# Patient Record
Sex: FEMALE | Race: WHITE | Employment: OTHER | URBAN - METROPOLITAN AREA
[De-identification: names, ages, dates, MRNs, and addresses within clinical notes are randomized per-mention and may not be internally consistent; named-entity substitution may affect disease eponyms.]

---

## 2017-01-20 RX ORDER — OXYCODONE AND ACETAMINOPHEN 10; 325 MG/1; MG/1
1 TABLET ORAL EVERY 4 HOURS PRN
Qty: 120 TABLET | Refills: 0 | Status: SHIPPED | OUTPATIENT
Start: 2017-01-20 | End: 2017-02-17 | Stop reason: SDUPTHER

## 2017-01-24 RX ORDER — ALENDRONATE SODIUM 70 MG/1
TABLET ORAL
Qty: 4 TABLET | Refills: 5 | Status: SHIPPED | OUTPATIENT
Start: 2017-01-24 | End: 2017-07-08 | Stop reason: SDUPTHER

## 2017-02-07 ENCOUNTER — OFFICE VISIT (OUTPATIENT)
Dept: FAMILY MEDICINE CLINIC | Age: 72
End: 2017-02-07

## 2017-02-07 VITALS
OXYGEN SATURATION: 96 % | SYSTOLIC BLOOD PRESSURE: 120 MMHG | WEIGHT: 156 LBS | HEART RATE: 70 BPM | DIASTOLIC BLOOD PRESSURE: 60 MMHG | BODY MASS INDEX: 25.18 KG/M2

## 2017-02-07 DIAGNOSIS — S32.000D LUMBAR COMPRESSION FRACTURE, WITH ROUTINE HEALING, SUBSEQUENT ENCOUNTER: ICD-10-CM

## 2017-02-07 DIAGNOSIS — M48.061 SPINAL STENOSIS, LUMBAR REGION, WITHOUT NEUROGENIC CLAUDICATION: Primary | ICD-10-CM

## 2017-02-07 DIAGNOSIS — M15.9 GENERALIZED OSTEOARTHROSIS, INVOLVING MULTIPLE SITES: ICD-10-CM

## 2017-02-07 DIAGNOSIS — F13.20 SEDATIVE, HYPNOTIC OR ANXIOLYTIC DEPENDENCE (HCC): ICD-10-CM

## 2017-02-07 DIAGNOSIS — F19.20 DRUG DEPENDENCE (HCC): ICD-10-CM

## 2017-02-07 PROCEDURE — 99214 OFFICE O/P EST MOD 30 MIN: CPT | Performed by: FAMILY MEDICINE

## 2017-02-07 RX ORDER — GABAPENTIN 300 MG/1
CAPSULE ORAL
Qty: 60 CAPSULE | Refills: 2 | Status: SHIPPED | OUTPATIENT
Start: 2017-02-07 | End: 2017-02-07 | Stop reason: SDUPTHER

## 2017-02-07 RX ORDER — OXYCODONE AND ACETAMINOPHEN 10; 325 MG/1; MG/1
1 TABLET ORAL EVERY 4 HOURS PRN
Qty: 120 TABLET | Refills: 0 | Status: CANCELLED | OUTPATIENT
Start: 2017-02-07

## 2017-02-07 RX ORDER — GABAPENTIN 100 MG/1
CAPSULE ORAL
Qty: 60 CAPSULE | Refills: 3 | Status: SHIPPED | OUTPATIENT
Start: 2017-02-07 | End: 2017-02-07 | Stop reason: SDUPTHER

## 2017-02-08 RX ORDER — GABAPENTIN 300 MG/1
CAPSULE ORAL
Qty: 180 CAPSULE | Refills: 0 | Status: SHIPPED | OUTPATIENT
Start: 2017-02-08 | End: 2017-06-05

## 2017-02-16 RX ORDER — ALPRAZOLAM 0.5 MG/1
TABLET ORAL
Qty: 90 TABLET | Refills: 1 | OUTPATIENT
Start: 2017-02-16

## 2017-02-17 ENCOUNTER — TELEPHONE (OUTPATIENT)
Dept: FAMILY MEDICINE CLINIC | Age: 72
End: 2017-02-17

## 2017-02-17 RX ORDER — OXYCODONE AND ACETAMINOPHEN 10; 325 MG/1; MG/1
1 TABLET ORAL EVERY 4 HOURS PRN
Qty: 120 TABLET | Refills: 0 | Status: SHIPPED | OUTPATIENT
Start: 2017-02-17 | End: 2017-03-20 | Stop reason: SDUPTHER

## 2017-02-17 RX ORDER — ALPRAZOLAM 0.5 MG/1
TABLET ORAL
Qty: 90 TABLET | Refills: 2 | Status: SHIPPED | OUTPATIENT
Start: 2017-02-17 | End: 2017-05-17 | Stop reason: SDUPTHER

## 2017-03-06 ENCOUNTER — OFFICE VISIT (OUTPATIENT)
Dept: FAMILY MEDICINE CLINIC | Age: 72
End: 2017-03-06

## 2017-03-06 VITALS
HEIGHT: 66 IN | DIASTOLIC BLOOD PRESSURE: 72 MMHG | HEART RATE: 76 BPM | WEIGHT: 156 LBS | SYSTOLIC BLOOD PRESSURE: 122 MMHG | BODY MASS INDEX: 25.07 KG/M2 | OXYGEN SATURATION: 98 %

## 2017-03-06 DIAGNOSIS — F19.20 DRUG DEPENDENCE (HCC): ICD-10-CM

## 2017-03-06 DIAGNOSIS — E11.9 TYPE 2 DIABETES MELLITUS WITHOUT COMPLICATION, WITHOUT LONG-TERM CURRENT USE OF INSULIN (HCC): ICD-10-CM

## 2017-03-06 DIAGNOSIS — Z00.00 ROUTINE GENERAL MEDICAL EXAMINATION AT A HEALTH CARE FACILITY: Primary | ICD-10-CM

## 2017-03-06 DIAGNOSIS — F41.0 PANIC DISORDER WITHOUT AGORAPHOBIA: ICD-10-CM

## 2017-03-06 DIAGNOSIS — R41.3 MEMORY DEFICIT: ICD-10-CM

## 2017-03-06 DIAGNOSIS — M48.061 SPINAL STENOSIS, LUMBAR REGION, WITHOUT NEUROGENIC CLAUDICATION: ICD-10-CM

## 2017-03-06 PROCEDURE — 99214 OFFICE O/P EST MOD 30 MIN: CPT | Performed by: FAMILY MEDICINE

## 2017-03-06 PROCEDURE — G0438 PPPS, INITIAL VISIT: HCPCS | Performed by: FAMILY MEDICINE

## 2017-03-06 ASSESSMENT — LIFESTYLE VARIABLES
HOW OFTEN DURING THE LAST YEAR HAVE YOU HAD A FEELING OF GUILT OR REMORSE AFTER DRINKING: 0
HOW OFTEN DURING THE LAST YEAR HAVE YOU BEEN UNABLE TO REMEMBER WHAT HAPPENED THE NIGHT BEFORE BECAUSE YOU HAD BEEN DRINKING: 0
HOW OFTEN DO YOU HAVE A DRINK CONTAINING ALCOHOL: 1
AUDIT TOTAL SCORE: 1
HOW OFTEN DURING THE LAST YEAR HAVE YOU NEEDED AN ALCOHOLIC DRINK FIRST THING IN THE MORNING TO GET YOURSELF GOING AFTER A NIGHT OF HEAVY DRINKING: 0
HOW OFTEN DURING THE LAST YEAR HAVE YOU FOUND THAT YOU WERE NOT ABLE TO STOP DRINKING ONCE YOU HAD STARTED: 0
AUDIT-C TOTAL SCORE: 1
HOW OFTEN DO YOU HAVE SIX OR MORE DRINKS ON ONE OCCASION: 0
HOW MANY STANDARD DRINKS CONTAINING ALCOHOL DO YOU HAVE ON A TYPICAL DAY: 0
HAS A RELATIVE, FRIEND, DOCTOR, OR ANOTHER HEALTH PROFESSIONAL EXPRESSED CONCERN ABOUT YOUR DRINKING OR SUGGESTED YOU CUT DOWN: 0
HOW OFTEN DURING THE LAST YEAR HAVE YOU FAILED TO DO WHAT WAS NORMALLY EXPECTED FROM YOU BECAUSE OF DRINKING: 0
HAVE YOU OR SOMEONE ELSE BEEN INJURED AS A RESULT OF YOUR DRINKING: 0

## 2017-03-06 ASSESSMENT — ANXIETY QUESTIONNAIRES: GAD7 TOTAL SCORE: 3

## 2017-03-09 ENCOUNTER — TELEPHONE (OUTPATIENT)
Dept: FAMILY MEDICINE CLINIC | Age: 72
End: 2017-03-09

## 2017-03-09 RX ORDER — AMOXICILLIN 500 MG/1
500 CAPSULE ORAL 3 TIMES DAILY
Qty: 30 CAPSULE | Refills: 0 | Status: SHIPPED | OUTPATIENT
Start: 2017-03-09 | End: 2017-03-19

## 2017-03-18 RX ORDER — GABAPENTIN 100 MG/1
CAPSULE ORAL
Qty: 30 CAPSULE | Refills: 2 | Status: SHIPPED | OUTPATIENT
Start: 2017-03-18 | End: 2017-06-05

## 2017-03-20 ENCOUNTER — TELEPHONE (OUTPATIENT)
Dept: FAMILY MEDICINE CLINIC | Age: 72
End: 2017-03-20

## 2017-03-20 RX ORDER — OXYCODONE AND ACETAMINOPHEN 10; 325 MG/1; MG/1
1 TABLET ORAL EVERY 4 HOURS PRN
Qty: 120 TABLET | Refills: 0 | Status: SHIPPED | OUTPATIENT
Start: 2017-03-20 | End: 2017-04-19 | Stop reason: SDUPTHER

## 2017-04-19 ENCOUNTER — TELEPHONE (OUTPATIENT)
Dept: FAMILY MEDICINE CLINIC | Age: 72
End: 2017-04-19

## 2017-04-19 RX ORDER — OXYCODONE AND ACETAMINOPHEN 10; 325 MG/1; MG/1
1 TABLET ORAL EVERY 4 HOURS PRN
Qty: 120 TABLET | Refills: 0 | Status: SHIPPED | OUTPATIENT
Start: 2017-04-19 | End: 2017-05-17 | Stop reason: SDUPTHER

## 2017-04-21 ENCOUNTER — TELEPHONE (OUTPATIENT)
Dept: FAMILY MEDICINE CLINIC | Age: 72
End: 2017-04-21

## 2017-04-21 DIAGNOSIS — M54.5 ACUTE LOW BACK PAIN, UNSPECIFIED BACK PAIN LATERALITY, WITH SCIATICA PRESENCE UNSPECIFIED: Primary | ICD-10-CM

## 2017-05-01 RX ORDER — SERTRALINE HYDROCHLORIDE 100 MG/1
TABLET, FILM COATED ORAL
Qty: 30 TABLET | Refills: 2 | Status: SHIPPED | OUTPATIENT
Start: 2017-05-01 | End: 2017-08-10 | Stop reason: SDUPTHER

## 2017-05-01 RX ORDER — SERTRALINE HYDROCHLORIDE 100 MG/1
TABLET, FILM COATED ORAL
Qty: 30 TABLET | Refills: 4 | Status: SHIPPED | OUTPATIENT
Start: 2017-05-01 | End: 2017-05-01 | Stop reason: SDUPTHER

## 2017-05-04 RX ORDER — GABAPENTIN 300 MG/1
300 CAPSULE ORAL 2 TIMES DAILY
Qty: 60 CAPSULE | Refills: 0 | Status: SHIPPED | OUTPATIENT
Start: 2017-05-04 | End: 2017-06-05 | Stop reason: SDUPTHER

## 2017-05-17 ENCOUNTER — TELEPHONE (OUTPATIENT)
Dept: FAMILY MEDICINE CLINIC | Age: 72
End: 2017-05-17

## 2017-05-17 RX ORDER — ALPRAZOLAM 0.5 MG/1
TABLET ORAL
Qty: 90 TABLET | Refills: 1 | Status: SHIPPED | OUTPATIENT
Start: 2017-05-17 | End: 2017-07-14 | Stop reason: SDUPTHER

## 2017-05-17 RX ORDER — OXYCODONE AND ACETAMINOPHEN 10; 325 MG/1; MG/1
1 TABLET ORAL EVERY 4 HOURS PRN
Qty: 120 TABLET | Refills: 0 | Status: SHIPPED | OUTPATIENT
Start: 2017-05-17 | End: 2017-06-05

## 2017-05-19 RX ORDER — ALPRAZOLAM 0.5 MG/1
TABLET ORAL
Qty: 90 TABLET | Refills: 1 | OUTPATIENT
Start: 2017-05-19

## 2017-06-03 ENCOUNTER — HOSPITAL ENCOUNTER (OUTPATIENT)
Dept: OTHER | Age: 72
Discharge: HOME | End: 2017-06-03
Attending: FAMILY MEDICINE | Admitting: FAMILY MEDICINE

## 2017-06-03 DIAGNOSIS — M54.5 ACUTE LOW BACK PAIN, UNSPECIFIED BACK PAIN LATERALITY, WITH SCIATICA PRESENCE UNSPECIFIED: ICD-10-CM

## 2017-06-05 ENCOUNTER — OFFICE VISIT (OUTPATIENT)
Dept: FAMILY MEDICINE CLINIC | Age: 72
End: 2017-06-05

## 2017-06-05 VITALS
WEIGHT: 150.13 LBS | SYSTOLIC BLOOD PRESSURE: 110 MMHG | OXYGEN SATURATION: 96 % | HEART RATE: 72 BPM | BODY MASS INDEX: 24.23 KG/M2 | DIASTOLIC BLOOD PRESSURE: 70 MMHG

## 2017-06-05 DIAGNOSIS — Z13.820 OSTEOPOROSIS SCREENING: ICD-10-CM

## 2017-06-05 DIAGNOSIS — R41.3 MEMORY DEFICIT: ICD-10-CM

## 2017-06-05 DIAGNOSIS — D68.2 CONGENITAL DEFICIENCY OF OTHER CLOTTING FACTORS: ICD-10-CM

## 2017-06-05 DIAGNOSIS — Z13.220 SCREENING FOR HYPERLIPIDEMIA: ICD-10-CM

## 2017-06-05 DIAGNOSIS — M15.9 GENERALIZED OSTEOARTHROSIS, INVOLVING MULTIPLE SITES: Primary | ICD-10-CM

## 2017-06-05 PROCEDURE — 99214 OFFICE O/P EST MOD 30 MIN: CPT | Performed by: FAMILY MEDICINE

## 2017-06-05 RX ORDER — DONEPEZIL HYDROCHLORIDE 10 MG/1
TABLET, FILM COATED ORAL
Qty: 180 TABLET | Refills: 1 | Status: CANCELLED | OUTPATIENT
Start: 2017-06-05

## 2017-06-05 RX ORDER — AMLODIPINE BESYLATE 10 MG/1
TABLET ORAL
Qty: 90 TABLET | Refills: 1 | Status: SHIPPED | OUTPATIENT
Start: 2017-06-05 | End: 2017-11-27 | Stop reason: SDUPTHER

## 2017-06-05 RX ORDER — OXYCODONE HYDROCHLORIDE AND ACETAMINOPHEN 5; 325 MG/1; MG/1
1 TABLET ORAL 4 TIMES DAILY PRN
Qty: 120 TABLET | Refills: 0 | Status: SHIPPED | OUTPATIENT
Start: 2017-06-05 | End: 2017-06-28

## 2017-06-05 RX ORDER — PRAVASTATIN SODIUM 40 MG
TABLET ORAL
Qty: 90 TABLET | Refills: 1 | Status: SHIPPED | OUTPATIENT
Start: 2017-06-05 | End: 2017-11-27 | Stop reason: SDUPTHER

## 2017-06-05 RX ORDER — MEMANTINE HYDROCHLORIDE 10 MG/1
10 TABLET ORAL 2 TIMES DAILY
Qty: 180 TABLET | Refills: 1 | Status: CANCELLED | OUTPATIENT
Start: 2017-06-05

## 2017-06-05 RX ORDER — GABAPENTIN 300 MG/1
300 CAPSULE ORAL 2 TIMES DAILY
Qty: 60 CAPSULE | Refills: 5 | Status: SHIPPED | OUTPATIENT
Start: 2017-06-05 | End: 2017-06-28 | Stop reason: DRUGHIGH

## 2017-06-06 ENCOUNTER — TELEPHONE (OUTPATIENT)
Dept: FAMILY MEDICINE CLINIC | Age: 72
End: 2017-06-06

## 2017-06-06 RX ORDER — DONEPEZIL HYDROCHLORIDE 10 MG/1
TABLET, FILM COATED ORAL
Qty: 180 TABLET | Refills: 1 | Status: SHIPPED | OUTPATIENT
Start: 2017-06-06 | End: 2017-11-27 | Stop reason: SDUPTHER

## 2017-06-06 RX ORDER — OMEPRAZOLE 40 MG/1
CAPSULE, DELAYED RELEASE ORAL
Qty: 90 CAPSULE | Refills: 1 | Status: SHIPPED | OUTPATIENT
Start: 2017-06-06 | End: 2017-10-30 | Stop reason: SDUPTHER

## 2017-06-06 RX ORDER — MEMANTINE HYDROCHLORIDE 10 MG/1
10 TABLET ORAL 2 TIMES DAILY
Qty: 180 TABLET | Refills: 1 | Status: SHIPPED | OUTPATIENT
Start: 2017-06-06 | End: 2017-11-07 | Stop reason: SDUPTHER

## 2017-06-12 ENCOUNTER — TELEPHONE (OUTPATIENT)
Dept: FAMILY MEDICINE CLINIC | Age: 72
End: 2017-06-12

## 2017-06-19 ENCOUNTER — TELEPHONE (OUTPATIENT)
Dept: ORTHOPEDIC SURGERY | Age: 72
End: 2017-06-19

## 2017-06-19 ENCOUNTER — OFFICE VISIT (OUTPATIENT)
Dept: ORTHOPEDIC SURGERY | Age: 72
End: 2017-06-19

## 2017-06-19 ENCOUNTER — TELEPHONE (OUTPATIENT)
Dept: FAMILY MEDICINE CLINIC | Age: 72
End: 2017-06-19

## 2017-06-19 VITALS
DIASTOLIC BLOOD PRESSURE: 78 MMHG | BODY MASS INDEX: 24.13 KG/M2 | SYSTOLIC BLOOD PRESSURE: 135 MMHG | WEIGHT: 150.13 LBS | HEIGHT: 66 IN | HEART RATE: 58 BPM

## 2017-06-19 DIAGNOSIS — M43.16 SPONDYLOLISTHESIS, LUMBAR REGION: Primary | ICD-10-CM

## 2017-06-19 DIAGNOSIS — M47.26 OSTEOARTHRITIS OF SPINE WITH RADICULOPATHY, LUMBAR REGION: ICD-10-CM

## 2017-06-19 PROCEDURE — 99214 OFFICE O/P EST MOD 30 MIN: CPT | Performed by: PHYSICAL MEDICINE & REHABILITATION

## 2017-06-21 ENCOUNTER — HOSPITAL ENCOUNTER (OUTPATIENT)
Dept: PAIN MANAGEMENT | Age: 72
Discharge: HOME | End: 2017-06-21
Attending: PHYSICAL MEDICINE & REHABILITATION | Admitting: PHYSICAL MEDICINE & REHABILITATION

## 2017-06-21 VITALS
DIASTOLIC BLOOD PRESSURE: 69 MMHG | RESPIRATION RATE: 16 BRPM | BODY MASS INDEX: 23.3 KG/M2 | HEART RATE: 55 BPM | HEIGHT: 66 IN | SYSTOLIC BLOOD PRESSURE: 143 MMHG | OXYGEN SATURATION: 99 % | TEMPERATURE: 98.2 F | WEIGHT: 145 LBS

## 2017-06-21 ASSESSMENT — PAIN DESCRIPTION - DESCRIPTORS: DESCRIPTORS: ACHING

## 2017-06-21 ASSESSMENT — PAIN - FUNCTIONAL ASSESSMENT
PAIN_FUNCTIONAL_ASSESSMENT: 0-10

## 2017-06-27 ENCOUNTER — TELEPHONE (OUTPATIENT)
Dept: FAMILY MEDICINE CLINIC | Age: 72
End: 2017-06-27

## 2017-06-28 RX ORDER — OXYCODONE AND ACETAMINOPHEN 10; 325 MG/1; MG/1
1 TABLET ORAL EVERY 4 HOURS PRN
Qty: 120 TABLET | Refills: 0 | Status: SHIPPED | OUTPATIENT
Start: 2017-06-28 | End: 2017-07-27 | Stop reason: SDUPTHER

## 2017-06-28 RX ORDER — GABAPENTIN 300 MG/1
600 CAPSULE ORAL 2 TIMES DAILY
Qty: 120 CAPSULE | Refills: 0 | Status: SHIPPED
Start: 2017-06-28 | End: 2017-06-29 | Stop reason: SDUPTHER

## 2017-06-28 RX ORDER — GABAPENTIN 300 MG/1
2 CAPSULE ORAL 2 TIMES DAILY
Qty: 120 CAPSULE | Refills: 0 | Status: SHIPPED
Start: 2017-06-28 | End: 2017-06-28 | Stop reason: DRUGHIGH

## 2017-06-29 RX ORDER — GABAPENTIN 300 MG/1
600 CAPSULE ORAL 2 TIMES DAILY
Qty: 120 CAPSULE | Refills: 2 | Status: SHIPPED | OUTPATIENT
Start: 2017-06-29 | End: 2017-08-24 | Stop reason: SDUPTHER

## 2017-07-08 RX ORDER — ALENDRONATE SODIUM 70 MG/1
TABLET ORAL
Qty: 4 TABLET | Refills: 4 | Status: SHIPPED | OUTPATIENT
Start: 2017-07-08 | End: 2018-04-17 | Stop reason: SDUPTHER

## 2017-07-10 ENCOUNTER — OFFICE VISIT (OUTPATIENT)
Dept: ORTHOPEDIC SURGERY | Age: 72
End: 2017-07-10

## 2017-07-10 VITALS
DIASTOLIC BLOOD PRESSURE: 74 MMHG | HEART RATE: 61 BPM | BODY MASS INDEX: 23.31 KG/M2 | WEIGHT: 145.06 LBS | SYSTOLIC BLOOD PRESSURE: 140 MMHG | HEIGHT: 66 IN

## 2017-07-10 DIAGNOSIS — M47.816 SPONDYLOSIS OF LUMBAR REGION WITHOUT MYELOPATHY OR RADICULOPATHY: Primary | ICD-10-CM

## 2017-07-10 DIAGNOSIS — M53.3 SACRODYNIA: ICD-10-CM

## 2017-07-10 PROCEDURE — 99213 OFFICE O/P EST LOW 20 MIN: CPT | Performed by: PHYSICAL MEDICINE & REHABILITATION

## 2017-07-11 ENCOUNTER — TELEPHONE (OUTPATIENT)
Dept: ORTHOPEDIC SURGERY | Age: 72
End: 2017-07-11

## 2017-07-14 RX ORDER — ALPRAZOLAM 0.5 MG/1
TABLET ORAL
Qty: 90 TABLET | Refills: 2 | OUTPATIENT
Start: 2017-07-14 | End: 2017-10-12 | Stop reason: SDUPTHER

## 2017-07-19 ENCOUNTER — HOSPITAL ENCOUNTER (OUTPATIENT)
Dept: PAIN MANAGEMENT | Age: 72
Discharge: HOME | End: 2017-07-19
Attending: PHYSICAL MEDICINE & REHABILITATION | Admitting: PHYSICAL MEDICINE & REHABILITATION

## 2017-07-19 VITALS
DIASTOLIC BLOOD PRESSURE: 69 MMHG | HEART RATE: 72 BPM | TEMPERATURE: 97.8 F | RESPIRATION RATE: 20 BRPM | SYSTOLIC BLOOD PRESSURE: 149 MMHG | OXYGEN SATURATION: 100 %

## 2017-07-19 ASSESSMENT — PAIN - FUNCTIONAL ASSESSMENT
PAIN_FUNCTIONAL_ASSESSMENT: 0-10
PAIN_FUNCTIONAL_ASSESSMENT: 0-10

## 2017-07-19 ASSESSMENT — PAIN DESCRIPTION - DESCRIPTORS: DESCRIPTORS: CONSTANT;TIGHTNESS

## 2017-07-27 ENCOUNTER — TELEPHONE (OUTPATIENT)
Dept: FAMILY MEDICINE CLINIC | Age: 72
End: 2017-07-27

## 2017-07-27 RX ORDER — OXYCODONE AND ACETAMINOPHEN 10; 325 MG/1; MG/1
1 TABLET ORAL EVERY 4 HOURS PRN
Qty: 120 TABLET | Refills: 0 | Status: SHIPPED | OUTPATIENT
Start: 2017-07-27 | End: 2017-08-23 | Stop reason: ALTCHOICE

## 2017-08-09 ENCOUNTER — TELEPHONE (OUTPATIENT)
Dept: FAMILY MEDICINE CLINIC | Age: 72
End: 2017-08-09

## 2017-08-10 RX ORDER — SERTRALINE HYDROCHLORIDE 100 MG/1
TABLET, FILM COATED ORAL
Qty: 30 TABLET | Refills: 2 | Status: SHIPPED | OUTPATIENT
Start: 2017-08-10 | End: 2017-10-10

## 2017-08-11 RX ORDER — TRAMADOL HYDROCHLORIDE 50 MG/1
50 TABLET ORAL 4 TIMES DAILY PRN
Qty: 60 TABLET | Refills: 0 | OUTPATIENT
Start: 2017-08-11 | End: 2017-08-24 | Stop reason: SDUPTHER

## 2017-08-17 ENCOUNTER — TELEPHONE (OUTPATIENT)
Dept: FAMILY MEDICINE CLINIC | Age: 72
End: 2017-08-17

## 2017-08-18 ENCOUNTER — TELEPHONE (OUTPATIENT)
Dept: ORTHOPEDIC SURGERY | Age: 72
End: 2017-08-18

## 2017-08-18 ENCOUNTER — OFFICE VISIT (OUTPATIENT)
Dept: ORTHOPEDIC SURGERY | Age: 72
End: 2017-08-18

## 2017-08-18 VITALS
SYSTOLIC BLOOD PRESSURE: 138 MMHG | DIASTOLIC BLOOD PRESSURE: 74 MMHG | HEART RATE: 66 BPM | BODY MASS INDEX: 23.31 KG/M2 | WEIGHT: 145.06 LBS | HEIGHT: 66 IN

## 2017-08-18 DIAGNOSIS — M53.3 SACROILIAC JOINT DYSFUNCTION OF BOTH SIDES: Primary | ICD-10-CM

## 2017-08-18 DIAGNOSIS — M43.16 SPONDYLOLISTHESIS, LUMBAR REGION: ICD-10-CM

## 2017-08-18 DIAGNOSIS — M54.6 THORACIC SPINE PAIN: ICD-10-CM

## 2017-08-18 PROCEDURE — 99213 OFFICE O/P EST LOW 20 MIN: CPT | Performed by: PHYSICAL MEDICINE & REHABILITATION

## 2017-08-23 ENCOUNTER — TELEPHONE (OUTPATIENT)
Dept: FAMILY MEDICINE CLINIC | Age: 72
End: 2017-08-23

## 2017-08-23 ENCOUNTER — HOSPITAL ENCOUNTER (OUTPATIENT)
Dept: PAIN MANAGEMENT | Age: 72
Discharge: HOME | End: 2017-08-23
Attending: PHYSICAL MEDICINE & REHABILITATION | Admitting: PHYSICAL MEDICINE & REHABILITATION

## 2017-08-23 VITALS
OXYGEN SATURATION: 100 % | HEART RATE: 63 BPM | WEIGHT: 154 LBS | SYSTOLIC BLOOD PRESSURE: 148 MMHG | BODY MASS INDEX: 24.75 KG/M2 | DIASTOLIC BLOOD PRESSURE: 78 MMHG | HEIGHT: 66 IN | RESPIRATION RATE: 16 BRPM | TEMPERATURE: 97.6 F

## 2017-08-23 ASSESSMENT — PAIN - FUNCTIONAL ASSESSMENT: PAIN_FUNCTIONAL_ASSESSMENT: 0-10

## 2017-08-23 ASSESSMENT — PAIN DESCRIPTION - DESCRIPTORS
DESCRIPTORS: DULL;SHARP;CONSTANT
DESCRIPTORS: ACHING

## 2017-08-24 RX ORDER — GABAPENTIN 300 MG/1
600 CAPSULE ORAL 2 TIMES DAILY
Qty: 120 CAPSULE | Refills: 2 | OUTPATIENT
Start: 2017-08-24 | End: 2017-09-05

## 2017-08-24 RX ORDER — TRAMADOL HYDROCHLORIDE 50 MG/1
50 TABLET ORAL 4 TIMES DAILY PRN
Qty: 60 TABLET | Refills: 0 | OUTPATIENT
Start: 2017-08-24 | End: 2017-09-05

## 2017-09-05 ENCOUNTER — OFFICE VISIT (OUTPATIENT)
Dept: FAMILY MEDICINE CLINIC | Age: 72
End: 2017-09-05

## 2017-09-05 VITALS
SYSTOLIC BLOOD PRESSURE: 120 MMHG | WEIGHT: 146 LBS | DIASTOLIC BLOOD PRESSURE: 80 MMHG | BODY MASS INDEX: 23.57 KG/M2 | HEART RATE: 74 BPM | OXYGEN SATURATION: 96 %

## 2017-09-05 DIAGNOSIS — M48.061 SPINAL STENOSIS, LUMBAR REGION, WITHOUT NEUROGENIC CLAUDICATION: ICD-10-CM

## 2017-09-05 DIAGNOSIS — F13.20 SEDATIVE, HYPNOTIC OR ANXIOLYTIC DEPENDENCE (HCC): ICD-10-CM

## 2017-09-05 DIAGNOSIS — M15.9 GENERALIZED OSTEOARTHROSIS, INVOLVING MULTIPLE SITES: Primary | ICD-10-CM

## 2017-09-05 DIAGNOSIS — J06.9 VIRAL UPPER RESPIRATORY TRACT INFECTION: ICD-10-CM

## 2017-09-05 DIAGNOSIS — E11.9 TYPE 2 DIABETES MELLITUS WITHOUT COMPLICATION, WITHOUT LONG-TERM CURRENT USE OF INSULIN (HCC): ICD-10-CM

## 2017-09-05 DIAGNOSIS — R41.3 MEMORY DEFICIT: ICD-10-CM

## 2017-09-05 PROCEDURE — 99214 OFFICE O/P EST MOD 30 MIN: CPT | Performed by: FAMILY MEDICINE

## 2017-09-05 RX ORDER — OXYCODONE AND ACETAMINOPHEN 10; 325 MG/1; MG/1
1 TABLET ORAL 4 TIMES DAILY PRN
Qty: 120 TABLET | Refills: 0 | Status: SHIPPED | OUTPATIENT
Start: 2017-09-05 | End: 2017-10-02 | Stop reason: SDUPTHER

## 2017-09-05 RX ORDER — GABAPENTIN 600 MG/1
600 TABLET ORAL 3 TIMES DAILY
Qty: 90 TABLET | Refills: 3 | Status: SHIPPED | OUTPATIENT
Start: 2017-09-05 | End: 2018-01-05 | Stop reason: SDUPTHER

## 2017-09-08 ENCOUNTER — OFFICE VISIT (OUTPATIENT)
Dept: ORTHOPEDIC SURGERY | Age: 72
End: 2017-09-08

## 2017-09-08 ENCOUNTER — TELEPHONE (OUTPATIENT)
Dept: ORTHOPEDIC SURGERY | Age: 72
End: 2017-09-08

## 2017-09-08 VITALS
DIASTOLIC BLOOD PRESSURE: 79 MMHG | HEIGHT: 66 IN | WEIGHT: 147 LBS | SYSTOLIC BLOOD PRESSURE: 126 MMHG | BODY MASS INDEX: 23.63 KG/M2

## 2017-09-08 DIAGNOSIS — M47.816 SPONDYLOSIS OF LUMBAR REGION WITHOUT MYELOPATHY OR RADICULOPATHY: ICD-10-CM

## 2017-09-08 DIAGNOSIS — M53.3 SACROILIAC JOINT DYSFUNCTION OF LEFT SIDE: Primary | ICD-10-CM

## 2017-09-08 PROCEDURE — 99213 OFFICE O/P EST LOW 20 MIN: CPT | Performed by: PHYSICAL MEDICINE & REHABILITATION

## 2017-09-15 ENCOUNTER — HOSPITAL ENCOUNTER (OUTPATIENT)
Dept: PAIN MANAGEMENT | Age: 72
Discharge: HOME | End: 2017-09-15
Attending: PHYSICAL MEDICINE & REHABILITATION | Admitting: PHYSICAL MEDICINE & REHABILITATION

## 2017-09-15 VITALS
WEIGHT: 147 LBS | DIASTOLIC BLOOD PRESSURE: 69 MMHG | OXYGEN SATURATION: 98 % | RESPIRATION RATE: 16 BRPM | TEMPERATURE: 98.2 F | HEART RATE: 65 BPM | SYSTOLIC BLOOD PRESSURE: 140 MMHG | HEIGHT: 66 IN | BODY MASS INDEX: 23.63 KG/M2

## 2017-09-15 ASSESSMENT — PAIN - FUNCTIONAL ASSESSMENT
PAIN_FUNCTIONAL_ASSESSMENT: 0-10
PAIN_FUNCTIONAL_ASSESSMENT: 0-10

## 2017-09-15 ASSESSMENT — PAIN SCALES - GENERAL: PAINLEVEL_OUTOF10: 1

## 2017-09-15 ASSESSMENT — PAIN DESCRIPTION - DESCRIPTORS: DESCRIPTORS: ACHING

## 2017-09-22 ENCOUNTER — OFFICE VISIT (OUTPATIENT)
Dept: ORTHOPEDIC SURGERY | Age: 72
End: 2017-09-22

## 2017-09-22 VITALS — WEIGHT: 147.05 LBS | HEIGHT: 66 IN | BODY MASS INDEX: 23.63 KG/M2

## 2017-09-22 DIAGNOSIS — M53.3 SACRAL PAIN: Primary | ICD-10-CM

## 2017-09-22 DIAGNOSIS — M46.1 SACROILIITIS (HCC): ICD-10-CM

## 2017-09-22 DIAGNOSIS — M43.16 SPONDYLOLISTHESIS OF LUMBAR REGION: ICD-10-CM

## 2017-09-22 PROCEDURE — 99213 OFFICE O/P EST LOW 20 MIN: CPT | Performed by: PHYSICAL MEDICINE & REHABILITATION

## 2017-09-28 ENCOUNTER — NURSE ONLY (OUTPATIENT)
Dept: FAMILY MEDICINE CLINIC | Age: 72
End: 2017-09-28

## 2017-09-28 DIAGNOSIS — Z23 NEED FOR INFLUENZA VACCINATION: Primary | ICD-10-CM

## 2017-09-28 PROCEDURE — G0008 ADMIN INFLUENZA VIRUS VAC: HCPCS | Performed by: FAMILY MEDICINE

## 2017-09-28 PROCEDURE — 90662 IIV NO PRSV INCREASED AG IM: CPT | Performed by: FAMILY MEDICINE

## 2017-10-02 ENCOUNTER — HOSPITAL ENCOUNTER (OUTPATIENT)
Dept: MRI IMAGING | Age: 72
Discharge: HOME | End: 2017-10-02
Attending: PHYSICAL MEDICINE & REHABILITATION | Admitting: PHYSICAL MEDICINE & REHABILITATION

## 2017-10-02 ENCOUNTER — TELEPHONE (OUTPATIENT)
Dept: FAMILY MEDICINE CLINIC | Age: 72
End: 2017-10-02

## 2017-10-02 DIAGNOSIS — M46.1 SACROILIITIS (HCC): ICD-10-CM

## 2017-10-02 DIAGNOSIS — M46.1 SACROILIITIS, NOT ELSEWHERE CLASSIFIED (HCC): ICD-10-CM

## 2017-10-02 DIAGNOSIS — M53.3 SACRAL PAIN: ICD-10-CM

## 2017-10-02 RX ORDER — OXYCODONE AND ACETAMINOPHEN 10; 325 MG/1; MG/1
1 TABLET ORAL 4 TIMES DAILY PRN
Qty: 120 TABLET | Refills: 0 | Status: SHIPPED | OUTPATIENT
Start: 2017-10-02 | End: 2017-11-01 | Stop reason: SDUPTHER

## 2017-10-02 NOTE — TELEPHONE ENCOUNTER
Patient called in to refill     oxyCODONE-acetaminophen (PERCOCET)  MG per tablet [[id number]]      She would like to go back to the stronger dose of   She is in a lot of pain  She had an MRI this morning at [facility]  She will  on October 5

## 2017-10-09 ENCOUNTER — TELEPHONE (OUTPATIENT)
Dept: ORTHOPEDIC SURGERY | Age: 72
End: 2017-10-09

## 2017-10-09 ENCOUNTER — TELEPHONE (OUTPATIENT)
Dept: FAMILY MEDICINE CLINIC | Age: 72
End: 2017-10-09

## 2017-10-09 NOTE — TELEPHONE ENCOUNTER
Patient called wanting results of MRI Pelvis from 10/2/17. She states that she will not come into the office for the results of the MRI and refuses to waste money on a copay that will not help. States Dr. Sravanthi Anand cannot help her. After speaking with Dr. Sravanthi Anand, the results of the MRI Pelvis are normal in regards to musculature and bony structures. MRI did show some free standing fluid in the pelvis which is not normal for a patient her age. These results were related to the patient via voicemail message and instructed that Dr. Sravanthi Anand would like her to follow up with her PCP. Results of MRI Pelvis forwarded to PCP for review.

## 2017-10-09 NOTE — TELEPHONE ENCOUNTER
Pt states that Dr. Juan Mata wants to charge pt $45 to come to office to get test results and want to [address] Alonso would let pt know the results when he get the report on MRI of her pelvis. Dr Juan Mata has already told pt there nothing more she can do.   Please advise

## 2017-10-10 ENCOUNTER — OFFICE VISIT (OUTPATIENT)
Dept: FAMILY MEDICINE CLINIC | Age: 72
End: 2017-10-10

## 2017-10-10 VITALS
DIASTOLIC BLOOD PRESSURE: 60 MMHG | RESPIRATION RATE: 12 BRPM | OXYGEN SATURATION: 98 % | SYSTOLIC BLOOD PRESSURE: 100 MMHG | BODY MASS INDEX: 24.51 KG/M2 | HEART RATE: 69 BPM | WEIGHT: 152.5 LBS

## 2017-10-10 DIAGNOSIS — F32.1 MODERATE SINGLE CURRENT EPISODE OF MAJOR DEPRESSIVE DISORDER (HCC): ICD-10-CM

## 2017-10-10 DIAGNOSIS — M48.061 SPINAL STENOSIS, LUMBAR REGION, WITHOUT NEUROGENIC CLAUDICATION: Primary | ICD-10-CM

## 2017-10-10 DIAGNOSIS — R18.8 OTHER ASCITES: ICD-10-CM

## 2017-10-10 PROCEDURE — 99214 OFFICE O/P EST MOD 30 MIN: CPT | Performed by: FAMILY MEDICINE

## 2017-10-10 RX ORDER — DULOXETIN HYDROCHLORIDE 60 MG/1
60 CAPSULE, DELAYED RELEASE ORAL DAILY
Qty: 30 CAPSULE | Refills: 3 | Status: SHIPPED | OUTPATIENT
Start: 2017-10-10 | End: 2017-10-10 | Stop reason: SDUPTHER

## 2017-10-10 ASSESSMENT — ENCOUNTER SYMPTOMS: BACK PAIN: 1

## 2017-10-10 NOTE — PROGRESS NOTES
hernia. Musculoskeletal:        Lumbar back: She exhibits no swelling, no edema, no deformity and no spasm. Right upper leg: She exhibits no tenderness, no swelling and no deformity. Left upper leg: She exhibits no tenderness, no swelling and no deformity. Patient has tenderness across her entire sacrum but no coccygeal tenderness. Neurological: She is alert and oriented to person, place, and time. No sensory deficit. Reflex Scores:       Patellar reflexes are 2+ on the right side and 2+ on the left side. Achilles reflexes are 2+ on the right side and 2+ on the left side. Skin: Skin is warm. No rash noted. No erythema. Psychiatric: Her speech is normal and behavior is normal. Thought content normal. Her mood appears anxious. She exhibits abnormal recent memory. Assessment:      Jatinder Moore was seen today for back pain. Diagnoses and all orders for this visit:    Spinal stenosis, lumbar region, without neurogenic claudication    Moderate single current episode of major depressive disorder ([address]    Other ascites    Other orders  -     DULoxetine (CYMBALTA) 60 MG extended release capsule;  Take 1 capsule by mouth daily            Plan:      MRI scan reviewed with patient  Discussed stopping sertraline and starting Cymbalta for pain management  Maintain gabapentin  We'll obtain an abdominal pelvic CT scan with contrast  No change of pain medication  Keep her RTC appointment

## 2017-10-11 RX ORDER — DULOXETIN HYDROCHLORIDE 60 MG/1
60 CAPSULE, DELAYED RELEASE ORAL DAILY
Qty: 90 CAPSULE | Refills: 0 | Status: SHIPPED | OUTPATIENT
Start: 2017-10-11 | End: 2018-01-18 | Stop reason: SDUPTHER

## 2017-10-12 ENCOUNTER — TELEPHONE (OUTPATIENT)
Dept: FAMILY MEDICINE CLINIC | Age: 72
End: 2017-10-12

## 2017-10-13 RX ORDER — ALPRAZOLAM 0.5 MG/1
TABLET ORAL
Qty: 90 TABLET | Refills: 0 | Status: SHIPPED | OUTPATIENT
Start: 2017-10-13 | End: 2017-11-12 | Stop reason: SDUPTHER

## 2017-10-18 ENCOUNTER — HOSPITAL ENCOUNTER (OUTPATIENT)
Dept: CT IMAGING | Age: 72
Discharge: HOME | End: 2017-10-18
Attending: FAMILY MEDICINE | Admitting: FAMILY MEDICINE

## 2017-10-18 ENCOUNTER — TELEPHONE (OUTPATIENT)
Dept: FAMILY MEDICINE CLINIC | Age: 72
End: 2017-10-18

## 2017-10-18 DIAGNOSIS — R18.8 OTHER ASCITES: ICD-10-CM

## 2017-10-18 LAB
ANION GAP SERPL CALCULATED.3IONS-SCNC: 10 MMOL/L (ref 3–16)
BUN BLDV-MCNC: 10 MG/DL (ref 7–20)
CALCIUM SERPL-MCNC: 9.7 MG/DL (ref 8.3–10.6)
CHLORIDE BLD-SCNC: 100 MMOL/L (ref 99–110)
CO2: 28 MMOL/L (ref 21–32)
CREAT SERPL-MCNC: 0.7 MG/DL (ref 0.6–1.2)
GFR AFRICAN AMERICAN: >60
GFR NON-AFRICAN AMERICAN: >60
GLUCOSE BLD-MCNC: 108 MG/DL (ref 70–99)
POTASSIUM SERPL-SCNC: 5.1 MMOL/L (ref 3.5–5.1)
SODIUM BLD-SCNC: 138 MMOL/L (ref 136–145)

## 2017-10-19 ENCOUNTER — TELEPHONE (OUTPATIENT)
Dept: FAMILY MEDICINE CLINIC | Age: 72
End: 2017-10-19

## 2017-10-20 ENCOUNTER — TELEPHONE (OUTPATIENT)
Dept: FAMILY MEDICINE CLINIC | Age: 72
End: 2017-10-20

## 2017-10-30 RX ORDER — OMEPRAZOLE 40 MG/1
CAPSULE, DELAYED RELEASE ORAL
Qty: 90 CAPSULE | Refills: 0 | Status: SHIPPED | OUTPATIENT
Start: 2017-10-30 | End: 2017-10-30 | Stop reason: SDUPTHER

## 2017-10-30 RX ORDER — OMEPRAZOLE 40 MG/1
CAPSULE, DELAYED RELEASE ORAL
Qty: 90 CAPSULE | Refills: 0 | Status: SHIPPED | OUTPATIENT
Start: 2017-10-30 | End: 2018-02-03 | Stop reason: SDUPTHER

## 2017-11-01 ENCOUNTER — TELEPHONE (OUTPATIENT)
Dept: FAMILY MEDICINE CLINIC | Age: 72
End: 2017-11-01

## 2017-11-01 RX ORDER — OXYCODONE AND ACETAMINOPHEN 10; 325 MG/1; MG/1
1 TABLET ORAL 4 TIMES DAILY PRN
Qty: 120 TABLET | Refills: 0 | Status: SHIPPED | OUTPATIENT
Start: 2017-11-01 | End: 2017-11-29 | Stop reason: SDUPTHER

## 2017-11-07 RX ORDER — MEMANTINE HYDROCHLORIDE 10 MG/1
TABLET ORAL
Qty: 180 TABLET | Refills: 0 | Status: SHIPPED | OUTPATIENT
Start: 2017-11-07 | End: 2018-01-18 | Stop reason: SDUPTHER

## 2017-11-08 ENCOUNTER — TELEPHONE (OUTPATIENT)
Dept: FAMILY MEDICINE CLINIC | Age: 72
End: 2017-11-08

## 2017-11-08 RX ORDER — ALPRAZOLAM 0.5 MG/1
TABLET ORAL
Qty: 90 TABLET | Refills: 0 | OUTPATIENT
Start: 2017-11-08

## 2017-11-08 NOTE — TELEPHONE ENCOUNTER
oxyCODONE-acetaminophen (PERCOCET) 5-325 MG per tablet     Pt is requesting the stronger dose vs oxyCODONE-acetaminophen (PERCOCET)  MG per tablet - this was prescribe on 11/1/17    Pt states pt's spouse will not allow her to have 2 pills of the Percocet  to compare to the higher dosage for pain. Pt wants to know if she can take 2 pills until she gets the higher dosage.     Please call and advise

## 2017-11-13 RX ORDER — ALPRAZOLAM 0.5 MG/1
TABLET ORAL
Qty: 90 TABLET | Refills: 0 | Status: SHIPPED | OUTPATIENT
Start: 2017-11-13 | End: 2017-12-10 | Stop reason: SDUPTHER

## 2017-11-27 RX ORDER — DONEPEZIL HYDROCHLORIDE 10 MG/1
TABLET, FILM COATED ORAL
Qty: 180 TABLET | Refills: 0 | Status: SHIPPED | OUTPATIENT
Start: 2017-11-27 | End: 2018-01-18 | Stop reason: SDUPTHER

## 2017-11-27 RX ORDER — PRAVASTATIN SODIUM 40 MG
TABLET ORAL
Qty: 90 TABLET | Refills: 0 | Status: SHIPPED | OUTPATIENT
Start: 2017-11-27 | End: 2018-03-07 | Stop reason: SDUPTHER

## 2017-11-27 RX ORDER — AMLODIPINE BESYLATE 10 MG/1
TABLET ORAL
Qty: 90 TABLET | Refills: 0 | Status: SHIPPED | OUTPATIENT
Start: 2017-11-27 | End: 2018-03-07 | Stop reason: SDUPTHER

## 2017-11-29 ENCOUNTER — TELEPHONE (OUTPATIENT)
Dept: FAMILY MEDICINE CLINIC | Age: 72
End: 2017-11-29

## 2017-11-29 RX ORDER — OXYCODONE AND ACETAMINOPHEN 10; 325 MG/1; MG/1
1 TABLET ORAL 4 TIMES DAILY PRN
Qty: 120 TABLET | Refills: 0 | Status: SHIPPED | OUTPATIENT
Start: 2017-11-29 | End: 2017-12-29 | Stop reason: SDUPTHER

## 2017-12-12 RX ORDER — ALPRAZOLAM 0.5 MG/1
TABLET ORAL
Qty: 90 TABLET | Refills: 0 | Status: SHIPPED | OUTPATIENT
Start: 2017-12-12 | End: 2018-01-08 | Stop reason: SDUPTHER

## 2017-12-13 RX ORDER — ALPRAZOLAM 0.5 MG/1
TABLET ORAL
Qty: 90 TABLET | Refills: 0 | Status: SHIPPED | OUTPATIENT
Start: 2017-12-13 | End: 2018-01-08

## 2017-12-29 DIAGNOSIS — M48.061 SPINAL STENOSIS, LUMBAR REGION, WITHOUT NEUROGENIC CLAUDICATION: Primary | ICD-10-CM

## 2017-12-29 RX ORDER — OXYCODONE AND ACETAMINOPHEN 10; 325 MG/1; MG/1
1 TABLET ORAL 4 TIMES DAILY PRN
Qty: 120 TABLET | Refills: 0 | Status: CANCELLED | OUTPATIENT
Start: 2017-12-29 | End: 2018-12-29

## 2017-12-29 RX ORDER — OXYCODONE AND ACETAMINOPHEN 10; 325 MG/1; MG/1
1 TABLET ORAL 4 TIMES DAILY PRN
Qty: 120 TABLET | Refills: 0 | Status: SHIPPED | OUTPATIENT
Start: 2017-12-29 | End: 2018-01-29 | Stop reason: SDUPTHER

## 2017-12-29 NOTE — TELEPHONE ENCOUNTER
From: Wero Settler  Sent: 12/28/2017 4:44 PM EST  Subject: Medication Renewal Request    Selin Ruiz would like a refill of the following medications:  oxyCODONE-acetaminophen (PERCOCET)  MG per tablet Johnny Bender MD]    Preferred pharmacy: [facility] [address] [address], [address]    Comment:

## 2018-01-02 ENCOUNTER — TELEPHONE (OUTPATIENT)
Dept: FAMILY MEDICINE CLINIC | Age: 73
End: 2018-01-02

## 2018-01-05 RX ORDER — GABAPENTIN 600 MG/1
TABLET ORAL
Qty: 90 TABLET | Refills: 0 | Status: SHIPPED | OUTPATIENT
Start: 2018-01-05 | End: 2018-01-29 | Stop reason: SDUPTHER

## 2018-01-08 ENCOUNTER — TELEPHONE (OUTPATIENT)
Dept: FAMILY MEDICINE CLINIC | Age: 73
End: 2018-01-08

## 2018-01-08 DIAGNOSIS — F41.0 PANIC DISORDER WITHOUT AGORAPHOBIA: Primary | ICD-10-CM

## 2018-01-08 RX ORDER — ALPRAZOLAM 0.5 MG/1
TABLET ORAL
Qty: 48 TABLET | Refills: 0 | OUTPATIENT
Start: 2018-01-08 | End: 2018-02-08

## 2018-01-08 NOTE — TELEPHONE ENCOUNTER
Pt's mother in law is in hospice and in the process of dying she had to reschedule her appt from 1/10 to 1/26 and will be out of the following    ALPRAZolam (XANAX) 0.5 MG tablet 90 tablet 0 12/13/2017     Sig: TAKE ONE TABLET BY MOUTH EVERY MORNING AND TAKE TWO TABLETS BY MOUTH EVERY EVENING      Diony on [facility] - is asking for just enough to hold her over until new appt date

## 2018-01-16 ENCOUNTER — TELEPHONE (OUTPATIENT)
Dept: FAMILY MEDICINE CLINIC | Age: 73
End: 2018-01-16

## 2018-01-16 DIAGNOSIS — S39.92XA INJURY OF COCCYX, INITIAL ENCOUNTER: Primary | ICD-10-CM

## 2018-01-16 NOTE — TELEPHONE ENCOUNTER
Pt is asking for a order to be in place for her she stated that she fell yesterday during her mother , she is complaining of tailbone pain      Please call and advise

## 2018-01-17 ENCOUNTER — OFFICE VISIT (OUTPATIENT)
Dept: FAMILY MEDICINE CLINIC | Age: 73
End: 2018-01-17

## 2018-01-17 VITALS
HEART RATE: 87 BPM | BODY MASS INDEX: 24.75 KG/M2 | DIASTOLIC BLOOD PRESSURE: 70 MMHG | OXYGEN SATURATION: 97 % | WEIGHT: 154 LBS | SYSTOLIC BLOOD PRESSURE: 110 MMHG | RESPIRATION RATE: 12 BRPM

## 2018-01-17 DIAGNOSIS — F32.1 MODERATE SINGLE CURRENT EPISODE OF MAJOR DEPRESSIVE DISORDER (HCC): ICD-10-CM

## 2018-01-17 DIAGNOSIS — M54.50 ACUTE MIDLINE LOW BACK PAIN WITHOUT SCIATICA: Primary | ICD-10-CM

## 2018-01-17 PROCEDURE — 99214 OFFICE O/P EST MOD 30 MIN: CPT | Performed by: FAMILY MEDICINE

## 2018-01-17 RX ORDER — PREDNISONE 20 MG/1
TABLET ORAL
Qty: 15 TABLET | Refills: 0 | Status: SHIPPED | OUTPATIENT
Start: 2018-01-17 | End: 2018-04-17

## 2018-01-17 NOTE — PROGRESS NOTES
Subjective:      Patient ID: Bonny Kahn is a 74 y.o. female. HPI Pt states she is here due to depression, back pain. Pt states this has been going on for months. Patient states that 2 weeks ago she was coming down some basement steps and slipped and hurt her back. She's been using heat to the back but she states her back pain is not improving. She denies any sciatica symptoms. Patient states she stopped taking her pain medication through them away. She also was to talk about depression. Patient states she severely depressed. In October patient was taken off the sertraline and placed on duloxetine. Patient states she's not taking his medication. She tells me she is initially taking only gabapentin and amlodipine but his eye started going down her medication list more medications were added back. Patient did not bring her medications with her. Patient denies any bowel or bladder problems associated with her back pain    Review of Systems  No Known Allergies    Objective:   Physical Exam   Constitutional: She is oriented to person, place, and time. She appears well-developed and well-nourished. She is cooperative. No distress. Musculoskeletal:        Lumbar back: She exhibits no swelling, no edema, no deformity and no spasm. Right upper leg: She exhibits no tenderness, no swelling and no deformity. Left upper leg: She exhibits no tenderness, no swelling and no deformity. Neurological: She is alert and oriented to person, place, and time. No sensory deficit. Reflex Scores:       Patellar reflexes are 2+ on the right side and 2+ on the left side. Achilles reflexes are 2+ on the right side and 2+ on the left side. Skin: Skin is warm. No rash noted. No erythema. Psychiatric: Her speech is normal and behavior is normal. Thought content normal. Her mood appears anxious. Cognition and memory are impaired. She exhibits a depressed mood.      Back Exam     Tenderness   The patient

## 2018-01-18 ENCOUNTER — HOSPITAL ENCOUNTER (OUTPATIENT)
Dept: OTHER | Age: 73
Discharge: HOME | End: 2018-01-18
Attending: FAMILY MEDICINE | Admitting: FAMILY MEDICINE

## 2018-01-18 DIAGNOSIS — S39.92XA INJURY OF COCCYX, INITIAL ENCOUNTER: ICD-10-CM

## 2018-01-18 RX ORDER — DONEPEZIL HYDROCHLORIDE 10 MG/1
TABLET, FILM COATED ORAL
Qty: 180 TABLET | Refills: 1 | Status: SHIPPED | OUTPATIENT
Start: 2018-01-18 | End: 2018-01-18 | Stop reason: SDUPTHER

## 2018-01-18 RX ORDER — DONEPEZIL HYDROCHLORIDE 10 MG/1
TABLET, FILM COATED ORAL
Qty: 180 TABLET | Refills: 1 | Status: SHIPPED | OUTPATIENT
Start: 2018-01-18 | End: 2018-04-17

## 2018-01-18 RX ORDER — DULOXETIN HYDROCHLORIDE 60 MG/1
60 CAPSULE, DELAYED RELEASE ORAL DAILY
Qty: 90 CAPSULE | Refills: 1 | Status: SHIPPED | OUTPATIENT
Start: 2018-01-18 | End: 2018-01-18 | Stop reason: SDUPTHER

## 2018-01-18 RX ORDER — MEMANTINE HYDROCHLORIDE 10 MG/1
TABLET ORAL
Qty: 180 TABLET | Refills: 1 | Status: SHIPPED | OUTPATIENT
Start: 2018-01-18 | End: 2018-01-18 | Stop reason: SDUPTHER

## 2018-01-18 RX ORDER — MEMANTINE HYDROCHLORIDE 10 MG/1
TABLET ORAL
Qty: 180 TABLET | Refills: 1 | Status: SHIPPED | OUTPATIENT
Start: 2018-01-18 | End: 2018-04-17

## 2018-01-18 RX ORDER — DULOXETIN HYDROCHLORIDE 60 MG/1
60 CAPSULE, DELAYED RELEASE ORAL DAILY
Qty: 90 CAPSULE | Refills: 1 | Status: SHIPPED | OUTPATIENT
Start: 2018-01-18 | End: 2018-04-17

## 2018-01-19 DIAGNOSIS — M48.061 SPINAL STENOSIS, LUMBAR REGION, WITHOUT NEUROGENIC CLAUDICATION: ICD-10-CM

## 2018-01-22 RX ORDER — OXYCODONE AND ACETAMINOPHEN 10; 325 MG/1; MG/1
1 TABLET ORAL 4 TIMES DAILY PRN
Qty: 120 TABLET | Refills: 0 | OUTPATIENT
Start: 2018-01-22 | End: 2019-01-22

## 2018-01-26 ENCOUNTER — OFFICE VISIT (OUTPATIENT)
Dept: FAMILY MEDICINE CLINIC | Age: 73
End: 2018-01-26

## 2018-01-26 VITALS
HEART RATE: 90 BPM | DIASTOLIC BLOOD PRESSURE: 80 MMHG | BODY MASS INDEX: 24.91 KG/M2 | RESPIRATION RATE: 12 BRPM | OXYGEN SATURATION: 99 % | WEIGHT: 155 LBS | SYSTOLIC BLOOD PRESSURE: 132 MMHG

## 2018-01-26 DIAGNOSIS — F41.0 PANIC DISORDER WITHOUT AGORAPHOBIA: Primary | ICD-10-CM

## 2018-01-26 DIAGNOSIS — R41.3 MEMORY DEFICIT: ICD-10-CM

## 2018-01-26 DIAGNOSIS — R73.9 HYPERGLYCEMIA: ICD-10-CM

## 2018-01-26 DIAGNOSIS — I10 ESSENTIAL HYPERTENSION: ICD-10-CM

## 2018-01-26 DIAGNOSIS — S32.10XA CLOSED FRACTURE OF SACRUM, UNSPECIFIED PORTION OF SACRUM, INITIAL ENCOUNTER (HCC): ICD-10-CM

## 2018-01-26 PROCEDURE — 99214 OFFICE O/P EST MOD 30 MIN: CPT | Performed by: FAMILY MEDICINE

## 2018-01-26 NOTE — PROGRESS NOTES
Subjective:      Patient ID: Wero Settler is a 74 y.o. female. HPI Pt states she is here for a follow up on her back pain. Patient was on x-ray to have a sacral fracture. Patient states she has pain in her lower back. She did throw away her pain medicines. Patient should have pain medication until the 30th. Patient did not bring her medications in this time. Patient does state she is taking her blood pressure pill gabapentin in the Xanax medication along with a vitamin. Patient states she stopped taking her memory pill because she did not think she needed them and her  was becoming irritated that she was taking multiple medications. Patient has significant problems with short-term memory. Patient was to bring back her medicines after last visit but only brought back her blood pressure medication and gabapentin. Patient on her back disorder feels she's doing well. She has known spinal stenosis but is largely just to that. Review of Systems  Patient Active Problem List   Diagnosis    Essential hypertension    Panic disorder without agoraphobia    Generalized osteoarthrosis, involving multiple sites    Type 2 diabetes mellitus without complication ([address]    Hyperglycemia    Peptic ulcer    Spinal stenosis, lumbar region, without neurogenic claudication    Congenital deficiency of other clotting factors    Other and unspecified hyperlipidemia    GERD (gastroesophageal reflux disease)    Memory deficit    Sedative, hypnotic or anxiolytic dependence ([address]    Drug dependence ([address]       Outpatient Prescriptions Marked as Taking for the 1/26/18 encounter (Office Visit) with Johnny Bender MD   Medication Sig Dispense Refill    ALPRAZolam (XANAX) 0.5 MG tablet TAKE ONE TABLET BY MOUTH EVERY MORNING AND TAKE TWO TABLETS BY MOUTH EVERY EVENING.  48 tablet 0    gabapentin (NEURONTIN) 600 MG tablet TAKE ONE TABLET BY MOUTH THREE TIMES A DAY 90 tablet 0    pravastatin (PRAVACHOL) 40 MG side and 2+ on the left side. Skin: No erythema. Psychiatric: Her speech is normal and behavior is normal. Thought content normal. Her mood appears anxious. She exhibits abnormal recent memory. Assessment:      Zion Love was seen today for follow-up. Diagnoses and all orders for this visit:    Panic disorder without agoraphobia    Essential hypertension    Memory deficit    Hyperglycemia    Closed fracture of sacrum, unspecified portion of sacrum, initial encounter ([facility]    OARRS report checked          Plan:      Laboratory profiling ordered as patient has not had labs done for one year. Patient was to back up her pills and bring him back to the office for reevaluation  Encouraged patient to restart medications for dementia  If patient requests pain medication after the 30th this can be filled. RTC 3 months    Please note that this chart was generated using Dragon dictation software. Although every effort was made to ensure the accuracy of this automated transcription, some errors in transcription may have occurred.

## 2018-01-29 ENCOUNTER — TELEPHONE (OUTPATIENT)
Dept: FAMILY MEDICINE CLINIC | Age: 73
End: 2018-01-29

## 2018-01-29 DIAGNOSIS — M48.061 SPINAL STENOSIS, LUMBAR REGION, WITHOUT NEUROGENIC CLAUDICATION: ICD-10-CM

## 2018-01-30 RX ORDER — GABAPENTIN 600 MG/1
TABLET ORAL
Qty: 90 TABLET | Refills: 0 | Status: SHIPPED | OUTPATIENT
Start: 2018-01-30 | End: 2018-04-17

## 2018-01-31 RX ORDER — OXYCODONE AND ACETAMINOPHEN 10; 325 MG/1; MG/1
1 TABLET ORAL 4 TIMES DAILY PRN
Qty: 120 TABLET | Refills: 0 | Status: SHIPPED | OUTPATIENT
Start: 2018-01-31 | End: 2018-03-01 | Stop reason: SDUPTHER

## 2018-02-01 RX ORDER — MEMANTINE HYDROCHLORIDE 10 MG/1
TABLET ORAL
Qty: 180 TABLET | Refills: 0 | OUTPATIENT
Start: 2018-02-01

## 2018-02-01 RX ORDER — AMLODIPINE BESYLATE 10 MG/1
TABLET ORAL
Qty: 90 TABLET | Refills: 0 | OUTPATIENT
Start: 2018-02-01

## 2018-02-01 RX ORDER — OMEPRAZOLE 40 MG/1
CAPSULE, DELAYED RELEASE ORAL
Qty: 90 CAPSULE | Refills: 0 | OUTPATIENT
Start: 2018-02-01

## 2018-02-01 RX ORDER — DONEPEZIL HYDROCHLORIDE 10 MG/1
TABLET, FILM COATED ORAL
Qty: 180 TABLET | Refills: 0 | OUTPATIENT
Start: 2018-02-01

## 2018-02-01 RX ORDER — PRAVASTATIN SODIUM 40 MG
TABLET ORAL
Qty: 90 TABLET | Refills: 0 | OUTPATIENT
Start: 2018-02-01

## 2018-02-01 RX ORDER — GABAPENTIN 300 MG/1
CAPSULE ORAL
Qty: 120 CAPSULE | Refills: 1 | OUTPATIENT
Start: 2018-02-01

## 2018-02-05 RX ORDER — SERTRALINE HYDROCHLORIDE 100 MG/1
TABLET, FILM COATED ORAL
Qty: 90 TABLET | Refills: 0 | Status: SHIPPED | OUTPATIENT
Start: 2018-02-05 | End: 2018-03-09 | Stop reason: SDUPTHER

## 2018-02-05 RX ORDER — OMEPRAZOLE 40 MG/1
CAPSULE, DELAYED RELEASE ORAL
Qty: 90 CAPSULE | Refills: 0 | Status: SHIPPED | OUTPATIENT
Start: 2018-02-05 | End: 2018-03-08 | Stop reason: SDUPTHER

## 2018-02-26 RX ORDER — ALPRAZOLAM 0.5 MG/1
TABLET ORAL
Qty: 90 TABLET | Refills: 0 | Status: SHIPPED | OUTPATIENT
Start: 2018-02-26 | End: 2018-02-28

## 2018-02-28 RX ORDER — ALPRAZOLAM 0.5 MG/1
TABLET ORAL
Qty: 90 TABLET | Refills: 2 | Status: SHIPPED | OUTPATIENT
Start: 2018-02-28 | End: 2018-06-26 | Stop reason: SDUPTHER

## 2018-03-01 DIAGNOSIS — M48.061 SPINAL STENOSIS, LUMBAR REGION, WITHOUT NEUROGENIC CLAUDICATION: ICD-10-CM

## 2018-03-01 NOTE — TELEPHONE ENCOUNTER
From: Chris Radnolph  Sent: 2/28/2018 9:41 AM EST  Subject: Medication Renewal Request    Selin Billingsley Finder would like a refill of the following medications:     oxyCODONE-acetaminophen (PERCOCET)  MG per tablet Ranjith Hawk MD]    Preferred pharmacy: [facility] [address] [address], [address]    Comment:

## 2018-03-02 RX ORDER — OXYCODONE AND ACETAMINOPHEN 10; 325 MG/1; MG/1
1 TABLET ORAL 4 TIMES DAILY PRN
Qty: 120 TABLET | Refills: 0 | Status: SHIPPED | OUTPATIENT
Start: 2018-03-02 | End: 2018-03-28 | Stop reason: SDUPTHER

## 2018-03-02 RX ORDER — SERTRALINE HYDROCHLORIDE 100 MG/1
TABLET, FILM COATED ORAL
Qty: 90 TABLET | Refills: 0 | OUTPATIENT
Start: 2018-03-02

## 2018-03-07 RX ORDER — PRAVASTATIN SODIUM 40 MG
TABLET ORAL
Qty: 90 TABLET | Refills: 0 | Status: SHIPPED | OUTPATIENT
Start: 2018-03-07 | End: 2018-04-17 | Stop reason: SDUPTHER

## 2018-03-07 RX ORDER — AMLODIPINE BESYLATE 10 MG/1
TABLET ORAL
Qty: 90 TABLET | Refills: 0 | Status: SHIPPED | OUTPATIENT
Start: 2018-03-07 | End: 2018-04-17 | Stop reason: SDUPTHER

## 2018-03-08 RX ORDER — OMEPRAZOLE 40 MG/1
CAPSULE, DELAYED RELEASE ORAL
Qty: 90 CAPSULE | Refills: 0 | Status: SHIPPED | OUTPATIENT
Start: 2018-03-08 | End: 2018-04-17 | Stop reason: SDUPTHER

## 2018-03-09 RX ORDER — SERTRALINE HYDROCHLORIDE 100 MG/1
TABLET, FILM COATED ORAL
Qty: 90 TABLET | Refills: 0 | Status: SHIPPED | OUTPATIENT
Start: 2018-03-09 | End: 2018-04-17

## 2018-03-12 ENCOUNTER — TELEPHONE (OUTPATIENT)
Dept: FAMILY MEDICINE CLINIC | Age: 73
End: 2018-03-12

## 2018-03-22 DIAGNOSIS — M48.061 SPINAL STENOSIS, LUMBAR REGION, WITHOUT NEUROGENIC CLAUDICATION: ICD-10-CM

## 2018-03-22 NOTE — TELEPHONE ENCOUNTER
oxyCODONE-acetaminophen (PERCOCET)  MG per tablet 120 tablet 0 3/2/2018 3/2/2019    Sig - Route: Take 1 tablet by mouth 4 times daily as needed for Pain. - Oral      Pt states the med above is not helping with her pain for her broken tailbone. She states med does help with her back pain. She would like to know if she can have something else until her tail bone is healed.     [facility] [address], [address] [place] [address] [phone]

## 2018-03-28 RX ORDER — OXYCODONE AND ACETAMINOPHEN 10; 325 MG/1; MG/1
1 TABLET ORAL 4 TIMES DAILY PRN
Qty: 120 TABLET | Refills: 0 | Status: SHIPPED | OUTPATIENT
Start: 2018-03-28 | End: 2018-04-30 | Stop reason: SDUPTHER

## 2018-04-02 ENCOUNTER — TELEPHONE (OUTPATIENT)
Dept: FAMILY MEDICINE CLINIC | Age: 73
End: 2018-04-02

## 2018-04-02 DIAGNOSIS — S32.10XD CLOSED FRACTURE OF SACRUM WITH ROUTINE HEALING, UNSPECIFIED PORTION OF SACRUM, SUBSEQUENT ENCOUNTER: Primary | ICD-10-CM

## 2018-04-03 ENCOUNTER — TELEPHONE (OUTPATIENT)
Dept: FAMILY MEDICINE CLINIC | Age: 73
End: 2018-04-03

## 2018-04-03 ENCOUNTER — HOSPITAL ENCOUNTER (OUTPATIENT)
Dept: OTHER | Age: 73
Discharge: HOME | End: 2018-04-03
Attending: FAMILY MEDICINE | Admitting: FAMILY MEDICINE

## 2018-04-03 DIAGNOSIS — S32.10XD CLOSED FRACTURE OF SACRUM WITH ROUTINE HEALING, UNSPECIFIED PORTION OF SACRUM, SUBSEQUENT ENCOUNTER: ICD-10-CM

## 2018-04-17 ENCOUNTER — TELEPHONE (OUTPATIENT)
Dept: FAMILY MEDICINE CLINIC | Age: 73
End: 2018-04-17

## 2018-04-17 ENCOUNTER — OFFICE VISIT (OUTPATIENT)
Dept: FAMILY MEDICINE CLINIC | Age: 73
End: 2018-04-17

## 2018-04-17 VITALS
DIASTOLIC BLOOD PRESSURE: 60 MMHG | OXYGEN SATURATION: 98 % | BODY MASS INDEX: 24.11 KG/M2 | SYSTOLIC BLOOD PRESSURE: 110 MMHG | WEIGHT: 150 LBS | HEART RATE: 91 BPM

## 2018-04-17 DIAGNOSIS — I10 ESSENTIAL HYPERTENSION: ICD-10-CM

## 2018-04-17 DIAGNOSIS — G89.29 CHRONIC LOW BACK PAIN WITHOUT SCIATICA, UNSPECIFIED BACK PAIN LATERALITY: ICD-10-CM

## 2018-04-17 DIAGNOSIS — M48.061 SPINAL STENOSIS, LUMBAR REGION, WITHOUT NEUROGENIC CLAUDICATION: Primary | ICD-10-CM

## 2018-04-17 DIAGNOSIS — M54.50 CHRONIC LOW BACK PAIN WITHOUT SCIATICA, UNSPECIFIED BACK PAIN LATERALITY: ICD-10-CM

## 2018-04-17 DIAGNOSIS — F19.20 DRUG DEPENDENCE (HCC): ICD-10-CM

## 2018-04-17 DIAGNOSIS — R41.3 MEMORY DEFICIT: ICD-10-CM

## 2018-04-17 PROCEDURE — 99214 OFFICE O/P EST MOD 30 MIN: CPT | Performed by: FAMILY MEDICINE

## 2018-04-17 RX ORDER — PRAVASTATIN SODIUM 40 MG
TABLET ORAL
Qty: 90 TABLET | Refills: 1 | Status: SHIPPED | OUTPATIENT
Start: 2018-04-17 | End: 2018-06-02 | Stop reason: SDUPTHER

## 2018-04-17 RX ORDER — OMEPRAZOLE 40 MG/1
CAPSULE, DELAYED RELEASE ORAL
Qty: 90 CAPSULE | Refills: 1 | Status: SHIPPED | OUTPATIENT
Start: 2018-04-17 | End: 2018-09-14 | Stop reason: SDUPTHER

## 2018-04-17 RX ORDER — AMLODIPINE BESYLATE 10 MG/1
TABLET ORAL
Qty: 90 TABLET | Refills: 1 | Status: SHIPPED | OUTPATIENT
Start: 2018-04-17 | End: 2018-06-01 | Stop reason: SDUPTHER

## 2018-04-17 RX ORDER — ALENDRONATE SODIUM 70 MG/1
TABLET ORAL
Qty: 4 TABLET | Refills: 5 | Status: SHIPPED | OUTPATIENT
Start: 2018-04-17 | End: 2018-04-17 | Stop reason: SDUPTHER

## 2018-04-17 RX ORDER — DULOXETIN HYDROCHLORIDE 30 MG/1
30 CAPSULE, DELAYED RELEASE ORAL DAILY
Qty: 90 CAPSULE | Refills: 0 | Status: SHIPPED | OUTPATIENT
Start: 2018-04-17 | End: 2018-04-20 | Stop reason: DRUGHIGH

## 2018-04-17 RX ORDER — DONEPEZIL HYDROCHLORIDE 10 MG/1
TABLET, FILM COATED ORAL
Qty: 180 TABLET | Refills: 0 | Status: SHIPPED | OUTPATIENT
Start: 2018-04-17 | End: 2018-09-14 | Stop reason: SDUPTHER

## 2018-04-17 RX ORDER — ALENDRONATE SODIUM 70 MG/1
TABLET ORAL
Qty: 12 TABLET | Refills: 1 | Status: SHIPPED | OUTPATIENT
Start: 2018-04-17 | End: 2018-06-14

## 2018-04-17 ASSESSMENT — PATIENT HEALTH QUESTIONNAIRE - PHQ9
10. IF YOU CHECKED OFF ANY PROBLEMS, HOW DIFFICULT HAVE THESE PROBLEMS MADE IT FOR YOU TO DO YOUR WORK, TAKE CARE OF THINGS AT HOME, OR GET ALONG WITH OTHER PEOPLE: 1
6. FEELING BAD ABOUT YOURSELF - OR THAT YOU ARE A FAILURE OR HAVE LET YOURSELF OR YOUR FAMILY DOWN: 0
3. TROUBLE FALLING OR STAYING ASLEEP: 1
SUM OF ALL RESPONSES TO PHQ QUESTIONS 1-9: 9
2. FEELING DOWN, DEPRESSED OR HOPELESS: 3
4. FEELING TIRED OR HAVING LITTLE ENERGY: 1
7. TROUBLE CONCENTRATING ON THINGS, SUCH AS READING THE NEWSPAPER OR WATCHING TELEVISION: 2
8. MOVING OR SPEAKING SO SLOWLY THAT OTHER PEOPLE COULD HAVE NOTICED. OR THE OPPOSITE, BEING SO FIGETY OR RESTLESS THAT YOU HAVE BEEN MOVING AROUND A LOT MORE THAN USUAL: 0
9. THOUGHTS THAT YOU WOULD BE BETTER OFF DEAD, OR OF HURTING YOURSELF: 0
SUM OF ALL RESPONSES TO PHQ9 QUESTIONS 1 & 2: 5
5. POOR APPETITE OR OVEREATING: 0
1. LITTLE INTEREST OR PLEASURE IN DOING THINGS: 2

## 2018-04-18 ENCOUNTER — TELEPHONE (OUTPATIENT)
Dept: FAMILY MEDICINE CLINIC | Age: 73
End: 2018-04-18

## 2018-04-20 RX ORDER — DULOXETIN HYDROCHLORIDE 60 MG/1
60 CAPSULE, DELAYED RELEASE ORAL DAILY
Qty: 90 CAPSULE | Refills: 0 | Status: SHIPPED
Start: 2018-04-20 | End: 2018-08-15 | Stop reason: SDUPTHER

## 2018-04-26 ENCOUNTER — TELEPHONE (OUTPATIENT)
Dept: FAMILY MEDICINE CLINIC | Age: 73
End: 2018-04-26

## 2018-04-30 DIAGNOSIS — M48.061 SPINAL STENOSIS, LUMBAR REGION, WITHOUT NEUROGENIC CLAUDICATION: ICD-10-CM

## 2018-04-30 RX ORDER — OXYCODONE AND ACETAMINOPHEN 10; 325 MG/1; MG/1
1 TABLET ORAL 4 TIMES DAILY PRN
Qty: 120 TABLET | Refills: 0 | Status: SHIPPED | OUTPATIENT
Start: 2018-04-30 | End: 2018-05-29 | Stop reason: SDUPTHER

## 2018-05-14 ENCOUNTER — TELEPHONE (OUTPATIENT)
Dept: FAMILY MEDICINE CLINIC | Age: 73
End: 2018-05-14

## 2018-05-29 DIAGNOSIS — M48.061 SPINAL STENOSIS, LUMBAR REGION, WITHOUT NEUROGENIC CLAUDICATION: ICD-10-CM

## 2018-05-31 RX ORDER — OXYCODONE AND ACETAMINOPHEN 10; 325 MG/1; MG/1
1 TABLET ORAL 4 TIMES DAILY PRN
Qty: 120 TABLET | Refills: 0 | Status: SHIPPED | OUTPATIENT
Start: 2018-05-31 | End: 2018-06-28 | Stop reason: SDUPTHER

## 2018-06-01 RX ORDER — AMLODIPINE BESYLATE 10 MG/1
TABLET ORAL
Qty: 90 TABLET | Refills: 0 | Status: SHIPPED | OUTPATIENT
Start: 2018-06-01 | End: 2018-08-28 | Stop reason: SDUPTHER

## 2018-06-02 RX ORDER — PRAVASTATIN SODIUM 40 MG
TABLET ORAL
Qty: 90 TABLET | Refills: 0 | Status: SHIPPED | OUTPATIENT
Start: 2018-06-02 | End: 2018-08-28 | Stop reason: SDUPTHER

## 2018-06-14 ENCOUNTER — HOSPITAL ENCOUNTER (OUTPATIENT)
Dept: OTHER | Age: 73
Discharge: HOME | End: 2018-06-14
Attending: FAMILY MEDICINE | Admitting: FAMILY MEDICINE

## 2018-06-14 ENCOUNTER — OFFICE VISIT (OUTPATIENT)
Dept: FAMILY MEDICINE CLINIC | Age: 73
End: 2018-06-14

## 2018-06-14 VITALS
WEIGHT: 144.2 LBS | SYSTOLIC BLOOD PRESSURE: 112 MMHG | BODY MASS INDEX: 23.17 KG/M2 | DIASTOLIC BLOOD PRESSURE: 64 MMHG | HEART RATE: 75 BPM | OXYGEN SATURATION: 98 %

## 2018-06-14 DIAGNOSIS — D68.2 CONGENITAL CLOTTING FACTOR DEFICIENCY (HCC): ICD-10-CM

## 2018-06-14 DIAGNOSIS — I10 ESSENTIAL HYPERTENSION: ICD-10-CM

## 2018-06-14 DIAGNOSIS — G89.29 CHRONIC LOW BACK PAIN WITHOUT SCIATICA, UNSPECIFIED BACK PAIN LATERALITY: ICD-10-CM

## 2018-06-14 DIAGNOSIS — F19.20 DRUG DEPENDENCE (HCC): ICD-10-CM

## 2018-06-14 DIAGNOSIS — R41.3 MEMORY DEFICIT: ICD-10-CM

## 2018-06-14 DIAGNOSIS — E11.9 TYPE 2 DIABETES MELLITUS WITHOUT COMPLICATION, WITHOUT LONG-TERM CURRENT USE OF INSULIN (HCC): Primary | ICD-10-CM

## 2018-06-14 DIAGNOSIS — M54.50 CHRONIC LOW BACK PAIN WITHOUT SCIATICA, UNSPECIFIED BACK PAIN LATERALITY: ICD-10-CM

## 2018-06-14 LAB
A/G RATIO: 1.5 (ref 1.1–2.2)
ALBUMIN SERPL-MCNC: 4.4 G/DL (ref 3.4–5)
ALP BLD-CCNC: 90 U/L (ref 40–129)
ALT SERPL-CCNC: 31 U/L (ref 10–40)
ANION GAP SERPL CALCULATED.3IONS-SCNC: 13 MMOL/L (ref 3–16)
AST SERPL-CCNC: 37 U/L (ref 15–37)
BILIRUB SERPL-MCNC: 0.3 MG/DL (ref 0–1)
BUN BLDV-MCNC: 12 MG/DL (ref 7–20)
CALCIUM SERPL-MCNC: 10.5 MG/DL (ref 8.3–10.6)
CHLORIDE BLD-SCNC: 100 MMOL/L (ref 99–110)
CHOLESTEROL, TOTAL: 169 MG/DL (ref 0–199)
CO2: 26 MMOL/L (ref 21–32)
CREAT SERPL-MCNC: 0.7 MG/DL (ref 0.6–1.2)
ESTIMATED AVERAGE GLUCOSE: 122.6 MG/DL
GFR AFRICAN AMERICAN: >60
GFR NON-AFRICAN AMERICAN: >60
GLOBULIN: 2.9 G/DL
GLUCOSE BLD-MCNC: 103 MG/DL (ref 70–99)
HBA1C MFR BLD: 5.9 %
HDLC SERPL-MCNC: 71 MG/DL (ref 40–60)
LDL CHOLESTEROL CALCULATED: 56 MG/DL
POTASSIUM SERPL-SCNC: 4.6 MMOL/L (ref 3.5–5.1)
RHEUMATOID FACTOR: 11 IU/ML
SODIUM BLD-SCNC: 139 MMOL/L (ref 136–145)
TOTAL PROTEIN: 7.3 G/DL (ref 6.4–8.2)
TRIGL SERPL-MCNC: 210 MG/DL (ref 0–150)
VLDLC SERPL CALC-MCNC: 42 MG/DL

## 2018-06-14 PROCEDURE — 99214 OFFICE O/P EST MOD 30 MIN: CPT | Performed by: FAMILY MEDICINE

## 2018-06-14 RX ORDER — ALPRAZOLAM 0.5 MG/1
0.5 TABLET ORAL 2 TIMES DAILY
COMMUNITY
End: 2018-09-14

## 2018-06-15 ENCOUNTER — TELEPHONE (OUTPATIENT)
Dept: FAMILY MEDICINE CLINIC | Age: 73
End: 2018-06-15

## 2018-06-19 ENCOUNTER — TELEPHONE (OUTPATIENT)
Dept: FAMILY MEDICINE CLINIC | Age: 73
End: 2018-06-19

## 2018-06-26 ENCOUNTER — TELEPHONE (OUTPATIENT)
Dept: FAMILY MEDICINE CLINIC | Age: 73
End: 2018-06-26

## 2018-06-26 DIAGNOSIS — F40.01 PANIC DISORDER WITH AGORAPHOBIA: Primary | ICD-10-CM

## 2018-06-26 RX ORDER — ALPRAZOLAM 0.5 MG/1
TABLET ORAL
Qty: 90 TABLET | Refills: 1 | Status: SHIPPED | OUTPATIENT
Start: 2018-06-26 | End: 2018-08-28 | Stop reason: SDUPTHER

## 2018-06-27 ENCOUNTER — TELEPHONE (OUTPATIENT)
Dept: FAMILY MEDICINE CLINIC | Age: 73
End: 2018-06-27

## 2018-06-27 DIAGNOSIS — Z12.11 SCREENING FOR COLON CANCER: Primary | ICD-10-CM

## 2018-06-28 ENCOUNTER — TELEPHONE (OUTPATIENT)
Dept: INTERNAL MEDICINE CLINIC | Age: 73
End: 2018-06-28

## 2018-06-28 DIAGNOSIS — M48.061 SPINAL STENOSIS, LUMBAR REGION, WITHOUT NEUROGENIC CLAUDICATION: ICD-10-CM

## 2018-06-28 RX ORDER — OXYCODONE AND ACETAMINOPHEN 10; 325 MG/1; MG/1
1 TABLET ORAL 4 TIMES DAILY PRN
Qty: 120 TABLET | Refills: 0 | Status: SHIPPED | OUTPATIENT
Start: 2018-06-28 | End: 2018-07-30 | Stop reason: SDUPTHER

## 2018-06-28 NOTE — TELEPHONE ENCOUNTER
Referral from Dr Yudelka Mcguire---pt called made NP appt for 8/1 ---consult requested for arthritis ---no records sent. Thanks.

## 2018-07-06 ENCOUNTER — TELEPHONE (OUTPATIENT)
Dept: FAMILY MEDICINE CLINIC | Age: 73
End: 2018-07-06

## 2018-07-06 NOTE — TELEPHONE ENCOUNTER
This is a very confusing note. Dr. Liliam Aquino is a gastroenterologist and does not do nerve studies.   There is no indication at this point time she needs an evaluation with Dr. Liliam Aquino

## 2018-07-06 NOTE — TELEPHONE ENCOUNTER
Pt called stating she did not call Dr Nunu Esparza cell and request an EMG  - states she does have an appt with Dr Leslie Bernal on 8/1 and if he wants her to have an EMG she will get one but would like Dr Dutton Camera office to call and let her know    Please advise patient

## 2018-07-06 NOTE — TELEPHONE ENCOUNTER
Patt Sousa from Dr. Corinne Dess office states, pt called Dr. Stanley Toney personal cell number and said she was referred by Dr. Carlo Beckman, to Dr. Alison Ellison. Has not seen Dr. Carlo Beckman yet, first appt is on 8/1. Dr. Shameka Weinstein office stated that they have not recommended an EMG, as pt requested. Saralyn Runner calling here to find out if an order has been placed for an EMG. Dr. Corinne Dess office willing to schedule EMG if an order is placed for one.

## 2018-07-12 ENCOUNTER — PROCEDURE VISIT (OUTPATIENT)
Dept: FAMILY MEDICINE CLINIC | Age: 73
End: 2018-07-12

## 2018-07-12 ENCOUNTER — TELEPHONE (OUTPATIENT)
Dept: FAMILY MEDICINE CLINIC | Age: 73
End: 2018-07-12

## 2018-07-12 VITALS
WEIGHT: 144 LBS | OXYGEN SATURATION: 98 % | HEART RATE: 65 BPM | BODY MASS INDEX: 23.14 KG/M2 | DIASTOLIC BLOOD PRESSURE: 56 MMHG | SYSTOLIC BLOOD PRESSURE: 102 MMHG

## 2018-07-12 DIAGNOSIS — Z12.31 ENCOUNTER FOR SCREENING MAMMOGRAM FOR BREAST CANCER: ICD-10-CM

## 2018-07-12 DIAGNOSIS — L57.0 ACTINIC KERATOSIS: Primary | ICD-10-CM

## 2018-07-12 DIAGNOSIS — Z23 NEED FOR SHINGLES VACCINE: ICD-10-CM

## 2018-07-12 PROCEDURE — 17003 DESTRUCT PREMALG LES 2-14: CPT | Performed by: FAMILY MEDICINE

## 2018-07-12 PROCEDURE — 17000 DESTRUCT PREMALG LESION: CPT | Performed by: FAMILY MEDICINE

## 2018-07-12 NOTE — TELEPHONE ENCOUNTER
Patient was wanting to know if she can have something for depression     With the pain in buttocks is making her more depressed    Please advise     Pharmacy:  [facility] [address], [address]

## 2018-07-14 RX ORDER — DULOXETIN HYDROCHLORIDE 60 MG/1
60 CAPSULE, DELAYED RELEASE ORAL DAILY
Qty: 90 CAPSULE | Refills: 0 | Status: SHIPPED | OUTPATIENT
Start: 2018-07-14 | End: 2018-09-14 | Stop reason: SDUPTHER

## 2018-07-23 RX ORDER — DULOXETIN HYDROCHLORIDE 30 MG/1
30 CAPSULE, DELAYED RELEASE ORAL DAILY
Qty: 90 CAPSULE | Refills: 0 | OUTPATIENT
Start: 2018-07-23

## 2018-07-30 DIAGNOSIS — M48.061 SPINAL STENOSIS, LUMBAR REGION, WITHOUT NEUROGENIC CLAUDICATION: ICD-10-CM

## 2018-07-30 RX ORDER — OXYCODONE AND ACETAMINOPHEN 10; 325 MG/1; MG/1
1 TABLET ORAL 4 TIMES DAILY PRN
Qty: 120 TABLET | Refills: 0 | Status: SHIPPED | OUTPATIENT
Start: 2018-07-30 | End: 2018-08-28 | Stop reason: SDUPTHER

## 2018-07-30 NOTE — TELEPHONE ENCOUNTER
Medication:   Requested Prescriptions     Pending Prescriptions Disp Refills    oxyCODONE-acetaminophen (PERCOCET)  MG per tablet 120 tablet 0     Sig: Take 1 tablet by mouth 4 times daily as needed for Pain. Addison Mendoza Date: 7/30/18      Last Filled:  6/28/18    Patient Phone Number: [phone] (home) [phone] (work)    Last appt: 6/14/2018   Next appt: 9/14/2018    Last OARRS:   RX Monitoring 6/28/2018   Attestation The Prescription Monitoring Report for this patient was reviewed today.    Documentation -       Preferred Pharmacy:   [facility] [address], [facility] [phone] [facility] [phone]  [address] [address]  Phone: [phone] Fax: [phone]    [facility] [address], [address] [facility] [address]  [address]  [place] [address]  Phone: [phone] Fax: [phone]

## 2018-08-15 ENCOUNTER — OFFICE VISIT (OUTPATIENT)
Dept: RHEUMATOLOGY | Age: 73
End: 2018-08-15

## 2018-08-15 VITALS
DIASTOLIC BLOOD PRESSURE: 78 MMHG | HEIGHT: 65 IN | BODY MASS INDEX: 23.35 KG/M2 | WEIGHT: 140.13 LBS | HEART RATE: 72 BPM | SYSTOLIC BLOOD PRESSURE: 128 MMHG

## 2018-08-15 DIAGNOSIS — Z78.0 POSTMENOPAUSAL: ICD-10-CM

## 2018-08-15 DIAGNOSIS — M48.48XD STRESS FRACTURE OF SACRUM WITH ROUTINE HEALING, SUBSEQUENT ENCOUNTER: Primary | ICD-10-CM

## 2018-08-15 LAB — PARATHYROID HORMONE INTACT: 91.5 PG/ML (ref 14–72)

## 2018-08-15 PROCEDURE — 99204 OFFICE O/P NEW MOD 45 MIN: CPT | Performed by: INTERNAL MEDICINE

## 2018-08-15 NOTE — PROGRESS NOTES
SUBJECTIVE:    Patient ID: Hayder Veloz is a 74 y.o. female. This 70-year-old woman is sent for consultation because of back pain and a history of fractured sacrum. She had a hysterectomy when she was in her early 45s and was briefly on hormone replacement therapy. She denies a family history of osteoporosis. She's currently taking calcium and vitamin D but is on no pharmacologic therapy. In the past she used Celebrex but she has had a history of bleeding ulcer. At this time she uses oxycodone for pills daily for pain    Review of Systems:    Constitutional symptoms: denies weight loss, fevers night sweats  Eyes: denies dry eyes,   Ears, nose, mouth, throat: denies mucosal sores denies dry mouth  Cardiovascular: denies pleuritic chest pain  Respiratory: denies cough, denies shortness of breath  Gastrointestinal: History of peptic ulcer disease,  denies symptoms of malabsorption  Skin: , denies rash,  Neurologic: , denies numbness or tingling  Hematologic: denies blood clots  Genitourinary: denies kidney stone  Musculoskeletal:  denies joint swelling  Past Medical History:   Diagnosis Date    Acute gastritis without mention of hemorrhage     Acute sinusitis, unspecified     Acute upper respiratory infections of unspecified site     Allergic rhinitis, cause unspecified     Anemia, unspecified     Edema     Hyperlipidemia     Hypertension     Loss of weight     Lumbago     Lumbosacral root lesions, not elsewhere classified     Need for prophylactic vaccination and inoculation against influenza     Other bursitis disorders     Other seborrheic keratosis     Symptomatic menopausal or female climacteric states     Type II or unspecified type diabetes mellitus without mention of complication, not stated as uncontrolled     Pt is not aware of having diabetes, does know her A1C runs high but blood sugar is generally normal       Prior to Visit Medications    Medication Sig Taking?  Authorizing Provider   oxyCODONE-acetaminophen (PERCOCET)  MG per tablet Take 1 tablet by mouth 4 times daily as needed for Pain. Abhijeet Gunter MD   DULoxetine (CYMBALTA) 60 MG extended release capsule TAKE 1 CAPSULE BY MOUTH DAILY Yes Arturo dAams MD   zoster recombinant adjuvanted vaccine [facility]) 50 MCG SUSR injection One dose now and repeat 2nd dose in 2-6 months Yes Arturo Adams MD   ALPRAZolam (XANAX) 0.5 MG tablet TAKE ONE TABLET BY MOUTH EVERY MORNING AND TAKE TWO TABLETS BY MOUTH EVERY EVENING Yes Andrade Siu MD   ALPRAZolam (XANAX) 0.5 MG tablet Take 0.5 mg by mouth 2 times daily. Take 1 tablet in morning and take 2 tablets at night . Yes Historical Provider, MD   pravastatin (PRAVACHOL) 40 MG tablet TAKE ONE TABLET BY MOUTH DAILY Yes Arturo Adams MD   amLODIPine (NORVASC) 10 MG tablet TAKE ONE TABLET BY MOUTH DAILY Yes Arturo Adams MD   omeprazole (PRILOSEC) 40 MG delayed release capsule TAKE ONE CAPSULE BY MOUTH DAILY Yes Arturo Adams MD   donepezil (ARICEPT) 10 MG tablet TAKE TWO TABLETS BY MOUTH EVERY NIGHT AT BEDTIME Yes Arturo Adams MD   Calcium Carbonate-Vitamin D (CALTRATE 600+D PO) Take  by mouth 2 times daily. Yes Historical Provider, MD   Multiple Vitamins-Minerals (CENTRUM SILVER PO) Take 1 tablet by mouth daily  Yes Historical Provider, MD   B Complex Vitamins (VITAMIN B COMPLEX PO) Take 1 tablet by mouth daily  Yes Historical Provider, MD        OBJECTIVE:  /78   Pulse 72   Ht 5' 5.2\" (1.656 m)   Wt 140 lb 2 oz (63.6 kg)   BMI 23.18 kg/m²      Physical Exam:   General: Normal gait and posture. Hygiene appears normal.  No evidence of overt muscle wasting. Eye: inspection of the conjunctiva and eyelids reveals no evidence of abnormal injection or discharge. Examination of the pupils shows that they react equally and consensually to light and to accommodation. The irises appear normal.  Fundi are grossly benign.   Ears, nose, taking at least 1000 mg of calcium a day and thousand units of vitamin D daily. I'll see her back in approximately 7 - 8 weeks. A letter to her referring physician will be dictated after the next visit.

## 2018-08-16 DIAGNOSIS — E34.9 ELEVATED PARATHYROID HORMONE: Primary | ICD-10-CM

## 2018-08-16 DIAGNOSIS — Z78.0 POSTMENOPAUSAL: ICD-10-CM

## 2018-08-17 LAB
ALBUMIN SERPL-MCNC: 3.6 G/DL (ref 3.1–4.9)
ALPHA-1-GLOBULIN: 0.3 G/DL (ref 0.2–0.4)
ALPHA-2-GLOBULIN: 1 G/DL (ref 0.4–1.1)
BETA GLOBULIN: 1.3 G/DL (ref 0.9–1.6)
GAMMA GLOBULIN: 0.7 G/DL (ref 0.6–1.8)
SPE/IFE INTERPRETATION: NORMAL
TOTAL PROTEIN: 6.9 G/DL (ref 6.4–8.2)

## 2018-08-18 ENCOUNTER — HOSPITAL ENCOUNTER (OUTPATIENT)
Dept: OTHER | Age: 73
Discharge: HOME | End: 2018-08-18
Attending: INTERNAL MEDICINE | Admitting: INTERNAL MEDICINE

## 2018-08-18 DIAGNOSIS — E34.9 ELEVATED PARATHYROID HORMONE: ICD-10-CM

## 2018-08-18 LAB
CALCIUM SERPL-MCNC: 9.9 MG/DL (ref 8.3–10.6)
PARATHYROID HORMONE INTACT: 95.3 PG/ML (ref 14–72)
VITAMIN D 25-HYDROXY: 31.1 NG/ML

## 2018-08-20 ENCOUNTER — OFFICE VISIT (OUTPATIENT)
Dept: FAMILY MEDICINE CLINIC | Age: 73
End: 2018-08-20

## 2018-08-20 ENCOUNTER — TELEPHONE (OUTPATIENT)
Dept: INTERNAL MEDICINE CLINIC | Age: 73
End: 2018-08-20

## 2018-08-20 VITALS
OXYGEN SATURATION: 97 % | DIASTOLIC BLOOD PRESSURE: 58 MMHG | SYSTOLIC BLOOD PRESSURE: 116 MMHG | WEIGHT: 141 LBS | HEART RATE: 86 BPM | BODY MASS INDEX: 23.32 KG/M2

## 2018-08-20 DIAGNOSIS — L98.9 LEG SKIN LESION, LEFT: Primary | ICD-10-CM

## 2018-08-20 PROCEDURE — 99212 OFFICE O/P EST SF 10 MIN: CPT | Performed by: PHYSICIAN ASSISTANT

## 2018-08-20 RX ORDER — LIDOCAINE 50 MG/G
1 PATCH TOPICAL DAILY
Qty: 30 PATCH | Refills: 0 | Status: SHIPPED | OUTPATIENT
Start: 2018-08-20 | End: 2019-05-03

## 2018-08-20 ASSESSMENT — ENCOUNTER SYMPTOMS
COUGH: 0
TROUBLE SWALLOWING: 0

## 2018-08-20 NOTE — PATIENT INSTRUCTIONS
Josea Courser was seen today for mass. Diagnoses and all orders for this visit:    Leg skin lesion, left       Dermatologists:      DERMATOLOGY [phone]   Julia Malloy MD (General Dermatology)   Merlyn Mcintyre MD [facility])   Shoshana Lange Marcialeverardo [address] [place] [address]       [facility]  [address] [facility], [address]  Phone: [phone]   [phone]   Fax: [phone]       Dr. Teja Triplett  [phone]  [address] [place], [place]     Dr. Andrade Garcia MD  [phone] [address] [place], [place] [address]Renee Wong MD  [phone]  [address], [address] Rachelle [facility], [address] [place] [address]  [phone]

## 2018-08-20 NOTE — TELEPHONE ENCOUNTER
Spoke with the patient ordered Lidoderm patches for her back discomfort. We will reevaluate at her next visit.

## 2018-08-22 ENCOUNTER — TELEPHONE (OUTPATIENT)
Dept: RHEUMATOLOGY | Age: 73
End: 2018-08-22

## 2018-08-22 NOTE — TELEPHONE ENCOUNTER
PA submitted for LIDOCAINE 5% PATCH on [facility]  Mack ZIMMER Case ID: [id number]    Pending review

## 2018-08-22 NOTE — TELEPHONE ENCOUNTER
Received a DENIAL for this medication. Only covered for post herpetic neuralgia, diabetic neuropathy, cancer related neuropathy,   Denial letter attached.     Please advise patient

## 2018-08-28 DIAGNOSIS — M48.061 SPINAL STENOSIS, LUMBAR REGION, WITHOUT NEUROGENIC CLAUDICATION: ICD-10-CM

## 2018-08-28 DIAGNOSIS — F40.01 PANIC DISORDER WITH AGORAPHOBIA: ICD-10-CM

## 2018-08-28 RX ORDER — PRAVASTATIN SODIUM 40 MG
TABLET ORAL
Qty: 90 TABLET | Refills: 0 | Status: SHIPPED | OUTPATIENT
Start: 2018-08-28 | End: 2018-09-14 | Stop reason: SDUPTHER

## 2018-08-28 RX ORDER — AMLODIPINE BESYLATE 10 MG/1
TABLET ORAL
Qty: 90 TABLET | Refills: 0 | Status: SHIPPED | OUTPATIENT
Start: 2018-08-28 | End: 2018-09-14 | Stop reason: SDUPTHER

## 2018-08-28 NOTE — TELEPHONE ENCOUNTER
Medication:   Requested Prescriptions     Pending Prescriptions Disp Refills    ALPRAZolam (XANAX) 0.5 MG tablet [Pharmacy Med Name: ALPRAZolam 0.5 MG TABLET] 90 tablet 0     Sig: TAKE ONE TABLET BY MOUTH EVERY MORNING AND TAKE TWO TABLETS BY MOUTH EVERY EVENING    oxyCODONE-acetaminophen (PERCOCET)  MG per tablet 120 tablet 0     Sig: Take 1 tablet by mouth 4 times daily as needed for Pain. Eric Torres Date: 8/28/18      Last Filled:  7/30/18 - percocet; 6/28/18 - xanax    Patient Phone Number: [phone] (home) [phone] (work)    Last appt: 8/20/2018   Next appt: 9/14/2018    Last OARRS:   RX Monitoring 6/28/2018   Attestation The Prescription Monitoring Report for this patient was reviewed today.    Documentation -       Preferred Pharmacy:   [address] [place] [address] [address], [address] [facility] [address] [facility] [address] [facility] [address]  Phone: [phone] Fax: [phone]

## 2018-08-29 ENCOUNTER — HOSPITAL ENCOUNTER (OUTPATIENT)
Dept: GENERAL RADIOLOGY | Age: 73
Discharge: HOME | End: 2018-08-29
Attending: INTERNAL MEDICINE | Admitting: INTERNAL MEDICINE

## 2018-08-29 DIAGNOSIS — M48.48XD STRESS FRACTURE OF SACRUM WITH ROUTINE HEALING, SUBSEQUENT ENCOUNTER: ICD-10-CM

## 2018-08-29 DIAGNOSIS — Z78.0 ASYMPTOMATIC AGE-RELATED POSTMENOPAUSAL STATE: ICD-10-CM

## 2018-08-29 DIAGNOSIS — Z78.0 POSTMENOPAUSAL: ICD-10-CM

## 2018-08-29 DIAGNOSIS — Z78.0 ASYMPTOMATIC MENOPAUSAL STATE: ICD-10-CM

## 2018-08-29 RX ORDER — OXYCODONE AND ACETAMINOPHEN 10; 325 MG/1; MG/1
1 TABLET ORAL 4 TIMES DAILY PRN
Qty: 120 TABLET | Refills: 0 | Status: SHIPPED | OUTPATIENT
Start: 2018-08-29 | End: 2018-09-27 | Stop reason: SDUPTHER

## 2018-08-29 RX ORDER — ALPRAZOLAM 0.5 MG/1
TABLET ORAL
Qty: 90 TABLET | Refills: 0 | Status: SHIPPED | OUTPATIENT
Start: 2018-08-29 | End: 2018-09-25 | Stop reason: SDUPTHER

## 2018-09-04 ENCOUNTER — TELEPHONE (OUTPATIENT)
Dept: FAMILY MEDICINE CLINIC | Age: 73
End: 2018-09-04

## 2018-09-04 ENCOUNTER — TELEPHONE (OUTPATIENT)
Dept: INTERNAL MEDICINE CLINIC | Age: 73
End: 2018-09-04

## 2018-09-04 DIAGNOSIS — R91.1 LUNG NODULE: Primary | ICD-10-CM

## 2018-09-04 DIAGNOSIS — I10 ESSENTIAL HYPERTENSION: Primary | ICD-10-CM

## 2018-09-04 NOTE — TELEPHONE ENCOUNTER
Pt's  is calling to fine out if the pt needs to have x-ray or CT scan of pt lower part of right lung     Pt had CT scan 10-17     Please call pt to advise

## 2018-09-05 ENCOUNTER — TELEPHONE (OUTPATIENT)
Dept: INTERNAL MEDICINE CLINIC | Age: 73
End: 2018-09-05

## 2018-09-05 NOTE — TELEPHONE ENCOUNTER
Per last message from [facility] message being held for Dr Monty Underwood I attempted to call and inform pt but no answer /no vmail

## 2018-09-06 ENCOUNTER — TELEPHONE (OUTPATIENT)
Dept: INTERNAL MEDICINE CLINIC | Age: 73
End: 2018-09-06

## 2018-09-13 ENCOUNTER — HOSPITAL ENCOUNTER (OUTPATIENT)
Dept: CT IMAGING | Age: 73
Discharge: HOME | End: 2018-09-13
Attending: FAMILY MEDICINE | Admitting: FAMILY MEDICINE

## 2018-09-13 DIAGNOSIS — R91.1 LUNG NODULE: ICD-10-CM

## 2018-09-13 DIAGNOSIS — R91.1 SOLITARY PULMONARY NODULE: ICD-10-CM

## 2018-09-13 LAB
ANION GAP SERPL CALCULATED.3IONS-SCNC: 9 MMOL/L (ref 3–16)
BUN BLDV-MCNC: 20 MG/DL (ref 7–20)
CALCIUM SERPL-MCNC: 10.1 MG/DL (ref 8.3–10.6)
CHLORIDE BLD-SCNC: 103 MMOL/L (ref 99–110)
CO2: 28 MMOL/L (ref 21–32)
CREAT SERPL-MCNC: 0.8 MG/DL (ref 0.6–1.2)
GFR AFRICAN AMERICAN: >60
GFR NON-AFRICAN AMERICAN: >60
GLUCOSE BLD-MCNC: 94 MG/DL (ref 70–99)
POTASSIUM SERPL-SCNC: 5.8 MMOL/L (ref 3.5–5.1)
SODIUM BLD-SCNC: 140 MMOL/L (ref 136–145)

## 2018-09-14 ENCOUNTER — OFFICE VISIT (OUTPATIENT)
Dept: FAMILY MEDICINE CLINIC | Age: 73
End: 2018-09-14

## 2018-09-14 VITALS
DIASTOLIC BLOOD PRESSURE: 62 MMHG | OXYGEN SATURATION: 98 % | WEIGHT: 145 LBS | BODY MASS INDEX: 23.98 KG/M2 | SYSTOLIC BLOOD PRESSURE: 102 MMHG | HEART RATE: 78 BPM

## 2018-09-14 DIAGNOSIS — M54.50 CHRONIC LOW BACK PAIN WITHOUT SCIATICA, UNSPECIFIED BACK PAIN LATERALITY: Primary | ICD-10-CM

## 2018-09-14 DIAGNOSIS — G89.29 CHRONIC LOW BACK PAIN WITHOUT SCIATICA, UNSPECIFIED BACK PAIN LATERALITY: Primary | ICD-10-CM

## 2018-09-14 DIAGNOSIS — F19.20 DRUG DEPENDENCE (HCC): ICD-10-CM

## 2018-09-14 DIAGNOSIS — R41.3 MEMORY DEFICIT: ICD-10-CM

## 2018-09-14 DIAGNOSIS — E11.9 TYPE 2 DIABETES MELLITUS WITHOUT COMPLICATION, WITHOUT LONG-TERM CURRENT USE OF INSULIN (HCC): ICD-10-CM

## 2018-09-14 PROCEDURE — 99214 OFFICE O/P EST MOD 30 MIN: CPT | Performed by: FAMILY MEDICINE

## 2018-09-14 RX ORDER — OMEPRAZOLE 40 MG/1
CAPSULE, DELAYED RELEASE ORAL
Qty: 90 CAPSULE | Refills: 1 | Status: SHIPPED | OUTPATIENT
Start: 2018-09-14 | End: 2019-03-08 | Stop reason: SDUPTHER

## 2018-09-14 RX ORDER — AMLODIPINE BESYLATE 10 MG/1
TABLET ORAL
Qty: 90 TABLET | Refills: 1 | Status: SHIPPED | OUTPATIENT
Start: 2018-09-14 | End: 2019-03-19 | Stop reason: SDUPTHER

## 2018-09-14 RX ORDER — ALENDRONATE SODIUM 70 MG/1
TABLET ORAL
Qty: 4 TABLET | Refills: 5 | Status: SHIPPED | OUTPATIENT
Start: 2018-09-14 | End: 2019-03-19 | Stop reason: SDUPTHER

## 2018-09-14 RX ORDER — PRAVASTATIN SODIUM 40 MG
TABLET ORAL
Qty: 90 TABLET | Refills: 1 | Status: SHIPPED | OUTPATIENT
Start: 2018-09-14 | End: 2019-03-19 | Stop reason: SDUPTHER

## 2018-09-14 RX ORDER — DONEPEZIL HYDROCHLORIDE 10 MG/1
TABLET, FILM COATED ORAL
Qty: 180 TABLET | Refills: 1 | Status: SHIPPED | OUTPATIENT
Start: 2018-09-14 | End: 2019-02-19 | Stop reason: SDUPTHER

## 2018-09-14 RX ORDER — DULOXETIN HYDROCHLORIDE 30 MG/1
30 CAPSULE, DELAYED RELEASE ORAL DAILY
Qty: 90 CAPSULE | Refills: 0 | Status: SHIPPED | OUTPATIENT
Start: 2018-09-14 | End: 2018-12-14 | Stop reason: SDUPTHER

## 2018-09-14 NOTE — PROGRESS NOTES
Subjective:      Patient ID: Lewis Brown is a 74 y.o. female. CC: Patient presents for re-evaluation of chronic health problems including chronic pain, anxiety, and dementia. HPI Patient presents today for a follow-up on chronic medications and conditions. Patient states she is feeling miserable all the time and needs to do something about constant discomfort. She's taking for pain pills a day and 3 Xanax medication daily that are to be distributed by her . Patient now per her medications in but we made a phone call to her  and reviewed the below medications the patient has at the house and states she is taking. Despite sending Cymbalta into the pharmacy numerous times patient is yet to  the prescription. Patient states she would like all her prescriptions now at [facility] although in the past she's switch between [address] [place] [address] and [facility]. I advised her she does stay with one pharmacy.     Review of Systems     Patient Active Problem List   Diagnosis    Essential hypertension    Panic disorder without agoraphobia    Generalized osteoarthrosis, involving multiple sites    Type 2 diabetes mellitus without complication ([address]    Hyperglycemia    Peptic ulcer    Congenital clotting factor deficiency (HCC)    Hypercholesterolemia    GERD (gastroesophageal reflux disease)    Memory deficit    Drug dependence ([address]    Chronic low back pain without sciatica       Outpatient Prescriptions Marked as Taking for the 9/14/18 encounter (Office Visit) with Jazzy Clark MD   Medication Sig Dispense Refill    donepezil (ARICEPT) 10 MG tablet TAKE TWO TABLETS BY MOUTH EVERY NIGHT AT BEDTIME 180 tablet 1    omeprazole (PRILOSEC) 40 MG delayed release capsule TAKE ONE CAPSULE BY MOUTH DAILY 90 capsule 1           pravastatin (PRAVACHOL) 40 MG tablet TAKE ONE TABLET BY MOUTH DAILY 90 tablet 1    amLODIPine (NORVASC) 10 MG tablet TAKE ONE TABLET BY MOUTH DAILY 90 tablet 1    alendronate Neurological: She is alert and oriented to person, place, and time. She has normal strength and normal reflexes. No sensory deficit. Reflex Scores:       Patellar reflexes are 2+ on the right side and 2+ on the left side. Achilles reflexes are 2+ on the right side and 2+ on the left side. 12 point monofilament test normal    Skin: No erythema. Psychiatric: Her speech is normal and behavior is normal. Thought content normal. Her mood appears anxious. She exhibits abnormal recent memory. Assessment:      Sandra Philip was seen today for 3 month follow-up. Diagnoses and all orders for this visit:    Chronic low back pain without sciatica, unspecified back pain laterality    Memory deficit    Drug dependence ([address]    Type 2 diabetes mellitus without complication, without long-term current use of insulin ([facility]    Other orders  -     donepezil (ARICEPT) 10 MG tablet; TAKE TWO TABLETS BY MOUTH EVERY NIGHT AT BEDTIME  -     omeprazole (PRILOSEC) 40 MG delayed release capsule; TAKE ONE CAPSULE BY MOUTH DAILY  -     DULoxetine (CYMBALTA) 30 MG extended release capsule; Take 1 capsule by mouth daily  -     pravastatin (PRAVACHOL) 40 MG tablet; TAKE ONE TABLET BY MOUTH DAILY  -     amLODIPine (NORVASC) 10 MG tablet; TAKE ONE TABLET BY MOUTH DAILY  -     alendronate (FOSAMAX) 70 MG tablet; TAKE 1 TABLET BY MOUTH ONCE WEEKLY BEFORE BREAKFAST, ON AN EMPTY STOMACH: REMAIN UPRIGHT FOR 30 MINUTES    OARRS report checked          Plan:      X-ray examination reviewed with patient  Discussed with patient and  per phone that patient needed help in making sure she takes her medication every day. Patient is agreement to start Cymbalta medication and she is to call me back in one month with an update of her symptoms.   If she is not making improvement we discussed increasing this medication to 60 mg daily  No other change in medication  Activity as tolerated  RTC 3 months    Please note that this chart was generated

## 2018-09-25 DIAGNOSIS — F40.01 PANIC DISORDER WITH AGORAPHOBIA: ICD-10-CM

## 2018-09-27 DIAGNOSIS — M48.061 SPINAL STENOSIS, LUMBAR REGION, WITHOUT NEUROGENIC CLAUDICATION: ICD-10-CM

## 2018-09-27 RX ORDER — ALPRAZOLAM 0.5 MG/1
TABLET ORAL
Qty: 90 TABLET | Refills: 0 | Status: SHIPPED | OUTPATIENT
Start: 2018-09-27 | End: 2018-10-25 | Stop reason: SDUPTHER

## 2018-09-27 RX ORDER — OXYCODONE AND ACETAMINOPHEN 10; 325 MG/1; MG/1
1 TABLET ORAL 4 TIMES DAILY PRN
Qty: 120 TABLET | Refills: 0 | Status: SHIPPED | OUTPATIENT
Start: 2018-09-27 | End: 2018-10-29 | Stop reason: SDUPTHER

## 2018-09-27 NOTE — TELEPHONE ENCOUNTER
Medication:   Requested Prescriptions     Pending Prescriptions Disp Refills    oxyCODONE-acetaminophen (PERCOCET)  MG per tablet 120 tablet 0     Sig: Take 1 tablet by mouth 4 times daily as needed for Pain. Linda Godoy Date: 9/27/18      Last Filled:  8/29/18    Patient Phone Number: [phone] (home) [phone] (work)    Last appt: 9/14/18   Next appt: 12/14/2018    Last OARRS:   RX Monitoring 9/14/2018   Attestation The Prescription Monitoring Report for this patient was reviewed today.    Documentation -       Preferred Pharmacy:   [facility] [address], [address] [facility] [address]  [address] [facility]  [place] [address]  Phone: [phone] Fax: [phone]

## 2018-10-09 RX ORDER — DULOXETIN HYDROCHLORIDE 60 MG/1
60 CAPSULE, DELAYED RELEASE ORAL DAILY
Qty: 90 CAPSULE | Refills: 0 | OUTPATIENT
Start: 2018-10-09

## 2018-10-25 DIAGNOSIS — F40.01 PANIC DISORDER WITH AGORAPHOBIA: ICD-10-CM

## 2018-10-25 RX ORDER — ALPRAZOLAM 0.5 MG/1
TABLET ORAL
Qty: 90 TABLET | Refills: 0 | Status: SHIPPED | OUTPATIENT
Start: 2018-10-25 | End: 2018-11-26 | Stop reason: SDUPTHER

## 2018-10-29 DIAGNOSIS — M48.061 SPINAL STENOSIS, LUMBAR REGION, WITHOUT NEUROGENIC CLAUDICATION: ICD-10-CM

## 2018-10-29 RX ORDER — OXYCODONE AND ACETAMINOPHEN 10; 325 MG/1; MG/1
1 TABLET ORAL 4 TIMES DAILY PRN
Qty: 120 TABLET | Refills: 0 | Status: SHIPPED | OUTPATIENT
Start: 2018-10-29 | End: 2018-11-26 | Stop reason: SDUPTHER

## 2018-10-29 NOTE — TELEPHONE ENCOUNTER
From: Sarkis Nelson  Sent: 10/28/2018 9:36 AM EDT  Subject: Medication Renewal Request    Selin Beth would like a refill of the following medications:     oxyCODONE-acetaminophen (PERCOCET)  MG per tablet Cristobal Mckeon MD]    Preferred pharmacy: [facility] [address] [address], [place] [address]

## 2018-10-29 NOTE — TELEPHONE ENCOUNTER
Medication:   Requested Prescriptions     Pending Prescriptions Disp Refills    oxyCODONE-acetaminophen (PERCOCET)  MG per tablet 120 tablet 0     Sig: Take 1 tablet by mouth 4 times daily as needed for Pain. Haja Dailey Date: 10/29/18      Last Filled:  9/27/2018    Patient Phone Number: [phone] (home) [phone] (work)    Last appt: 9/14/2018  Next appt: 12/14/2018    Last OARRS:   RX Monitoring 9/14/2018   Attestation The Prescription Monitoring Report for this patient was reviewed today.    Documentation -       Preferred Pharmacy:   [facility] [address], [address] [phone] [place] [phone]  [address] [facility] [facility] [address]  [place] [address]  Phone: [phone] Fax: [phone] [address], [facility] [phone] [place] [phone]  [address]  [place] [address]  Phone: [phone] Fax: [phone]

## 2018-11-06 ENCOUNTER — OFFICE VISIT (OUTPATIENT)
Dept: RHEUMATOLOGY | Age: 73
End: 2018-11-06
Payer: COMMERCIAL

## 2018-11-06 VITALS
WEIGHT: 133 LBS | SYSTOLIC BLOOD PRESSURE: 136 MMHG | BODY MASS INDEX: 19.04 KG/M2 | HEIGHT: 70 IN | DIASTOLIC BLOOD PRESSURE: 76 MMHG | HEART RATE: 88 BPM

## 2018-11-06 DIAGNOSIS — R79.89 ELEVATED PTHRP LEVEL: Primary | ICD-10-CM

## 2018-11-06 DIAGNOSIS — M80.80XD OTHER OSTEOPOROSIS WITH CURRENT PATHOLOGICAL FRACTURE WITH ROUTINE HEALING, SUBSEQUENT ENCOUNTER: ICD-10-CM

## 2018-11-06 LAB — PARATHYROID HORMONE INTACT: 97.3 PG/ML (ref 14–72)

## 2018-11-06 PROCEDURE — 99213 OFFICE O/P EST LOW 20 MIN: CPT | Performed by: INTERNAL MEDICINE

## 2018-11-06 NOTE — PROGRESS NOTES
Subjective:      Patient ID: Savanah Caraballo is a 74 y.o. female. HPI  The patient returns for follow-up of osteoporosis. Her PTH level is slightly elevated. She's recovering from a sacral fracture. She's currently on Fosamax 70 mg a week. Her DEXA scan results reviewed  Review of Systems  Denies nausea or abdominal pain  Objective:   Physical Exam /76   Pulse 88   Ht 5' 9.5\" (1.765 m)   Wt 133 lb (60.3 kg)   BMI 19.36 kg/m²   Alert female in no acute distress    Assessment:      Osteoporosis possible hyperparathyroidism      Plan:      The patient's PTH will be repeated. If elevated may need workup for hyperparathyroidism. I'll see her back in 6 months time.         Lorie Chou MD

## 2018-11-09 ENCOUNTER — TELEPHONE (OUTPATIENT)
Dept: INTERNAL MEDICINE CLINIC | Age: 73
End: 2018-11-09

## 2018-11-09 NOTE — TELEPHONE ENCOUNTER
Patient states she was here last week and had lab work done.   She is requesting results of her test.

## 2018-11-26 DIAGNOSIS — F40.01 PANIC DISORDER WITH AGORAPHOBIA: ICD-10-CM

## 2018-11-26 DIAGNOSIS — M48.061 SPINAL STENOSIS, LUMBAR REGION, WITHOUT NEUROGENIC CLAUDICATION: ICD-10-CM

## 2018-11-26 NOTE — TELEPHONE ENCOUNTER
From: Rafael Patel  Sent: 11/26/2018 9:25 AM EST  Subject: Medication Renewal Request    Selin Maguire would like a refill of the following medications:     ALPRAZolam Carolina Dana) 0.5 MG tablet Mansi Elkins MD]    Preferred pharmacy: [facility], [address] [place] [address] [place] - P [phone] - F [phone]

## 2018-11-26 NOTE — TELEPHONE ENCOUNTER
Medication:   Requested Prescriptions     Pending Prescriptions Disp Refills    ALPRAZolam (XANAX) 0.5 MG tablet 90 tablet 0     Sig: TAKE ONE TABLET BY MOUTH EVERY MORNING AND TAKE TWO TABLETS BY MOUTH EVERY EVENING.  oxyCODONE-acetaminophen (PERCOCET)  MG per tablet 120 tablet 0     Sig: Take 1 tablet by mouth 4 times daily as needed for Pain. Tiffanie Yi Date: 11/26/18      Last Filled:  10/25/18 and 10/29/18    Patient Phone Number: [phone] (home) [phone] (work)    Last appt: Visit date not found   Next appt: 12/14/2018    Last OARRS:   RX Monitoring 9/14/2018   Attestation The Prescription Monitoring Report for this patient was reviewed today.    Documentation -       Preferred Pharmacy:   [facility] [address], [address] [facility] [address]  [address] [facility] [address]  [place] [address]  Phone: [phone] Fax: [phone]

## 2018-11-27 RX ORDER — ALPRAZOLAM 0.5 MG/1
TABLET ORAL
Qty: 90 TABLET | Refills: 0 | Status: SHIPPED | OUTPATIENT
Start: 2018-11-27 | End: 2018-12-28 | Stop reason: SDUPTHER

## 2018-11-27 RX ORDER — OXYCODONE AND ACETAMINOPHEN 10; 325 MG/1; MG/1
1 TABLET ORAL 4 TIMES DAILY PRN
Qty: 120 TABLET | Refills: 0 | Status: SHIPPED | OUTPATIENT
Start: 2018-11-27 | End: 2018-12-28

## 2018-12-01 ENCOUNTER — PATIENT MESSAGE (OUTPATIENT)
Dept: OTHER | Facility: CLINIC | Age: 73
End: 2018-12-01

## 2018-12-04 ENCOUNTER — TELEPHONE (OUTPATIENT)
Dept: FAMILY MEDICINE CLINIC | Age: 73
End: 2018-12-04

## 2018-12-07 ENCOUNTER — NURSE TRIAGE (OUTPATIENT)
Dept: OTHER | Facility: CLINIC | Age: 73
End: 2018-12-07

## 2018-12-14 ENCOUNTER — OFFICE VISIT (OUTPATIENT)
Dept: FAMILY MEDICINE CLINIC | Age: 73
End: 2018-12-14
Payer: COMMERCIAL

## 2018-12-14 VITALS
BODY MASS INDEX: 20.38 KG/M2 | DIASTOLIC BLOOD PRESSURE: 60 MMHG | HEART RATE: 84 BPM | OXYGEN SATURATION: 96 % | WEIGHT: 140 LBS | SYSTOLIC BLOOD PRESSURE: 120 MMHG

## 2018-12-14 DIAGNOSIS — R41.3 MEMORY DEFICIT: ICD-10-CM

## 2018-12-14 DIAGNOSIS — G89.29 CHRONIC LOW BACK PAIN WITHOUT SCIATICA, UNSPECIFIED BACK PAIN LATERALITY: Primary | ICD-10-CM

## 2018-12-14 DIAGNOSIS — M54.50 CHRONIC LOW BACK PAIN WITHOUT SCIATICA, UNSPECIFIED BACK PAIN LATERALITY: Primary | ICD-10-CM

## 2018-12-14 DIAGNOSIS — M15.9 GENERALIZED OSTEOARTHROSIS, INVOLVING MULTIPLE SITES: ICD-10-CM

## 2018-12-14 PROCEDURE — 99214 OFFICE O/P EST MOD 30 MIN: CPT | Performed by: FAMILY MEDICINE

## 2018-12-14 RX ORDER — DULOXETIN HYDROCHLORIDE 60 MG/1
60 CAPSULE, DELAYED RELEASE ORAL DAILY
Qty: 90 CAPSULE | Refills: 1 | Status: SHIPPED | OUTPATIENT
Start: 2018-12-14 | End: 2019-04-22 | Stop reason: SDUPTHER

## 2018-12-14 NOTE — PROGRESS NOTES
the  Allergies pain medication refilled this will be decreased down to 5 mg 4 times a day and ultimately to 5 mg 3 times a day. Again advised patient she needs medications with her at each appointment time  Total time of consultation 25 minutes. RTC 3 months    Please note that this chart was generated using Dragon dictation software. Although every effort was made to ensure the accuracy of this automated transcription, some errors in transcription may have occurred.             Vi Jones MA

## 2018-12-28 DIAGNOSIS — M15.9 GENERALIZED OSTEOARTHROSIS, INVOLVING MULTIPLE SITES: Primary | ICD-10-CM

## 2018-12-28 DIAGNOSIS — M48.061 SPINAL STENOSIS, LUMBAR REGION, WITHOUT NEUROGENIC CLAUDICATION: ICD-10-CM

## 2018-12-28 DIAGNOSIS — F40.01 PANIC DISORDER WITH AGORAPHOBIA: ICD-10-CM

## 2018-12-28 RX ORDER — OXYCODONE HYDROCHLORIDE AND ACETAMINOPHEN 5; 325 MG/1; MG/1
1 TABLET ORAL 4 TIMES DAILY PRN
Qty: 120 TABLET | Refills: 0 | Status: SHIPPED | OUTPATIENT
Start: 2018-12-28 | End: 2019-01-24 | Stop reason: SDUPTHER

## 2018-12-28 RX ORDER — ALPRAZOLAM 0.5 MG/1
TABLET ORAL
Qty: 90 TABLET | Refills: 0 | Status: SHIPPED | OUTPATIENT
Start: 2018-12-28 | End: 2019-01-24 | Stop reason: SDUPTHER

## 2018-12-28 RX ORDER — OXYCODONE AND ACETAMINOPHEN 10; 325 MG/1; MG/1
1 TABLET ORAL 4 TIMES DAILY PRN
Qty: 120 TABLET | Refills: 0 | OUTPATIENT
Start: 2018-12-28 | End: 2019-12-28

## 2019-01-24 DIAGNOSIS — F40.01 PANIC DISORDER WITH AGORAPHOBIA: ICD-10-CM

## 2019-01-24 DIAGNOSIS — M15.9 GENERALIZED OSTEOARTHROSIS, INVOLVING MULTIPLE SITES: ICD-10-CM

## 2019-01-24 RX ORDER — OXYCODONE HYDROCHLORIDE AND ACETAMINOPHEN 5; 325 MG/1; MG/1
1 TABLET ORAL 4 TIMES DAILY PRN
Qty: 120 TABLET | Refills: 0 | Status: SHIPPED | OUTPATIENT
Start: 2019-01-24 | End: 2019-02-27 | Stop reason: SDUPTHER

## 2019-01-24 RX ORDER — ALPRAZOLAM 0.5 MG/1
TABLET ORAL
Qty: 90 TABLET | Refills: 0 | Status: SHIPPED | OUTPATIENT
Start: 2019-01-24 | End: 2019-02-26 | Stop reason: SDUPTHER

## 2019-01-24 RX ORDER — OXYCODONE HYDROCHLORIDE AND ACETAMINOPHEN 5; 325 MG/1; MG/1
1 TABLET ORAL 4 TIMES DAILY PRN
Qty: 120 TABLET | Refills: 0 | Status: SHIPPED | OUTPATIENT
Start: 2019-01-24 | End: 2019-01-24 | Stop reason: SDUPTHER

## 2019-02-21 RX ORDER — DONEPEZIL HYDROCHLORIDE 10 MG/1
TABLET, FILM COATED ORAL
Qty: 180 TABLET | Refills: 0 | Status: SHIPPED | OUTPATIENT
Start: 2019-02-21 | End: 2019-04-08 | Stop reason: SDUPTHER

## 2019-02-26 DIAGNOSIS — F40.01 PANIC DISORDER WITH AGORAPHOBIA: ICD-10-CM

## 2019-02-27 DIAGNOSIS — M15.9 GENERALIZED OSTEOARTHROSIS, INVOLVING MULTIPLE SITES: ICD-10-CM

## 2019-02-27 RX ORDER — ALPRAZOLAM 0.5 MG/1
TABLET ORAL
Qty: 90 TABLET | Refills: 0 | Status: SHIPPED | OUTPATIENT
Start: 2019-02-27 | End: 2019-03-19 | Stop reason: SDUPTHER

## 2019-02-27 RX ORDER — OXYCODONE HYDROCHLORIDE AND ACETAMINOPHEN 5; 325 MG/1; MG/1
1 TABLET ORAL 4 TIMES DAILY PRN
Qty: 120 TABLET | Refills: 0 | Status: SHIPPED | OUTPATIENT
Start: 2019-02-27 | End: 2019-03-19 | Stop reason: SDUPTHER

## 2019-03-01 ENCOUNTER — TELEPHONE (OUTPATIENT)
Dept: FAMILY MEDICINE CLINIC | Age: 74
End: 2019-03-01

## 2019-03-01 DIAGNOSIS — M54.50 CHRONIC LOW BACK PAIN WITHOUT SCIATICA, UNSPECIFIED BACK PAIN LATERALITY: ICD-10-CM

## 2019-03-01 DIAGNOSIS — M15.9 GENERALIZED OSTEOARTHROSIS, INVOLVING MULTIPLE SITES: Primary | ICD-10-CM

## 2019-03-01 DIAGNOSIS — G89.29 CHRONIC LOW BACK PAIN WITHOUT SCIATICA, UNSPECIFIED BACK PAIN LATERALITY: ICD-10-CM

## 2019-03-08 RX ORDER — OMEPRAZOLE 40 MG/1
CAPSULE, DELAYED RELEASE ORAL
Qty: 90 CAPSULE | Refills: 0 | Status: SHIPPED | OUTPATIENT
Start: 2019-03-08 | End: 2019-03-23 | Stop reason: SDUPTHER

## 2019-03-11 ENCOUNTER — TELEPHONE (OUTPATIENT)
Dept: PAIN MANAGEMENT | Age: 74
End: 2019-03-11

## 2019-03-12 RX ORDER — DONEPEZIL HYDROCHLORIDE 10 MG/1
TABLET, FILM COATED ORAL
Qty: 180 TABLET | Refills: 0 | OUTPATIENT
Start: 2019-03-12

## 2019-03-19 ENCOUNTER — OFFICE VISIT (OUTPATIENT)
Dept: FAMILY MEDICINE CLINIC | Age: 74
End: 2019-03-19
Payer: COMMERCIAL

## 2019-03-19 VITALS
SYSTOLIC BLOOD PRESSURE: 112 MMHG | RESPIRATION RATE: 12 BRPM | OXYGEN SATURATION: 95 % | BODY MASS INDEX: 21.54 KG/M2 | HEART RATE: 77 BPM | DIASTOLIC BLOOD PRESSURE: 80 MMHG | WEIGHT: 148 LBS

## 2019-03-19 DIAGNOSIS — G89.29 CHRONIC LOW BACK PAIN WITHOUT SCIATICA, UNSPECIFIED BACK PAIN LATERALITY: Primary | ICD-10-CM

## 2019-03-19 DIAGNOSIS — M54.50 CHRONIC LOW BACK PAIN WITHOUT SCIATICA, UNSPECIFIED BACK PAIN LATERALITY: Primary | ICD-10-CM

## 2019-03-19 DIAGNOSIS — F40.01 PANIC DISORDER WITH AGORAPHOBIA: ICD-10-CM

## 2019-03-19 DIAGNOSIS — M15.9 GENERALIZED OSTEOARTHROSIS, INVOLVING MULTIPLE SITES: ICD-10-CM

## 2019-03-19 PROCEDURE — 99214 OFFICE O/P EST MOD 30 MIN: CPT | Performed by: FAMILY MEDICINE

## 2019-03-19 RX ORDER — AMLODIPINE BESYLATE 10 MG/1
TABLET ORAL
Qty: 90 TABLET | Refills: 1 | Status: SHIPPED | OUTPATIENT
Start: 2019-03-19 | End: 2019-05-03 | Stop reason: SDUPTHER

## 2019-03-19 RX ORDER — OXYCODONE HYDROCHLORIDE AND ACETAMINOPHEN 5; 325 MG/1; MG/1
1 TABLET ORAL 4 TIMES DAILY PRN
Qty: 120 TABLET | Refills: 0 | Status: SHIPPED | OUTPATIENT
Start: 2019-03-19 | End: 2019-05-01 | Stop reason: DRUGHIGH

## 2019-03-19 RX ORDER — ALENDRONATE SODIUM 70 MG/1
TABLET ORAL
Qty: 4 TABLET | Refills: 5 | Status: SHIPPED | OUTPATIENT
Start: 2019-03-19 | End: 2019-09-28

## 2019-03-19 RX ORDER — PRAVASTATIN SODIUM 40 MG
TABLET ORAL
Qty: 90 TABLET | Refills: 1 | Status: SHIPPED | OUTPATIENT
Start: 2019-03-19 | End: 2019-05-03 | Stop reason: SDUPTHER

## 2019-03-19 RX ORDER — ALPRAZOLAM 0.5 MG/1
TABLET ORAL
Qty: 90 TABLET | Refills: 2 | Status: SHIPPED | OUTPATIENT
Start: 2019-03-19 | End: 2019-03-29 | Stop reason: SDUPTHER

## 2019-03-19 ASSESSMENT — PATIENT HEALTH QUESTIONNAIRE - PHQ9
SUM OF ALL RESPONSES TO PHQ QUESTIONS 1-9: 0
SUM OF ALL RESPONSES TO PHQ9 QUESTIONS 1 & 2: 0
2. FEELING DOWN, DEPRESSED OR HOPELESS: 0
1. LITTLE INTEREST OR PLEASURE IN DOING THINGS: 0
SUM OF ALL RESPONSES TO PHQ QUESTIONS 1-9: 0

## 2019-03-20 ENCOUNTER — OFFICE VISIT (OUTPATIENT)
Dept: PAIN MANAGEMENT | Age: 74
End: 2019-03-20
Payer: COMMERCIAL

## 2019-03-20 VITALS
BODY MASS INDEX: 24.92 KG/M2 | SYSTOLIC BLOOD PRESSURE: 130 MMHG | WEIGHT: 146 LBS | DIASTOLIC BLOOD PRESSURE: 69 MMHG | HEIGHT: 64 IN | HEART RATE: 84 BPM

## 2019-03-20 DIAGNOSIS — G89.4 CHRONIC PAIN SYNDROME: ICD-10-CM

## 2019-03-20 DIAGNOSIS — M41.34 THORACOGENIC SCOLIOSIS OF THORACIC REGION: ICD-10-CM

## 2019-03-20 DIAGNOSIS — M43.16 SPONDYLOLISTHESIS, LUMBAR REGION: Primary | ICD-10-CM

## 2019-03-20 DIAGNOSIS — M51.36 DDD (DEGENERATIVE DISC DISEASE), LUMBAR: ICD-10-CM

## 2019-03-20 DIAGNOSIS — S32.010S CLOSED COMPRESSION FRACTURE OF FIRST LUMBAR VERTEBRA, SEQUELA: ICD-10-CM

## 2019-03-20 DIAGNOSIS — Z79.899 CHRONICALLY ON BENZODIAZEPINE THERAPY: ICD-10-CM

## 2019-03-20 DIAGNOSIS — M47.896 OTHER SPONDYLOSIS, LUMBAR REGION: ICD-10-CM

## 2019-03-20 PROCEDURE — 99204 OFFICE O/P NEW MOD 45 MIN: CPT | Performed by: ANESTHESIOLOGY

## 2019-03-20 RX ORDER — BACLOFEN 10 MG/1
10 TABLET ORAL 2 TIMES DAILY PRN
Qty: 60 TABLET | Refills: 0 | Status: SHIPPED | OUTPATIENT
Start: 2019-03-20 | End: 2019-04-15 | Stop reason: SDUPTHER

## 2019-03-20 ASSESSMENT — ENCOUNTER SYMPTOMS
ABDOMINAL PAIN: 0
VOMITING: 0
NAUSEA: 0
BACK PAIN: 1
SHORTNESS OF BREATH: 0
EYE PAIN: 0
COUGH: 0
WHEEZING: 0
EYE DISCHARGE: 0
CONSTIPATION: 0
EYE REDNESS: 0

## 2019-03-25 RX ORDER — OMEPRAZOLE 40 MG/1
CAPSULE, DELAYED RELEASE ORAL
Qty: 90 CAPSULE | Refills: 0 | Status: SHIPPED | OUTPATIENT
Start: 2019-03-25 | End: 2019-05-03 | Stop reason: SDUPTHER

## 2019-03-29 DIAGNOSIS — F40.01 PANIC DISORDER WITH AGORAPHOBIA: ICD-10-CM

## 2019-03-29 DIAGNOSIS — M15.9 GENERALIZED OSTEOARTHROSIS, INVOLVING MULTIPLE SITES: ICD-10-CM

## 2019-03-29 RX ORDER — ALPRAZOLAM 0.5 MG/1
TABLET ORAL
Qty: 90 TABLET | Refills: 2 | Status: SHIPPED | OUTPATIENT
Start: 2019-03-29 | End: 2019-05-01 | Stop reason: ALTCHOICE

## 2019-03-29 RX ORDER — OXYCODONE HYDROCHLORIDE AND ACETAMINOPHEN 5; 325 MG/1; MG/1
1 TABLET ORAL 4 TIMES DAILY PRN
Qty: 120 TABLET | Refills: 0 | OUTPATIENT
Start: 2019-03-29 | End: 2019-04-28

## 2019-04-01 ENCOUNTER — HOSPITAL ENCOUNTER (OUTPATIENT)
Dept: PHYSICAL THERAPY | Age: 74
Setting detail: THERAPIES SERIES
Discharge: HOME | End: 2019-04-01
Payer: COMMERCIAL

## 2019-04-01 PROCEDURE — 97161 PT EVAL LOW COMPLEX 20 MIN: CPT

## 2019-04-01 PROCEDURE — 97530 THERAPEUTIC ACTIVITIES: CPT

## 2019-04-01 PROCEDURE — 97110 THERAPEUTIC EXERCISES: CPT

## 2019-04-01 ASSESSMENT — PAIN SCALES - GENERAL: PAINLEVEL_OUTOF10: 9

## 2019-04-01 ASSESSMENT — PAIN DESCRIPTION - LOCATION: LOCATION: BACK

## 2019-04-01 ASSESSMENT — PAIN - FUNCTIONAL ASSESSMENT: PAIN_FUNCTIONAL_ASSESSMENT: PREVENTS OR INTERFERES WITH ALL ACTIVE AND SOME PASSIVE ACTIVITIES

## 2019-04-01 ASSESSMENT — PAIN DESCRIPTION - PROGRESSION: CLINICAL_PROGRESSION: GRADUALLY WORSENING

## 2019-04-01 ASSESSMENT — PAIN DESCRIPTION - FREQUENCY: FREQUENCY: CONTINUOUS

## 2019-04-01 ASSESSMENT — PAIN DESCRIPTION - PAIN TYPE: TYPE: CHRONIC PAIN

## 2019-04-01 NOTE — PROGRESS NOTES
Physical Therapy  Initial Assessment  Date: 2019  Patient Name: Malvin Naidu  MRN: [id number]  : 1945     Treatment Diagnosis: Sacral/LBP,     Restrictions  Position Activity Restriction  Spinal Precautions: light lifting only   Outpatient fall risk assessment completed asking screening question if patient has fallen in the past 30 days:  [x] Yes  [] No    Based on screen for falls, patient demonstrates fall risk:  [] Yes  [x] No    Interventions based on fall risk status:  Updated Problem List within Medical History  [] Yes   [x] N/A    Asked family to assist with increased observation of the patient  [] Yes   [x] N/A    Patient kept in visible area when not closely supervised by therapist  [] Yes   [x] N/A    Repeatedly reinforce activity limits and safety needs with patient/family  [] Yes   [x] N/A    Increase frequency of rounding/monitoring patient  [] Yes   [x] N/A        Subjective   General  Chart Reviewed: Yes  Additional Pertinent Hx: Pt withold L1 compression Fxr,  has L5 anteriorlisthesis  Family / Caregiver Present: Yes  Referring Practitioner: Jean Shore MD  Referral Date : 19  Diagnosis: Lumbar pain  Follows Commands: Within Functional Limits  PT Visit Information  Onset Date: 14  PT Insurance Information: Laur[employer] Patel ($40 copay) Medical necessity  Total # of Visits Approved: 6  Total # of Visits to Date: 1  Subjective  Subjective: Subject has had LBP for about 5 years,  has undergone DARIA multiple times,  no relief. only medical/medication treatment todate. PLOF:  Indep with all ADL/IADL, CLOF:  unble to do normal housework,  difficulty with stairs to laundry. low back and sacral pan is constant and limits most activiteis.   Pain Screening  Patient Currently in Pain: Yes  Pain Assessment  Pain Assessment: 0-10  Pain Level: 9  Patient's Stated Pain Goal: No pain  Pain Type: Chronic pain  Pain Location: Back  Pain Radiating Towards: 'buttocks, B  Pain Descriptors: Constant;Nagging;Dull;Aching  Pain Frequency: Continuous  Clinical Progression: Gradually worsening  Functional Pain Assessment: Prevents or interferes with all active and some passive activities  Vital Signs  Patient Currently in Pain: Yes    Vision/Hearing  Vision  Vision: Impaired  Hearing  Hearing: Within functional limits    Orientation       Social/Functional History  Social/Functional History  Lives With: Spouse  Home Layout: One level; Laundry in basement  Bathroom Shower/Tub: Walk-in shower  Bathroom Toilet: Standard  Bathroom Equipment: Grab bars in shower;Built-in shower seat  Bathroom Accessibility: Accessible  Home Equipment: [employer]  ADL Assistance: Independent  Homemaking Assistance: Needs assistance  Laundry: Moderate  Vacuuming: Total  Cleaning: Total  Gardening: Total  Homemaking Responsibilities: Yes  Meal Prep Responsibility: Primary  Laundry Responsibility: Secondary  Cleaning Responsibility: Secondary  Ambulation Assistance: Independent  Transfer Assistance: Independent  Active : Yes  Mode of Transportation: Car  Occupation: Retired  Type of occupation: retired medical assistant  Leisure & Hobbies: No HEP. Objective          Joint Mobility  Spine: WNL Flexion, B SB     Strength RLE  Strength RLE: WNL  Strength LLE  Strength LLE: WNL           Assessment   Conditions Requiring Skilled Therapeutic Intervention  Body structures, Functions, Activity limitations: Increased Pain;Decreased high-level IADLs  Assessment: Pt presents today with complaint of LBP limiting her aiblity to perform normal household and recreational activities. She will benefit from skilled therapy to address her HEP and aquatic exercises so she may be able to perform Independently.   Treatment Diagnosis: Sacral/LBP,   Prognosis: Good  Decision Making: Low Complexity  History: Hx of LBP,  spinal pathologies,    (old compression fracture,  anteriorlisthesis  Exam: stable,    REQUIRES PT FOLLOW UP: Yes  Activity

## 2019-04-01 NOTE — FLOWSHEET NOTE
Physical Therapy Daily/Aquatic Flow Sheet   Date:  2019    Patient Name:  Kyle Conner    :  1945  MRN: [id number]  Restrictions/Precautions:    Medical/Treatment Diagnosis Information:   · Diagnosis: Lumbar pain  · Treatment Diagnosis: Sacral/LBP,     Tracking Information:  Physician Information Referring Practitioner: Shantel Hinojosa MD     Plan of Care  19 Signed Received:    Visit Count / Total Visits  /    Insurance Approved Visits  /  Approved Dates:     Insurance Information PT Insurance Information: [employer] ($40 copay) Medical necessity    Progress Note/G-codes   []  Yes  []  No Next Due:      Pain level: 8-10    Subjective:   See eval-  Pt concerned re $40 copy and would like to minimize therapy. Her goal is to be Indep with aquatic and HEP. Objective:   Observation:  See eval  Test measurements:    Land-based Therapy Dates of Service:    eval  Land-based Visits Exercises/Activities:  Exercise/Equipment Resistance/Repetitions Other comments                                                                              AquaticTherapy Dates of Service:   Aquatic Visits Exercises/Activities:   Transfers:          % Immersion:            Ambulation:   UE Exercises:       Forwards   x Shoulder Shrugs      Lateral   x Shoulder Circles      Retro   x Scapular Retraction       Marching   Push Downs   x    Cariocas   Punching     Jog    Rowing x    Multifidi walkouts with paddle  x Elbow Flex/Ext       Shldr Flex/Ext       Shldr aBd/aDd    LE Exercises:  Shldr Horiz aBd/aDd    HR/TR x Shldr IR/ER    Marches x Arm Circles    Squats  x PNF Diagonals    Hamstring Curls x Wall Push Ups x   Hip Flexion (SLR) x Figure 8's    Hip aBduction (SLR) x Bilateral Pull Downs    Hip Extension (SLR) x      Hip aDduction (SLR)      Hip Circles x Functional:    Hip IR/Er x Step up forward x   TrA Set  x Step up lateral  x   Pelvic Tilts   Step down     Fig 8's   Lunges Forward    LE PNF  Lunges Retro      Lunges Lateral     Balance:   Lower ab curl with noodle     SLS        Tandem Stance x      NBOS eyes open  Seated:     NBOS eyes closed x Ankle pumps     Hand to Opposite Knee x Ankle Circles     Fwd Step ups to SLS  Knee Flex/Ext    Lateral Step ups to SLS  Hip aBd/aDd    Stop/Go Gait   Bicycle       Ankle DF/PF      Ankle Inv/Ev    Stretching:       Gastroc/Soleus x     Hamstring   Noodle:     Knee Flex Stretch  Leg Press    Piriformis   Noodle Hang at [place]    Hip Flexor  Noodle Hang Deep Water    SKTC x Noodle Bicycle     DKTC       ITB      Quad  Deep Water:    Mid Back   Jog    UT  Jumping Jacks    Post Shoulder  Heel to Toe    Ladder Pull  Hand to Opposite Knee    Pec Stretch  Rocking Horse      [employer] Skier          Cervical:   Other:     AROM Flexion      AROM Extension      AROM Side Bending      AROM Rotation      Chin Tucks      Chin Nods        Aquatic Abbreviation Key  B= Belt DB= Dumbells T= Theratube   H= Hydrotone N= Noodles W= Weights   P= Paddles S= Speedo equipment K= Kickboard     Other Therapeutic Activities:  Evaluation completed. Discussed Plan of care,  benefits,  goals and options of PT. Pt agrees with goals. Pt educated re :  HEP, importance of performing  As prescribed,  Educated on use of CP instead of Heat. Reviews aquatic procecdures. Home Exercise Program:  4/1:  Core strengthening activities,   Provided h/o to include:  TrA,  TRA with marching,  Heel slides,  Heel taps,  Hip ER,  Hip ER with knee extension,  DKTc, SKTC,  LTR,  Seated lumbar flexion stretch.   All to perform stretches 30 sec x 5 reps 2x/day, all exercises 10-20 reps 2x/day    Manual Treatments:      Modalities:      Timed Code Treatment Minutes:  40    Total Treatment Minutes:  55    Treatment/Activity Tolerance:  [x] Patient tolerated treatment well [] Patient limited by fatigue  [] Patient limited by pain  [] Patient limited by other medical complications  [] Other:     Prognosis: [x]

## 2019-04-08 RX ORDER — DONEPEZIL HYDROCHLORIDE 10 MG/1
10 TABLET, FILM COATED ORAL NIGHTLY
Qty: 180 TABLET | Refills: 0 | Status: SHIPPED | OUTPATIENT
Start: 2019-04-08 | End: 2019-05-03 | Stop reason: DRUGHIGH

## 2019-04-09 ENCOUNTER — HOSPITAL ENCOUNTER (OUTPATIENT)
Dept: PHYSICAL THERAPY | Age: 74
Setting detail: THERAPIES SERIES
Discharge: HOME | End: 2019-04-09
Payer: COMMERCIAL

## 2019-04-09 NOTE — PROGRESS NOTES
Physical Therapy  Cancellation/No-show Note  Patient Name:  Vanna Ramirez  :  1945   Date:  2019  Cancelled visits to date: 0  No-shows to date: 1    Patient status for today's appointment patient:  []  Cancelled  []  Rescheduled appointment  [x]  No-show 19     Reason given by patient:  []  Patient ill  []  Conflicting appointment  []  No transportation    []  Conflict with work  [x]  No reason given  []  Other:     Comments:      Phone call information:   []  Phone call made today to patient at _ time at number provided:      []  Patient answered, conversation as follows:    []  Patient did not answer, message left as follows:  [x]  Phone call not made today    Electronically signed by:  Severa Pollen, PT, DPT

## 2019-04-13 RX ORDER — DONEPEZIL HYDROCHLORIDE 10 MG/1
TABLET, FILM COATED ORAL
Qty: 180 TABLET | Refills: 0
Start: 2019-04-13

## 2019-04-15 DIAGNOSIS — M47.896 OTHER SPONDYLOSIS, LUMBAR REGION: ICD-10-CM

## 2019-04-15 DIAGNOSIS — M51.36 DDD (DEGENERATIVE DISC DISEASE), LUMBAR: ICD-10-CM

## 2019-04-15 DIAGNOSIS — M43.16 SPONDYLOLISTHESIS, LUMBAR REGION: ICD-10-CM

## 2019-04-15 RX ORDER — BACLOFEN 10 MG/1
TABLET ORAL
Qty: 60 TABLET | Refills: 0 | Status: SHIPPED | OUTPATIENT
Start: 2019-04-15 | End: 2019-05-01 | Stop reason: DRUGHIGH

## 2019-04-16 ENCOUNTER — HOSPITAL ENCOUNTER (OUTPATIENT)
Dept: PHYSICAL THERAPY | Age: 74
Setting detail: THERAPIES SERIES
Discharge: HOME | End: 2019-04-16
Payer: COMMERCIAL

## 2019-04-16 ENCOUNTER — TELEPHONE (OUTPATIENT)
Dept: FAMILY MEDICINE CLINIC | Age: 74
End: 2019-04-16

## 2019-04-16 DIAGNOSIS — I10 ESSENTIAL HYPERTENSION: ICD-10-CM

## 2019-04-16 DIAGNOSIS — R73.9 HYPERGLYCEMIA: Primary | ICD-10-CM

## 2019-04-16 DIAGNOSIS — E78.5 HYPERLIPIDEMIA, UNSPECIFIED HYPERLIPIDEMIA TYPE: ICD-10-CM

## 2019-04-16 PROCEDURE — 97113 AQUATIC THERAPY/EXERCISES: CPT

## 2019-04-16 PROCEDURE — 97150 GROUP THERAPEUTIC PROCEDURES: CPT

## 2019-04-16 NOTE — FLOWSHEET NOTE
Physical Therapy Daily/Aquatic Flow Sheet   Date:  2019    Patient Name:  Lyubov Amador    :  1945  MRN: [id number]  Restrictions/Precautions:    Medical/Treatment Diagnosis Information:   · Diagnosis: Lumbar pain  · Treatment Diagnosis: Sacral/LBP    Tracking Information:  Physician Information Referring Practitioner: Argenis Vogel MD     Plan of Care  19 Signed Received:    Visit Count / Total Visits      Insurance Approved Visits    Approved Dates:     Insurance Information PT Insurance Information: [employer] ($40 copay) Medical necessity    Progress Note/G-codes   []  Yes  []  No Next Due:      Pain level: 2/10    Subjective:   Pt reports first time in the pool. Objective:   Observation:  See eval  Test measurements:    Land-based Therapy Dates of Service:    eval  Land-based Visits Exercises/Activities:  Exercise/Equipment Resistance/Repetitions Other comments                                                                              [place] Dates of Service:   Aquatic Visits Exercises/Activities:   Transfers:          % Immersion:            Ambulation:   UE Exercises:       Forwards  x1 N Shoulder Shrugs      Lateral   x1 N Shoulder Circles      Retro   x1 N Scapular Retraction       Marching   Push Downs   x    Cariocas   Punching     Jog    Rowing x    Multifidi walkouts with paddle  x Elbow Flex/Ext       Shldr Flex/Ext       Shldr aBd/aDd    LE Exercises:  Shldr Horiz aBd/aDd    HR/TR x10 Shldr IR/ER    Marches x10 Arm Circles    Squats x10 PNF Diagonals    Hamstring Curls x10 Wall Push Ups x   Hip Flexion (SLR) x10 Figure 8's    Hip aBduction (SLR) x10 Bilateral Pull Downs    Hip Extension (SLR) x10      Hip aDduction (SLR)      Hip Circles x10 Functional:    Hip IR/Er x Step up forward x   TrA Set  x Step up lateral  x   Pelvic Tilts   Step down     Fig 8's   Lunges Forward    LE PNF  Lunges Retro      Lunges Lateral     Balance:   Lower ab curl with noodle     SLS  20 sec x 2 B      Tandem Stance x20 secx 2 B      NBOS eyes open  Seated:     NBOS eyes closed x Ankle pumps     Hand to Opposite Knee x Ankle Circles     Fwd Step ups to SLS  Knee Flex/Ext    Lateral Step ups to SLS  Hip aBd/aDd    Stop/Go Gait   Bicycle       Ankle DF/PF      Ankle Inv/Ev    Stretching:       Gastroc/Soleus 20 sec x 2 B     Hamstring  20 sec x 2 B Noodle:     Knee Flex Stretch  Leg Press    Piriformis   Noodle Hang at [place]    Hip Flexor  Noodle Hang Deep Water    SKTC x Noodle Bicycle     DKTC       ITB      Quad  Deep Water:    Mid Back   Jog    UT  Jumping Jacks    Post Shoulder  Heel to Toe    Ladder Pull  Hand to Opposite Knee    Pec Stretch  Rocking Horse      [employer] Skier          Cervical:   Other:     AROM Flexion      AROM Extension      AROM Side Bending      AROM Rotation      Chin Tucks      Chin Nods        Aquatic Abbreviation Key  B= Belt DB= Dumbells T= Theratube   H= Hydrotone N= Noodles W= Weights   P= Paddles S= Speedo equipment K= Kickboard     Other Therapeutic Activities:  Evaluation completed. Discussed Plan of care,  benefits,  goals and options of PT. Pt agrees with goals. Pt educated re :  HEP, importance of performing  As prescribed,  Educated on use of CP instead of Heat. Reviews aquatic procecdures. Home Exercise Program:  4/1:  Core strengthening activities,   Provided h/o to include:  TrA,  TRA with marching,  Heel slides,  Heel taps,  Hip ER,  Hip ER with knee extension,  DKTc, SKTC,  LTR,  Seated lumbar flexion stretch.   All to perform stretches 30 sec x 5 reps 2x/day, all exercises 10-20 reps 2x/day    Manual Treatments:      Modalities:      Timed Code Treatment Minutes:  10    Total Treatment Minutes:  43    Treatment/Activity Tolerance:  [x] Patient tolerated treatment well [] Patient limited by fatigue  [] Patient limited by pain  [] Patient limited by other medical complications  [x] Other: slow progression, slightly off balance with amb with noodle, keep close eye     Prognosis: [x] Good [] Fair  [] Poor    Patient Requires Follow-up: [] Yes  [] No    Plan:   [x] Continue per plan of care [] Alter current plan (see comments)  [] Plan of care initiated [] Hold pending MD visit [] Discharge    Plan for Next Session:  Aquatic therapy,  Progress HEP as appropriate,  Then DC to independent  aquatics and HEP.     Electronically signed by:  Timothy Casanova PT  DPT

## 2019-04-23 RX ORDER — DULOXETIN HYDROCHLORIDE 60 MG/1
CAPSULE, DELAYED RELEASE ORAL
Qty: 90 CAPSULE | Refills: 0 | Status: SHIPPED | OUTPATIENT
Start: 2019-04-23 | End: 2019-05-03

## 2019-04-29 DIAGNOSIS — M15.9 GENERALIZED OSTEOARTHROSIS, INVOLVING MULTIPLE SITES: ICD-10-CM

## 2019-04-29 RX ORDER — OXYCODONE HYDROCHLORIDE AND ACETAMINOPHEN 5; 325 MG/1; MG/1
1 TABLET ORAL 4 TIMES DAILY PRN
Qty: 120 TABLET | Refills: 0 | OUTPATIENT
Start: 2019-04-29 | End: 2019-05-29

## 2019-05-01 ENCOUNTER — OFFICE VISIT (OUTPATIENT)
Dept: PAIN MANAGEMENT | Age: 74
End: 2019-05-01
Payer: COMMERCIAL

## 2019-05-01 VITALS
SYSTOLIC BLOOD PRESSURE: 149 MMHG | HEART RATE: 84 BPM | HEIGHT: 64 IN | DIASTOLIC BLOOD PRESSURE: 78 MMHG | BODY MASS INDEX: 25.1 KG/M2 | WEIGHT: 147 LBS

## 2019-05-01 DIAGNOSIS — M43.16 SPONDYLOLISTHESIS, LUMBAR REGION: Primary | ICD-10-CM

## 2019-05-01 DIAGNOSIS — Z02.89 PAIN MEDICATION AGREEMENT: ICD-10-CM

## 2019-05-01 DIAGNOSIS — G89.4 CHRONIC PAIN SYNDROME: ICD-10-CM

## 2019-05-01 DIAGNOSIS — M48.061 DEGENERATIVE LUMBAR SPINAL STENOSIS: ICD-10-CM

## 2019-05-01 DIAGNOSIS — M47.26 OTHER SPONDYLOSIS WITH RADICULOPATHY, LUMBAR REGION: ICD-10-CM

## 2019-05-01 DIAGNOSIS — F45.42 PAIN DISORDER WITH PSYCHOLOGICAL FACTORS: ICD-10-CM

## 2019-05-01 DIAGNOSIS — Z79.891 ENCOUNTER FOR MONITORING OPIOID MAINTENANCE THERAPY: ICD-10-CM

## 2019-05-01 DIAGNOSIS — Z51.81 ENCOUNTER FOR MONITORING OPIOID MAINTENANCE THERAPY: ICD-10-CM

## 2019-05-01 PROCEDURE — 99214 OFFICE O/P EST MOD 30 MIN: CPT | Performed by: ANESTHESIOLOGY

## 2019-05-01 RX ORDER — BACLOFEN 10 MG/1
10 TABLET ORAL 3 TIMES DAILY
Qty: 90 TABLET | Refills: 1 | Status: CANCELLED | OUTPATIENT
Start: 2019-05-01 | End: 2019-05-31

## 2019-05-01 RX ORDER — BACLOFEN 10 MG/1
10 TABLET ORAL 3 TIMES DAILY
Qty: 90 TABLET | Refills: 1 | Status: SHIPPED | OUTPATIENT
Start: 2019-05-01 | End: 2019-05-03

## 2019-05-01 RX ORDER — OXYCODONE HYDROCHLORIDE AND ACETAMINOPHEN 5; 325 MG/1; MG/1
1 TABLET ORAL EVERY 8 HOURS PRN
Qty: 90 TABLET | Refills: 0 | Status: SHIPPED | OUTPATIENT
Start: 2019-05-01 | End: 2019-05-29 | Stop reason: ALTCHOICE

## 2019-05-01 ASSESSMENT — ENCOUNTER SYMPTOMS
EYE DISCHARGE: 0
EYE REDNESS: 0
VOMITING: 0
NAUSEA: 0
WHEEZING: 0
CONSTIPATION: 0
ABDOMINAL PAIN: 0
BACK PAIN: 1
SHORTNESS OF BREATH: 0
COUGH: 0
EYE PAIN: 0

## 2019-05-01 NOTE — LETTER
MEDICATION AGREEMENT     [facility]  [id number]      For certain conditions, multiple classes of medications may be used to help better manage your symptoms, and to improve your ability to function at home, work and in social settings. However, these medications do have risks, which will be discussed with you, including addiction and dependency. The following prescribed medications need frequent monitoring and will require you to partner and assist in your healthcare. Medication  Dose, instructions and quantity as indicated on current prescription bottle Diagnosis/Reason(s) for Taking Category                 f                 Benefits and goals of Controlled Substance Medications: There are two potential goals for your treatment: (1) decreased pain and suffering (2) improved daily life functions. There are many possible treatments for your chronic condition(s), and, in addition to controlled substance medications, we will try alternatives such as physical therapy, yoga, massage, home daily exercise, meditation, relaxation techniques, injections, chiropractic manipulations, surgery, cognitive therapy, hypnosis and many medications that are not habit-forming. Use of controlled substance medications may be helpful, but they are unlikely to resolve all of your symptoms or restore all function. Risks of Controlled Substance Medications:    Opioid pain medications: These medications can lead to problems such as addiction/dependence, sedation, lightheadedness/dizziness, memory issues, falls, constipation, nausea, or vomiting. They may also impair the ability to drive or operate machinery. Additionally, these medications may lower testosterone levels, leading to loss of bone strength, stamina and sex drive.   They may cause problems with breathing, sleep apnea and reduced coughing, which are especially dangerous for patients with lung disease. Overdose or dangerous interactions with alcohol and other medications may occur, leading to death. Hyperalgesia may develop, in which patients receiving opioids for the treatment of pain may actually become more sensitive to certain painful stimuli, and in some cases, experience pain from ordinarily non-painful stimuli. Women between the ages of 14-53 who could become pregnant should carefully weigh the risks and benefits of opioids with their physicians, as these medications increase the risk of pregnancy complications, including miscarriage,  delivery and stillbirth. It is also possible for babies to be born addicted to opioids. Opioid dependence withdrawal symptoms may include; feelings of uneasiness, increased pain, irritability, belly pain, diarrhea, sweats and goose-flesh. Benzodiazepines and non-benzodiazepine sleep medications: These medications can lead to problems such as addiction/dependence, sedation, fatigue, lightheadedness, dizziness, incoordination, falls, depression, hallucinations, and impaired judgment, memory and concentration. The ability to drive and operate machinery may also be affected. Abnormal sleep-related behaviors have been reported, including sleep walking, driving, making telephone calls, eating, or having sex while not fully awake. These medications can suppress breathing and worsen sleep apnea, particularly when combined with alcohol or other sedating medications, potentially leading to death. Dependence withdrawal symptoms may include tremors, anxiety, hallucinations and seizures. Stimulants:  Common adverse effects include addiction/dependence, increased blood pressure and heart rate, decreased appetite, nausea, involuntary weight loss, insomnia, irritability, and headaches.   These risks may increase when these medications are combined with other stimulants, such as caffeine pills or energy drinks, certain weight loss supplements and oral decongestants. Dependence withdrawal symptoms may include depressed mood, loss of interest, suicidal thoughts, anxiety, fatigue, appetite changes and agitation. Testosterone replacement therapy:  Potential side effects include increased risk of stroke and heart attack, blood clots, increased blood pressure, increased cholesterol, enlarged prostate, sleep apnea, irritability/aggression and other mood disorders, and decreased fertility. Other:     1. I understand that I have the following responsibilities:  · I will take medications at the dose and frequency prescribed. · I will not increase or change how I take my medications without the approval of the health care provider who signs this Medication Agreement. · I will arrange for refills at the prescribed interval ONLY during regular office hours. I will not ask for refills earlier than agreed, after-hours, on holidays or on weekends. · I will obtain all refills for these medications at  ·  ____________________________________  pharmacy (phone number  ·  ________________________), with full consent for my provider and pharmacist to exchange information in writing or verbally. · I will not request any pain medications or controlled substances from other providers and will inform this provider of all other medications I am taking. · I will inform my other health care providers that I am taking these medications and of the existence of this [address]. In the event of an emergency, I will provide the same information to the emergency department providers. · I will protect my prescriptions and medications. I understand that lost or misplaced prescriptions will not be replaced. · I will keep medications only for my own use and will not share them with others. I will keep all medications away from children. · I agree to participate in any medical, psychological or psychiatric assessments recommended by my provider. · I will actively participate in any program designed to improve function, including social, physical, psychological and daily or work activities. 2. I will not use illegal or street drugs or another person's prescription. If I have an addiction problem with drugs or alcohol and my provider asks me to enter a program to address this issue, I agree to follow through. Such programs may include:  · 12-Step program and securing a sponsor  · Individual counseling   · Inpatient or outpatient treatment  · Other:_____________________________________________________________________________________________________________________________________________    If in treatment, I will request that a copy of the programs initial evaluation and treatment recommendations be sent to this provider and will not expect refills until that is received. I will also request written monthly updates be sent to this provider to verify my continuing treatment. 3. I will consent to drug screening upon my providers request to assure I am only taking the prescribed drugs, described in this MEDICATION AGREEMENT. I understand that a drug screen is a laboratory test in which a sample of my urine, blood or saliva is checked to see what drugs I have been taking. 4. I agree that I will treat the providers and staff at this office with respect at all times. I will keep all of my scheduled appointments, but if I need to cancel my appointment, I will do so a minimum of 24 hours before it is scheduled. 5. I understand that this provider may stop prescribing the medications listed if:  · I do not show any improvement in pain, or my activity has not improved. · I develop rapid tolerance or loss of improvement, as described in my treatment plan. · I develop significant side effects from the medication.   · My behavior is inconsistent with the responsibilities outlined above, which may also result in my being prevented from receiving further care from this office. · Other:____________________________________________________________________    AGREEMENT:    I have read the above and have had all of my questions answered. For chronic disease management, I know that my symptoms can be managed with many types of treatments. A chronic medication trial may be part of my treatment, but I must be an active participant in my care. Medication therapy is only one part of my symptom management plan. In some cases, there may be limited scientific evidence to support the chronic use of certain medications to improve symptoms and daily function. Furthermore, in certain circumstances, there may be scientific information that suggests that use of chronic controlled substances may actually worsen my symptoms and increase my risk of unintentional death directly related to this medication therapy. I know that if my provider feels my risk from controlled medications is greater than my benefit, I will have my controlled substance medication(s) compassionately lowered or removed altogether. I agree to a controlled substance medication trial.      I further agree to allow this office to contact my HIPAA contact on file if there are concerns about my safety and use of controlled medications. I have agreed to use the following medications above as instructed by my physician and as stated in this [address].      Patient Signature:  ______________________  Date:5/1/2019 or _____________    Provider Signature:______________________  Date:5/1/2019 or _____________

## 2019-05-01 NOTE — PROGRESS NOTES
Worry: Not on file     Inability: Not on file    Transportation needs:     Medical: Not on file     Non-medical: Not on file   Tobacco Use    Smoking status: Never Smoker    Smokeless tobacco: Never Used   Substance and Sexual Activity    Alcohol use: No     Alcohol/week: 0.0 oz    Drug use: Never    Sexual activity: Yes     Partners: Male   Lifestyle    Physical activity:     Days per week: Not on file     Minutes per session: Not on file    Stress: Not on file   Relationships    Social connections:     Talks on phone: Not on file     Gets together: Not on file     Attends Anabaptist service: Not on file     Active member of club or organization: Not on file     Attends meetings of clubs or organizations: Not on file     Relationship status: Not on file    Intimate partner violence:     Fear of current or ex partner: Not on file     Emotionally abused: Not on file     Physically abused: Not on file     Forced sexual activity: Not on file   Other Topics Concern    Not on file   Social History Narrative    Not on file       Imaging Studies:   MRI LS (07/30/2016)      \"Impression   1. Degenerative changes of the lumbar spine predominately contributing to   neural foraminal narrowing as above. 2. No significant spinal canal stenosis of the lumbar spine. 3. There is unchanged grade 1 anterolisthesis at L5-S1. \"          XR LS (10/13/2016)  Impression   \"L1 compression fracture now with approximately 45% loss of vertebral body   height with minimal additional loss of height over the past month. \"      Results of the above studies were personally reviewed by me with the patient indetail along with the images, when available. The patient was instructed to contact the primary care provider regarding other unrelated findings not addressed during today's visit. Review of Systems:   Review of Systems   Constitutional: Negative for chills and fever. HENT: Negative for hearing loss and tinnitus.     Eyes: No      Increasingly unkempt or impaired: No     Negative mood changes: No     Abusing alcohol or illicit drugs: No     Appears intoxicated: No     Other: NA     Controlled Substances Monitoring:    Attestation: The Prescription Monitoring Report for this patient was reviewed today. Kasia Ascencio MD)  Chronic Pain Routine Monitoring: Possible medication side effects, risk of tolerance/dependence & alternative treatments discussed. , Random urine drug screen sent today., No signs of potential drug abuse or diversion identified: otherwise, see note documentation Kasia Ascencio MD)      Overall Progress:       PEG Score = 9 / 10     Physical Therapy: ongoing   Counseling: N/A   Injections: N/A     Patient compliance on plan of care: Fair   Progress on current plan:  Poor    Assessment:      ICD-10-CM    1. Spondylolisthesis, lumbar region M43.16 baclofen (LIORESAL) 10 MG tablet     oxyCODONE-acetaminophen (PERCOCET) 5-325 MG per tablet     External Referral To Neurosurgery     MRI LUMBAR SPINE WO CONTRAST     CANCELED: External Referral To Neurosurgery   2. Other spondylosis with radiculopathy, lumbar region M47.26 baclofen (LIORESAL) 10 MG tablet     oxyCODONE-acetaminophen (PERCOCET) 5-325 MG per tablet     External Referral To Neurosurgery     MRI LUMBAR SPINE WO CONTRAST     CANCELED: External Referral To Neurosurgery   3. Degenerative lumbar spinal stenosis M48.061 baclofen (LIORESAL) 10 MG tablet     External Referral To Neurosurgery     MRI LUMBAR SPINE WO CONTRAST     CANCELED: External Referral To Neurosurgery   4. Chronic pain syndrome G89.4 baclofen (LIORESAL) 10 MG tablet   5. Encounter for monitoring opioid maintenance therapy Z51.81 Drug Panel-PM-HI Res-UR Interp-A    Z79.891    6. Pain medication agreement Z02.89 oxyCODONE-acetaminophen (PERCOCET) 5-325 MG per tablet   7. 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  Chronic progressive low back/leg pain for years; no acute changes. 2. Followed with PM&R pain provider (Dr Jordy Mary) and tried several injections without help. 3. Reports short-term help with PT/aquatic therapy - encouraged regular home exercises. 4. Reports worsening pain over the years - requesting further options. 5. Failed PT, medications and spinal injections through other providers - will repeat MRI and refer to Neurosurgery for consultation. 6. Reports she is here to continue pain medications - will taper off Xanax as she does not use it often. May reduce to 1 pill twice a day for 2 weeks, then reduce to 1 pill a day for 2 weeks to stop. 7. Continue baclofen as before - reports it does help her with pain issues; on Cymbalta (PCP). 8. Counseled on limitation of opioid - will provide Percocet 5 mg TID PRN for severe pain. I will get UDT/oral fluid for testing today and get medication agreement. 9. I will refer her to Neurosurgery for further options, after MRI is completed. Both patient and her spouse (who dispenses her medications) agree. Analgesic Plan:   Continue present regimen: No - Taper off Xanax as discussed. Adjust dose of present analgesic: Yes - reduce Percocet 5 mg to TID PRN   Switch analgesics: No   Add/Adjust concomitant therapy: Yes - home exercise, baclofen, Cymbalta. Education:    - Reviewed OARRS report in detail, including Narx Scores and Overdose Risk Score. - Encouraged regular home exercises and strengthening as long-termgoals. - Counseled on dual effects, limitations, side effects and long-term complications of opioids. Discussed proper use and potential life threatening side effects of inappropriate use. - Educational materialprovided on multidisciplinary chronic pain management, safe opioid use. Follow-up: RTC X 1 month. Patient engaged in shared decision making. The entire treatment plan was discussed with patient and her spouse, and agreed.      Thank you for allowing us to participate in the care of your

## 2019-05-01 NOTE — PATIENT INSTRUCTIONS
Antidepressants and anti-seizure medicines may be used. These medicines seem to change the way your brain senses pain. Another option may be a nerve block injection. Medicines are the most common treatment for pain. But to feel better, you'll need to do more than take medicine. You can also do things like reducing your stress level and changing how you think. How can you take medicine safely? Medicines can help you get better. But they can also be dangerous, especially if you don't take them the right way. Be safe with medicines. Read and follow all instructions on the label. If the medicine you take causes side effects such as constipation or nausea, you may need to take other medicines for those problems. Talk to your doctor about any side effects you have. If you were prescribed an opioid pain reliever, your care team will give you information on how to use it safely. You will also get directions for how to safely store the medicine and how to get rid of any that's left over. Follow these instructions carefully. What physical treatments can help? Physical treatments can be an important part of managing chronic pain. You may find that combining more than one treatment helps the most.  These treatments can include:  · Heat or cold. This can help arthritis, sore muscles, and other aches. · Hydrotherapy. It uses flowing water to relax muscles. · Massage. Massage involves rubbing the soft tissues of the body. It eases tension and pain. · Transcutaneous electrical nerve stimulation (TENS). This treatment uses a gentle electric current applied to the skin for pain relief. · Acupuncture. This is a form of traditional [facility] medicine. It uses very thin needles inserted into certain points of the body. · Physical therapy. This treatment uses stretches and exercises to reduce pain and help you move better.   If you get physical therapy, make sure to do any home exercises or stretching your therapist has prescribed. Stay as active as you can. Try to get some physical activity every day. What other things can help? You can manage chronic pain by using things other than medicines or physical treatments. For example, you can keep track of your pain in a pain diary. It can help you understand how the things you do affect your pain. Reducing stress and tension can reduce pain. And being more aware of your thought patterns can be helpful. In some cases, shifting how you think about pain can affect how you feel. Here are some options to think about:  · Breathing exercises and meditation. These techniques can help you focus your attention, relax, and get rid of tension. · Guided imagery. This is a series of thoughts and images that can focus your attention away from your pain. · Hypnosis. It's a state of focused concentration that makes you less aware of your surroundings. · Cognitive behavioral therapy. This type of counseling helps you change your thought patterns. · Yoga. Stretching and exercises can reduce stress and improve flexibility. If what you're doing to control your pain isn't working, or if you're feeling depressed, talk to your doctor. He or she can help you change your pain management plan and find resources for emotional support. Where can you learn more? Go to [email or web address] and sign in to your [employer] account. Enter P119 in the [employer] box to learn more about \"Learning About Managing Chronic Pain. \"     If you do not have an account, please click on the \"Sign Up Now\" link. Current as of: Terrie 3, 2018  Content Version: 11.9  © 7222-2398 [employer]. Care instructions adapted under license by [facility] ([facility]). If you have questions about a medical condition or this instruction, always ask your healthcare professional. [address] any warranty or liability for your use of this information.

## 2019-05-03 ENCOUNTER — OFFICE VISIT (OUTPATIENT)
Dept: FAMILY MEDICINE CLINIC | Age: 74
End: 2019-05-03
Payer: COMMERCIAL

## 2019-05-03 VITALS
BODY MASS INDEX: 25.58 KG/M2 | HEART RATE: 90 BPM | DIASTOLIC BLOOD PRESSURE: 62 MMHG | SYSTOLIC BLOOD PRESSURE: 116 MMHG | OXYGEN SATURATION: 97 % | WEIGHT: 149 LBS

## 2019-05-03 DIAGNOSIS — E78.00 HYPERCHOLESTEROLEMIA: ICD-10-CM

## 2019-05-03 DIAGNOSIS — I10 ESSENTIAL HYPERTENSION: ICD-10-CM

## 2019-05-03 DIAGNOSIS — E11.9 TYPE 2 DIABETES MELLITUS WITHOUT COMPLICATION, WITHOUT LONG-TERM CURRENT USE OF INSULIN (HCC): Primary | ICD-10-CM

## 2019-05-03 DIAGNOSIS — F41.0 PANIC DISORDER WITHOUT AGORAPHOBIA: ICD-10-CM

## 2019-05-03 DIAGNOSIS — K21.9 GASTROESOPHAGEAL REFLUX DISEASE WITHOUT ESOPHAGITIS: ICD-10-CM

## 2019-05-03 PROCEDURE — 99214 OFFICE O/P EST MOD 30 MIN: CPT | Performed by: FAMILY MEDICINE

## 2019-05-03 RX ORDER — ALPRAZOLAM 0.5 MG/1
0.5 TABLET ORAL 2 TIMES DAILY PRN
COMMUNITY
End: 2019-05-29 | Stop reason: ALTCHOICE

## 2019-05-03 RX ORDER — DONEPEZIL HYDROCHLORIDE 10 MG/1
20 TABLET, FILM COATED ORAL NIGHTLY
Qty: 180 TABLET | Refills: 0 | Status: SHIPPED | OUTPATIENT
Start: 2019-05-03 | End: 2019-08-21

## 2019-05-03 RX ORDER — AMLODIPINE BESYLATE 10 MG/1
TABLET ORAL
Qty: 90 TABLET | Refills: 1 | Status: SHIPPED | OUTPATIENT
Start: 2019-05-03 | End: 2019-12-05 | Stop reason: SDUPTHER

## 2019-05-03 RX ORDER — OMEPRAZOLE 40 MG/1
40 CAPSULE, DELAYED RELEASE ORAL DAILY
Qty: 90 CAPSULE | Refills: 1 | Status: SHIPPED | OUTPATIENT
Start: 2019-05-03 | End: 2019-08-13 | Stop reason: SDUPTHER

## 2019-05-03 RX ORDER — PRAVASTATIN SODIUM 40 MG
TABLET ORAL
Qty: 90 TABLET | Refills: 1 | Status: ON HOLD
Start: 2019-05-03 | End: 2020-05-24 | Stop reason: HOSPADM

## 2019-05-03 NOTE — PROGRESS NOTES
DAILY 90 capsule 0    pravastatin (PRAVACHOL) 40 MG tablet TAKE ONE TABLET BY MOUTH DAILY 90 tablet 1    amLODIPine (NORVASC) 10 MG tablet TAKE ONE TABLET BY MOUTH DAILY 90 tablet 1    alendronate (FOSAMAX) 70 MG tablet TAKE 1 TABLET BY MOUTH ONCE WEEKLY BEFORE BREAKFAST, ON AN EMPTY STOMACH: REMAIN UPRIGHT FOR 30 MINUTES 4 tablet 5    Multiple Vitamins-Minerals (CENTRUM SILVER PO) Take 1 tablet by mouth daily          No Known Allergies    Social History     Tobacco Use    Smoking status: Never Smoker    Smokeless tobacco: Never Used   Substance Use Topics    Alcohol use: No     Alcohol/week: 0.0 oz       /62 (Site: Right Upper Arm, Position: Sitting, Cuff Size: Medium Adult)   Pulse 90   Wt 149 lb (67.6 kg)   SpO2 97%   BMI 25.58 kg/m²         Objective:   Physical Exam   Constitutional: She appears well-developed and well-nourished. She is cooperative. No distress. Neck: Carotid bruit is not present. Cardiovascular: Normal rate, regular rhythm and normal heart sounds. No murmur heard. Pulses:       Dorsalis pedis pulses are 2+ on the right side, and 2+ on the left side. Posterior tibial pulses are 2+ on the right side, and 2+ on the left side. Pulmonary/Chest: Effort normal and breath sounds normal.   Musculoskeletal: Normal range of motion. She exhibits no edema. Right foot: Normal.        Left foot: Normal.   Neurological: She is alert. She has normal reflexes. No sensory deficit. 12 point monofilament test normal    Psychiatric: Her speech is normal and behavior is normal. Judgment and thought content normal. Her mood appears not anxious. She does not exhibit a depressed mood. She exhibits abnormal recent memory. Assessment:      Yves Koch was seen today for follow-up.     Diagnoses and all orders for this visit:    Type 2 diabetes mellitus without complication, without long-term current use of insulin (HCC)    Gastroesophageal reflux disease without esophagitis    Hypercholesterolemia    Panic disorder without agoraphobia    Essential hypertension    Other orders  -     donepezil (ARICEPT) 10 MG tablet; Take 2 tablets by mouth nightly  -     amLODIPine (NORVASC) 10 MG tablet; TAKE ONE TABLET BY MOUTH DAILY  -     omeprazole (PRILOSEC) 40 MG delayed release capsule; Take 1 capsule by mouth daily  -     pravastatin (PRAVACHOL) 40 MG tablet; TAKE ONE TABLET BY MOUTH DAILY    OARRS report checked          Plan:      Pt appears stable & labs ordered. Adjustments of medication will be done after laboratory testing results available. Maintain current medications. We did discuss decreasing Xanax down to twice a day if possible. Continue with pain management specialist    Patient received counseling on the following healthy behaviors: nutrition and exercise     Patient given educational materials     Health maintenance updated    Discussed use, benefit, and side effects of prescribed medications. Barriers to medication compliance addressed. All patient questions answered. Pt voiced understanding. Patient needs RTC in 3 months. Please note that this chart was generated using Dragon dictation software. Although every effort was made to ensure the accuracy of this automated transcription, some errors in transcription may have occurred.             Annalee Moseley MA

## 2019-05-06 ENCOUNTER — HOSPITAL ENCOUNTER (OUTPATIENT)
Dept: MRI IMAGING | Age: 74
Discharge: HOME | End: 2019-05-06
Payer: COMMERCIAL

## 2019-05-06 ENCOUNTER — TELEPHONE (OUTPATIENT)
Dept: PAIN MANAGEMENT | Age: 74
End: 2019-05-06

## 2019-05-06 DIAGNOSIS — M47.26 OTHER SPONDYLOSIS WITH RADICULOPATHY, LUMBAR REGION: ICD-10-CM

## 2019-05-06 DIAGNOSIS — S32.010S CLOSED COMPRESSION FRACTURE OF FIRST LUMBAR VERTEBRA, SEQUELA: Primary | ICD-10-CM

## 2019-05-06 DIAGNOSIS — M43.16 SPONDYLOLISTHESIS, LUMBAR REGION: ICD-10-CM

## 2019-05-06 DIAGNOSIS — M48.061 DEGENERATIVE LUMBAR SPINAL STENOSIS: ICD-10-CM

## 2019-05-06 PROCEDURE — 72148 MRI LUMBAR SPINE W/O DYE: CPT

## 2019-05-06 NOTE — TELEPHONE ENCOUNTER
Lumbar MRI -   Vertebral compression fracture of L1 present - new from prior MRI of 2016  Referral to Ortho spine (Dr Dawood Carter) placed, as discussed during office visit.

## 2019-05-15 RX ORDER — BACLOFEN 10 MG/1
TABLET ORAL
Qty: 60 TABLET | Refills: 0 | Status: SHIPPED | OUTPATIENT
Start: 2019-05-15 | End: 2019-09-03

## 2019-05-21 ENCOUNTER — TELEPHONE (OUTPATIENT)
Dept: PAIN MANAGEMENT | Age: 74
End: 2019-05-21

## 2019-05-29 ENCOUNTER — OFFICE VISIT (OUTPATIENT)
Dept: PAIN MANAGEMENT | Age: 74
End: 2019-05-29
Payer: COMMERCIAL

## 2019-05-29 VITALS
SYSTOLIC BLOOD PRESSURE: 134 MMHG | HEIGHT: 64 IN | WEIGHT: 142 LBS | BODY MASS INDEX: 24.24 KG/M2 | HEART RATE: 70 BPM | DIASTOLIC BLOOD PRESSURE: 72 MMHG

## 2019-05-29 DIAGNOSIS — G89.4 CHRONIC PAIN SYNDROME: ICD-10-CM

## 2019-05-29 DIAGNOSIS — M47.26 OTHER SPONDYLOSIS WITH RADICULOPATHY, LUMBAR REGION: ICD-10-CM

## 2019-05-29 DIAGNOSIS — Z02.89 PAIN MEDICATION AGREEMENT: ICD-10-CM

## 2019-05-29 DIAGNOSIS — M48.061 DEGENERATIVE LUMBAR SPINAL STENOSIS: ICD-10-CM

## 2019-05-29 DIAGNOSIS — M43.16 SPONDYLOLISTHESIS, LUMBAR REGION: ICD-10-CM

## 2019-05-29 DIAGNOSIS — S32.010S CLOSED COMPRESSION FRACTURE OF FIRST LUMBAR VERTEBRA, SEQUELA: Primary | ICD-10-CM

## 2019-05-29 PROBLEM — S32.010A CLOSED COMPRESSION FRACTURE OF L1 LUMBAR VERTEBRA: Status: ACTIVE | Noted: 2019-05-29

## 2019-05-29 PROCEDURE — 99214 OFFICE O/P EST MOD 30 MIN: CPT | Performed by: ANESTHESIOLOGY

## 2019-05-29 RX ORDER — HYDROCODONE BITARTRATE AND ACETAMINOPHEN 5; 325 MG/1; MG/1
1 TABLET ORAL EVERY 8 HOURS PRN
Qty: 21 TABLET | Refills: 0 | Status: SHIPPED | OUTPATIENT
Start: 2019-05-29 | End: 2019-06-04 | Stop reason: SDUPTHER

## 2019-05-29 ASSESSMENT — ENCOUNTER SYMPTOMS
ABDOMINAL PAIN: 0
WHEEZING: 0
BACK PAIN: 1
SHORTNESS OF BREATH: 0
NAUSEA: 0
CONSTIPATION: 0
VOMITING: 0
EYE PAIN: 0
EYE REDNESS: 0
COUGH: 0
EYE DISCHARGE: 0

## 2019-05-29 NOTE — PROGRESS NOTES
Pain Medication Review -    Last Pain Medication Agreement date: 5/1/2019    Last UDT date:  3/20/2019 Oral Swab canceled due to specimen to old.     Last Opioid fill date (per [facility] Report): 5/1/2019   Days filled: 30    Expected Opioid pill count: 6     Actual Opiod pill count: 0    Notes -

## 2019-05-29 NOTE — PATIENT INSTRUCTIONS
Patient Education     Follow with spine surgeon as scheduled. I have provided Norco (instead of Percocet) for pain - call us back for refill    Chronic Pain: Care Instructions  Your Care Instructions    Chronic pain is pain that lasts a long time (months or even years) and may or may not have a clear cause. It is different from acute pain, which usually does have a clear cause--like an injury or illness--and gets better over time. Chronic pain:  · Lasts over time but may vary from day to day. · Does not go away despite efforts to end it. · May disrupt your sleep and lead to fatigue. · May cause depression or anxiety. · May make your muscles tense, causing more pain. · Can disrupt your work, hobbies, home life, and relationships with friends and family. Chronic pain is a very real condition. It is not just in your head. Treatment can help and usually includes several methods used together, such as medicines, physical therapy, exercise, and other treatments. Learning how to relax and changing negative thought patterns can also help you cope. Chronic pain is complex. Taking an active role in your treatment will help you better manage your pain. Tell your doctor if you have trouble dealing with your pain. You may have to try several things before you find what works best for you. Follow-up care is a key part of your treatment and safety. Be sure to make and go to all appointments, and call your doctor if you are having problems. It's also a good idea to know your test results and keep a list of the medicines you take. How can you care for yourself at home? · Pace yourself. Break up large jobs into smaller tasks. Save harder tasks for days when you have less pain, or go back and forth between hard tasks and easier ones. Take rest breaks. · Relax, and reduce stress. Relaxation techniques such as deep breathing or meditation can help. · Keep moving.  Gentle, daily exercise can help reduce pain over the long run. Try low- or no-impact exercises such as walking, swimming, and stationary biking. Do stretches to stay flexible. · Try heat, cold packs, and massage. · Get enough sleep. Chronic pain can make you tired and drain your energy. Talk with your doctor if you have trouble sleeping because of pain. · Think positive. Your thoughts can affect your pain level. Do things that you enjoy to distract yourself when you have pain instead of focusing on the pain. See a movie, read a book, listen to music, or spend time with a friend. · If you think you are depressed, talk to your doctor about treatment. · Keep a daily pain diary. Record how your moods, thoughts, sleep patterns, activities, and medicine affect your pain. You may find that your pain is worse during or after certain activities or when you are feeling a certain emotion. Having a record of your pain can help you and your doctor find the best ways to treat your pain. · Take pain medicines exactly as directed. ? If the doctor gave you a prescription medicine for pain, take it as prescribed. ? If you are not taking a prescription pain medicine, ask your doctor if you can take an over-the-counter medicine. Reducing constipation caused by pain medicine  · Include fruits, vegetables, beans, and whole grains in your diet each day. These foods are high in fiber. · Drink plenty of fluids, enough so that your urine is light yellow or clear like water. If you have kidney, heart, or liver disease and have to limit fluids, talk with your doctor before you increase the amount of fluids you drink. · If your doctor recommends it, get more exercise. Walking is a good choice. Bit by bit, increase the amount you walk every day. Try for at least 30 minutes on most days of the week. · Schedule time each day for a bowel movement. A daily routine may help. Take your time and do not strain when having a bowel movement. When should you call for help?   Call your doctor now or seek immediate medical care if:    · Your pain gets worse or is out of control.     · You feel down or blue, or you do not enjoy things like you once did. You may be depressed, which is common in people with chronic pain. Depression can be treated.     · You have vomiting or cramps for more than 2 hours.    Watch closely for changes in your health, and be sure to contact your doctor if:    · You cannot sleep because of pain.     · You are very worried or anxious about your pain.     · You have trouble taking your pain medicine.     · You have any concerns about your pain medicine.     · You have trouble with bowel movements, such as:  ? No bowel movement in 3 days. ? Blood in the anal area, in your stool, or on the toilet paper. ? Diarrhea for more than 24 hours. Where can you learn more? Go to https://[employer]. org and sign in to your [employer] account. Enter N004 in the MediaPhy box to learn more about \"Chronic Pain: Care Instructions. \"     If you do not have an account, please click on the \"Sign Up Now\" link. Current as of: Terrie 3, 2018  Content Version: 12.0  © 3158-6647 [employer], [employer]. Care instructions adapted under license by [facility] ([facility]). If you have questions about a medical condition or this instruction, always ask your healthcare professional. [address]  any warranty or liability for your use of this information.

## 2019-05-29 NOTE — PROGRESS NOTES
[facility]  [address], [place], [address]  [phone] (R)  [phone] (f)    5/29/19        Serena Armstrong  1945      Elisabet Payne MD      Chief Complaint:   Chief Complaint   Patient presents with    Lower Back Pain     The patient complains of low back pain that raditates to tailbone, both buttocks and legs. History of Present Illness   HPI    Seen us since 03/2019 -  Chronic low back pain; on chronic opioid for years. Pain constant in lower back achy sharp with tingling down the legs; no new weakness. Pain worse with daily activities bending and not helped by medications or injections. RED FLAG symptoms (Symptoms may include, but not limited to, acute trauma,fever, drug use, malignancy, weakness, perianal numbness, bladder/bowel changes) since last visit - No    Changes since last visit:  Completed MRI lumbar spine; scheduled to see spine surgeon next week.       Past Medical History:   Diagnosis Date    Acute gastritis without mention of hemorrhage     Acute sinusitis, unspecified     Acute upper respiratory infections of unspecified site     Allergic rhinitis, cause unspecified     Anemia, unspecified     Edema     Hyperlipidemia     Hypertension     Loss of weight     Lumbago     Lumbosacral root lesions, not elsewhere classified     Need for prophylactic vaccination and inoculation against influenza     Other bursitis disorders     Other seborrheic keratosis     Symptomatic menopausal or female climacteric states     Type II or unspecified type diabetes mellitus without mention of complication, not stated as uncontrolled     Pt is not aware of having diabetes, does know her A1C runs high but blood sugar is generally normal       Past Surgical History:   Procedure Laterality Date    OTHER SURGICAL HISTORY  1977    breast mass local excision negative    OTHER SURGICAL HISTORY  1986    nasal polyps    PARVEZ AND BSO  1976 No Known Allergies    Current Outpatient Medications   Medication Sig Dispense Refill    HYDROcodone-acetaminophen (NORCO) 5-325 MG per tablet Take 1 tablet by mouth every 8 hours as needed for Pain for up to 7 days. Take lowest dose possible to manage pain 21 tablet 0    baclofen (LIORESAL) 10 MG tablet TAKE ONE TABLET BY MOUTH TWICE A DAY AS NEEDED FOR MUSCLE SPASMS 60 tablet 0    donepezil (ARICEPT) 10 MG tablet Take 2 tablets by mouth nightly 180 tablet 0    amLODIPine (NORVASC) 10 MG tablet TAKE ONE TABLET BY MOUTH DAILY 90 tablet 1    omeprazole (PRILOSEC) 40 MG delayed release capsule Take 1 capsule by mouth daily 90 capsule 1    pravastatin (PRAVACHOL) 40 MG tablet TAKE ONE TABLET BY MOUTH DAILY 90 tablet 1    alendronate (FOSAMAX) 70 MG tablet TAKE 1 TABLET BY MOUTH ONCE WEEKLY BEFORE BREAKFAST, ON AN EMPTY STOMACH: REMAIN UPRIGHT FOR 30 MINUTES 4 tablet 5    Multiple Vitamins-Minerals (CENTRUM SILVER PO) Take 1 tablet by mouth daily        No current facility-administered medications for this visit.         Family History   Problem Relation Age of Onset    Diabetes Sister     Heart Disease Brother     Heart Disease Sister     Emphysema Sister        Social History     Socioeconomic History    Marital status:      Spouse name: Not on file    Number of children: Not on file    Years of education: Not on file    Highest education level: Not on file   Occupational History    Occupation: Retired     Comment: 2004   Social Needs    Financial resource strain: Not on file    Food insecurity:     Worry: Not on file     Inability: Not on file   Acorio needs:     Medical: Not on file     Non-medical: Not on file   Tobacco Use    Smoking status: Never Smoker    Smokeless tobacco: Never Used   Substance and Sexual Activity    Alcohol use: No     Alcohol/week: 0.0 oz    Drug use: Never    Sexual activity: Yes     Partners: Male   Lifestyle    Physical activity:     Days per week: Not on file     Minutes per session: Not on file    Stress: Not on file   Relationships    Social connections:     Talks on phone: Not on file     Gets together: Not on file     Attends Jewish service: Not on file     Active member of club or organization: Not on file     Attends meetings of clubs or organizations: Not on file     Relationship status: Not on file    Intimate partner violence:     Fear of current or ex partner: Not on file     Emotionally abused: Not on file     Physically abused: Not on file     Forced sexual activity: Not on file   Other Topics Concern    Not on file   Social History Narrative    Not on file         Imaging Studies:   MRI LS (05/06/2019)  \"FINDINGS:   BONES/ALIGNMENT: There is 3 mm anterolisthesis at L5-S1.  Moderate height   loss L1 vertebral body.  L1 superior endplate concavity with mild associated   edema.  Osseous retropulsion at superior endplate measuring approximately 4   mm.  Remainder of lumbar vertebral bodies are normal in height.  Remainder of   marrow signal is unremarkable.       SPINAL CORD: The conus terminates normally.       SOFT TISSUES: No paraspinal mass identified.       Partial anterior subarachnoid effacement at the L1 superior endplate level   due to osseous retropulsion.       L1-L2: Bilateral facet hypertrophy.  No significant neural foraminal   narrowing.  No significant spinal canal stenosis.       L2-L3: Bilateral facet hypertrophy.  No significant neural foraminal   narrowing.  No significant spinal canal stenosis.       L3-L4: Bilateral facet hypertrophy.  Small foraminal disc protrusions.  Mild   neural foraminal narrowing.  No significant spinal canal stenosis.       L4-L5: Bilateral facet hypertrophy.  Foraminal disc protrusions.  Mild   bilateral neural foraminal narrowing.  Lateral recess effacement.  Mild   spinal canal stenosis.       L5-S1: Bilateral facet hypertrophy.  No evidence of a pars defect.  Diffuse   disc bulging/uncovering.  Mild bilateral neural foraminal narrowing.  Lateral   recess effacement and mild thecal sac compression.           Impression   Moderate height loss of L1 vertebral body, most likely subacute-chronic. There is minor vertebral marrow edema without a visible fracture plane.  This   finding is new from 2016.       Mild spinal canal stenosis, lateral recess effacement, and neural foraminal   narrowing.       Mild degenerative anterolisthesis at L5-S1. \"     XR LS (10/13/2016)  Impression   \"L1 compression fracture now with approximately 45% loss of vertebral body   height with minimal additional loss of height over the past month. \"       Results of the above studies were personally reviewed by me with the patient indetail along with the images, when available. The patient was instructed to contact the primary care provider regarding other unrelated findings not addressed during today's visit. Review of Systems:   Review of Systems   Constitutional: Negative for chills and fever. HENT: Negative for hearing loss and tinnitus. Eyes: Negative for pain, discharge and redness. Respiratory: Negative for cough, shortness of breath and wheezing. Cardiovascular: Negative for chest pain and palpitations. Gastrointestinal: Negative for abdominal pain, constipation, nausea and vomiting. Genitourinary: Negative for frequency, hematuria and urgency. Musculoskeletal: Positive for back pain. Negative for myalgias and neck pain. Skin: Negative for rash. Neurological: Negative for dizziness, seizures, weakness and headaches. Hematological: Does not bruise/bleed easily. Psychiatric/Behavioral: Negative for suicidal ideas. The patient is not nervous/anxious. The patient was instructed to contact primary care physician regarding ROS positives not being addressed during today's visit. Physical Exam:   Physical Exam   Constitutional: She is oriented to person, place, and time.  No distress. Mild distress, ambulates with cane   HENT:   Head: Normocephalic. Eyes: Pupils are equal, round, and reactive to light. EOM are normal.   Neck: Normal range of motion. Neck supple. No thyromegaly present. Cardiovascular: Normal rate, regular rhythm, normal heart sounds and intact distal pulses. Pulmonary/Chest: Effort normal and breath sounds normal. No respiratory distress. She exhibits no tenderness. Abdominal: Soft. Bowel sounds are normal. She exhibits no mass. There is no tenderness. There is no guarding. Musculoskeletal: Normal range of motion. She exhibits no tenderness or deformity. No focal tenderness in back   Lymphadenopathy:     She has no cervical adenopathy. Neurological: She is alert and oriented to person, place, and time. She has normal strength. She displays normal reflexes. No cranial nerve deficit or sensory deficit. She exhibits normal muscle tone. Gait (antalgic) abnormal. Coordination normal. She displays no Babinski's sign on the right side. She displays no Babinski's sign on the left side. Reflex Scores:       Tricep reflexes are 2+ on the right side and 2+ on the left side. Bicep reflexes are 2+ on the right side and 2+ on the left side. Brachioradialis reflexes are 2+ on the right side and 2+ on the left side. Patellar reflexes are 1+ on the right side and 1+ on the left side. Achilles reflexes are 1+ on the right side and 1+ on the left side. Skin: Skin is warm. No rash noted. She is not diaphoretic. Psychiatric: She has a normal mood and affect. Her behavior is normal. Thought content normal.       Vitals:    05/29/19 1115 05/29/19 1118   BP: (!) 140/69 134/72   Site: Left Upper Arm    Position: Sitting    Cuff Size: Large Adult    Pulse: 70    Weight: 142 lb (64.4 kg)    Height: 5' 4\" (1.626 m)        Body mass index is 24.37 kg/m².   Pain Score:   9 /10    Goals of chronic pain therapy:      Multi-disciplinary comprehensive pain management with functional improvement and reduced opioid use. Prescription pain medication monitoring:      MEDD current = 22.50   ORT Score = 2   LOW     Other Risk factors - Anxiety, advanced age. Date of Last Medication Agreement: 05/01/2019   Date Naloxone prescribed: NA     UDT:    Date of last UDT: 05/01/2019 (oral fluid)    Adverse report: No     OARRS:    Checked today: Yes    Adverse report: No     Medication Effects -        Analgesia:      Average level of pain for the past month = 8/10     Percentage of pain relief = <30%     Activities Improved: Activities of daily living = 10%     Sleep = 10%     Mood = 10%     Social functioning = 10%     Adverse Effects: Any adverse effects: No     Type/Severity of side effect: None    Aberrant Behavior:     Requests for frequent early refills: No     Increased dose without authorization: No     Reports lost or stolen prescriptions: No     Attempts to obtain prescriptions from other providers: No     Use of pain medication in response to situational stressor: No      Increasingly unkempt or impaired: No     Negative mood changes: No     Abusing alcohol or illicit drugs: No     Appears intoxicated: No     Other: NA     Controlled Substances Monitoring:    Attestation: The Prescription Monitoring Report for this patient was reviewed today. Roshan Batista MD)  Chronic Pain Routine Monitoring: Possible medication side effects, risk of tolerance/dependence & alternative treatments discussed., No signs of potential drug abuse or diversion identified: otherwise, see note documentation Roshan Batista MD)      Overall Progress:       PEG Score = 8 / 10     Physical Therapy: last in 04/2019   Counseling: N/A   Injections: N/A     Patient compliance on plan of care:   Fair   Progress on current plan:  Poor    Assessment:      ICD-10-CM    1. Closed compression fracture of first lumbar vertebra, sequela S32.010S HYDROcodone-acetaminophen (NORCO) 5-325 MG per tablet   2. Spondylolisthesis, lumbar region M43.16 HYDROcodone-acetaminophen (NORCO) 5-325 MG per tablet   3. Other spondylosis with radiculopathy, lumbar region M47.26 HYDROcodone-acetaminophen (NORCO) 5-325 MG per tablet   4. Degenerative lumbar spinal stenosis M48.061    5. Chronic pain syndrome G89.4    6. Pain medication agreement Z02.89        Plan:     1. Chronic low back pain for years  2. Continues baclofen and Cymbalta (PCP); reports recent MRI. 3. Reviewed MRI films with patient - multilevel spondylosis with spinal/foraminal stenosis. Compression fracture of L1 with >50% compression and no retropulsion. 4. Followed with PM&R pain provider (Dr Kristal Slade) and failed spinal injections. 5. She is scheduled to see spine surgeon (Dr Jennifer Diaz) next week, for possible kyphoplasty/surgery. 6. No help from Percocet; may try Norco 5 mg TID PRN - trial script provided for a week. May call us back for further refills. Analgesic Plan:   Continue present regimen: No - stop Percocet   Adjust dose of present analgesic: No   Switch analgesics: Yes - Norco 5 mg TID PRN   Add/Adjust concomitant therapy: No (baclofen, Cymbalta)    Education:    - Reviewed OARRS report in detail, including Narx Scores and Overdose Risk Score. - Encouraged regular home exercises and strengthening as long-termgoals. - Counseled on dual effects, limitations, side effects and long-term complications of opioids. Discussed proper use and potential life threatening side effects of inappropriate use. - Educational materialprovided on multidisciplinary chronic pain management, safe opioid use. Follow-up: RTC X 1 month. Patient engaged in shared decision making. The entire treatment plan was discussed with patient and agreed. Thank you for allowing us to participate in the care of your patient - please do not hesitate to contact us if you have any questions or concerns.         Lam Ye MD  Board Certified in Anesthesiology and Pain Medicine

## 2019-06-04 ENCOUNTER — OFFICE VISIT (OUTPATIENT)
Dept: ORTHOPEDIC SURGERY | Age: 74
End: 2019-06-04
Payer: COMMERCIAL

## 2019-06-04 VITALS
HEIGHT: 64 IN | SYSTOLIC BLOOD PRESSURE: 132 MMHG | BODY MASS INDEX: 24.24 KG/M2 | WEIGHT: 142 LBS | DIASTOLIC BLOOD PRESSURE: 74 MMHG | HEART RATE: 71 BPM

## 2019-06-04 DIAGNOSIS — M48.062 LUMBAR STENOSIS WITH NEUROGENIC CLAUDICATION: Primary | ICD-10-CM

## 2019-06-04 DIAGNOSIS — M43.16 SPONDYLOLISTHESIS, LUMBAR REGION: ICD-10-CM

## 2019-06-04 DIAGNOSIS — S32.010S CLOSED COMPRESSION FRACTURE OF FIRST LUMBAR VERTEBRA, SEQUELA: ICD-10-CM

## 2019-06-04 DIAGNOSIS — M47.26 OTHER SPONDYLOSIS WITH RADICULOPATHY, LUMBAR REGION: ICD-10-CM

## 2019-06-04 DIAGNOSIS — S32.010A CLOSED COMPRESSION FRACTURE OF FIRST LUMBAR VERTEBRA, INITIAL ENCOUNTER: ICD-10-CM

## 2019-06-04 PROCEDURE — 99203 OFFICE O/P NEW LOW 30 MIN: CPT | Performed by: PHYSICIAN ASSISTANT

## 2019-06-04 RX ORDER — HYDROCODONE BITARTRATE AND ACETAMINOPHEN 5; 325 MG/1; MG/1
1 TABLET ORAL EVERY 8 HOURS PRN
Qty: 42 TABLET | Refills: 0 | Status: SHIPPED | OUTPATIENT
Start: 2019-06-04 | End: 2019-07-03 | Stop reason: SINTOL

## 2019-06-04 NOTE — TELEPHONE ENCOUNTER
MEDICATION ISSUES     Reported Issue:  Medication Refill    Medication Information     - Refill medication requested: Hydrocodone     - Medication dosage and quantity: 5/325 mg # 21     - Reporting side effects, if any: No     - Other issues, if any: NA     Controlled Substances     - OARRS done: Yes     - Last refill date (per OARRS): 5/29/2019      - Follow-up Appointment date: Yes   6/26/2019    Pharmacy Information      - PHARMACY updated with patient: Yes     - PHARMACY updated in Chart: Yes      NOTE for Controlled Substances     PLEASE READ TO PATIENTS:    1. For routine refills: ALL MEDICATION REFILLS NEED 2 WORKING DAYS (48-HOUR) ADVANCED NOTICE - Not filled on weekends or holidays.     - Patient informed about the above policy:  NA    2. If switching or changing opioid pain medications -    PATIENTS NEED TO BRING OLD MEDICATION FOR PILL COUNT AND POSSIBLE DESTRUCTION - before new medication could be filled.      - Patient informed about the above policy:  NA

## 2019-06-04 NOTE — TELEPHONE ENCOUNTER
MEDS: Hydrocodone    PHARMACY: Verified    F/U APPT DATE: 6/26/19    Patient also said Dr. Lexie Crawford said to raise her dosage.

## 2019-06-26 DIAGNOSIS — M43.16 SPONDYLOLISTHESIS, LUMBAR REGION: ICD-10-CM

## 2019-06-26 DIAGNOSIS — G89.4 CHRONIC PAIN SYNDROME: ICD-10-CM

## 2019-06-26 DIAGNOSIS — M48.061 DEGENERATIVE LUMBAR SPINAL STENOSIS: ICD-10-CM

## 2019-06-26 DIAGNOSIS — M47.26 OTHER SPONDYLOSIS WITH RADICULOPATHY, LUMBAR REGION: ICD-10-CM

## 2019-06-26 RX ORDER — BACLOFEN 10 MG/1
TABLET ORAL
Qty: 90 TABLET | Refills: 0 | Status: SHIPPED | OUTPATIENT
Start: 2019-06-26 | End: 2019-07-23 | Stop reason: SDUPTHER

## 2019-06-28 ENCOUNTER — TELEPHONE (OUTPATIENT)
Dept: PAIN MANAGEMENT | Age: 74
End: 2019-06-28

## 2019-06-28 NOTE — TELEPHONE ENCOUNTER
The patient called and stated that the Hydrocodone upset her stomach to bad to continue. She says that she is taking her Baclofen, and that is helping. She says that she will call us back as needed.

## 2019-07-03 ENCOUNTER — OFFICE VISIT (OUTPATIENT)
Dept: PAIN MANAGEMENT | Age: 74
End: 2019-07-03
Payer: COMMERCIAL

## 2019-07-03 VITALS
SYSTOLIC BLOOD PRESSURE: 124 MMHG | DIASTOLIC BLOOD PRESSURE: 77 MMHG | BODY MASS INDEX: 26.22 KG/M2 | HEIGHT: 64 IN | HEART RATE: 80 BPM | WEIGHT: 153.6 LBS

## 2019-07-03 DIAGNOSIS — M48.061 DEGENERATIVE LUMBAR SPINAL STENOSIS: ICD-10-CM

## 2019-07-03 DIAGNOSIS — M47.26 OTHER SPONDYLOSIS WITH RADICULOPATHY, LUMBAR REGION: ICD-10-CM

## 2019-07-03 DIAGNOSIS — S32.010S CLOSED COMPRESSION FRACTURE OF FIRST LUMBAR VERTEBRA, SEQUELA: ICD-10-CM

## 2019-07-03 DIAGNOSIS — M43.16 SPONDYLOLISTHESIS, LUMBAR REGION: Primary | ICD-10-CM

## 2019-07-03 DIAGNOSIS — G89.4 CHRONIC PAIN SYNDROME: ICD-10-CM

## 2019-07-03 PROCEDURE — 99214 OFFICE O/P EST MOD 30 MIN: CPT | Performed by: ANESTHESIOLOGY

## 2019-07-03 RX ORDER — TRAMADOL HYDROCHLORIDE 50 MG/1
50 TABLET ORAL 2 TIMES DAILY PRN
Qty: 10 TABLET | Refills: 0 | Status: SHIPPED | OUTPATIENT
Start: 2019-07-03 | End: 2019-07-08 | Stop reason: SDUPTHER

## 2019-07-03 ASSESSMENT — ENCOUNTER SYMPTOMS
SHORTNESS OF BREATH: 0
CONSTIPATION: 0
ABDOMINAL PAIN: 0
WHEEZING: 0
COUGH: 0
NAUSEA: 0
VOMITING: 0
EYE DISCHARGE: 0
BACK PAIN: 1
EYE PAIN: 0
EYE REDNESS: 0

## 2019-07-03 NOTE — PATIENT INSTRUCTIONS
Patient Education     Continue home exercises  May try Ultram for severe pain as provided. May return for caudal epidural steroid injection as discussed    Learning About Lumbar Epidural Steroid Injections  What is a lumbar epidural steroid injection? A doctor may give you a lumbar epidural steroid injection to try to decrease pain, tingling, or numbness in your back, buttock, or leg. These might be the result of a back or disc problem. The injection goes directly into your epidural space. This is the area in your back around your spinal cord. This injection may have both a local anesthetic and a steroid medicine. Or it may only have a steroid. Local anesthetic medicines numb your nerves right away for a short time. Steroids reduce swelling and pain. But they take a few days to start working. Some people get a series of these injections over weeks or months. How is a lumbar epidural done? The doctor may use an imaging test before or during your injection. This can be an MRI, a CT scan, or an X-ray. These tests can show where your nerve problems are. After finding the right spot, the doctor may inject a numbing medicine into the skin where you will get the steroid injection. Then he or she puts the needle for the steroid into the numbed area. You may feel some pressure. You could feel some stinging or burning during the injection. It takes about 10 to 15 minutes to get this injection. You will probably go home about 20 to 30 minutes after you get it. You will need someone to drive you home. What can you expect after a lumbar epidural?  If your injection had local anesthetic and a steroid, your legs may feel heavy or numb right after. You will probably be able to walk. But you may need to be extra careful. Take care not to lose your balance and be sure to follow your doctor's instructions. If your injection contained local anesthetic, you may feel better right away.  But this pain relief will last only a few hours. Your pain will probably return. This is because the steroids have not started working yet. Before the steroids start to work, your back may be sore for a few days. These injections don't always work. When they do, it takes 1 to 5 days. This pain relief can last for several days to a few months or longer. You may want to do less than normal for a few days. But you may also be able to return to your daily routine. Some people are dizzy or feel sick to their stomach after getting this injection. These symptoms usually do not last very long. If your pain is better, you may be able to keep doing your normal activities or physical therapy. But try not to overdo it, even if your back pain has improved a lot. If your pain is only a little better or if it comes back, your doctor may recommend another injection in a few weeks. If your pain has not changed, talk to your doctor about other treatment choices. Side effects from an epidural steroid injection include headache, fever, or infection. Serious side effects are rare. But they can include stroke, paralysis, or loss of vision. Follow-up care is a key part of your treatment and safety. Be sure to make and go to all appointments, and call your doctor if you are having problems. It's also a good idea to know your test results and keep a list of the medicines you take. Where can you learn more? Go to [email or web address] org and sign in to your mobli account. Enter E661 in the [facility] box to learn more about \"Learning About Lumbar Epidural Steroid Injections. \"     If you do not have an account, please click on the \"Sign Up Now\" link. Current as of: September 20, 2018  Content Version: 12.0  © 8213-8788 [employer]. Care instructions adapted under license by [facility] ([facility]).  If you have questions about a medical condition or this instruction, always ask your healthcare professional. [facility] disclaims any warranty or liability for your use of this information.

## 2019-07-03 NOTE — PROGRESS NOTES
Respiratory: Negative for cough, shortness of breath and wheezing. Cardiovascular: Negative for chest pain and palpitations. Gastrointestinal: Negative for abdominal pain, constipation, nausea and vomiting. Genitourinary: Negative for frequency, hematuria and urgency. Musculoskeletal: Positive for back pain. Negative for myalgias and neck pain. Skin: Negative for rash. Neurological: Negative for dizziness, seizures, weakness and headaches. Hematological: Does not bruise/bleed easily. Psychiatric/Behavioral: Negative for suicidal ideas. The patient is not nervous/anxious. The patient was instructed to contact primary care physician regarding ROS positives not being addressed during today's visit. Physical Exam:   Physical Exam   Constitutional: She is oriented to person, place, and time. No distress. Mild distress, ambulates without help   HENT:   Head: Normocephalic. Eyes: Pupils are equal, round, and reactive to light. EOM are normal.   Neck: Normal range of motion. Neck supple. No thyromegaly present. Cardiovascular: Normal rate, regular rhythm, normal heart sounds and intact distal pulses. Pulmonary/Chest: Effort normal and breath sounds normal. No respiratory distress. She exhibits no tenderness. Abdominal: Soft. Bowel sounds are normal. She exhibits no mass. There is no tenderness. There is no guarding. Musculoskeletal: Normal range of motion. She exhibits no tenderness or deformity. No focal tenderness in back   Lymphadenopathy:     She has no cervical adenopathy. Neurological: She is alert and oriented to person, place, and time. She has normal strength and normal reflexes. She displays normal reflexes. No cranial nerve deficit or sensory deficit. She exhibits normal muscle tone. Coordination and gait normal. She displays no Babinski's sign on the right side. She displays no Babinski's sign on the left side.    Reflex Scores:       Tricep reflexes are 2+ on the right side and 2+ on the left side. Bicep reflexes are 2+ on the right side and 2+ on the left side. Brachioradialis reflexes are 2+ on the right side and 2+ on the left side. Patellar reflexes are 2+ on the right side and 2+ on the left side. Achilles reflexes are 2+ on the right side and 2+ on the left side. Skin: Skin is warm. No rash noted. She is not diaphoretic. Psychiatric: She has a normal mood and affect. Her behavior is normal. Thought content normal.       Vitals:    07/03/19 1419 07/03/19 1424   BP: (!) 146/81 124/77   Site: Right Upper Arm Right Upper Arm   Position: Sitting Sitting   Cuff Size: Medium Adult Medium Adult   Pulse: 87 80   Weight: 153 lb 9.6 oz (69.7 kg)    Height: 5' 4\" (1.626 m)        Body mass index is 26.37 kg/m². Pain Score:   8 /10    Goals of chronic pain therapy:      Multi-disciplinary comprehensive pain management with functional improvement. Prescription pain medication monitoring:      MEDD current = 15   ORT Score = 2   LOW     Other Risk factors - anxiety, advanced age. Date of Last Medication Agreement: 05/01/2019   Date Naloxone prescribed: NA     UDT:    Date of last UDT: 05/01/2019 (oral fluid)    Adverse report: No     OARRS:    Checked today: Yes    Adverse report: No     Medication Effects -        Analgesia:      Average level of pain for the past month = 8/10     Percentage of pain relief = 0%     Activities Improved: Activities of daily living = 50%     Sleep = 40%     Mood = 40%     Social functioning = 50%     Adverse Effects:      Any adverse effects: No     Type/Severity of side effect: None    Aberrant Behavior:     Requests for frequent early refills: No     Increased dose without authorization: No     Reports lost or stolen prescriptions: No     Attempts to obtain prescriptions from other providers: No     Use of pain medication in response to situational stressor: No      Increasingly unkempt or impaired: No     Negative mood changes: No     Abusing alcohol or illicit drugs: No     Appears intoxicated: No     Other: NA     Controlled Substances Monitoring:    Periodic Controlled Substance Monitoring: Possible medication side effects, risk of tolerance/dependence & alternative treatments discussed., No signs of potential drug abuse or diversion identified. , Assessed functional status. Amanda Dhaliwal MD)      Overall Progress:       PEG Score = 7 / 10     Physical Therapy: last in 04/2019   Counseling: N/A   Injections: N/A     Patient compliance on plan of care: Fair   Progress on current plan:  Poor    Assessment:      ICD-10-CM    1. Spondylolisthesis, lumbar region M43.16 traMADol (ULTRAM) 50 MG tablet   2. Other spondylosis with radiculopathy, lumbar region M47.26 traMADol (ULTRAM) 50 MG tablet   3. Degenerative lumbar spinal stenosis M48.061 traMADol (ULTRAM) 50 MG tablet   4. Closed compression fracture of first lumbar vertebra, sequela S32.010S traMADol (ULTRAM) 50 MG tablet   5. Chronic pain syndrome G89.4        Plan:     1. Chronic low back pain for years; no focal signs. 2. History of old L1 compression fracture and degenerative spinal stenosis. 3. Failed spinal injections through another pain provider in 2017.  4. Seen spine surgeon last month and no surgery was offered. 5. Reports intolerance to NSAIDs, Norco and Percocet due to GI issues. 6. May try Ultram for PRN severe pain; reviewed MRI films, which showed spondylolisthesis of L5 on S1 with diffuse facet degenerative changes and stenosis in lower lumbar spine. 7. She is frustrated with the pain issues -  offered trial of caudal DARIA; discussed procedure benefits possible complications and alternatives and agrees. Analgesic Plan:   Continue present regimen: Yes - Ultram 50 mg BID PRN   Adjust dose of present analgesic: No   Switch analgesics: No   Add/Adjust concomitant therapy: Yes - home exercises.     Education:    - Reviewed OARRS

## 2019-07-08 DIAGNOSIS — M43.16 SPONDYLOLISTHESIS, LUMBAR REGION: ICD-10-CM

## 2019-07-08 DIAGNOSIS — M48.061 DEGENERATIVE LUMBAR SPINAL STENOSIS: ICD-10-CM

## 2019-07-08 DIAGNOSIS — S32.010S CLOSED COMPRESSION FRACTURE OF FIRST LUMBAR VERTEBRA, SEQUELA: ICD-10-CM

## 2019-07-08 DIAGNOSIS — M47.26 OTHER SPONDYLOSIS WITH RADICULOPATHY, LUMBAR REGION: ICD-10-CM

## 2019-07-08 RX ORDER — TRAMADOL HYDROCHLORIDE 50 MG/1
50 TABLET ORAL 2 TIMES DAILY PRN
Qty: 10 TABLET | Refills: 0 | Status: SHIPPED | OUTPATIENT
Start: 2019-07-08 | End: 2019-07-12 | Stop reason: SDUPTHER

## 2019-07-12 DIAGNOSIS — S32.010S CLOSED COMPRESSION FRACTURE OF FIRST LUMBAR VERTEBRA, SEQUELA: ICD-10-CM

## 2019-07-12 DIAGNOSIS — M47.26 OTHER SPONDYLOSIS WITH RADICULOPATHY, LUMBAR REGION: ICD-10-CM

## 2019-07-12 DIAGNOSIS — M43.16 SPONDYLOLISTHESIS, LUMBAR REGION: ICD-10-CM

## 2019-07-12 DIAGNOSIS — M48.061 DEGENERATIVE LUMBAR SPINAL STENOSIS: ICD-10-CM

## 2019-07-12 RX ORDER — TRAMADOL HYDROCHLORIDE 50 MG/1
50 TABLET ORAL 2 TIMES DAILY PRN
Qty: 60 TABLET | Refills: 0 | Status: SHIPPED | OUTPATIENT
Start: 2019-07-12 | End: 2019-08-13 | Stop reason: SDUPTHER

## 2019-07-12 NOTE — TELEPHONE ENCOUNTER
MEDICATION ISSUES     Reported Issue:  Medication Refill    Medication Information     - Refill medication requested: Tramadol     - Medication dosage and quantity:      - Reporting side effects, if any: Yes     - Other issues, if any:   Patient wants to know if she can have a full prescription. She said she only takes them when absolutely necessary, but her insurance charges her the same amount regardless of the pill count.                           Controlled Substances     - Last refill date (per OARRS): NA      - Follow-up Appointment date: Yes 8/2/2019    Pharmacy Information      - PHARMACY updated with patient: Yes     - PHARMACY updated in Chart: Yes      NOTE for Controlled Substances     PLEASE READ TO PATIENTS:    1. For routine refills: ALL MEDICATION REFILLS NEED 2 WORKING DAYS (48-HOUR) ADVANCED NOTICE - Not filled on weekends or holidays.     - Patient informed about the above policy:  NA    2. If switching or changing opioid pain medications -    PATIENTS NEED TO BRING OLD MEDICATION FOR PILL COUNT AND POSSIBLE DESTRUCTION - before new medication could be filled.      - Patient informed about the above policy:  NA

## 2019-07-23 DIAGNOSIS — M48.061 DEGENERATIVE LUMBAR SPINAL STENOSIS: ICD-10-CM

## 2019-07-23 DIAGNOSIS — G89.4 CHRONIC PAIN SYNDROME: ICD-10-CM

## 2019-07-23 DIAGNOSIS — M43.16 SPONDYLOLISTHESIS, LUMBAR REGION: ICD-10-CM

## 2019-07-23 DIAGNOSIS — M47.26 OTHER SPONDYLOSIS WITH RADICULOPATHY, LUMBAR REGION: ICD-10-CM

## 2019-07-23 RX ORDER — BACLOFEN 10 MG/1
TABLET ORAL
Qty: 90 TABLET | Refills: 0 | Status: SHIPPED | OUTPATIENT
Start: 2019-07-23 | End: 2019-08-21

## 2019-07-25 ENCOUNTER — TELEPHONE (OUTPATIENT)
Dept: PAIN MANAGEMENT | Age: 74
End: 2019-07-25

## 2019-07-25 NOTE — TELEPHONE ENCOUNTER
Called pt to inform her that we have to reschedule her injection with Dr. Hussein Suggs on 8/1/19. I LVM for pt to call back.

## 2019-07-29 RX ORDER — DONEPEZIL HYDROCHLORIDE 10 MG/1
TABLET, FILM COATED ORAL
Qty: 180 TABLET | Refills: 0 | Status: SHIPPED | OUTPATIENT
Start: 2019-07-29 | End: 2019-10-24 | Stop reason: SDUPTHER

## 2019-08-08 ENCOUNTER — HOSPITAL ENCOUNTER (OUTPATIENT)
Dept: INTERVENTIONAL RADIOLOGY/VASCULAR | Age: 74
Discharge: HOME | End: 2019-08-08
Payer: COMMERCIAL

## 2019-08-08 DIAGNOSIS — M48.061 BILATERAL STENOSIS OF LATERAL RECESS OF LUMBAR SPINE: ICD-10-CM

## 2019-08-08 PROCEDURE — 6360000002 HC RX W HCPCS

## 2019-08-08 PROCEDURE — 62323 NJX INTERLAMINAR LMBR/SAC: CPT

## 2019-08-08 PROCEDURE — 62323 NJX INTERLAMINAR LMBR/SAC: CPT | Performed by: ANESTHESIOLOGY

## 2019-08-08 PROCEDURE — 6370000000 HC RX 637 (ALT 250 FOR IP): Performed by: ANESTHESIOLOGY

## 2019-08-08 PROCEDURE — 6360000004 HC RX CONTRAST MEDICATION: Performed by: ANESTHESIOLOGY

## 2019-08-08 RX ORDER — ALPRAZOLAM 0.5 MG/1
1 TABLET ORAL ONCE
Status: COMPLETED | OUTPATIENT
Start: 2019-08-08 | End: 2019-08-08

## 2019-08-08 RX ADMIN — IOHEXOL 10 ML: 180 INJECTION INTRAVENOUS at 09:39

## 2019-08-08 RX ADMIN — ALPRAZOLAM 1 MG: 0.5 TABLET ORAL at 09:39

## 2019-08-08 NOTE — PROCEDURES
Procedure: Caudal Epidural steroid injection  under fluorsocopy     Anesthesia: Local  Blood loss: None  Complications: None     Patient has chronic low back and leg pain. No improvement with PT, medications and here for trial of epidural steroid injections.     The entire procedure was done under sterile precautions. After informed consent, patient was placed prone and monitors placed. Time-out session was done. The back was cleaned with Chloraprep and sterile drapes placed. After infiltrating skin and subcutaneous tissues with 1% lidocaine, I used 22g spinal needle to access caudal epidural space under fluoroscopy. The needle tip was verified on lateral view. After negative aspiration for blood and CSF, I placed 1 ml of Omnipaque 180, which showed epidural spread without vascular uptake. Then after negative aspiration, I placed 10 ml of a mixture of Kenalog 80 mg in preservative free normal saline incrementally. Then the needle was removed, hemostasis obtained and needle entry site covered with band-aid. Patient tolerated procedure well without any problems, was ambulatory and discharged in stable condition.     Plan:   May repeat DARIA as needed        Stephan Singleton MD  Board Certified in Anesthesiology and Pain Medicine

## 2019-08-09 ENCOUNTER — TELEPHONE (OUTPATIENT)
Dept: PAIN MANAGEMENT | Age: 74
End: 2019-08-09

## 2019-08-12 ENCOUNTER — TELEPHONE (OUTPATIENT)
Dept: PAIN MANAGEMENT | Age: 74
End: 2019-08-12

## 2019-08-12 DIAGNOSIS — S32.010S CLOSED COMPRESSION FRACTURE OF FIRST LUMBAR VERTEBRA, SEQUELA: ICD-10-CM

## 2019-08-12 DIAGNOSIS — M47.26 OTHER SPONDYLOSIS WITH RADICULOPATHY, LUMBAR REGION: ICD-10-CM

## 2019-08-12 DIAGNOSIS — M48.061 DEGENERATIVE LUMBAR SPINAL STENOSIS: ICD-10-CM

## 2019-08-12 DIAGNOSIS — M43.16 SPONDYLOLISTHESIS, LUMBAR REGION: ICD-10-CM

## 2019-08-12 RX ORDER — TRAMADOL HYDROCHLORIDE 50 MG/1
50 TABLET ORAL 2 TIMES DAILY PRN
Qty: 60 TABLET | Refills: 0 | Status: CANCELLED | OUTPATIENT
Start: 2019-08-12 | End: 2019-09-11

## 2019-08-13 ENCOUNTER — TELEPHONE (OUTPATIENT)
Dept: PAIN MANAGEMENT | Age: 74
End: 2019-08-13

## 2019-08-13 RX ORDER — TRAMADOL HYDROCHLORIDE 50 MG/1
50 TABLET ORAL 2 TIMES DAILY PRN
Qty: 60 TABLET | Refills: 0 | Status: SHIPPED | OUTPATIENT
Start: 2019-08-13 | End: 2019-08-21

## 2019-08-13 RX ORDER — OMEPRAZOLE 40 MG/1
40 CAPSULE, DELAYED RELEASE ORAL DAILY
Qty: 90 CAPSULE | Refills: 1 | Status: SHIPPED | OUTPATIENT
Start: 2019-08-13 | End: 2020-02-07 | Stop reason: SDUPTHER

## 2019-08-13 NOTE — TELEPHONE ENCOUNTER
Ultram sent (e-script)  Patient needs to call us in advance for medication refills - NOT done over the weekend.

## 2019-08-21 ENCOUNTER — OFFICE VISIT (OUTPATIENT)
Dept: PAIN MANAGEMENT | Age: 74
End: 2019-08-21
Payer: COMMERCIAL

## 2019-08-21 VITALS
DIASTOLIC BLOOD PRESSURE: 62 MMHG | WEIGHT: 153 LBS | HEIGHT: 64 IN | BODY MASS INDEX: 26.12 KG/M2 | SYSTOLIC BLOOD PRESSURE: 112 MMHG

## 2019-08-21 DIAGNOSIS — S32.010S CLOSED COMPRESSION FRACTURE OF FIRST LUMBAR VERTEBRA, SEQUELA: ICD-10-CM

## 2019-08-21 DIAGNOSIS — M43.16 SPONDYLOLISTHESIS, LUMBAR REGION: Primary | ICD-10-CM

## 2019-08-21 DIAGNOSIS — M48.061 DEGENERATIVE LUMBAR SPINAL STENOSIS: ICD-10-CM

## 2019-08-21 DIAGNOSIS — G89.4 CHRONIC PAIN SYNDROME: ICD-10-CM

## 2019-08-21 PROCEDURE — 99214 OFFICE O/P EST MOD 30 MIN: CPT | Performed by: ANESTHESIOLOGY

## 2019-08-21 RX ORDER — ACETAMINOPHEN AND CODEINE PHOSPHATE 300; 30 MG/1; MG/1
1 TABLET ORAL EVERY 8 HOURS PRN
Qty: 15 TABLET | Refills: 0 | Status: SHIPPED | OUTPATIENT
Start: 2019-08-21 | End: 2019-08-26 | Stop reason: SDUPTHER

## 2019-08-21 ASSESSMENT — ENCOUNTER SYMPTOMS
VOMITING: 0
EYE PAIN: 0
SHORTNESS OF BREATH: 0
WHEEZING: 0
CONSTIPATION: 0
COUGH: 0
BACK PAIN: 1
ABDOMINAL PAIN: 0
EYE DISCHARGE: 0
NAUSEA: 0
EYE REDNESS: 0

## 2019-08-21 NOTE — PROGRESS NOTES
Pain Medication Review -    Last Pain Medication Agreement date: 5/1/19    Last UDT date: N/A Pt has not done a UDT with our office. Expected Opioid pill count: 42      Actual Opiod pill count: Unknown pt didn't know to bring her pain meds in to all appts. Pt states that she will bring them into her next f/u.      Notes -

## 2019-08-21 NOTE — PROGRESS NOTES
[address] [facility] [address], [place], [address]  [phone] (B)  [phone] (f)    8/21/19        William Renee  1945      Daphne Avalos MD      Chief Complaint:   Chief Complaint   Patient presents with    Lower Back Pain     F/u on low back pain that raditates to buttock area. History of Present Illness   HPI    Seen us since 03/2019 -  Chronic low back pain for years. Pain constant in lower back, achy throbbing with radiation to buttock area and none to legs; no weakness. Pain worse with daily activities at home, and helped by bracing, icy hot and somewhat by injection.  RED FLAG symptoms (Symptoms may include, but not limited to, acute trauma,fever, drug use, malignancy, weakness, perianal numbness, bladder/bowel changes) since last visit - No    Changes since last visit:  Tried caudal DARIA on 08/08/2019      Past Medical History:   Diagnosis Date    Acute gastritis without mention of hemorrhage     Acute sinusitis, unspecified     Acute upper respiratory infections of unspecified site     Allergic rhinitis, cause unspecified     Anemia, unspecified     Edema     Hyperlipidemia     Hypertension     Loss of weight     Lumbago     Lumbosacral root lesions, not elsewhere classified     Need for prophylactic vaccination and inoculation against influenza     Other bursitis disorders     Other seborrheic keratosis     Symptomatic menopausal or female climacteric states     Type II or unspecified type diabetes mellitus without mention of complication, not stated as uncontrolled     Pt is not aware of having diabetes, does know her A1C runs high but blood sugar is generally normal       Past Surgical History:   Procedure Laterality Date    OTHER SURGICAL HISTORY  1977    breast mass local excision negative    OTHER SURGICAL HISTORY  1986    nasal polyps    PARVEZ AND BSO  1976       Allergies   Allergen Reactions    Hydrocodone      The Skin: Negative for rash. Neurological: Negative for dizziness, seizures, weakness and headaches. Hematological: Does not bruise/bleed easily. Psychiatric/Behavioral: Negative for suicidal ideas. The patient is not nervous/anxious. The patient was instructed to contact primary care physician regarding ROS positives not being addressed during today's visit. Physical Exam:   Physical Exam   Constitutional: She is oriented to person, place, and time. No distress. No acute distress  Ambulates without help  Accompanied by spouse   HENT:   Head: Normocephalic. Eyes: Pupils are equal, round, and reactive to light. EOM are normal.   Neck: Normal range of motion. Neck supple. No thyromegaly present. Cardiovascular: Normal rate, regular rhythm, normal heart sounds and intact distal pulses. Pulmonary/Chest: Effort normal and breath sounds normal. No respiratory distress. She exhibits no tenderness. Abdominal: Soft. Bowel sounds are normal. She exhibits no mass. There is no tenderness. There is no guarding. Musculoskeletal: Normal range of motion. She exhibits no tenderness or deformity. Mild paraspinal tenderness in lumbosacral area. Caudal injection site is clean without signs of infection. Lymphadenopathy:     She has no cervical adenopathy. Neurological: She is alert and oriented to person, place, and time. She has normal strength and normal reflexes. She displays normal reflexes. No cranial nerve deficit or sensory deficit. She exhibits normal muscle tone. Coordination and gait normal. She displays no Babinski's sign on the right side. She displays no Babinski's sign on the left side. Reflex Scores:       Tricep reflexes are 2+ on the right side and 2+ on the left side. Bicep reflexes are 2+ on the right side and 2+ on the left side. Brachioradialis reflexes are 2+ on the right side and 2+ on the left side.        Patellar reflexes are 2+ on the right side and 2+ on the

## 2019-08-25 DIAGNOSIS — M47.26 OTHER SPONDYLOSIS WITH RADICULOPATHY, LUMBAR REGION: ICD-10-CM

## 2019-08-25 DIAGNOSIS — M48.061 DEGENERATIVE LUMBAR SPINAL STENOSIS: ICD-10-CM

## 2019-08-25 DIAGNOSIS — M43.16 SPONDYLOLISTHESIS, LUMBAR REGION: ICD-10-CM

## 2019-08-25 DIAGNOSIS — G89.4 CHRONIC PAIN SYNDROME: ICD-10-CM

## 2019-08-26 DIAGNOSIS — M43.16 SPONDYLOLISTHESIS, LUMBAR REGION: ICD-10-CM

## 2019-08-26 DIAGNOSIS — M48.061 DEGENERATIVE LUMBAR SPINAL STENOSIS: ICD-10-CM

## 2019-08-26 RX ORDER — BACLOFEN 10 MG/1
TABLET ORAL
Qty: 90 TABLET | Refills: 0 | OUTPATIENT
Start: 2019-08-26

## 2019-08-27 RX ORDER — ACETAMINOPHEN AND CODEINE PHOSPHATE 300; 30 MG/1; MG/1
1 TABLET ORAL 2 TIMES DAILY PRN
Qty: 60 TABLET | Refills: 0 | Status: SHIPPED | OUTPATIENT
Start: 2019-08-27 | End: 2019-09-25 | Stop reason: SDUPTHER

## 2019-09-02 DIAGNOSIS — M43.16 SPONDYLOLISTHESIS, LUMBAR REGION: ICD-10-CM

## 2019-09-02 DIAGNOSIS — M48.061 DEGENERATIVE LUMBAR SPINAL STENOSIS: ICD-10-CM

## 2019-09-02 DIAGNOSIS — G89.4 CHRONIC PAIN SYNDROME: ICD-10-CM

## 2019-09-02 DIAGNOSIS — M47.26 OTHER SPONDYLOSIS WITH RADICULOPATHY, LUMBAR REGION: ICD-10-CM

## 2019-09-03 RX ORDER — BACLOFEN 10 MG/1
TABLET ORAL
Qty: 90 TABLET | Refills: 0 | Status: SHIPPED | OUTPATIENT
Start: 2019-09-03 | End: 2019-09-30 | Stop reason: SDUPTHER

## 2019-09-25 ENCOUNTER — OFFICE VISIT (OUTPATIENT)
Dept: FAMILY MEDICINE CLINIC | Age: 74
End: 2019-09-25
Payer: COMMERCIAL

## 2019-09-25 VITALS
WEIGHT: 144.4 LBS | HEART RATE: 82 BPM | BODY MASS INDEX: 24.79 KG/M2 | OXYGEN SATURATION: 96 % | DIASTOLIC BLOOD PRESSURE: 82 MMHG | SYSTOLIC BLOOD PRESSURE: 108 MMHG

## 2019-09-25 DIAGNOSIS — Z12.11 SCREENING FOR COLON CANCER: ICD-10-CM

## 2019-09-25 DIAGNOSIS — M48.061 DEGENERATIVE LUMBAR SPINAL STENOSIS: ICD-10-CM

## 2019-09-25 DIAGNOSIS — F41.0 PANIC DISORDER WITHOUT AGORAPHOBIA: ICD-10-CM

## 2019-09-25 DIAGNOSIS — Z23 NEED FOR VACCINATION: ICD-10-CM

## 2019-09-25 DIAGNOSIS — E78.00 HYPERCHOLESTEROLEMIA: ICD-10-CM

## 2019-09-25 DIAGNOSIS — M43.16 SPONDYLOLISTHESIS, LUMBAR REGION: ICD-10-CM

## 2019-09-25 DIAGNOSIS — R41.3 MEMORY DEFICIT: ICD-10-CM

## 2019-09-25 DIAGNOSIS — E11.9 TYPE 2 DIABETES MELLITUS WITHOUT COMPLICATION, WITHOUT LONG-TERM CURRENT USE OF INSULIN (HCC): Primary | ICD-10-CM

## 2019-09-25 DIAGNOSIS — E21.3 HYPERPARATHYROIDISM (HCC): ICD-10-CM

## 2019-09-25 PROCEDURE — 90653 IIV ADJUVANT VACCINE IM: CPT | Performed by: FAMILY MEDICINE

## 2019-09-25 PROCEDURE — G8510 SCR DEP NEG, NO PLAN REQD: HCPCS | Performed by: FAMILY MEDICINE

## 2019-09-25 PROCEDURE — 99214 OFFICE O/P EST MOD 30 MIN: CPT | Performed by: FAMILY MEDICINE

## 2019-09-25 PROCEDURE — 3288F FALL RISK ASSESSMENT DOCD: CPT | Performed by: FAMILY MEDICINE

## 2019-09-25 PROCEDURE — G0008 ADMIN INFLUENZA VIRUS VAC: HCPCS | Performed by: FAMILY MEDICINE

## 2019-09-25 RX ORDER — ACETAMINOPHEN AND CODEINE PHOSPHATE 300; 30 MG/1; MG/1
TABLET ORAL
Qty: 60 TABLET | Refills: 0 | Status: SHIPPED | OUTPATIENT
Start: 2019-09-27 | End: 2019-09-26 | Stop reason: SDUPTHER

## 2019-09-25 ASSESSMENT — PATIENT HEALTH QUESTIONNAIRE - PHQ9
SUM OF ALL RESPONSES TO PHQ QUESTIONS 1-9: 0
2. FEELING DOWN, DEPRESSED OR HOPELESS: 0
SUM OF ALL RESPONSES TO PHQ QUESTIONS 1-9: 0
SUM OF ALL RESPONSES TO PHQ9 QUESTIONS 1 & 2: 0
1. LITTLE INTEREST OR PLEASURE IN DOING THINGS: 0

## 2019-09-26 ENCOUNTER — TELEPHONE (OUTPATIENT)
Dept: FAMILY MEDICINE CLINIC | Age: 74
End: 2019-09-26

## 2019-09-26 RX ORDER — ACETAMINOPHEN AND CODEINE PHOSPHATE 300; 30 MG/1; MG/1
1 TABLET ORAL 2 TIMES DAILY PRN
Qty: 60 TABLET | Refills: 0 | Status: SHIPPED | OUTPATIENT
Start: 2019-09-26 | End: 2019-10-24 | Stop reason: SDUPTHER

## 2019-09-28 RX ORDER — ALENDRONATE SODIUM 70 MG/1
TABLET ORAL
Qty: 4 TABLET | Refills: 4 | Status: SHIPPED | OUTPATIENT
Start: 2019-09-28 | End: 2020-02-24

## 2019-09-30 DIAGNOSIS — G89.4 CHRONIC PAIN SYNDROME: ICD-10-CM

## 2019-09-30 DIAGNOSIS — M48.061 DEGENERATIVE LUMBAR SPINAL STENOSIS: ICD-10-CM

## 2019-09-30 DIAGNOSIS — M47.26 OTHER SPONDYLOSIS WITH RADICULOPATHY, LUMBAR REGION: ICD-10-CM

## 2019-09-30 DIAGNOSIS — M43.16 SPONDYLOLISTHESIS, LUMBAR REGION: ICD-10-CM

## 2019-09-30 RX ORDER — BACLOFEN 10 MG/1
TABLET ORAL
Qty: 90 TABLET | Refills: 0 | Status: SHIPPED | OUTPATIENT
Start: 2019-09-30 | End: 2019-10-28 | Stop reason: SDUPTHER

## 2019-10-01 DIAGNOSIS — F40.01 PANIC DISORDER WITH AGORAPHOBIA: ICD-10-CM

## 2019-10-01 RX ORDER — ALPRAZOLAM 0.5 MG/1
TABLET ORAL
Qty: 90 TABLET | Refills: 1 | OUTPATIENT
Start: 2019-10-01

## 2019-10-06 DIAGNOSIS — F40.01 PANIC DISORDER WITH AGORAPHOBIA: ICD-10-CM

## 2019-10-07 RX ORDER — ALPRAZOLAM 0.5 MG/1
TABLET ORAL
Qty: 90 TABLET | Refills: 1 | Status: SHIPPED | OUTPATIENT
Start: 2019-10-07 | End: 2019-12-04 | Stop reason: SDUPTHER

## 2019-10-10 RX ORDER — DULOXETIN HYDROCHLORIDE 60 MG/1
CAPSULE, DELAYED RELEASE ORAL
Qty: 30 CAPSULE | Refills: 2 | Status: SHIPPED | OUTPATIENT
Start: 2019-10-10 | End: 2020-01-16 | Stop reason: SDUPTHER

## 2019-10-24 ENCOUNTER — TELEPHONE (OUTPATIENT)
Dept: PAIN MANAGEMENT | Age: 74
End: 2019-10-24

## 2019-10-24 DIAGNOSIS — M43.16 SPONDYLOLISTHESIS, LUMBAR REGION: ICD-10-CM

## 2019-10-24 DIAGNOSIS — M48.061 DEGENERATIVE LUMBAR SPINAL STENOSIS: ICD-10-CM

## 2019-10-24 RX ORDER — ACETAMINOPHEN AND CODEINE PHOSPHATE 300; 30 MG/1; MG/1
TABLET ORAL
Qty: 60 TABLET | Refills: 0 | Status: SHIPPED | OUTPATIENT
Start: 2019-10-24 | End: 2019-10-24 | Stop reason: CLARIF

## 2019-10-24 RX ORDER — DONEPEZIL HYDROCHLORIDE 10 MG/1
TABLET, FILM COATED ORAL
Qty: 180 TABLET | Refills: 0 | Status: SHIPPED | OUTPATIENT
Start: 2019-10-24 | End: 2020-01-20

## 2019-10-24 RX ORDER — ACETAMINOPHEN AND CODEINE PHOSPHATE 300; 30 MG/1; MG/1
1 TABLET ORAL 2 TIMES DAILY PRN
Qty: 60 TABLET | Refills: 0 | Status: SHIPPED | OUTPATIENT
Start: 2019-10-24 | End: 2019-11-25 | Stop reason: SDUPTHER

## 2019-10-28 DIAGNOSIS — M47.26 OTHER SPONDYLOSIS WITH RADICULOPATHY, LUMBAR REGION: ICD-10-CM

## 2019-10-28 DIAGNOSIS — M48.061 DEGENERATIVE LUMBAR SPINAL STENOSIS: ICD-10-CM

## 2019-10-28 DIAGNOSIS — G89.4 CHRONIC PAIN SYNDROME: ICD-10-CM

## 2019-10-28 DIAGNOSIS — M43.16 SPONDYLOLISTHESIS, LUMBAR REGION: ICD-10-CM

## 2019-10-28 RX ORDER — BACLOFEN 10 MG/1
TABLET ORAL
Qty: 90 TABLET | Refills: 0 | Status: SHIPPED | OUTPATIENT
Start: 2019-10-28 | End: 2019-11-28 | Stop reason: SDUPTHER

## 2019-11-25 DIAGNOSIS — M43.16 SPONDYLOLISTHESIS, LUMBAR REGION: ICD-10-CM

## 2019-11-25 DIAGNOSIS — M48.061 DEGENERATIVE LUMBAR SPINAL STENOSIS: ICD-10-CM

## 2019-11-25 RX ORDER — ACETAMINOPHEN AND CODEINE PHOSPHATE 300; 30 MG/1; MG/1
TABLET ORAL
Qty: 60 TABLET | Refills: 0 | Status: SHIPPED | OUTPATIENT
Start: 2019-11-25 | End: 2019-12-03 | Stop reason: SDUPTHER

## 2019-11-28 DIAGNOSIS — G89.4 CHRONIC PAIN SYNDROME: ICD-10-CM

## 2019-11-28 DIAGNOSIS — M47.26 OTHER SPONDYLOSIS WITH RADICULOPATHY, LUMBAR REGION: ICD-10-CM

## 2019-11-28 DIAGNOSIS — M48.061 DEGENERATIVE LUMBAR SPINAL STENOSIS: ICD-10-CM

## 2019-11-28 DIAGNOSIS — M43.16 SPONDYLOLISTHESIS, LUMBAR REGION: ICD-10-CM

## 2019-12-02 ENCOUNTER — OFFICE VISIT (OUTPATIENT)
Dept: FAMILY MEDICINE CLINIC | Age: 74
End: 2019-12-02
Payer: COMMERCIAL

## 2019-12-02 VITALS
DIASTOLIC BLOOD PRESSURE: 64 MMHG | BODY MASS INDEX: 24.13 KG/M2 | SYSTOLIC BLOOD PRESSURE: 102 MMHG | HEART RATE: 68 BPM | WEIGHT: 140.6 LBS

## 2019-12-02 DIAGNOSIS — L21.9 SEBORRHEIC DERMATITIS: Primary | ICD-10-CM

## 2019-12-02 PROCEDURE — 99213 OFFICE O/P EST LOW 20 MIN: CPT | Performed by: FAMILY MEDICINE

## 2019-12-02 RX ORDER — BACLOFEN 10 MG/1
TABLET ORAL
Qty: 90 TABLET | Refills: 0 | Status: SHIPPED | OUTPATIENT
Start: 2019-12-02 | End: 2019-12-30

## 2019-12-03 ENCOUNTER — OFFICE VISIT (OUTPATIENT)
Dept: PAIN MANAGEMENT | Age: 74
End: 2019-12-03
Payer: COMMERCIAL

## 2019-12-03 VITALS
DIASTOLIC BLOOD PRESSURE: 82 MMHG | SYSTOLIC BLOOD PRESSURE: 133 MMHG | BODY MASS INDEX: 24.3 KG/M2 | WEIGHT: 142.3 LBS | HEART RATE: 77 BPM | HEIGHT: 64 IN

## 2019-12-03 DIAGNOSIS — M48.061 DEGENERATIVE LUMBAR SPINAL STENOSIS: ICD-10-CM

## 2019-12-03 DIAGNOSIS — G89.4 CHRONIC PAIN SYNDROME: ICD-10-CM

## 2019-12-03 DIAGNOSIS — Z79.899 CHRONIC PRESCRIPTION BENZODIAZEPINE USE: ICD-10-CM

## 2019-12-03 DIAGNOSIS — F45.42 PAIN DISORDER WITH PSYCHOLOGICAL FACTORS: ICD-10-CM

## 2019-12-03 DIAGNOSIS — M43.16 SPONDYLOLISTHESIS, LUMBAR REGION: Primary | ICD-10-CM

## 2019-12-03 PROCEDURE — 99213 OFFICE O/P EST LOW 20 MIN: CPT | Performed by: ANESTHESIOLOGY

## 2019-12-03 RX ORDER — ACETAMINOPHEN AND CODEINE PHOSPHATE 300; 30 MG/1; MG/1
1 TABLET ORAL 2 TIMES DAILY PRN
Qty: 60 TABLET | Refills: 1 | Status: SHIPPED | OUTPATIENT
Start: 2019-12-21 | End: 2020-01-16

## 2019-12-03 ASSESSMENT — ENCOUNTER SYMPTOMS
EYE PAIN: 0
NAUSEA: 0
CONSTIPATION: 0
BACK PAIN: 1
SHORTNESS OF BREATH: 0
VOMITING: 0
COUGH: 0
ABDOMINAL PAIN: 0
WHEEZING: 0
EYE DISCHARGE: 0
EYE REDNESS: 0

## 2019-12-04 DIAGNOSIS — F40.01 PANIC DISORDER WITH AGORAPHOBIA: ICD-10-CM

## 2019-12-04 RX ORDER — ALPRAZOLAM 0.5 MG/1
TABLET ORAL
Qty: 90 TABLET | Refills: 0 | Status: SHIPPED | OUTPATIENT
Start: 2019-12-04 | End: 2020-01-03

## 2019-12-05 RX ORDER — AMLODIPINE BESYLATE 10 MG/1
TABLET ORAL
Qty: 90 TABLET | Refills: 1 | Status: SHIPPED | OUTPATIENT
Start: 2019-12-05 | End: 2020-06-01

## 2019-12-29 DIAGNOSIS — M43.16 SPONDYLOLISTHESIS, LUMBAR REGION: ICD-10-CM

## 2019-12-29 DIAGNOSIS — M48.061 DEGENERATIVE LUMBAR SPINAL STENOSIS: ICD-10-CM

## 2019-12-29 DIAGNOSIS — M47.26 OTHER SPONDYLOSIS WITH RADICULOPATHY, LUMBAR REGION: ICD-10-CM

## 2019-12-29 DIAGNOSIS — G89.4 CHRONIC PAIN SYNDROME: ICD-10-CM

## 2019-12-30 RX ORDER — BACLOFEN 10 MG/1
TABLET ORAL
Qty: 90 TABLET | Refills: 1 | Status: SHIPPED | OUTPATIENT
Start: 2019-12-30 | End: 2020-02-26

## 2020-01-03 RX ORDER — ALPRAZOLAM 0.5 MG/1
TABLET ORAL
Qty: 90 TABLET | Refills: 0 | Status: SHIPPED | OUTPATIENT
Start: 2020-01-03 | End: 2020-02-03

## 2020-01-03 NOTE — TELEPHONE ENCOUNTER
Medication:   Requested Prescriptions     Pending Prescriptions Disp Refills    ALPRAZolam (XANAX) 0.5 MG tablet [Pharmacy Med Name: ALPRAZolam 0.5 MG TABLET] 90 tablet 0     Sig: TAKE ONE TABLET BY MOUTH EVERY MORNING AND TAKE TWO TABLETS EVERY EVENING      Last Filled:  1/16/20    Patient Phone Number: [phone] (home)     Last appt: 12/2/2019   Next appt: 1/16/2020    Last OARRS:   RX Monitoring 12/3/2019   Attestation -   Periodic Controlled Substance Monitoring Possible medication side effects, risk of tolerance/dependence & alternative treatments discussed. ;No signs of potential drug abuse or diversion identified. ;Assessed functional status. Chronic Pain > 50 MEDD Obtained or confirmed \"Consent for Opioid Use\" on file.      PDMP Monitoring:    Last PDMP Marilee Sequeira as Reviewed [facility]):  Review User Review Instant Review Result   Levar Fair 12/3/2019  2:23 PM Reviewed PDMP [1]     Preferred Pharmacy:   [facility] [address], [address] [phone] Wilma Luna [phone]  [address]  [place] [address]  Phone: [phone] Fax: [phone]

## 2020-01-13 ENCOUNTER — HOSPITAL ENCOUNTER (OUTPATIENT)
Age: 75
Discharge: HOME | End: 2020-01-13
Payer: COMMERCIAL

## 2020-01-13 LAB
PARATHYROID HORMONE INTACT: 44 PG/ML (ref 14–72)
VITAMIN D 25-HYDROXY: 42.2 NG/ML

## 2020-01-13 PROCEDURE — 83970 ASSAY OF PARATHORMONE: CPT

## 2020-01-13 PROCEDURE — 82306 VITAMIN D 25 HYDROXY: CPT

## 2020-01-13 PROCEDURE — 36415 COLL VENOUS BLD VENIPUNCTURE: CPT

## 2020-01-16 ENCOUNTER — OFFICE VISIT (OUTPATIENT)
Dept: FAMILY MEDICINE CLINIC | Age: 75
End: 2020-01-16
Payer: COMMERCIAL

## 2020-01-16 VITALS
HEART RATE: 69 BPM | DIASTOLIC BLOOD PRESSURE: 70 MMHG | BODY MASS INDEX: 23.34 KG/M2 | SYSTOLIC BLOOD PRESSURE: 112 MMHG | OXYGEN SATURATION: 92 % | WEIGHT: 136 LBS | RESPIRATION RATE: 12 BRPM

## 2020-01-16 PROBLEM — F32.1 CURRENT MODERATE EPISODE OF MAJOR DEPRESSIVE DISORDER WITHOUT PRIOR EPISODE (HCC): Status: ACTIVE | Noted: 2020-01-16

## 2020-01-16 PROCEDURE — 99214 OFFICE O/P EST MOD 30 MIN: CPT | Performed by: FAMILY MEDICINE

## 2020-01-16 RX ORDER — DULOXETIN HYDROCHLORIDE 60 MG/1
CAPSULE, DELAYED RELEASE ORAL
Qty: 90 CAPSULE | Refills: 1 | Status: SHIPPED | OUTPATIENT
Start: 2020-01-16 | End: 2020-06-29 | Stop reason: SDUPTHER

## 2020-01-16 ASSESSMENT — PATIENT HEALTH QUESTIONNAIRE - PHQ9
SUM OF ALL RESPONSES TO PHQ QUESTIONS 1-9: 0
SUM OF ALL RESPONSES TO PHQ QUESTIONS 1-9: 0
1. LITTLE INTEREST OR PLEASURE IN DOING THINGS: 0
SUM OF ALL RESPONSES TO PHQ9 QUESTIONS 1 & 2: 0
2. FEELING DOWN, DEPRESSED OR HOPELESS: 0

## 2020-01-16 NOTE — PROGRESS NOTES
side and 2+ on the left side. Heart sounds: Normal heart sounds. No murmur. No friction rub. No gallop. Pulmonary:      Effort: Pulmonary effort is normal.      Breath sounds: Normal breath sounds. Musculoskeletal: Normal range of motion. General: No tenderness. Right lower leg: No edema. Left lower leg: No edema. Neurological:      Mental Status: She is alert and oriented to person, place, and time. Sensory: Sensation is intact. Motor: Motor function is intact. Psychiatric:         Attention and Perception: Attention and perception normal.         Mood and Affect: Mood is anxious and depressed. Affect is tearful. Speech: Speech normal.         Behavior: Behavior normal. Behavior is cooperative. Thought Content: Thought content normal.         Cognition and Memory: Cognition is impaired. Memory is impaired. Assessment:      Marti Hope was seen today for follow-up. Diagnoses and all orders for this visit:    Current moderate episode of major depressive disorder without prior episode [facility])    Essential hypertension  -     Comprehensive Metabolic Panel; Future  -     Lipid Panel; Future    Chronic pain syndrome    Hyperparathyroidism ([facility] [address]    Late onset Alzheimer's disease without behavioral disturbance ([facility] [address] [address]    Other orders  -     DULoxetine (CYMBALTA) 60 MG extended release capsule; TAKE ONE CAPSULE BY MOUTH DAILY            Plan:      Laboratory profile was reviewed but unfortunately she has not had a cholesterol or comprehensive panel done. She used an old lab slip that was only looking a parathyroid and vitamin D level. In regards to the hyperparathyroidism she is now in the normal range and I encouraged patient to continue with calcium with vitamin D supplementation as she is already been taking. Patient was restarted on Cymbalta medication as she tells me she is not taking this but again she did not bring her medicines with her.   I gave her

## 2020-01-20 RX ORDER — DONEPEZIL HYDROCHLORIDE 10 MG/1
TABLET, FILM COATED ORAL
Qty: 180 TABLET | Refills: 1 | Status: SHIPPED | OUTPATIENT
Start: 2020-01-20 | End: 2020-06-29 | Stop reason: SDUPTHER

## 2020-02-03 RX ORDER — ALPRAZOLAM 0.5 MG/1
TABLET ORAL
Qty: 90 TABLET | Refills: 2 | Status: SHIPPED | OUTPATIENT
Start: 2020-02-03 | End: 2020-05-07

## 2020-02-03 NOTE — TELEPHONE ENCOUNTER
Medication:   Requested Prescriptions     Pending Prescriptions Disp Refills    ALPRAZolam (XANAX) 0.5 MG tablet [Pharmacy Med Name: ALPRAZolam 0.5 MG TABLET] 90 tablet      Sig: TAKE ONE TABLET BY MOUTH EVERY MORNING AND TAKE TWO TABLETS BY MOUTH EVERY EVENING      Last Filled:  1/3/2020    Patient Phone Number: [phone] (home)     Last appt: 1/16/2020   Next appt: 4/16/2020    Last OARRS:   RX Monitoring 12/3/2019   Attestation -   Periodic Controlled Substance Monitoring Possible medication side effects, risk of tolerance/dependence & alternative treatments discussed. ;No signs of potential drug abuse or diversion identified. ;Assessed functional status. Chronic Pain > 50 MEDD Obtained or confirmed \"Consent for Opioid Use\" on file.      PDMP Monitoring:    Last PDMP [place] as Reviewed [facility]):  Review User Review Instant Review Result   Levar Fair 12/3/2019  2:23 PM Reviewed PDMP [1]     Preferred Pharmacy:   [facility] [address], [address] [facility] [phone] Wilma Luna [phone]  [address]  [place] [address]  Phone: [phone] Fax: [phone]

## 2020-02-07 ENCOUNTER — TELEPHONE (OUTPATIENT)
Dept: FAMILY MEDICINE CLINIC | Age: 75
End: 2020-02-07

## 2020-02-07 RX ORDER — OMEPRAZOLE 40 MG/1
40 CAPSULE, DELAYED RELEASE ORAL DAILY
Qty: 90 CAPSULE | Refills: 1 | Status: SHIPPED | OUTPATIENT
Start: 2020-02-07 | End: 2020-07-28

## 2020-02-07 NOTE — TELEPHONE ENCOUNTER
[facility] called requesting new Rx for     Disp Refills Start End    omeprazole (PRILOSEC) 40 MG delayed release capsule 90 capsule 1 2019     Sig - Route:  Take 1 capsule by mouth daily - Oral      She states the current Rx they have  and they cannot e-scribe renewal request.      Provider out of office    Please advise

## 2020-02-24 RX ORDER — ALENDRONATE SODIUM 70 MG/1
TABLET ORAL
Qty: 4 TABLET | Refills: 1 | Status: SHIPPED | OUTPATIENT
Start: 2020-02-24 | End: 2020-05-05

## 2020-02-26 RX ORDER — BACLOFEN 10 MG/1
TABLET ORAL
Qty: 90 TABLET | Refills: 0 | Status: SHIPPED | OUTPATIENT
Start: 2020-02-26 | End: 2020-03-02

## 2020-03-02 RX ORDER — BACLOFEN 10 MG/1
TABLET ORAL
Qty: 90 TABLET | Refills: 0 | Status: SHIPPED | OUTPATIENT
Start: 2020-03-02 | End: 2020-04-06

## 2020-04-06 RX ORDER — BACLOFEN 10 MG/1
TABLET ORAL
Qty: 90 TABLET | Refills: 0 | Status: SHIPPED | OUTPATIENT
Start: 2020-04-06 | End: 2020-05-04

## 2020-05-04 RX ORDER — BACLOFEN 10 MG/1
TABLET ORAL
Qty: 90 TABLET | Refills: 0 | Status: SHIPPED | OUTPATIENT
Start: 2020-05-04 | End: 2020-06-01

## 2020-05-06 RX ORDER — ALENDRONATE SODIUM 70 MG/1
TABLET ORAL
Qty: 4 TABLET | Refills: 0 | Status: SHIPPED | OUTPATIENT
Start: 2020-05-06 | End: 2020-06-02

## 2020-05-22 ENCOUNTER — APPOINTMENT (OUTPATIENT)
Dept: CT IMAGING | Age: 75
End: 2020-05-22
Payer: COMMERCIAL

## 2020-05-22 ENCOUNTER — APPOINTMENT (OUTPATIENT)
Dept: GENERAL RADIOLOGY | Age: 75
End: 2020-05-22
Payer: COMMERCIAL

## 2020-05-22 ENCOUNTER — HOSPITAL ENCOUNTER (OUTPATIENT)
Age: 75
Setting detail: OBSERVATION
Discharge: HOME | End: 2020-05-24
Attending: EMERGENCY MEDICINE | Admitting: EMERGENCY MEDICINE
Payer: COMMERCIAL

## 2020-05-22 ENCOUNTER — TELEPHONE (OUTPATIENT)
Dept: FAMILY MEDICINE CLINIC | Age: 75
End: 2020-05-22

## 2020-05-22 PROBLEM — G93.40 ACUTE ENCEPHALOPATHY: Status: ACTIVE | Noted: 2020-05-22

## 2020-05-22 PROBLEM — G45.9 TIA (TRANSIENT ISCHEMIC ATTACK): Status: ACTIVE | Noted: 2020-05-22

## 2020-05-22 LAB
A/G RATIO: 1.4 (ref 1.1–2.2)
ACETAMINOPHEN LEVEL: <5 UG/ML (ref 10–30)
ALBUMIN SERPL-MCNC: 4.5 G/DL (ref 3.4–5)
ALP BLD-CCNC: 136 U/L (ref 40–129)
ALT SERPL-CCNC: 20 U/L (ref 10–40)
AMPHETAMINE SCREEN, URINE: ABNORMAL
ANION GAP SERPL CALCULATED.3IONS-SCNC: 13 MMOL/L (ref 3–16)
APTT: 37 SEC (ref 24.2–36.2)
AST SERPL-CCNC: 25 U/L (ref 15–37)
BARBITURATE SCREEN URINE: ABNORMAL
BASOPHILS ABSOLUTE: 0 K/UL (ref 0–0.2)
BASOPHILS RELATIVE PERCENT: 0.3 %
BENZODIAZEPINE SCREEN, URINE: POSITIVE
BILIRUB SERPL-MCNC: <0.2 MG/DL (ref 0–1)
BILIRUBIN URINE: NEGATIVE
BLOOD, URINE: NEGATIVE
BUN BLDV-MCNC: 9 MG/DL (ref 7–20)
CALCIUM SERPL-MCNC: 10.1 MG/DL (ref 8.3–10.6)
CANNABINOID SCREEN URINE: ABNORMAL
CHLORIDE BLD-SCNC: 97 MMOL/L (ref 99–110)
CLARITY: CLEAR
CO2: 24 MMOL/L (ref 21–32)
COCAINE METABOLITE SCREEN URINE: ABNORMAL
COLOR: YELLOW
CREAT SERPL-MCNC: 0.6 MG/DL (ref 0.6–1.2)
EOSINOPHILS ABSOLUTE: 0 K/UL (ref 0–0.6)
EOSINOPHILS RELATIVE PERCENT: 0.2 %
ETHANOL: NORMAL MG/DL (ref 0–0.08)
GFR AFRICAN AMERICAN: >60
GFR NON-AFRICAN AMERICAN: >60
GLOBULIN: 3.2 G/DL
GLUCOSE BLD-MCNC: 161 MG/DL (ref 70–99)
GLUCOSE BLD-MCNC: 200 MG/DL (ref 70–99)
GLUCOSE URINE: 100 MG/DL
HCT VFR BLD CALC: 41.2 % (ref 36–48)
HEMOGLOBIN: 13.8 G/DL (ref 12–16)
INR BLD: 1.02 (ref 0.86–1.14)
KETONES, URINE: NEGATIVE MG/DL
LEUKOCYTE ESTERASE, URINE: NEGATIVE
LYMPHOCYTES ABSOLUTE: 3.4 K/UL (ref 1–5.1)
LYMPHOCYTES RELATIVE PERCENT: 25.3 %
Lab: ABNORMAL
MCH RBC QN AUTO: 31.7 PG (ref 26–34)
MCHC RBC AUTO-ENTMCNC: 33.6 G/DL (ref 31–36)
MCV RBC AUTO: 94.3 FL (ref 80–100)
METHADONE SCREEN, URINE: ABNORMAL
MICROSCOPIC EXAMINATION: ABNORMAL
MONOCYTES ABSOLUTE: 0.5 K/UL (ref 0–1.3)
MONOCYTES RELATIVE PERCENT: 3.8 %
NEUTROPHILS ABSOLUTE: 9.6 K/UL (ref 1.7–7.7)
NEUTROPHILS RELATIVE PERCENT: 70.4 %
NITRITE, URINE: NEGATIVE
OPIATE SCREEN URINE: ABNORMAL
OXYCODONE URINE: ABNORMAL
PDW BLD-RTO: 13.6 % (ref 12.4–15.4)
PERFORMED ON: ABNORMAL
PH UA: 7
PH UA: 7 (ref 5–8)
PHENCYCLIDINE SCREEN URINE: ABNORMAL
PLATELET # BLD: 355 K/UL (ref 135–450)
PMV BLD AUTO: 7.4 FL (ref 5–10.5)
POTASSIUM REFLEX MAGNESIUM: 4.5 MMOL/L (ref 3.5–5.1)
PROPOXYPHENE SCREEN: ABNORMAL
PROTEIN UA: NEGATIVE MG/DL
PROTHROMBIN TIME: 11.8 SEC (ref 10–13.2)
RBC # BLD: 4.37 M/UL (ref 4–5.2)
SALICYLATE, SERUM: 0.4 MG/DL (ref 15–30)
SODIUM BLD-SCNC: 134 MMOL/L (ref 136–145)
SPECIFIC GRAVITY UA: 1.02 (ref 1–1.03)
TOTAL PROTEIN: 7.7 G/DL (ref 6.4–8.2)
TROPONIN: <0.01 NG/ML
URINE REFLEX TO CULTURE: ABNORMAL
URINE TYPE: ABNORMAL
UROBILINOGEN, URINE: 0.2 E.U./DL
WBC # BLD: 13.6 K/UL (ref 4–11)

## 2020-05-22 PROCEDURE — 6360000002 HC RX W HCPCS

## 2020-05-22 PROCEDURE — 85730 THROMBOPLASTIN TIME PARTIAL: CPT

## 2020-05-22 PROCEDURE — 80307 DRUG TEST PRSMV CHEM ANLYZR: CPT

## 2020-05-22 PROCEDURE — G0480 DRUG TEST DEF 1-7 CLASSES: HCPCS

## 2020-05-22 PROCEDURE — 84484 ASSAY OF TROPONIN QUANT: CPT

## 2020-05-22 PROCEDURE — 85025 COMPLETE CBC W/AUTO DIFF WBC: CPT

## 2020-05-22 PROCEDURE — 96374 THER/PROPH/DIAG INJ IV PUSH: CPT

## 2020-05-22 PROCEDURE — 93005 ELECTROCARDIOGRAM TRACING: CPT | Performed by: EMERGENCY MEDICINE

## 2020-05-22 PROCEDURE — 85610 PROTHROMBIN TIME: CPT

## 2020-05-22 PROCEDURE — G0378 HOSPITAL OBSERVATION PER HR: HCPCS

## 2020-05-22 PROCEDURE — 81003 URINALYSIS AUTO W/O SCOPE: CPT

## 2020-05-22 PROCEDURE — 36415 COLL VENOUS BLD VENIPUNCTURE: CPT

## 2020-05-22 PROCEDURE — 71045 X-RAY EXAM CHEST 1 VIEW: CPT

## 2020-05-22 PROCEDURE — 6360000004 HC RX CONTRAST MEDICATION: Performed by: EMERGENCY MEDICINE

## 2020-05-22 PROCEDURE — 99285 EMERGENCY DEPT VISIT HI MDM: CPT

## 2020-05-22 PROCEDURE — 70496 CT ANGIOGRAPHY HEAD: CPT

## 2020-05-22 PROCEDURE — 70450 CT HEAD/BRAIN W/O DYE: CPT

## 2020-05-22 PROCEDURE — 51702 INSERT TEMP BLADDER CATH: CPT

## 2020-05-22 PROCEDURE — 80053 COMPREHEN METABOLIC PANEL: CPT

## 2020-05-22 RX ORDER — NALOXONE HYDROCHLORIDE 1 MG/ML
2 INJECTION INTRAMUSCULAR; INTRAVENOUS; SUBCUTANEOUS ONCE
Status: COMPLETED | OUTPATIENT
Start: 2020-05-22 | End: 2020-05-22

## 2020-05-22 RX ORDER — PROMETHAZINE HYDROCHLORIDE 25 MG/1
12.5 TABLET ORAL EVERY 6 HOURS PRN
Status: DISCONTINUED | OUTPATIENT
Start: 2020-05-22 | End: 2020-05-24 | Stop reason: HOSPADM

## 2020-05-22 RX ORDER — SODIUM CHLORIDE 0.9 % (FLUSH) 0.9 %
10 SYRINGE (ML) INJECTION EVERY 12 HOURS SCHEDULED
Status: DISCONTINUED | OUTPATIENT
Start: 2020-05-22 | End: 2020-05-24 | Stop reason: HOSPADM

## 2020-05-22 RX ORDER — NALOXONE HYDROCHLORIDE 1 MG/ML
INJECTION INTRAMUSCULAR; INTRAVENOUS; SUBCUTANEOUS
Status: COMPLETED
Start: 2020-05-22 | End: 2020-05-22

## 2020-05-22 RX ORDER — ATORVASTATIN CALCIUM 80 MG/1
40 TABLET, FILM COATED ORAL NIGHTLY
Status: DISCONTINUED | OUTPATIENT
Start: 2020-05-22 | End: 2020-05-23

## 2020-05-22 RX ORDER — POLYETHYLENE GLYCOL 3350 17 G/17G
17 POWDER, FOR SOLUTION ORAL DAILY PRN
Status: DISCONTINUED | OUTPATIENT
Start: 2020-05-22 | End: 2020-05-24 | Stop reason: HOSPADM

## 2020-05-22 RX ORDER — ONDANSETRON 2 MG/ML
4 INJECTION INTRAMUSCULAR; INTRAVENOUS EVERY 6 HOURS PRN
Status: DISCONTINUED | OUTPATIENT
Start: 2020-05-22 | End: 2020-05-24 | Stop reason: HOSPADM

## 2020-05-22 RX ORDER — ASPIRIN 81 MG/1
81 TABLET ORAL DAILY
Status: DISCONTINUED | OUTPATIENT
Start: 2020-05-23 | End: 2020-05-24 | Stop reason: HOSPADM

## 2020-05-22 RX ORDER — SODIUM CHLORIDE 0.9 % (FLUSH) 0.9 %
10 SYRINGE (ML) INJECTION PRN
Status: DISCONTINUED | OUTPATIENT
Start: 2020-05-22 | End: 2020-05-24 | Stop reason: HOSPADM

## 2020-05-22 RX ORDER — ASPIRIN 300 MG/1
300 SUPPOSITORY RECTAL DAILY
Status: DISCONTINUED | OUTPATIENT
Start: 2020-05-23 | End: 2020-05-24 | Stop reason: HOSPADM

## 2020-05-22 RX ORDER — LABETALOL HYDROCHLORIDE 5 MG/ML
10 INJECTION, SOLUTION INTRAVENOUS EVERY 10 MIN PRN
Status: DISCONTINUED | OUTPATIENT
Start: 2020-05-22 | End: 2020-05-24 | Stop reason: HOSPADM

## 2020-05-22 RX ADMIN — NALOXONE HYDROCHLORIDE 2 MG: 1 INJECTION PARENTERAL at 19:22

## 2020-05-22 RX ADMIN — NALOXONE HYDROCHLORIDE 2 MG: 1 INJECTION INTRAMUSCULAR; INTRAVENOUS; SUBCUTANEOUS at 19:22

## 2020-05-22 RX ADMIN — IOPAMIDOL 75 ML: 755 INJECTION, SOLUTION INTRAVENOUS at 18:55

## 2020-05-22 ASSESSMENT — PAIN SCALES - GENERAL: PAINLEVEL_OUTOF10: 0

## 2020-05-22 NOTE — ED NOTES
No change in patient's mental state after narcan, patient will wake up and repeat her name over and over until she goes back asleep, will awake easily, unable to follow commands      Leticia Hanks RN  05/22/20 1955

## 2020-05-23 ENCOUNTER — APPOINTMENT (OUTPATIENT)
Dept: MRI IMAGING | Age: 75
End: 2020-05-23
Payer: COMMERCIAL

## 2020-05-23 LAB
CHOLESTEROL, TOTAL: 255 MG/DL (ref 0–199)
EKG ATRIAL RATE: 97 BPM
EKG DIAGNOSIS: NORMAL
EKG P AXIS: 53 DEGREES
EKG P-R INTERVAL: 160 MS
EKG Q-T INTERVAL: 402 MS
EKG QRS DURATION: 120 MS
EKG QTC CALCULATION (BAZETT): 510 MS
EKG R AXIS: -28 DEGREES
EKG T AXIS: 69 DEGREES
EKG VENTRICULAR RATE: 97 BPM
HCT VFR BLD CALC: 40.1 % (ref 36–48)
HDLC SERPL-MCNC: 88 MG/DL (ref 40–60)
HEMOGLOBIN: 13.5 G/DL (ref 12–16)
LDL CHOLESTEROL CALCULATED: 135 MG/DL
MCH RBC QN AUTO: 31.6 PG (ref 26–34)
MCHC RBC AUTO-ENTMCNC: 33.7 G/DL (ref 31–36)
MCV RBC AUTO: 93.9 FL (ref 80–100)
PDW BLD-RTO: 13.7 % (ref 12.4–15.4)
PLATELET # BLD: 363 K/UL (ref 135–450)
PMV BLD AUTO: 7.5 FL (ref 5–10.5)
RBC # BLD: 4.27 M/UL (ref 4–5.2)
TRIGL SERPL-MCNC: 160 MG/DL (ref 0–150)
TROPONIN: <0.01 NG/ML
VLDLC SERPL CALC-MCNC: 32 MG/DL
WBC # BLD: 12.5 K/UL (ref 4–11)

## 2020-05-23 PROCEDURE — 93010 ELECTROCARDIOGRAM REPORT: CPT | Performed by: INTERNAL MEDICINE

## 2020-05-23 PROCEDURE — 97161 PT EVAL LOW COMPLEX 20 MIN: CPT

## 2020-05-23 PROCEDURE — 97116 GAIT TRAINING THERAPY: CPT

## 2020-05-23 PROCEDURE — 92610 EVALUATE SWALLOWING FUNCTION: CPT

## 2020-05-23 PROCEDURE — 99220 PR INITIAL OBSERVATION CARE/DAY 70 MINUTES: CPT | Performed by: PSYCHIATRY & NEUROLOGY

## 2020-05-23 PROCEDURE — 6370000000 HC RX 637 (ALT 250 FOR IP): Performed by: INTERNAL MEDICINE

## 2020-05-23 PROCEDURE — 36415 COLL VENOUS BLD VENIPUNCTURE: CPT

## 2020-05-23 PROCEDURE — 92523 SPEECH SOUND LANG COMPREHEN: CPT

## 2020-05-23 PROCEDURE — 97165 OT EVAL LOW COMPLEX 30 MIN: CPT

## 2020-05-23 PROCEDURE — 6360000002 HC RX W HCPCS: Performed by: INTERNAL MEDICINE

## 2020-05-23 PROCEDURE — 80061 LIPID PANEL: CPT

## 2020-05-23 PROCEDURE — G0378 HOSPITAL OBSERVATION PER HR: HCPCS

## 2020-05-23 PROCEDURE — 70551 MRI BRAIN STEM W/O DYE: CPT

## 2020-05-23 PROCEDURE — 2580000003 HC RX 258: Performed by: INTERNAL MEDICINE

## 2020-05-23 PROCEDURE — 83036 HEMOGLOBIN GLYCOSYLATED A1C: CPT

## 2020-05-23 PROCEDURE — 92526 ORAL FUNCTION THERAPY: CPT

## 2020-05-23 PROCEDURE — 85027 COMPLETE CBC AUTOMATED: CPT

## 2020-05-23 PROCEDURE — 96372 THER/PROPH/DIAG INJ SC/IM: CPT

## 2020-05-23 PROCEDURE — 84484 ASSAY OF TROPONIN QUANT: CPT

## 2020-05-23 RX ORDER — DULOXETIN HYDROCHLORIDE 60 MG/1
60 CAPSULE, DELAYED RELEASE ORAL DAILY
Status: DISCONTINUED | OUTPATIENT
Start: 2020-05-23 | End: 2020-05-24 | Stop reason: HOSPADM

## 2020-05-23 RX ORDER — DONEPEZIL HYDROCHLORIDE 5 MG/1
20 TABLET, FILM COATED ORAL NIGHTLY
Status: DISCONTINUED | OUTPATIENT
Start: 2020-05-23 | End: 2020-05-24 | Stop reason: HOSPADM

## 2020-05-23 RX ORDER — PANTOPRAZOLE SODIUM 40 MG/1
40 TABLET, DELAYED RELEASE ORAL
Status: DISCONTINUED | OUTPATIENT
Start: 2020-05-23 | End: 2020-05-24 | Stop reason: HOSPADM

## 2020-05-23 RX ORDER — BACLOFEN 10 MG/1
10 TABLET ORAL 3 TIMES DAILY
Status: DISCONTINUED | OUTPATIENT
Start: 2020-05-23 | End: 2020-05-24 | Stop reason: HOSPADM

## 2020-05-23 RX ORDER — CLOPIDOGREL BISULFATE 75 MG/1
75 TABLET ORAL DAILY
Status: DISCONTINUED | OUTPATIENT
Start: 2020-05-23 | End: 2020-05-23

## 2020-05-23 RX ORDER — AMLODIPINE BESYLATE 5 MG/1
10 TABLET ORAL DAILY
Status: DISCONTINUED | OUTPATIENT
Start: 2020-05-23 | End: 2020-05-24 | Stop reason: HOSPADM

## 2020-05-23 RX ORDER — ALPRAZOLAM 0.5 MG/1
0.5 TABLET ORAL 2 TIMES DAILY PRN
Status: DISCONTINUED | OUTPATIENT
Start: 2020-05-23 | End: 2020-05-24 | Stop reason: HOSPADM

## 2020-05-23 RX ORDER — PRAVASTATIN SODIUM 40 MG
40 TABLET ORAL NIGHTLY
Status: DISCONTINUED | OUTPATIENT
Start: 2020-05-23 | End: 2020-05-24 | Stop reason: HOSPADM

## 2020-05-23 RX ADMIN — ATORVASTATIN CALCIUM 40 MG: 80 TABLET, FILM COATED ORAL at 01:16

## 2020-05-23 RX ADMIN — AMLODIPINE BESYLATE 10 MG: 5 TABLET ORAL at 14:33

## 2020-05-23 RX ADMIN — ENOXAPARIN SODIUM 40 MG: 40 INJECTION SUBCUTANEOUS at 08:13

## 2020-05-23 RX ADMIN — ASPIRIN 81 MG: 81 TABLET, COATED ORAL at 08:13

## 2020-05-23 RX ADMIN — ALPRAZOLAM 0.5 MG: 0.5 TABLET ORAL at 21:40

## 2020-05-23 RX ADMIN — PRAVASTATIN SODIUM 40 MG: 40 TABLET ORAL at 21:40

## 2020-05-23 RX ADMIN — DONEPEZIL HYDROCHLORIDE 20 MG: 5 TABLET, FILM COATED ORAL at 21:40

## 2020-05-23 RX ADMIN — BACLOFEN 10 MG: 10 TABLET ORAL at 21:40

## 2020-05-23 RX ADMIN — BACLOFEN 10 MG: 10 TABLET ORAL at 14:33

## 2020-05-23 RX ADMIN — Medication 10 ML: at 21:41

## 2020-05-23 RX ADMIN — BACLOFEN 10 MG: 10 TABLET ORAL at 08:13

## 2020-05-23 RX ADMIN — Medication 10 ML: at 01:17

## 2020-05-23 RX ADMIN — Medication 10 ML: at 16:22

## 2020-05-23 RX ADMIN — PANTOPRAZOLE SODIUM 40 MG: 40 TABLET, DELAYED RELEASE ORAL at 08:13

## 2020-05-23 RX ADMIN — DULOXETINE HYDROCHLORIDE 60 MG: 60 CAPSULE, DELAYED RELEASE ORAL at 08:13

## 2020-05-23 RX ADMIN — ALPRAZOLAM 0.5 MG: 0.5 TABLET ORAL at 08:13

## 2020-05-23 ASSESSMENT — PAIN SCALES - GENERAL
PAINLEVEL_OUTOF10: 0

## 2020-05-23 NOTE — CONSULTS
NEUROLOGY CONSULTATION     Patient's Name :   Caryl Chery        YOB: 1945                    Date of Consultation : 5/23/2020 11:59 AM    Referring Physician :  Navi Starr MD    Reason for Consultation :  TIA    HISTORY OF PRESENT ILLNESS      Jazmin Ludwig is a 66 y.o. female   History was obtained from patient and from dictations in the chart. Patient has known late onset Alzheimer's type dementia and is on Aricept. Apparently she had a spell yesterday when she was unable to talk. She knew what she wanted to say but could not get the words out. She was brought to the hospital.  In the emergency room she was found to be aphasic. She was able to move her extremities very well. Patient does not remember much about this event. Now she feels back to normal.  She has had chronic short-term memory impairment. Onset of symptoms was fairly acute abrupt and moderately severe without any other aggravating or relieving factors. REVIEW OF SYSTEMS     Denies any chest pain palpitation breathlessness abdominal pain fever or weight loss.   Rest of the 14 system review was reviewed and was negative except for the symptoms noted above    Past Medical History:   Diagnosis Date    Acute gastritis without mention of hemorrhage     Acute sinusitis, unspecified     Acute upper respiratory infections of unspecified site     Allergic rhinitis, cause unspecified     Anemia, unspecified     Edema     Hyperlipidemia     Hypertension     Loss of weight     Lumbago     Lumbosacral root lesions, not elsewhere classified     Need for prophylactic vaccination and inoculation against influenza     Other bursitis disorders     Other seborrheic keratosis     Symptomatic menopausal or female climacteric states     Type II or unspecified type diabetes mellitus without mention of complication, not stated as uncontrolled     Pt is Nightly Wilton Wolfe MD        baclofen (LIORESAL) tablet 10 mg  10 mg Oral TID Wilton Wolfe MD   10 mg at 05/23/20 0813    pantoprazole (PROTONIX) tablet 40 mg  40 mg Oral QAM AC Wilton Wolfe MD   40 mg at 05/23/20 0813    clopidogrel (PLAVIX) tablet 75 mg  75 mg Oral Daily Holden Bradley MD        amLODIPine (NORVASC) tablet 10 mg  10 mg Oral Daily Holden Bradley MD        sodium chloride flush 0.9 % injection 10 mL  10 mL Intravenous 2 times per day Wilton Wolfe MD   10 mL at 05/23/20 0117    sodium chloride flush 0.9 % injection 10 mL  10 mL Intravenous PRN Wilton Wolfe MD        polyethylene glycol (GLYCOLAX) packet 17 g  17 g Oral Daily PRN Wilton Wolfe MD        promethazine (PHENERGAN) tablet 12.5 mg  12.5 mg Oral Q6H PRN Wilton Wolfe MD        Or    ondansetron (ZOFRAN) injection 4 mg  4 mg Intravenous Q6H PRN Wilton Wolfe MD        enoxaparin (LOVENOX) injection 40 mg  40 mg Subcutaneous Daily Wilton Wolfe MD   40 mg at 05/23/20 0813    aspirin EC tablet 81 mg  81 mg Oral Daily Wilton Wolfe MD   81 mg at 05/23/20 4507    Or    aspirin suppository 300 mg  300 mg Rectal Daily Wilton Wolfe MD        labetalol (NORMODYNE;TRANDATE) injection 10 mg  10 mg Intravenous Q10 Min PRN Wilton Wolfe MD         Allergies   Allergen Reactions    Hydrocodone      The patient has a history of a bleeding Ulcer, and this upset her stomach severely. PHYSICAL EXAMINATION:  BP (!) 150/84   Pulse 90   Temp 98.3 °F (36.8 °C) (Oral)   Resp 18   Ht 5' 4\" (1.626 m)   Wt 126 lb 3.2 oz (57.2 kg)   SpO2 93%   BMI 21.66 kg/m²   Appearance: Well appearing, well nourished and in no distress  Mental Status Exam: Patient is alert, oriented x2  Recent memory slightly impaired. She could recall 1 out of 3 objects in 3 minutes.   Remote memory was fair  Fund of Knowledge is normal  Attention/concentration is normal.   Speech : No dysarthria  Language : No aphasia  Funduscopic Exam: sharp disc margins  Cranial Nerves:   II: Visual fields:  Full to confrontation  III: Pupils:  equal, round, reactive to light  III,IV,VI: Extra Ocular Movements are intact. No nystagmus  V: Facial sensation is intact to pin prick and light touch  VII: Facial strength and movements: intact and symmetric smile,cheek puffing and eyebrow elevation  VIII: Hearing:  Intact to finger rub bilaterally  IX: Palate  elevation is symmetric  XI: Shoulder shrug is intact  XII: Tongue movements are normal  Motor:  Muscle tone and bulk are normal.   Strength is symmetrical 5/5 in all four extremities. Sensory: Intact to light touch and  pin prick in all four extremities  Coordination:  Normal  Finger to Nose and Heel to Shin bilaterally    . Reflexes:  DTR 1 and symmetric bilaterally  Plantar response: Flexor bilaterally  Gait: Gait and station is normal.   Romberg: negative  Vascular: No carotid bruit bilaterally        DATA:  LABS:  General Labs:    CBC:   Lab Results   Component Value Date    WBC 12.5 05/23/2020    RBC 4.27 05/23/2020    HGB 13.5 05/23/2020    HCT 40.1 05/23/2020    MCV 93.9 05/23/2020    MCH 31.6 05/23/2020    MCHC 33.7 05/23/2020    RDW 13.7 05/23/2020     05/23/2020    MPV 7.5 05/23/2020     BMP:    Lab Results   Component Value Date     05/22/2020    K 4.5 05/22/2020    CL 97 05/22/2020    CO2 24 05/22/2020    BUN 9 05/22/2020    LABALBU 4.5 05/22/2020    CREATININE 0.6 05/22/2020    CALCIUM 10.1 05/22/2020    GFRAA >60 05/22/2020    GFRAA >60 11/17/2012    LABGLOM >60 05/22/2020    GLUCOSE 161 05/22/2020     RADIOLOGY REVIEW:  I have reviewed radiology image(s) and reports(s) of: CT head and CT angiogram    IMPRESSION :  Transient ischemic attack with expressive aphasia, symptoms now resolved  Late onset Alzheimer's disease, on Aricept  CT head did not show any acute changes.   CT angiogram of the carotids did not show any hemodynamically significant stenosis  Patient Active Problem List   Diagnosis  Essential hypertension    Generalized osteoarthrosis, involving multiple sites    Type 2 diabetes mellitus without complication ([facility] [address]    Hyperglycemia    Degenerative lumbar spinal stenosis    Congenital clotting factor deficiency (HCC)    Hypercholesterolemia    GERD (gastroesophageal reflux disease)    Late onset Alzheimer's disease without behavioral disturbance (HCC)    Drug dependence (HCC)    Chronic low back pain without sciatica    Spondylolisthesis, lumbar region    Other spondylosis with radiculopathy, lumbar region    Pain medication agreement    Chronic pain syndrome    Closed compression fracture of first lumbar vertebra (HCC)    Hyperparathyroidism (HCC)    Pain disorder with psychological factors    Chronic prescription benzodiazepine use    Current moderate episode of major depressive disorder without prior episode (HCC)    Acute encephalopathy    TIA (transient ischemic attack)     RECOMMENDATIONS ;  Discussed with patient  Explained test results  Patient states that she has not been taking aspirin at home. In that case I think we can start with just aspirin alone and if she has any further recurrent spells we can add Plavix but she does not need dual antiplatelet therapy at this time. Await MRI brain. Continue Aricept as prescribed by her primary care physician. Thank you for this consultation. Please note a portion of this chart was generated using dragon dictation software. Although every effort was made to ensure the accuracy of this automated transcription, some errors in transcription may have occurred.

## 2020-05-23 NOTE — PROGRESS NOTES
Patient arrived from ED, alert and orient x1 (self) on room air, assessment done  and 4 eyes done, unable to follow command for NIHSS assessment, passed swallow test, skin intact

## 2020-05-23 NOTE — H&P
Hydrocodone    Social History:      The patient currently lives at home     TOBACCO:   reports that she has never smoked. She has never used smokeless tobacco.  ETOH:   reports no history of alcohol use. E-Cigarettes Vaping or Juuling     Questions Responses    Vaping Use     Start Date     Does device contain nicotine? Quit Date     Vaping Type             Family History:      Reviewed in detail and negative for Cancer, CVA. Positive as follows:        Problem Relation Age of Onset    Diabetes Sister     Heart Disease Brother     Heart Disease Sister     Emphysema Sister        REVIEW OF SYSTEMS:   Pertinent positives as noted in the HPI. All other systems reviewed and negative. PHYSICAL EXAM PERFORMED:    BP (!) 125/52   Pulse 76   Resp 23   Wt 137 lb 4 oz (62.3 kg)   SpO2 98%   BMI 23.56 kg/m²     General appearance:  No apparent distress, appears stated age and cooperative. HEENT:  Normal cephalic, atraumatic without obvious deformity. Pupils equal, round, and reactive to light. Extra ocular muscles intact. Conjunctivae/corneas clear. Neck: Supple, with full range of motion. No jugular venous distention. Trachea midline. Respiratory:  Normal respiratory effort. Clear to auscultation, bilaterally without Rales/Wheezes/Rhonchi. Cardiovascular:  Regular rate and rhythm with normal S1/S2 without murmurs, rubs or gallops. Abdomen: Soft, non-tender, non-distended with normal bowel sounds. Musculoskeletal:  No clubbing, cyanosis or edema bilaterally. Full range of motion without deformity. Skin: Skin color, texture, turgor normal.  No rashes or lesions. Neurologic:  Neurovascularly intact without any focal sensory/motor deficits. Cranial nerves: II-XII intact, aphasia is present   Psychiatric:  Alert and oriented to self and place.  abnormal insight, affect is flat   Capillary Refill: Brisk,< 3 seconds   Peripheral Pulses: +2 palpable, equal bilaterally       Labs:     Recent Labs at 3-6 months, then consider CT at 18-24 months. In a high-risk patient, CT at 3-6 months, then CT at 18-24 months. Nodule size greater than 8 mm   In a low-risk patient, CT at 3-6 months, then consider CT at 18-24 months. In a high-risk patient, CT at 3-6 months, then CT at 18-24 months. - Low risk patients include individuals with minimal or absent history of   smoking and other known risk factors. - High risk patients include individuals with a history or smoking or known   risk factors. Radiology 2017 http://pubs. rsna.org/doi/full/10.1148/radiol. 3186265529         CT Head WO Contrast   Final Result   No acute intracranial abnormality. ASSESSMENT:    Principal Problem:    TIA (transient ischemic attack)  Active Problems:    Acute encephalopathy    Essential hypertension    GERD (gastroesophageal reflux disease)    Late onset Alzheimer's disease without behavioral disturbance (HCC)    Chronic pain syndrome    Chronic prescription benzodiazepine use  Resolved Problems:    * No resolved hospital problems. *        PLAN:    1. TIA versus acute CVA - symptom mostly aphasia persist without much improvement. Check MRI brain. In addition,    Admit to telemetry unit   Started on ASA and statin   Lipid panel and HgBA1C in am   NPO until dysphagia evaluation    SLP / OT / PT consulted    VTE prophylaxis ordered     2. Hypertension, controlled - hold amlodipine which she takes at home for permissive HTN per stroke protocol. Monitor BP closely. 3. History of dementia - this is mild at baseline. Monitor for acute delirium and provide supportive care. Aricept qhs     4. Chronic pain syndrome - stable. Continue baclofen and prn meds as ordered.         DVT Prophylaxis: Lovenox   Diet: Regular diet   Code Status: Full code   PT/OT Eval Status: Ordered    Dispo - Admit to telemetry        Alicia Castro MD    Thank you Petr Frazier MD for the opportunity to be involved in this patient's

## 2020-05-23 NOTE — PROGRESS NOTES
Speech Language/Pathology   SPEECH LANGUAGE AND CLINICAL BEDSIDE SWALLOWING EVALUATION   Speech Therapy Department     Patient Name:  Salty Dominguez  :  1945  Pain level: 0   Medical Diagnosis:  TIA (transient ischemic attack) [G45.9]  TIA (transient ischemic attack) [G45.9]    HPI:  Per chart review \"Selin Duncan is 66 y.o. female who presented with complaint of slurred speech. Symptom onset was acute for a time period of 5 hours. The severity is described as severe. The course of his symptoms over time is constant. The symptoms improved with none and worsened with none. The patient's symptom is associated with lethargy.     Salty Dominguez is 66 y.o. female with history of HTN, DM II, mild dementia, chronic pain syndrome. She presents to the ER yesterday with complaint of slurred speech and lethargy. She was doing well at 2pm today and getting ready with her  to go to a doctor appointment. Around that time her mentation started to change, first she was hyper then became lethargic and disoriented. Her speech started to be come slurred and so  was unable to understand her speech. In the ED, she was noted aphasic but no weakness in extremities or other focal neuro deficit. On interview and exam, she is clearly aphasic and can only say one word at a time with some difficulty. When asked how she is doing she smiles and says fine. She has no insight in her acute illness. She is awake, alert and oriented to self and place only and follows command. She is not able to tell me anything else. \"    Chest Xray 2020  Impression   No acute process. CT Head WO Contrast  Impression   No acute intracranial abnormality. CLINICAL SWALLOW EVALUATION:  Dysphagia Treatment Diagnosis: Oropharyngeal Dysphagia     Impressions: Pt seen at bedside for clinical swallow evaluation, no s/s of aspiration noted across consistencies.  Puree, soft solid, hard solid, thins via cup and straw all tolerated without s/s of aspiration. Swallow appears grossly intact. OME is unremarkable, adequate strength, ROM, and sensation noted. Per nursing pt has tolerated meals and pills well. No concerns for dysphagia at this time. Educated patient in detail on safe swallow strategies (e.g. upright positioning, alternating consistencies, small bolus size, slow rate)    Dietary Recommendations: Continue general diet, thin liquids. May have straws. Meds whole. Strategies: 90 degree positioning with all p.o. intake; small bites/sips; alternate textures through meal; reduce rate of intake; No straws     Dysphagia Treatment/Goals: No speech therapy for dysphagia recommended at this time. SPEECH LANGUAGE EVALUATION:  Speech Language Treatment Diagnosis: Results consistent with reported diagnosis of dementia    Speech Language Impressions:Pt presents with very anxious behavior. Speech is fluid with no noted word finding difficulties. However rate of speech is fast, excitedly answering questions before they are presented in entirety. Pt noted to perseverate on returning home. Poor historian states she is here after tripping over her dog. Disoriented to timeline, believes she has been in hospital 5 days. Disoriented to month, day of week, and year. With patient permission called  to get a more accurate baseline.  reports pt has had memory issues for years and it has progressively worsened in past 3 years. He takes care of some of her meds, this writer recommended he take over all medication management. He states that memory deficits noted appear to be patients baseline, he states he is normally not very anxious/hyper as she is presenting at this time but states she can get that why when she has not had her anxiety medication. Given this information, chart review, and assessment of patient she appears to be at baseline with some anxiety like behaviors present at this time.  No skilled speech therapy services

## 2020-05-23 NOTE — PROGRESS NOTES
Occupational Therapy   Occupational Therapy Initial Assessment  Date: 2020   Patient Name: Sharona Helton  MRN: [id number]     : 1945    Date of Service: 2020     Sharona Helton scored a 19/24 on the AM-PAC ADL Inpatient form. Current research shows that an AM-PAC score of 18 or greater is typically associated with a discharge to the patient's home setting. Based on the patient's AM-PAC score, and their current ADL deficits, it is recommended that the patient have 2-3 sessions per week of Occupational Therapy at d/c to increase the patient's independence. At this time, this patient demonstrates the endurance and safety to discharge home with home services and a follow up treatment frequency of 2-3x/wk. Please see assessment section for further patient specific details. If patient discharges prior to next session this note will serve as a discharge summary. Please see below for the latest assessment towards goals. Discharge Recommendations:  S Level 1, Home with Home health OT, 24 hour supervision or assist]  HOME HEALTH CARE: LEVEL 2 SOCIAL     - Initial home health evaluation to occur within 24-48 hours, in patient home   - Therapy to evaluate with goal of regaining prior level of functioning   - Therapy to evaluate if patient has [id number] needs for personal care   -  evaluation within 24-48 hours, includes evaluation of resources and insurance to determine AL/IL/LTC/Medicaid options   - [facility] on Aging Referral   - Gina Alonso to discuss home support and care needs post discharge within two weeks of discharge. Assessment   Performance deficits / Impairments: Decreased functional mobility ; Decreased ADL status; Decreased safe awareness;Decreased high-level IADLs;Decreased balance  Assessment: Patient does not appear to be functioning at baseline for occupational performance based on the above deficits would benefit from skilled OT and 1459)    Goals  Short term goals  Time Frame for Short term goals: Discharge  Short term goal 1: Mod I LB dressing  Short term goal 2: Mod I functional transfers  Short term goal 3: Mod I simple IADL tasks  Short term goal 4: Mod I functional mobility with good to good+ standing balance. Long term goals  Time Frame for Long term goals : LTG=STG       Therapy Time   Individual Concurrent Group Co-treatment   Time In 1304         Time Out 1318         Minutes 14         Timed Code Treatment Minutes: 0 Minutes       Rema, OT     [address] OTR/L, [id number]

## 2020-05-23 NOTE — PROGRESS NOTES
Hospitalist Progress Note      PCP: Nicole Pleitez MD    Date of Admission: 5/22/2020    Chief Complaint: AMS, slurred speech    Hospital Course: Ladi Perry is 66 y.o. female with history of HTN, DM II, mild dementia, chronic pain syndrome. She presents to the ER yesterday with complaint of slurred speech and lethargy. She was doing well at 2pm today and getting ready with her  to go to a doctor appointment. Around that time her mentation started to change, first she was hyper then became lethargic and disoriented. Her speech started to be come slurred and so  was unable to understand her speech. In the ED, she was noted aphasic but no weakness in extremities or other focal neuro deficit. On interview and exam, she is clearly aphasic and can only say one word at a time with some difficulty. When asked how she is doing she smiles and says fine. She has no insight in her acute illness. She is awake, alert and oriented to self and place only and follows command. Subjective: Patient seen and examined. Complaints dialysis today is lower girdle weakness on and off for past several weeks. Denies chest pain, shortness of breath, fever or chills.       Medications:  Reviewed    Infusion Medications   Scheduled Medications    DULoxetine  60 mg Oral Daily    pravastatin  40 mg Oral Nightly    donepezil  20 mg Oral Nightly    baclofen  10 mg Oral TID    pantoprazole  40 mg Oral QAM AC    clopidogrel  75 mg Oral Daily    amLODIPine  10 mg Oral Daily    sodium chloride flush  10 mL Intravenous 2 times per day    enoxaparin  40 mg Subcutaneous Daily    aspirin  81 mg Oral Daily    Or    aspirin  300 mg Rectal Daily     PRN Meds: ALPRAZolam, sodium chloride flush, polyethylene glycol, promethazine **OR** ondansetron, labetalol      Intake/Output Summary (Last 24 hours) at 5/23/2020 1221  Last data filed at 5/23/2020 0532  Gross per 24 hour   Intake 130 ml   Output 1950 ml   Net -1820 ml       Physical Exam Performed:    /72   Pulse 85   Temp 99 °F (37.2 °C) (Temporal)   Resp 19   Ht 5' 4\" (1.626 m)   Wt 126 lb 3.2 oz (57.2 kg)   SpO2 94%   BMI 21.66 kg/m²     General appearance: No apparent distress, appears stated age and cooperative. HEENT: Pupils equal, round, and reactive to light. Conjunctivae/corneas clear. Neck: Supple, with full range of motion. No jugular venous distention. Trachea midline. Respiratory:  Normal respiratory effort. Clear to auscultation, bilaterally without Rales/Wheezes/Rhonchi. Cardiovascular: Regular rate and rhythm with normal S1/S2 without murmurs, rubs or gallops. Abdomen: Soft, non-tender, non-distended with normal bowel sounds. Musculoskeletal: No clubbing, cyanosis or edema bilaterally. Full range of motion without deformity. Skin: Skin color, texture, turgor normal.  No rashes or lesions. Neurologic:  Neurovascularly intact without any focal sensory/motor deficits. Cranial nerves: II-XII intact, grossly non-focal.  Psychiatric: Alert and oriented, thought content appropriate, normal insight  Capillary Refill: Brisk,< 3 seconds   Peripheral Pulses: +2 palpable, equal bilaterally       Labs:   Recent Labs     05/22/20  1841 05/23/20  0418   WBC 13.6* 12.5*   HGB 13.8 13.5   HCT 41.2 40.1    363     Recent Labs     05/22/20  1841   *   K 4.5   CL 97*   CO2 24   BUN 9   CREATININE 0.6   CALCIUM 10.1     Recent Labs     05/22/20  1841   AST 25   ALT 20   BILITOT <0.2   ALKPHOS 136*     Recent Labs     05/22/20  1841   INR 1.02     Recent Labs     05/22/20  1841 05/23/20  0204   TROPONINI <0.01 <0.01       Urinalysis:      Lab Results   Component Value Date    NITRU Negative 05/22/2020    BLOODU Negative 05/22/2020    SPECGRAV 1.021 05/22/2020    GLUCOSEU 100 05/22/2020       Radiology:  XR CHEST PORTABLE   Final Result   No acute process.          CTA HEAD NECK W CONTRAST   Final Result   Approximate 40% short-segment attack) [G45.9]    Chronic prescription benzodiazepine use [Z79.899]    Chronic pain syndrome [G89.4]    Late onset Alzheimer's disease without behavioral disturbance (HCC) [G30.1, F02.80]    GERD (gastroesophageal reflux disease) [K21.9]    Essential hypertension [I10]     TIA  No focal neurologic deficit appreciated this time  Patient passed speech eval  On aspirin and statin  No need for DAPT as per neurology  Awaiting MRI  PT OT    Alzheimer's dementia  Continue with Aricept    Acute encephalopathy?   Patient with dementia  Sensorium does not appear to be altered at this time  Neurology recommendations appreciated    Essential hypertension  Uncontrolled  We will restart home meds    Chronic pain syndrome    GERD    DVT Prophylaxis: Lovenox  Diet: DIET GENERAL;  Code Status: Full Code    PT/OT Eval Status: Pending    Electronically signed by Anaya Collins MD on 5/23/2020 at 12:21 PM

## 2020-05-23 NOTE — ED PROVIDER NOTES
[facility] Emergency Department      Pt Name: Iris Webster  MRN: [id number]  [place] 1945  Date of evaluation: 5/22/2020  Provider: Shirley Mejía MD  CHIEF COMPLAINT  Chief Complaint   Patient presents with    Altered Mental Status     Pt. comes in by [facility] for a possible stroke alert. Stroke alert called in field by EMS. Pt. was last known normal around 2pm today.  reports that pt. was going to go to a doctors appointment and then she started to slur her words. HPI  Iris Webster is a 66 y.o. female who presents because of concern for altered mental status. She was noted by her  to act abnormally before 2 pm today. He says she had a doctor's appointment at 2pm.  She seemed fine in the morning, took a shower, put on her make up on and then within a half hour of needing to leave, told him she did not want to go. She was acting hyper, like she had taken something but she denied it when he asked her. She is not believed to have overdosed on anything in an attempt to harm herself. He does control two of her medicines to prevent her from taking extra, the xanax and the muscle relaxer. He thought she would get better but she was slurring her words. She started napping off and on and he kept waking her up and asking her questions. She answered his questions correctly until just about prior to him calling for an ambulance, when he could not understand any of her verbal responses. Stroke alert was called in the field due to slurred speech. REVIEW OF SYSTEMS:  See HPI for further details. Remainder of review of systems limited by patient ability to provide history. Nursing notes reviewed.     PAST MEDICAL HISTORY  Past Medical History:   Diagnosis Date    Acute gastritis without mention of hemorrhage     Acute sinusitis, unspecified     Acute upper respiratory infections of unspecified site     Allergic rhinitis, cause unspecified     Anemia, unspecified     Edema     Hyperlipidemia     Hypertension     Loss of weight     Lumbago     Lumbosacral root lesions, not elsewhere classified     Need for prophylactic vaccination and inoculation against influenza     Other bursitis disorders     Other seborrheic keratosis     Symptomatic menopausal or female climacteric states     Type II or unspecified type diabetes mellitus without mention of complication, not stated as uncontrolled     Pt is not aware of having diabetes, does know her A1C runs high but blood sugar is generally normal     SURGICAL HISTORY  Past Surgical History:   Procedure Laterality Date    OTHER SURGICAL HISTORY  1977    breast mass local excision negative    OTHER SURGICAL HISTORY  1986    nasal polyps    PARVEZ AND BSO  1976     MEDICATIONS:  No current facility-administered medications on file prior to encounter.       Current Outpatient Medications on File Prior to Encounter   Medication Sig Dispense Refill    ALPRAZolam (XANAX) 0.5 MG tablet TAKE ONE TABLET BY MOUTH EVERY MORNING AND TAKE TWO TABLETS BY MOUTH EVERY EVENING--patient will need an appointment for additional refills 90 tablet 0    baclofen (LIORESAL) 10 MG tablet TAKE ONE TABLET BY MOUTH THREE TIMES A DAY 90 tablet 0    omeprazole (PRILOSEC) 40 MG delayed release capsule Take 1 capsule by mouth daily 90 capsule 1    donepezil (ARICEPT) 10 MG tablet TAKE TWO TABLETS BY MOUTH EVERY NIGHT AT BEDTIME 180 tablet 1    DULoxetine (CYMBALTA) 60 MG extended release capsule TAKE ONE CAPSULE BY MOUTH DAILY 90 capsule 1    amLODIPine (NORVASC) 10 MG tablet TAKE ONE TABLET BY MOUTH DAILY 90 tablet 1    pravastatin (PRAVACHOL) 40 MG tablet TAKE ONE TABLET BY MOUTH DAILY 90 tablet 1    Multiple Vitamins-Minerals (CENTRUM SILVER PO) Take 1 tablet by mouth daily       alendronate (FOSAMAX) 70 MG tablet TAKE 1 TABLET BY MOUTH ONCE WEEKLY BEFORE BREAKFAST, ON AN EMPTY STOMACH: REMAIN UPRIGHT FOR 30 MINUTES:TAKE WITH 8 OUNCES OF WATER 4 tablet 0     ALLERGIES  Hydrocodone  FAMILY HISTORY:  Family History   Problem Relation Age of Onset    Diabetes Sister     Heart Disease Brother     Heart Disease Sister     Emphysema Sister      SOCIAL HISTORY:  Social History     Tobacco Use    Smoking status: Never Smoker    Smokeless tobacco: Never Used   Substance Use Topics    Alcohol use: No     Alcohol/week: 0.0 standard drinks    Drug use: Never     IMMUNIZATIONS:  Noncontributory    PHYSICAL EXAM  VITAL SIGNS:  Blood pressure (!) 125/52, pulse 76, resp. rate 23, weight 137 lb 4 oz (62.3 kg), SpO2 98 %, not currently breastfeeding. Constitutional:  66 y.o. female wide eyed, occasionally says \"I'm fine! \" in a loud forceful manner  HENT:  Atraumatic, mucous membranes moist  Eyes:  Conjunctiva clear, no discharge, no icterus  Neck:  Without masses, no adenopathy, supple  Cardiovascular:  Regular, no rubs  Thorax & Lungs:  No accessory muscle usage, clear  Abdomen:  Soft, non distended, bowel sounds present, NT  Back:  No deformity  Genitalia:  Deferred  Rectal:  Deferred  Extremities:  No cyanosis, no edema  Skin:  Warm, dry  Neurologic:  Awake with eyes open, initially did not follow any of my commands except perhaps a slight smile when asked to smile, no facial droop, minimally verbal, did not answer any of the NIH questions, spontaneously moves extremities but not on command, legs were crossed in a casual manner after she moved from ambulance cot to CT scan table, after CT NIH as below  Psychiatric:  Not assessable    NIH Stroke Scale  Interval: Baseline  Level of Consciousness (1a. ): Not alert, requires repeated stimulation to attend  LOC Questions (1b. ):  Answers neither question correctly  LOC Commands (1c. ): Performs neither task correctly  Best Gaze (2. ) does not follow directions but preferentially looked rightward  Motor Arm, Left (5a. ): No drift  Motor Arm, Right (5b. ): No drift  Motor Leg, Left (6a. ): No drift  Motor Leg, Right (6b. Phone [phone]   SALICYLATE LEVEL - Abnormal; Notable for the following components:    Salicylate, Serum 0.4 (*)     All other components within normal limits    Narrative:     Performed at:                  [facility] [address], [facility], [address]   Phone [phone]   ACETAMINOPHEN LEVEL - Abnormal; Notable for the following components:    Acetaminophen Level <5 (*)     All other components within normal limits    Narrative:     Performed at:                  [facility] [address], [facility], [address]   Phone [phone]   POCT GLUCOSE - Abnormal; Notable for the following components:    POC Glucose 200 (*)     All other components within normal limits    Narrative:     Performed at:                  [facility] [address], [facility], [address]   Phone [phone]   PROTIME-INR    Narrative:     Performed at:                  [facility]                  [address] [facility] [address]   Phone [phone]   TROPONIN    Narrative:     Performed at:                  [facility] [address], [facility], [address]   Phone [phone]   ETHANOL    Narrative:     Performed at:                  [facility] [address], [facility], [address]   Phone [phone]     EKG:  Read by me in the absence of a cardiologist shows:  SR, rate 97, LBBB pattern, axis left, poor R wave progression, NSSTTWA, minimal change from prior    RADIOLOGY:   Plain x-rays were viewed by me:   XR CHEST PORTABLE   Final Result   No acute process. CTA HEAD NECK W CONTRAST   Final Result   Approximate 40% short-segment narrowing involving the right proximal internal   carotid artery.       No focal significant arterial narrowing in the neck otherwise. No focal significant arterial narrowing in the head. 3-4 mm right lung nodule. Fleischner Society guidelines for follow-up and management of incidentally   detected pulmonary nodules:      Single Solid Nodule:      Nodule size less than 6 mm   In a low-risk patient, no routine follow-up. In a high-risk patient, optional CT at 12 months. Nodule size equals 6-8 mm   In a low-risk patient, CT at 6-12 months, then consider CT at 18-24 months. In a high-risk patient, CT at 6-12 months, then CT at 18-24 months. Nodule size greater than 8 mm         In a low-risk patient, consider CT at 3 months, PET/CT, or tissue sampling. In a high-risk patient, consider CT at 3 months, PET/CT, or tissue sampling. Multiple Solid Nodules:      Nodule size less than 6 mm   In a low-risk patient, no routine follow-up. In a high-risk patient, optional CT at 12 months. Nodule size equals 6-8 mm   In a low-risk patient, CT at 3-6 months, then consider CT at 18-24 months. In a high-risk patient, CT at 3-6 months, then CT at 18-24 months. Nodule size greater than 8 mm   In a low-risk patient, CT at 3-6 months, then consider CT at 18-24 months. In a high-risk patient, CT at 3-6 months, then CT at 18-24 months. - Low risk patients include individuals with minimal or absent history of   smoking and other known risk factors. - High risk patients include individuals with a history or smoking or known   risk factors. Radiology 2017 http://pubs. rsna.org/doi/full/10.1148/radiol. 6970853125         CT Head WO Contrast   Final Result   No acute intracranial abnormality.          MRI brain without contrast    (Results Pending)     ED COURSE:  Very drowsy but with good sats and airway protection, awakens with tactile stimulation, trial of narcan with minimal change, speech very difficult to understand and garbled at times, other times it is

## 2020-05-23 NOTE — PROGRESS NOTES
Physical Therapy    Facility/Department: [address]  Initial Assessment    NAME: Krut Burns  : 1945  MRN: [id number]    Date of Service: 2020    Discharge Recommendations: Kurt Burns scored a 21/24 on the AM-PAC short mobility form. Current research shows that an AM-PAC score of 18 or greater is typically associated with a discharge to the patient's home setting. Based on the patient's AM-PAC score and their current functional mobility deficits, it is recommended that the patient have 2-3 sessions per week of Physical Therapy at d/c to increase the patient's independence. At this time, this patient demonstrates the endurance and safety to discharge home with home health PT and a follow up treatment frequency of 2-3x/wk. Please see assessment section for further patient specific details. If patient discharges prior to next session this note will serve as a discharge summary. Please see below for the latest assessment towards goals. HOME HEALTH CARE: LEVEL 3 SAFETY  - Initial home health evaluation to occur within 24-48 hours, in patient home   - Therapy evaluations in home within 24-48 hours of discharge; including DME and home safety   - Frontload therapy 5 days, then 3x a week   - Therapy to evaluate if patient has [address] needs for personal care   -  evaluation within 24-48 hours, includes evaluation of resources and insurance to determine [place], [place], LTC, and Medicaid options       PT Equipment Recommendations  Equipment Needed: Yes  Mobility Devices: Walker;Canes  Walker: Rolling  Cane: Straight Cane  Other: RW vs. cane depending on progress    Assessment   Body structures, Functions, Activity limitations: Decreased functional mobility ; Decreased balance;Decreased safe awareness  Assessment: Patient demonstrates balance/ambulation deficits making her a high fall risk. Recommend cane or walker for ambulation and continued therapy at d/c.   Treatment Diagnosis: balance/ambulation deficits  Prognosis: Good  Decision Making: Low Complexity  History: As above. Exam: MMT, ROM, functional mobility assessment  Clinical Presentation: Stable. PT Education: PT Role;Plan of Care;Disease Specific Education;Gait Training  Patient Education: Patient verbalized understanding. Barriers to Learning: None evident, possibly emotional given anxiety. REQUIRES PT FOLLOW UP: Yes  Activity Tolerance  Activity Tolerance: Patient Tolerated treatment well       Patient Diagnosis(es): The encounter diagnosis was Acute alteration in mental status. has a past medical history of Acute gastritis without mention of hemorrhage, Acute sinusitis, unspecified, Acute upper respiratory infections of unspecified site, Allergic rhinitis, cause unspecified, Anemia, unspecified, Edema, Hyperlipidemia, Hypertension, Loss of weight, Lumbago, Lumbosacral root lesions, not elsewhere classified, Need for prophylactic vaccination and inoculation against influenza, Other bursitis disorders, Other seborrheic keratosis, Symptomatic menopausal or female climacteric states, and Type II or unspecified type diabetes mellitus without mention of complication, not stated as uncontrolled. has a past surgical history that includes other surgical history (1977); liset and bso (cervix removed) (1976); and other surgical history (1986). Restrictions  Restrictions/Precautions  Restrictions/Precautions: Fall Risk(high fall risk)  Required Braces or Orthoses?: No  Position Activity Restriction  Other position/activity restrictions: Ritesh Ortega is a 66 y.o. female who presents because of concern for altered mental status. She was noted by her  to act abnormally before 2 pm today. He says she had a doctor's appointment at 2pm.  She seemed fine in the morning, took a shower, put on her make up on and then within a half hour of needing to leave, told him she did not want to go.   She was acting hyper, like

## 2020-05-24 VITALS
HEART RATE: 88 BPM | HEIGHT: 64 IN | SYSTOLIC BLOOD PRESSURE: 106 MMHG | DIASTOLIC BLOOD PRESSURE: 69 MMHG | BODY MASS INDEX: 21.54 KG/M2 | OXYGEN SATURATION: 100 % | TEMPERATURE: 97.8 F | WEIGHT: 126.2 LBS | RESPIRATION RATE: 18 BRPM

## 2020-05-24 PROCEDURE — 99214 OFFICE O/P EST MOD 30 MIN: CPT | Performed by: PSYCHIATRY & NEUROLOGY

## 2020-05-24 PROCEDURE — 6370000000 HC RX 637 (ALT 250 FOR IP): Performed by: INTERNAL MEDICINE

## 2020-05-24 PROCEDURE — G0378 HOSPITAL OBSERVATION PER HR: HCPCS

## 2020-05-24 RX ORDER — ATORVASTATIN CALCIUM 40 MG/1
40 TABLET, FILM COATED ORAL DAILY
Qty: 30 TABLET | Refills: 0 | Status: SHIPPED | OUTPATIENT
Start: 2020-05-24 | End: 2020-06-23 | Stop reason: SDUPTHER

## 2020-05-24 RX ORDER — ASPIRIN 81 MG/1
81 TABLET ORAL DAILY
Qty: 30 TABLET | Refills: 3 | Status: SHIPPED | OUTPATIENT
Start: 2020-05-24 | End: 2021-08-10 | Stop reason: ALTCHOICE

## 2020-05-24 RX ADMIN — ASPIRIN 81 MG: 81 TABLET, COATED ORAL at 10:22

## 2020-05-24 RX ADMIN — DULOXETINE HYDROCHLORIDE 60 MG: 60 CAPSULE, DELAYED RELEASE ORAL at 10:22

## 2020-05-24 RX ADMIN — BACLOFEN 10 MG: 10 TABLET ORAL at 10:22

## 2020-05-24 RX ADMIN — ALPRAZOLAM 0.5 MG: 0.5 TABLET ORAL at 10:22

## 2020-05-24 RX ADMIN — PANTOPRAZOLE SODIUM 40 MG: 40 TABLET, DELAYED RELEASE ORAL at 06:13

## 2020-05-24 RX ADMIN — BACLOFEN 10 MG: 10 TABLET ORAL at 15:02

## 2020-05-24 RX ADMIN — AMLODIPINE BESYLATE 10 MG: 5 TABLET ORAL at 10:23

## 2020-05-24 ASSESSMENT — PAIN SCALES - GENERAL
PAINLEVEL_OUTOF10: 0

## 2020-05-24 NOTE — DISCHARGE SUMMARY
Exam Performed:     /73   Pulse 87   Temp 98.2 °F (36.8 °C) (Oral)   Resp 18   Ht 5' 4\" (1.626 m)   Wt 126 lb 3.2 oz (57.2 kg)   SpO2 94%   BMI 21.66 kg/m²       General appearance:  No apparent distress, appears stated age and cooperative. HEENT:  Normal cephalic, atraumatic without obvious deformity. Pupils equal, round, and reactive to light. Extra ocular muscles intact. Conjunctivae/corneas clear. Neck: Supple, with full range of motion. No jugular venous distention. Trachea midline. Respiratory:  Normal respiratory effort. Clear to auscultation, bilaterally without Rales/Wheezes/Rhonchi. Cardiovascular:  Regular rate and rhythm with normal S1/S2 without murmurs, rubs or gallops. Abdomen: Soft, non-tender, non-distended with normal bowel sounds. Musculoskeletal:  No clubbing, cyanosis or edema bilaterally. Full range of motion without deformity. Skin: Skin color, texture, turgor normal.  No rashes or lesions. Neurologic:  Neurovascularly intact without any focal sensory/motor deficits. Cranial nerves: II-XII intact, grossly non-focal.  Psychiatric:  Alert and oriented, thought content appropriate, normal insight  Capillary Refill: Brisk,< 3 seconds   Peripheral Pulses: +2 palpable, equal bilaterally       Labs: For convenience and continuity at follow-up the following most recent labs are provided:      CBC:    Lab Results   Component Value Date    WBC 12.5 05/23/2020    HGB 13.5 05/23/2020    HCT 40.1 05/23/2020     05/23/2020       Renal:    Lab Results   Component Value Date     05/22/2020    K 4.5 05/22/2020    CL 97 05/22/2020    CO2 24 05/22/2020    BUN 9 05/22/2020    CREATININE 0.6 05/22/2020    CALCIUM 10.1 05/22/2020         Significant Diagnostic Studies    Radiology:   MRI brain without contrast   Final Result   No acute intracranial abnormality. Minimal parenchymal volume loss. Minimal chronic microvascular disease.          XR CHEST PORTABLE   Final Result   No acute process. CTA HEAD NECK W CONTRAST   Final Result   Approximate 40% short-segment narrowing involving the right proximal internal   carotid artery. No focal significant arterial narrowing in the neck otherwise. No focal significant arterial narrowing in the head. 3-4 mm right lung nodule. Fleischner Society guidelines for follow-up and management of incidentally   detected pulmonary nodules:      Single Solid Nodule:      Nodule size less than 6 mm   In a low-risk patient, no routine follow-up. In a high-risk patient, optional CT at 12 months. Nodule size equals 6-8 mm   In a low-risk patient, CT at 6-12 months, then consider CT at 18-24 months. In a high-risk patient, CT at 6-12 months, then CT at 18-24 months. Nodule size greater than 8 mm         In a low-risk patient, consider CT at 3 months, PET/CT, or tissue sampling. In a high-risk patient, consider CT at 3 months, PET/CT, or tissue sampling. Multiple Solid Nodules:      Nodule size less than 6 mm   In a low-risk patient, no routine follow-up. In a high-risk patient, optional CT at 12 months. Nodule size equals 6-8 mm   In a low-risk patient, CT at 3-6 months, then consider CT at 18-24 months. In a high-risk patient, CT at 3-6 months, then CT at 18-24 months. Nodule size greater than 8 mm   In a low-risk patient, CT at 3-6 months, then consider CT at 18-24 months. In a high-risk patient, CT at 3-6 months, then CT at 18-24 months. - Low risk patients include individuals with minimal or absent history of   smoking and other known risk factors. - High risk patients include individuals with a history or smoking or known   risk factors. Radiology 2017 http://pubs. rsna.org/doi/full/10.1148/radiol. 1675249037         CT Head WO Contrast   Final Result   No acute intracranial abnormality.                 Consults:     IP CONSULT TO HOSPITALIST  IP CONSULT TO

## 2020-05-24 NOTE — PLAN OF CARE
Problem: Confusion - Acute:  Goal: Absence of continued neurological deterioration signs and symptoms  Note: Neuro checks have improved from previously documented assessments. Problem: Injury - Risk of, Physical Injury:  Goal: Will remain free from falls  Outcome: Ongoing  Note: Patient is a high fall risk. Patient free of falls this shift. Bed low, locked and alarmed at all times. Call light and bedside table is within reach. Yellow blanket on bed, arm band on wrist and fall sign posted in the room. Notified patient to ask for assistance when needed. Patient verbalized understanding. Problem: Mood - Altered:  Goal: Mood stable  Note: Pt remains calm and cooperative upon assessment. Problem: Mood - Altered:  Goal: Absence of abusive behavior  Outcome: Ongoing     Problem: Mood - Altered:  Goal: Verbalizations of feeling emotionally comfortable while being cared for will increase  Outcome: Ongoing     Problem: Psychomotor Activity - Altered:  Goal: Absence of psychomotor disturbance signs and symptoms  Outcome: Ongoing     Problem: Falls - Risk of:  Goal: Will remain free from falls  Outcome: Ongoing  Note: Patient is a high fall risk. Patient free of falls this shift. Bed low, locked and alarmed at all times. Call light and bedside table is within reach. Yellow blanket on bed, arm band on wrist and fall sign posted in the room. Notified patient to ask for assistance when needed. Patient verbalized understanding.
signs and symptoms  Outcome: Ongoing

## 2020-05-24 NOTE — PROGRESS NOTES
NEUROLOGY FOLLOWUP    HISTORY OF PRESENT ILLNESS :     Yenni December is a 66 y.o. female   History was obtained from the patient and dictations in the chart. Patient has not had any further episodes of TIA or speech difficulty. Patient has known late onset Alzheimer's type dementia and is on Aricept. Apparently she had a spell on the day of admission when she was unable to talk. She knew what she wanted to say but could not get the words out. She was brought to the hospital.  In the emergency room she was found to be aphasic. She was able to move her extremities very well. Patient does not remember much about this event. Now she feels back to normal.  She has had chronic short-term memory impairment. Onset of symptoms was fairly acute abrupt and moderately severe without any other aggravating or relieving factors.     REVIEW OF SYSTEMS       Constitutional:  []   Chills   []  Fatigue   []  Fevers   []  Malaise   []  Weight loss     [x] Denies all of the above    Respiratory:   []  Cough    []  Shortness of breath         [x] Denies all of the above     Cardiovascular:   []  Chest pain    []  Exertional chest pressure/discomfort           [] Palpitations    []  Syncope     [x] Denies all of the above    Past Medical History:   Diagnosis Date    Acute gastritis without mention of hemorrhage     Acute sinusitis, unspecified     Acute upper respiratory infections of unspecified site     Allergic rhinitis, cause unspecified     Anemia, unspecified     Edema     Hyperlipidemia     Hypertension     Loss of weight     Lumbago     Lumbosacral root lesions, not elsewhere classified     Need for prophylactic vaccination and inoculation against influenza     Other bursitis disorders     Other seborrheic keratosis     Symptomatic menopausal or female climacteric states     Type II or unspecified type diabetes mellitus without mention of complication, not stated as uncontrolled     Pt is not aware of having diabetes, does know her A1C runs high but blood sugar is generally normal     Family History   Problem Relation Age of Onset    Diabetes Sister     Heart Disease Brother     Heart Disease Sister     Emphysema Sister      Social History     Socioeconomic History    Marital status:      Spouse name: None    Number of children: None    Years of education: None    Highest education level: None   Occupational History    Occupation: Retired     Comment: 2004   Social Needs    Financial resource strain: None    Food insecurity     Worry: None     Inability: None    Transportation needs     Medical: None     Non-medical: None   Tobacco Use    Smoking status: Never Smoker    Smokeless tobacco: Never Used   Substance and Sexual Activity    Alcohol use: No     Alcohol/week: 0.0 standard drinks    Drug use: Never    Sexual activity: Yes     Partners: Male   Lifestyle    Physical activity     Days per week: None     Minutes per session: None    Stress: None   Relationships    Social connections     Talks on phone: None     Gets together: None     Attends Church service: None     Active member of club or organization: None     Attends meetings of clubs or organizations: None     Relationship status: None    Intimate partner violence     Fear of current or ex partner: None     Emotionally abused: None     Physically abused: None     Forced sexual activity: None   Other Topics Concern    None   Social History Narrative    None        PHYSICAL EXAMINATION      /73   Pulse 87   Temp 98.2 °F (36.8 °C) (Oral)   Resp 18   Ht 5' 4\" (1.626 m)   Wt 126 lb 3.2 oz (57.2 kg)   SpO2 94%   BMI 21.66 kg/m²   This is a well-nourished patient in no acute distress  Awake alert and oriented x3. Short-term memory slightly impaired. Pupils equal round reactive to light. Visual fields full.   External ocular Acute encephalopathy    TIA (transient ischemic attack)       RECOMMENDATIONS :  Discussed with patient  Continue aspirin   Continue Aricept as prescribed by primary care physician  Okay to discharge from a neurological standpoint        Please note a portion of this chart was generated using dragon dictation software. Although every effort was made to ensure the accuracy of this automated transcription, some errors in transcription may have occurred.          Shona Lopez M.D.

## 2020-05-25 LAB
ESTIMATED AVERAGE GLUCOSE: 99.7 MG/DL
HBA1C MFR BLD: 5.1 %

## 2020-05-27 ENCOUNTER — OFFICE VISIT (OUTPATIENT)
Dept: FAMILY MEDICINE CLINIC | Age: 75
End: 2020-05-27
Payer: COMMERCIAL

## 2020-05-27 VITALS
HEART RATE: 86 BPM | SYSTOLIC BLOOD PRESSURE: 126 MMHG | OXYGEN SATURATION: 97 % | BODY MASS INDEX: 21.87 KG/M2 | WEIGHT: 127.4 LBS | DIASTOLIC BLOOD PRESSURE: 82 MMHG | TEMPERATURE: 98.9 F

## 2020-05-27 PROCEDURE — 99214 OFFICE O/P EST MOD 30 MIN: CPT | Performed by: FAMILY MEDICINE

## 2020-05-27 RX ORDER — BUSPIRONE HYDROCHLORIDE 10 MG/1
10 TABLET ORAL 3 TIMES DAILY PRN
Qty: 90 TABLET | Refills: 2 | Status: SHIPPED | OUTPATIENT
Start: 2020-05-27 | End: 2020-06-29

## 2020-05-27 NOTE — PROGRESS NOTES
Subjective:      Patient ID: Ariadne Kinney is a 66 y.o. female. CC: Patient presents for hospital follow-up. HPIPatient presents for a follow-up visit from [facility]. Patient was in from 5/22/20-5/24/20 for TIA. She was acting funny and not acting herself and acting confused and then her  called the life squad to take her to the hospital.   She had some altered mental status. Patient was admitted to the hospital thought she possibly had a TIA versus acute encephalopathy. Her urine drug screen was consistent with benzo diazepam's which is expected.  controls the Xanax medication regularly. She has taken a muscle relaxant that was prescribed by pain management and she is off pain pills entirely. Patient feels she needs the muscle relaxant for her chronic discomfort.  states that she forgets how often she takes the pain pill and he does limit her to no more than 3 times a day but he would like to wean off that medication if possible. Some of these symptoms have worsened since she was placed on a muscle relaxant although she was on pain medication prior.     Review of Systems     Patient Active Problem List   Diagnosis    Essential hypertension    Generalized osteoarthrosis, involving multiple sites    Type 2 diabetes mellitus without complication ([address]    Hyperglycemia    Degenerative lumbar spinal stenosis    Congenital clotting factor deficiency (HCC)    Hypercholesterolemia    GERD (gastroesophageal reflux disease)    Late onset Alzheimer's disease without behavioral disturbance (HCC)    Drug dependence (HCC)    Chronic low back pain without sciatica    Spondylolisthesis, lumbar region    Other spondylosis with radiculopathy, lumbar region    Pain medication agreement    Chronic pain syndrome    Closed compression fracture of first lumbar vertebra (HCC)    Hyperparathyroidism ([address]    Pain disorder with psychological factors    Chronic prescription benzodiazepine use    Current moderate episode of major depressive disorder without prior episode ([facility]    Acute encephalopathy    TIA (transient ischemic attack)       No outpatient medications have been marked as taking for the 5/27/20 encounter (Office Visit) with Queenie Langford MD.       Allergies   Allergen Reactions    Hydrocodone      The patient has a history of a bleeding Ulcer, and this upset her stomach severely. Social History     Tobacco Use    Smoking status: Never Smoker    Smokeless tobacco: Never Used   Substance Use Topics    Alcohol use: No     Alcohol/week: 0.0 standard drinks       /82 (Site: Left Upper Arm, Position: Sitting, Cuff Size: Medium Adult)   Pulse 86   Temp 98.9 °F (37.2 °C) (Tympanic)   Wt 127 lb 6.4 oz (57.8 kg)   SpO2 97%   BMI 21.87 kg/m²         Objective:   Physical Exam  Vitals signs and nursing note reviewed. Constitutional:       General: She is not in acute distress. Appearance: Normal appearance. She is well-developed and well-groomed. Neck:      Vascular: No carotid bruit. Cardiovascular:      Rate and Rhythm: Normal rate and regular rhythm. Pulses:           Dorsalis pedis pulses are 2+ on the right side and 2+ on the left side. Posterior tibial pulses are 2+ on the right side and 2+ on the left side. Heart sounds: Normal heart sounds. No murmur. No friction rub. No gallop. Pulmonary:      Effort: Pulmonary effort is normal.      Breath sounds: Normal breath sounds. Musculoskeletal: Normal range of motion. General: No tenderness. Right lower leg: No edema. Left lower leg: No edema. Neurological:      General: No focal deficit present. Mental Status: She is alert and oriented to person, place, and time. Cranial Nerves: No cranial nerve deficit. Sensory: Sensation is intact. Motor: Motor function is intact. No weakness.       Gait: Gait normal.   Psychiatric:

## 2020-06-01 PROBLEM — Z02.89 PAIN MEDICATION AGREEMENT: Status: RESOLVED | Noted: 2019-05-01 | Resolved: 2020-06-01

## 2020-06-01 RX ORDER — AMLODIPINE BESYLATE 10 MG/1
TABLET ORAL
Qty: 90 TABLET | Refills: 0 | Status: SHIPPED | OUTPATIENT
Start: 2020-06-01 | End: 2020-09-01 | Stop reason: SDUPTHER

## 2020-06-01 RX ORDER — BACLOFEN 10 MG/1
TABLET ORAL
Qty: 90 TABLET | Refills: 0 | Status: SHIPPED
Start: 2020-06-01 | End: 2020-06-29 | Stop reason: SINTOL

## 2020-06-02 RX ORDER — ALENDRONATE SODIUM 70 MG/1
TABLET ORAL
Qty: 4 TABLET | Refills: 0 | Status: SHIPPED | OUTPATIENT
Start: 2020-06-02 | End: 2020-06-29 | Stop reason: ALTCHOICE

## 2020-06-23 RX ORDER — ATORVASTATIN CALCIUM 40 MG/1
40 TABLET, FILM COATED ORAL DAILY
Qty: 90 TABLET | Refills: 0 | Status: SHIPPED | OUTPATIENT
Start: 2020-06-23 | End: 2020-09-01 | Stop reason: SDUPTHER

## 2020-06-23 NOTE — TELEPHONE ENCOUNTER
atorvastatin (LIPITOR) 40 MG tablet 30 tablet 0 5/24/2020     Sig - Route:  Take 1 tablet by mouth daily - [facility]

## 2020-06-29 ENCOUNTER — OFFICE VISIT (OUTPATIENT)
Dept: FAMILY MEDICINE CLINIC | Age: 75
End: 2020-06-29
Payer: COMMERCIAL

## 2020-06-29 VITALS
RESPIRATION RATE: 12 BRPM | OXYGEN SATURATION: 96 % | HEART RATE: 110 BPM | DIASTOLIC BLOOD PRESSURE: 70 MMHG | TEMPERATURE: 99.4 F | BODY MASS INDEX: 20.45 KG/M2 | SYSTOLIC BLOOD PRESSURE: 126 MMHG | WEIGHT: 119.13 LBS

## 2020-06-29 PROCEDURE — 99214 OFFICE O/P EST MOD 30 MIN: CPT | Performed by: FAMILY MEDICINE

## 2020-06-29 RX ORDER — BUSPIRONE HYDROCHLORIDE 15 MG/1
15 TABLET ORAL 3 TIMES DAILY PRN
Qty: 90 TABLET | Refills: 2 | Status: SHIPPED | OUTPATIENT
Start: 2020-06-29 | End: 2020-09-01 | Stop reason: SDUPTHER

## 2020-06-29 RX ORDER — DONEPEZIL HYDROCHLORIDE 10 MG/1
TABLET, FILM COATED ORAL
Qty: 180 TABLET | Refills: 1 | Status: SHIPPED | OUTPATIENT
Start: 2020-06-29 | End: 2020-11-25 | Stop reason: SDUPTHER

## 2020-06-29 RX ORDER — ACETAMINOPHEN AND CODEINE PHOSPHATE 300; 30 MG/1; MG/1
1 TABLET ORAL EVERY 8 HOURS PRN
Qty: 90 TABLET | Refills: 0 | Status: SHIPPED | OUTPATIENT
Start: 2020-06-29 | End: 2020-07-29 | Stop reason: SDUPTHER

## 2020-06-29 RX ORDER — MIRTAZAPINE 15 MG/1
15 TABLET, FILM COATED ORAL NIGHTLY
Qty: 30 TABLET | Refills: 5 | Status: SHIPPED | OUTPATIENT
Start: 2020-06-29 | End: 2020-12-08 | Stop reason: SDUPTHER

## 2020-06-29 RX ORDER — DULOXETIN HYDROCHLORIDE 60 MG/1
CAPSULE, DELAYED RELEASE ORAL
Qty: 90 CAPSULE | Refills: 1 | Status: SHIPPED | OUTPATIENT
Start: 2020-06-29 | End: 2020-12-08 | Stop reason: SDUPTHER

## 2020-06-29 NOTE — PROGRESS NOTES
Normal rate and regular rhythm. Pulses:           Dorsalis pedis pulses are 2+ on the right side and 2+ on the left side. Posterior tibial pulses are 2+ on the right side and 2+ on the left side. Heart sounds: Normal heart sounds. No murmur. No friction rub. No gallop. Pulmonary:      Effort: Pulmonary effort is normal.      Breath sounds: Normal breath sounds. Musculoskeletal:      Left shoulder: She exhibits tenderness (AC joint). She exhibits normal range of motion. Right lower leg: No edema. Left lower leg: No edema. Skin:     Findings: Rash present. Comments: Seborrheic dermatitis rash noted over the upper back with excoriations   Neurological:      Mental Status: She is alert. Sensory: Sensation is intact. Motor: Motor function is intact. Psychiatric:         Behavior: Behavior is cooperative. Assessment:      Onur Rojas was seen today for follow-up. Diagnoses and all orders for this visit:    Chronic low back pain without sciatica, unspecified back pain laterality  -     acetaminophen-codeine (TYLENOL/CODEINE #3) 300-30 MG per tablet; Take 1 tablet by mouth every 8 hours as needed for Pain for up to 30 days. Intended supply: 3 days. Take lowest dose possible to manage pain    Acute encephalopathy    Chronic prescription benzodiazepine use    Current moderate episode of major depressive disorder without prior episode (HCC)    Seborrheic dermatitis    Strain of left shoulder, initial encounter    Other orders  -     donepezil (ARICEPT) 10 MG tablet; TAKE TWO TABLETS BY MOUTH EVERY NIGHT AT BEDTIME  -     DULoxetine (CYMBALTA) 60 MG extended release capsule; TAKE ONE CAPSULE BY MOUTH DAILY  -     busPIRone (BUSPAR) 15 MG tablet; Take 15 mg by mouth 3 times daily as needed (anxiety)  -     mirtazapine (REMERON) 15 MG tablet; Take 1 tablet by mouth nightly  -     diclofenac sodium (VOLTAREN) 1 % GEL;  Apply 2 g topically 3 times daily    OARRS report

## 2020-06-30 RX ORDER — ALENDRONATE SODIUM 70 MG/1
TABLET ORAL
Qty: 4 TABLET | Refills: 0 | OUTPATIENT
Start: 2020-06-30

## 2020-07-01 ENCOUNTER — TELEPHONE (OUTPATIENT)
Dept: FAMILY MEDICINE CLINIC | Age: 75
End: 2020-07-01

## 2020-07-02 ENCOUNTER — TELEPHONE (OUTPATIENT)
Dept: ORTHOPEDIC SURGERY | Age: 75
End: 2020-07-02

## 2020-07-02 NOTE — TELEPHONE ENCOUNTER
Received APPROVAL for Diclofenac Sodium 1% gel from 04/03/2020 through 07/02/2021. Approval letter attached. Please advise patient. Thank you.

## 2020-07-02 NOTE — TELEPHONE ENCOUNTER
PA submitted via CMM for Diclofenac Sodium 1% gel. Key: [id number] - PA Case ID: [id number] - Rx #: [id number]    STATUS: PENDING      PA submitted via CMM for Acetaminophen-Codeine 300-30MG tablets. Key: [id number]     Response from CMM: Prior Authorization is not needed for this medication. Please advise patient. Thank you.

## 2020-07-13 ENCOUNTER — TELEPHONE (OUTPATIENT)
Dept: FAMILY MEDICINE CLINIC | Age: 75
End: 2020-07-13

## 2020-07-13 NOTE — TELEPHONE ENCOUNTER
E[facility] received a call from:    Name of Caller: Benjamin Buitrago    Relationship to patient:Self    Organization name: N/A    Best contact number: [phone]    Reason for call: Pt would like to renew her hanicap placard for 1 vehicle. Please call with any questions. Call with ready.

## 2020-07-28 RX ORDER — OMEPRAZOLE 40 MG/1
CAPSULE, DELAYED RELEASE ORAL
Qty: 90 CAPSULE | Refills: 0 | Status: SHIPPED | OUTPATIENT
Start: 2020-07-28 | End: 2020-08-05

## 2020-07-29 RX ORDER — ACETAMINOPHEN AND CODEINE PHOSPHATE 300; 30 MG/1; MG/1
1 TABLET ORAL EVERY 8 HOURS PRN
Qty: 90 TABLET | Refills: 0 | Status: SHIPPED | OUTPATIENT
Start: 2020-07-29 | End: 2020-09-01 | Stop reason: SDUPTHER

## 2020-08-03 ENCOUNTER — TELEPHONE (OUTPATIENT)
Dept: FAMILY MEDICINE CLINIC | Age: 75
End: 2020-08-03

## 2020-08-03 NOTE — TELEPHONE ENCOUNTER
----- Message from [employer] sent at 8/3/2020 11:29 AM EDT -----  Subject: Message to Provider    QUESTIONS  Information for Provider? patient wants to know if she can get back on   xanax    does think other medication is working . . please advise   ---------------------------------------------------------------------------  --------------  CALL BACK INFO  What is the best way for the office to contact you? OK to leave message on   voicemail  Preferred Call Back Phone Number? [phone]  ---------------------------------------------------------------------------  --------------  SCRIPT ANSWERS  Relationship to Patient?  Self

## 2020-08-05 RX ORDER — OMEPRAZOLE 40 MG/1
CAPSULE, DELAYED RELEASE ORAL
Qty: 90 CAPSULE | Refills: 0 | Status: SHIPPED | OUTPATIENT
Start: 2020-08-05 | End: 2020-09-01 | Stop reason: SDUPTHER

## 2020-08-07 ENCOUNTER — TELEPHONE (OUTPATIENT)
Dept: FAMILY MEDICINE CLINIC | Age: 75
End: 2020-08-07

## 2020-08-07 NOTE — TELEPHONE ENCOUNTER
----- Message from Jose Antonio Luis sent at 8/6/2020  1:58 PM EDT -----  Subject: Referral Request    QUESTIONS   Reason for referral request? pt is requesting to see a specialist for   carpel tunnel. Has the physician seen you for this condition before? No   Preferred Specialist (if applicable)? Do you already have an appointment scheduled? No  Additional Information for Provider?   ---------------------------------------------------------------------------  --------------  CALL BACK INFO  What is the best way for the office to contact you? OK to leave message on   voicemail  Preferred Call Back Phone Number?  [phone]

## 2020-08-07 NOTE — TELEPHONE ENCOUNTER
Refer for EMG and nerve conduction velocity of both upper extremities with a diagnosis of carpal tunnel syndrome

## 2020-08-19 ENCOUNTER — TELEPHONE (OUTPATIENT)
Dept: FAMILY MEDICINE CLINIC | Age: 75
End: 2020-08-19

## 2020-08-19 NOTE — TELEPHONE ENCOUNTER
----- Message from [address] sent at 8/19/2020 11:29 AM EDT -----  Subject: Message to Provider    QUESTIONS  Information for Provider? wants to know if its safe to get her nails done   now due to covid  ---------------------------------------------------------------------------  --------------  CALL BACK INFO  What is the best way for the office to contact you? OK to leave message on   voicemail  Preferred Call Back Phone Number? [phone]  ---------------------------------------------------------------------------  --------------  SCRIPT ANSWERS  Relationship to Patient?  Self

## 2020-08-21 ENCOUNTER — PROCEDURE VISIT (OUTPATIENT)
Dept: NEUROLOGY | Age: 75
End: 2020-08-21
Payer: COMMERCIAL

## 2020-08-21 PROCEDURE — 95911 NRV CNDJ TEST 9-10 STUDIES: CPT | Performed by: PSYCHIATRY & NEUROLOGY

## 2020-08-21 PROCEDURE — 95886 MUSC TEST DONE W/N TEST COMP: CPT | Performed by: PSYCHIATRY & NEUROLOGY

## 2020-08-21 NOTE — PROGRESS NOTES
Nigel Covarrubias M.D. [facility]) Phys[facility]  Board Certified in [address] [facility] [address] [facility], [address] [facility], [address] [facility]    EMG / NERVE CONDUCTION STUDY    PATIENT:     Ra Menjivar      DATE OF EM2020    YOB: 1945       REASON FOR EMG:   Bilateral arm pain and numbness    REFERRING PHYSICIAN:  Celena Hernandez MD  [address], [address]    SUMMARY:   Bilateral median sensory nerve studies with prolonged distal latencies. The right median motor nerve study was normal.  The left median motor nerve study had a borderline distal latency. Bilateral ulnar motor nerve studies had moderately severe slowing of conduction velocities across the elbow. The right ulnar sensory had a slightly prolonged distal latency. The left ulnar sensory nerve study was normal.  The left radial sensory nerve study was normal.  Needle EMG of several muscles in both upper extremities showed decreased motor units in the first dorsal interosseous muscles bilaterally. CLINICAL DIAGNOSIS:    Carpal tunnel syndrome       EMG RESULTS:   1.  Moderately severe bilateral ulnar nerve lesions at the elbow. Both sides are equally affected. 2.  Mild bilateral median nerve lesions at the wrist.  (Carpal tunnel syndrome). Here the left side is more involved than the right side. _____________________________  Nigel Covarrubias M.D.   Electromyographer/Neurologist

## 2020-08-28 RX ORDER — ACETAMINOPHEN AND CODEINE PHOSPHATE 300; 30 MG/1; MG/1
1 TABLET ORAL EVERY 8 HOURS PRN
Qty: 90 TABLET | Refills: 0 | Status: CANCELLED | OUTPATIENT
Start: 2020-08-28 | End: 2020-09-27

## 2020-08-28 NOTE — TELEPHONE ENCOUNTER
Medication:   Requested Prescriptions     Pending Prescriptions Disp Refills    acetaminophen-codeine (TYLENOL/CODEINE #3) 300-30 MG per tablet 90 tablet 0     Sig: Take 1 tablet by mouth every 8 hours as needed for Pain for up to 30 days. Intended supply: 3 days. Take lowest dose possible to manage pain      Last Filled: 7/29/2020    Patient Phone Number: [phone] (home)     Last appt: 6/29/2020   Next appt: 9/1/2020    Last OARRS:   RX Monitoring 12/3/2019   Attestation -   Periodic Controlled Substance Monitoring Possible medication side effects, risk of tolerance/dependence & alternative treatments discussed. ;No signs of potential drug abuse or diversion identified. ;Assessed functional status. Chronic Pain > 50 MEDD Obtained or confirmed \"Consent for Opioid Use\" on file.      PDMP Monitoring:    Last PDMP Shawna Reza as Reviewed [facility]):  Review User Review Instant Review Result   Jill Manner 12/3/2019  2:23 PM Reviewed PDMP [1]     Preferred Pharmacy:   [facility] [address], [address] [phone] Sushant Fraire [phone]  [address] [facility]  [place] [address]  Phone: [phone] Fax: [phone]

## 2020-08-31 RX ORDER — ACETAMINOPHEN AND CODEINE PHOSPHATE 300; 30 MG/1; MG/1
1 TABLET ORAL EVERY 8 HOURS PRN
Qty: 90 TABLET | Refills: 0 | Status: CANCELLED | OUTPATIENT
Start: 2020-08-31 | End: 2020-09-30

## 2020-08-31 NOTE — TELEPHONE ENCOUNTER
----- Message from Roz Ochoajohnnienahomy sent at 8/31/2020  1:29 PM EDT -----  Subject: Message to Provider    QUESTIONS  Information for Provider? Patient called in requesting refill for her   acetaminophen-codeine. Also   she has an appointment for tomorrow. ---------------------------------------------------------------------------  --------------  Brenda COTO  What is the best way for the office to contact you? OK to leave message on   voicemail  Preferred Call Back Phone Number? [phone]  ---------------------------------------------------------------------------  --------------  SCRIPT ANSWERS  Relationship to Patient?  Self

## 2020-09-01 ENCOUNTER — OFFICE VISIT (OUTPATIENT)
Dept: FAMILY MEDICINE CLINIC | Age: 75
End: 2020-09-01
Payer: COMMERCIAL

## 2020-09-01 VITALS
SYSTOLIC BLOOD PRESSURE: 120 MMHG | TEMPERATURE: 98.4 F | BODY MASS INDEX: 22.66 KG/M2 | HEART RATE: 74 BPM | DIASTOLIC BLOOD PRESSURE: 80 MMHG | OXYGEN SATURATION: 99 % | WEIGHT: 132 LBS

## 2020-09-01 PROBLEM — Z79.899 CHRONIC PRESCRIPTION BENZODIAZEPINE USE: Status: RESOLVED | Noted: 2019-12-03 | Resolved: 2020-09-01

## 2020-09-01 PROBLEM — S32.010A CLOSED COMPRESSION FRACTURE OF FIRST LUMBAR VERTEBRA (HCC): Status: RESOLVED | Noted: 2019-05-29 | Resolved: 2020-09-01

## 2020-09-01 PROBLEM — G93.40 ACUTE ENCEPHALOPATHY: Status: RESOLVED | Noted: 2020-05-22 | Resolved: 2020-09-01

## 2020-09-01 PROBLEM — M47.26 OTHER SPONDYLOSIS WITH RADICULOPATHY, LUMBAR REGION: Status: RESOLVED | Noted: 2019-05-01 | Resolved: 2020-09-01

## 2020-09-01 PROCEDURE — 99214 OFFICE O/P EST MOD 30 MIN: CPT | Performed by: FAMILY MEDICINE

## 2020-09-01 RX ORDER — AMLODIPINE BESYLATE 10 MG/1
TABLET ORAL
Qty: 90 TABLET | Refills: 0 | OUTPATIENT
Start: 2020-09-01

## 2020-09-01 RX ORDER — OMEPRAZOLE 40 MG/1
CAPSULE, DELAYED RELEASE ORAL
Qty: 90 CAPSULE | Refills: 1 | Status: SHIPPED | OUTPATIENT
Start: 2020-09-01 | End: 2020-12-28 | Stop reason: SDUPTHER

## 2020-09-01 RX ORDER — ACETAMINOPHEN AND CODEINE PHOSPHATE 300; 30 MG/1; MG/1
1 TABLET ORAL EVERY 8 HOURS PRN
Qty: 90 TABLET | Refills: 0 | Status: SHIPPED | OUTPATIENT
Start: 2020-09-01 | End: 2020-09-30 | Stop reason: SDUPTHER

## 2020-09-01 RX ORDER — AMLODIPINE BESYLATE 10 MG/1
TABLET ORAL
Qty: 90 TABLET | Refills: 1 | Status: SHIPPED | OUTPATIENT
Start: 2020-09-01 | End: 2021-02-26

## 2020-09-01 RX ORDER — BUSPIRONE HYDROCHLORIDE 15 MG/1
15 TABLET ORAL 3 TIMES DAILY PRN
Qty: 90 TABLET | Refills: 1 | Status: SHIPPED | OUTPATIENT
Start: 2020-09-01 | End: 2020-11-24

## 2020-09-01 RX ORDER — ATORVASTATIN CALCIUM 40 MG/1
40 TABLET, FILM COATED ORAL DAILY
Qty: 90 TABLET | Refills: 0 | Status: SHIPPED | OUTPATIENT
Start: 2020-09-01 | End: 2020-12-08 | Stop reason: SDUPTHER

## 2020-09-01 NOTE — PROGRESS NOTES
Subjective:      Patient ID: Kellie Cha is a 66 y.o. female. CC: Patient presents for re-evaluation of chronic health problems including diabetes mellitus, chronic back pain, depression and anxiety, left elbow and hand numbness. HPI Patient presents today for a follow-up on chronic medications and medical conditions. Since last office evaluation patient did have an EMG study of both upper extremities demonstrating moderate to severe ulnar nerve entrapment at the elbow bilaterally. She also has a mild carpal tunnel syndrome. Patient still complains of numbness in her elbow area that extends up into her neck.  states that she sits a lot at times through the day with her phone or leaning against that left elbow. She takes the pain pills 3 times a day under her 's direction and she feels this helps her out but obviously does not alleviate her pain 100%. Depression anxiety symptoms are better controlled. Memory issues are slowly worsening.     Review of Systems     Patient Active Problem List   Diagnosis    Essential hypertension    Generalized osteoarthrosis, involving multiple sites    Type 2 diabetes mellitus without complication ([address]    Degenerative lumbar spinal stenosis    Congenital clotting factor deficiency (HCC)    Hypercholesterolemia    GERD (gastroesophageal reflux disease)    Late onset Alzheimer's disease without behavioral disturbance (HCC)    Drug dependence (HCC)    Chronic low back pain without sciatica    Spondylolisthesis, lumbar region    Other spondylosis with radiculopathy, lumbar region    Chronic pain syndrome    Closed compression fracture of first lumbar vertebra (HCC)    Hyperparathyroidism ([address]    Pain disorder with psychological factors    Chronic prescription benzodiazepine use    Current moderate episode of major depressive disorder without prior episode ([address]    Acute encephalopathy    TIA (transient ischemic attack)       Outpatient Medications Marked as Taking for the 9/1/20 encounter (Office Visit) with Linsey Jerome MD   Medication Sig Dispense Refill    acetaminophen-codeine (TYLENOL/CODEINE #3) 300-30 MG per tablet Take 1 tablet by mouth every 8 hours as needed for Pain for up to 30 days. Intended supply: 3 days. Take lowest dose possible to manage pain 90 tablet 0    omeprazole (PRILOSEC) 40 MG delayed release capsule TAKE ONE CAPSULE BY MOUTH DAILY 90 capsule 0    donepezil (ARICEPT) 10 MG tablet TAKE TWO TABLETS BY MOUTH EVERY NIGHT AT BEDTIME 180 tablet 1    DULoxetine (CYMBALTA) 60 MG extended release capsule TAKE ONE CAPSULE BY MOUTH DAILY 90 capsule 1    busPIRone (BUSPAR) 15 MG tablet Take 15 mg by mouth 3 times daily as needed (anxiety) 90 tablet 2    mirtazapine (REMERON) 15 MG tablet Take 1 tablet by mouth nightly 30 tablet 5    diclofenac sodium (VOLTAREN) 1 % GEL Apply 2 g topically 3 times daily 2 Tube 1    atorvastatin (LIPITOR) 40 MG tablet Take 1 tablet by mouth daily 90 tablet 0    amLODIPine (NORVASC) 10 MG tablet TAKE ONE TABLET BY MOUTH DAILY 90 tablet 0    Melatonin 5 MG CAPS 1-2 qhs for sleep 60 capsule 3    aspirin 81 MG EC tablet Take 1 tablet by mouth daily 30 tablet 3    Multiple Vitamins-Minerals (CENTRUM SILVER PO) Take 1 tablet by mouth daily          Allergies   Allergen Reactions    Hydrocodone      The patient has a history of a bleeding Ulcer, and this upset her stomach severely. Social History     Tobacco Use    Smoking status: Never Smoker    Smokeless tobacco: Never Used   Substance Use Topics    Alcohol use: No     Alcohol/week: 0.0 standard drinks       Pulse 74   Temp 98.4 °F (36.9 °C) (Infrared)   Wt 132 lb (59.9 kg)   SpO2 99%   BMI 22.66 kg/m²         Objective:   Physical Exam  Vitals signs and nursing note reviewed. Constitutional:       General: She is not in acute distress. Appearance: Normal appearance. She is well-developed and well-groomed.    Neck: Vascular: No carotid bruit. Cardiovascular:      Rate and Rhythm: Normal rate and regular rhythm. Pulses:           Dorsalis pedis pulses are 2+ on the right side and 2+ on the left side. Posterior tibial pulses are 2+ on the right side and 2+ on the left side. Heart sounds: Normal heart sounds. No murmur. No friction rub. No gallop. Pulmonary:      Effort: Pulmonary effort is normal.      Breath sounds: Normal breath sounds. Musculoskeletal:      Lumbar back: She exhibits decreased range of motion and tenderness (Sacrum). She exhibits no swelling, no edema, no deformity and no spasm. Right upper leg: Normal. She exhibits no tenderness, no swelling and no deformity. Left upper leg: Normal. She exhibits no tenderness, no swelling and no deformity. Right lower leg: No edema. Left lower leg: No edema. Skin:     General: Skin is warm and dry. Findings: No rash. Neurological:      General: No focal deficit present. Mental Status: She is alert and oriented to person, place, and time. Cranial Nerves: No cranial nerve deficit. Sensory: Sensation is intact. Motor: Motor function is intact. No weakness. Gait: Gait normal.      Deep Tendon Reflexes:      Reflex Scores:       Patellar reflexes are 2+ on the right side and 2+ on the left side. Achilles reflexes are 2+ on the right side and 2+ on the left side. Psychiatric:         Attention and Perception: Attention and perception normal.         Mood and Affect: Mood is anxious and depressed. Affect is tearful. Speech: Speech normal.         Behavior: Behavior normal. Behavior is cooperative. Thought Content: Thought content normal.         Cognition and Memory: Cognition is impaired. Memory is impaired. Assessment:      Aysha Tsai was seen today for follow-up.     Diagnoses and all orders for this visit:    Type 2 diabetes mellitus without complication, without long-term current use of insulin (HCC)  -     Hemoglobin A1C; Future  -     Basic Metabolic Panel; Future    Need for shingles vaccine  -     zoster recombinant adjuvanted vaccine [facility]) 50 MCG/0.5ML SUSR injection; Inject 0.5 mLs into the muscle See Admin Instructions 1 dose now and repeat in 2-6 months    Entrapment of left ulnar nerve    Chronic low back pain without sciatica, unspecified back pain laterality    Current moderate episode of major depressive disorder without prior episode (HCC)    Drug dependence (HCC)    Pain disorder with psychological factors    Other orders  -     amLODIPine (NORVASC) 10 MG tablet; TAKE ONE TABLET BY MOUTH DAILY  -     atorvastatin (LIPITOR) 40 MG tablet; Take 1 tablet by mouth daily  -     busPIRone (BUSPAR) 15 MG tablet; Take 15 mg by mouth 3 times daily as needed (anxiety)  -     omeprazole (PRILOSEC) 40 MG delayed release capsule; TAKE ONE CAPSULE BY MOUTH DAILY    OARRS report checked          Plan:      Pt appears stable & reviewed EMG studies with patient and . In regards to the ulnar neuropathy I recommended that she not lean on her left elbow. She got a try to switch to the right side of the couch. Also discussed with her of using her phone on speaker. We did discuss possible surgical intervention in the future and she does not want a pursue this. No change in current pain medications. Patient received counseling on the following healthy behaviors: nutrition and exercise     Patient given educational materials     Health maintenance updated    Discussed use, benefit, and side effects of prescribed medications. Barriers to medication compliance addressed. All patient questions answered. Pt voiced understanding. Patient needs RTC in 3 months. Please note that this chart was generated using Dragon dictation software.  Although every effort was made to ensure the accuracy of this automated transcription, some errors in transcription may have occurred.

## 2020-09-30 RX ORDER — ACETAMINOPHEN AND CODEINE PHOSPHATE 300; 30 MG/1; MG/1
1 TABLET ORAL EVERY 8 HOURS PRN
Qty: 90 TABLET | Refills: 0 | Status: SHIPPED | OUTPATIENT
Start: 2020-09-30 | End: 2020-10-30 | Stop reason: SDUPTHER

## 2020-09-30 NOTE — TELEPHONE ENCOUNTER
Medication:   Requested Prescriptions     Pending Prescriptions Disp Refills    acetaminophen-codeine (TYLENOL/CODEINE #3) 300-30 MG per tablet 90 tablet 0     Sig: Take 1 tablet by mouth every 8 hours as needed for Pain for up to 30 days. Intended supply: 3 days. Take lowest dose possible to manage pain      Last Filled:  9/1/2020    Patient Phone Number: [phone] (home)     Last appt: 9/1/2020   Next appt: 12/1/2020    Last OARRS:   RX Monitoring 12/3/2019   Attestation -   Periodic Controlled Substance Monitoring Possible medication side effects, risk of tolerance/dependence & alternative treatments discussed. ;No signs of potential drug abuse or diversion identified. ;Assessed functional status. Chronic Pain > 50 MEDD Obtained or confirmed \"Consent for Opioid Use\" on file.      PDMP Monitoring:    Last PDMP Ana Coker as Reviewed [facility]):  Review User Review Instant Review Result   Marilyn Judi 9/1/2020  1:09 PM Reviewed PDMP [1]     Preferred Pharmacy:   [facility] [address], [address] [facility] [phone] Clement Brown [phone]  [address]  [place] [address]  Phone: [phone] Fax: [phone]

## 2020-10-08 ENCOUNTER — TELEPHONE (OUTPATIENT)
Dept: FAMILY MEDICINE CLINIC | Age: 75
End: 2020-10-08

## 2020-10-08 NOTE — LETTER
[facility] [facility] [address]  Phone: [phone]  Fax: [phone]    Leana Grissom MD        October 9, 2020     Patient: Jeannie Gamboa   YOB: 1945   Date of Visit: 10/8/2020       To Whom It May Concern: It is my medical opinion that Mauri Manzano requires a disability parking placard for the following reasons:  She cannot walk without assistance from another person or the use of an assistance device (cane, crutch, prosthetic device, wheelchair, etc.). She cannot walk 200 feet without stopping to rest.  Duration of need: permanent    If you have any questions or concerns, please don't hesitate to call.     Sincerely,        Leana Grissom MD

## 2020-10-08 NOTE — TELEPHONE ENCOUNTER
Patient has misplaced her script for her handicap placard and would like for you to rewrite it    Patients Provider is out of office

## 2020-10-12 ENCOUNTER — TELEPHONE (OUTPATIENT)
Dept: FAMILY MEDICINE CLINIC | Age: 75
End: 2020-10-12

## 2020-10-26 ENCOUNTER — OFFICE VISIT (OUTPATIENT)
Dept: ORTHOPEDIC SURGERY | Age: 75
End: 2020-10-26
Payer: COMMERCIAL

## 2020-10-26 VITALS — BODY MASS INDEX: 23.9 KG/M2 | TEMPERATURE: 97.2 F | WEIGHT: 140 LBS | HEIGHT: 64 IN

## 2020-10-26 PROBLEM — G56.22 CUBITAL TUNNEL SYNDROME ON LEFT: Status: ACTIVE | Noted: 2020-10-26

## 2020-10-26 PROBLEM — G56.01 RIGHT CARPAL TUNNEL SYNDROME: Status: ACTIVE | Noted: 2020-10-26

## 2020-10-26 PROBLEM — G56.02 LEFT CARPAL TUNNEL SYNDROME: Status: ACTIVE | Noted: 2020-10-26

## 2020-10-26 PROCEDURE — 99214 OFFICE O/P EST MOD 30 MIN: CPT | Performed by: ORTHOPAEDIC SURGERY

## 2020-10-26 NOTE — LETTER
History & Physical:   [ ] By Surgeon   [X] By PCP   Referrals:  [ ] Medical/Cardiac Clearance by _____________________________  [ ] Joint Replacement Class  [ ] Physical Therapy  [ ] Crutch Walking Instructions   Weight Bearing Status _____________  [ ] Occupational Therapy  Kimberly ] Smoking Cessation Instructions  [ ] Other ________________________________________________    Pre Admission Testing:  [ ] Hemoglobin & Hematocrit   [ ] Comprehensive Metabolic Panel  [ ] CBC with differential            [ ] Type & Screen  [ ] CBC without differential       [ ] Type & Crossmatch _____ units  [ ] PT/INR                                   [ ] Autologous Blood _____ units  [ ] PTT                                        [ ]  EKG  [ ] Urinalysis                               Kimberly ]  Other Anesthesia guidelines  [ ] Urinalysis with C & S  [ ] Basic Metabolic Panel         [ ] MRSA-nasal    Orders to be initiated upon admission to same day surgery:  Kimberly ] Fasting blood sugar by fingerstick [ ] Gabriella Benita  [ ] PT/INR (pt takes Warafin/Coumadin)     [ ] Interscalene Right or Left  Kimberly ] HCG _X___ Urine _____ Serum              [ ] Femoral Right or Left  [ ] Other ______________________________________________    IV Fluids and Medications:  1. Place IV catherer for IV access. The RN may use 0.1ml of 1% Lidocaine SQ      Per site for IV starts up to maximum of 0.5ml.  2. Infuse Lactated Ringers IV fluid at 50ml per hour. For diabetic or has renal             impaired patient, infuse 0.9% Normal Saline at 50ml/hr. 3. Cefazolin 1g IV if <80kg OR 2g if 80-120kg OR 3g if >120kg, given within one     hour of incision time. For those allergic to Cephalosporins, give Clindamycin     600mg IV within 1 hour of incision time.    4. Other medications: _________________________________________    Additional Orders:  Ernesto.Bureau ] Knee high anti-embolism stockings and antithrombic compression pumps (apply in Pre-op) Shonda Signature:                                                           Date: 10/26/2020 Time: 11:03 AM    '

## 2020-10-26 NOTE — PROGRESS NOTES
Stacia Corona MD  [address]  [address] [place] [address]    History of Present Illness:  Chief Complaint   Patient presents with    Arm Pain     New, Bilateral arm numbness and tingling. no injury to either arm. on and off for one year now. Bala Lau is a 66 y.o. female here for evaluation of bilateral arm numbness and tingling. Pain assessment has been completed and is documented below. Patient was referred here for orthopaedic evaluation by Loki Jacobs MD.  She is here today with her . She has previously had carpal tunnel release on her right hand approximately 15 years ago. She complains of bilateral arm numbness and tingling for at least a year, intermittently. However, recently it has become constant and is interfering with her daily activities. The numbness and tingling goes all the way down into all fingers of both hands. She is right handed. She has recently seen Dr. Cora Davila and proceeded with an EMG. Her pain was keeping her awake at night, until she received a script for a sleeping pill. She denies pain in her neck. She states that prior to her exam today, she already had tingling in her hands. She is chronically on Tylenol #3 and cymbalta from Dr. Jordana Rowland. History of DM and neuropathy. Pain Assessment  Location of Pain: Arm  Location Modifiers: Right, Left  Severity of Pain: 6  Quality of Pain: (numbness)  Duration of Pain: Persistent  Frequency of Pain: Constant  Aggravating Factors: Exercise  Limiting Behavior: Some  Result of Injury: No  Work-Related Injury: No  Are there other pain locations you wish to document?: No      Review of Systems:  Relevant review of systems reviewed and can be found in the Media section of patient's chart.        Medical History:  Past Medical History:   Diagnosis Date    Acute gastritis without mention of hemorrhage     Acute sinusitis, unspecified     Acute upper respiratory infections of unspecified site     Allergic rhinitis, cause unspecified     Anemia, unspecified     Edema     Hyperlipidemia     Hypertension     Loss of weight     Lumbago     Lumbosacral root lesions, not elsewhere classified     Need for prophylactic vaccination and inoculation against influenza     Neuropathy     Other bursitis disorders     Other seborrheic keratosis     Symptomatic menopausal or female climacteric states     Type II or unspecified type diabetes mellitus without mention of complication, not stated as uncontrolled     Pt is not aware of having diabetes, does know her A1C runs high but blood sugar is generally normal        Past Surgical History:   Procedure Laterality Date    OTHER SURGICAL HISTORY  1977    breast mass local excision negative    OTHER SURGICAL HISTORY  1986    nasal polyps    PARVEZ AND BSO  1976        Medication Review:  Current Outpatient Medications   Medication Sig Dispense Refill    acetaminophen-codeine (TYLENOL/CODEINE #3) 300-30 MG per tablet Take 1 tablet by mouth every 8 hours as needed for Pain for up to 30 days. Intended supply: 3 days.  Take lowest dose possible to manage pain 90 tablet 0    amLODIPine (NORVASC) 10 MG tablet TAKE ONE TABLET BY MOUTH DAILY 90 tablet 1    atorvastatin (LIPITOR) 40 MG tablet Take 1 tablet by mouth daily 90 tablet 0    busPIRone (BUSPAR) 15 MG tablet Take 15 mg by mouth 3 times daily as needed (anxiety) 90 tablet 1    omeprazole (PRILOSEC) 40 MG delayed release capsule TAKE ONE CAPSULE BY MOUTH DAILY 90 capsule 1    zoster recombinant adjuvanted vaccine (SHINGRIX) 50 MCG/0.5ML SUSR injection Inject 0.5 mLs into the muscle See Admin Instructions 1 dose now and repeat in 2-6 months 0.5 mL 1    donepezil (ARICEPT) 10 MG tablet TAKE TWO TABLETS BY MOUTH EVERY NIGHT AT BEDTIME (Patient taking differently: TAKE TWO TABLETS BY MOUTH EVERY  Right:  o Elbow and wrist with full flexion and extension.  Left:  o Elbow and wrist with full flexion and extension. Strength:     Right:  o Elbow flexion and extension at least 4+/5.  o Wrist flexion extension at least 4+/5.  o  4/5.  o Finger abduction 4/5.  Left:  o Elbow flexion and extension at least 4+/5.  o Wrist flexion extension at least 4+/5.  o  4/5.  o Finger abduction 4/5. Special Tests:     Positive Phalen's test for carpal tunnel. Positive Tinel's at the cubital tunnel of both elbows. No palpable snapping of either ulnar nerve on elbow flexion and extension. Mild thenar flattening bilaterally. APB strength bilaterally 4/5. Positive Froment's sign bilaterally. Additional comments:   Elbow: Full range of motion. Strength on elbow flexion and extension is 5/5. Wrist: Full range of motion. Strength on wrist flexion and extension is 5/5. Hand intrinsic function intact. Sensation to light tough grossly present throughout the axillary, median radial, ulnar, muscular, and cutaneous nerve distributions. Cervical spine: No tenderness over the spinous processes or step-offs. She has slight discomfort in the neck on extension as well as lateral rotation and bending slightly limited with mild discomfort. Skin: There are no rashes, ulcerations or lesions. Radiology:     X-rays obtained today and were reviewed in office:  Views 3: cervical spine. Impression: No evidence for acute fracture, subluxation, or dislocation. No lytic or blastic lesions in the metaphyseal regions. Multi level degenerative disc disease without evidence of fracture or spondylolisthesis. "[employer] EMG RESULTS:   Bilateral upper extremity EMG obtained on 8/21/2020 and reviewed today in office:  1. Moderately severe bilateral ulnar nerve lesions at the elbow. Both sides are equally affected. 2.  Mild bilateral median nerve lesions at the wrist.  (Carpal tunnel syndrome).   Here the left side is more involved than the right side. Office Orders/Procedures:  Orders Placed This Encounter   Procedures    XR CERVICAL SPINE (2-3 VIEWS)     Standing Status:   Future     Number of Occurrences:   1     Standing Expiration Date:   11/26/2020       Impression:   Diagnosis Orders   1. Chronic pain of both shoulders     2. Neck pain  XR CERVICAL SPINE (2-3 VIEWS)   3. Cubital tunnel syndrome on left     4. Left carpal tunnel syndrome     5. Right carpal tunnel syndrome          Treatment Plan:  I have discussed treatment options with the patient. Informed that we proceed with x-rays of the C-Spine today as issues in her neck can also contribute to the symptoms of these same nerves as they progress down the upper extremity. She does have some arthritis in her neck that could be contributing to her pain and numbness down the arm. I also reviewed the report from Dr. Arnel Cota, her left arm is worse than her right. She has moderate cubital tunnel and mild carpal tunnel, bilaterally. Discussed proceeding with surgery to help improve her symptoms. We will proceed with a left cubital and carpal tunnel release first.  Informed the patient that with a cubital release 95/100 times, we will just have to remove the tissue that is impinging on the nerve. However the nerve could become unstable and we would have to transpose it anteriorly at the elbow. We will release the left carpal tunnel at the same time. She will be in a splint for a few days after the surgery and she will be able to remove it after 5 days to begin elbow ROM. She will then be able to move her arm but not able to lift until it is discussed at her 2 week follow up. Normally therapy is usually not needed after surgery, but is available if necessary. Her night symptoms will usually improve first.  Then slowly about 6 weeks after surgery she should notice that her  strength is starting to improve.   Overall it could take 6 months or more for the sensitivity and numbness to improve and sometimes it still remains even after the release is complete. We perform these surgeries to prevent patients from getting worse, anything that improves is considered a bonus but usually patients do feel better. However, after 6-8 weeks of healing we could then proceed with the right side. I have reviewed patient's pertinent medical history, relevant laboratory and imaging studies, and past surgical history. Patient's use of relevant medications has been reviewed and was discussed during the visit. Patient was advised to keep future appointments with their respective specialty care team(s). Patient had the opportunity to ask questions, all of which were answered to the best of my ability and with patient satisfaction. Patient understands and is agreeable with the care plan following today's visit. Patient is to schedule an appointment for any new or worsening symptoms. By signing my name below, Marsha Camacho, attkeli that this documentation has been prepared under the direction and in the presence of Jose Rao MD.   Electronically Signed: Sandra Cadena, 10/26/20, 8:11 AM EDT. Noemy Manning MD, personally performed the services described in this documentation. All medical record entries made by the sandra were at my direction and in my presence. I have reviewed the chart and discharge instructions (if applicable) and agree that the record reflects my personal performance and is accurate and complete. Jose Rao MD, 10/28/20, 9:01 AM EDT. Some documentation was done using voice recognition dragon software. Every effort was made to ensure accuracy; however, inadvertent unintentional computerized transcription errors may be present.

## 2020-10-27 ENCOUNTER — TELEPHONE (OUTPATIENT)
Dept: ORTHOPEDIC SURGERY | Age: 75
End: 2020-10-27

## 2020-10-29 NOTE — TELEPHONE ENCOUNTER
Medication:   Requested Prescriptions     Pending Prescriptions Disp Refills    acetaminophen-codeine (TYLENOL/CODEINE #3) 300-30 MG per tablet 90 tablet 0     Sig: Take 1 tablet by mouth every 8 hours as needed for Pain for up to 30 days. Intended supply: 3 days. Take lowest dose possible to manage pain      Last Filled:      Patient Phone Number: [phone] (home)     Last appt: 9/1/2020   Next appt: 11/6/2020    Last OARRS:   RX Monitoring 12/3/2019   Attestation -   Periodic Controlled Substance Monitoring Possible medication side effects, risk of tolerance/dependence & alternative treatments discussed. ;No signs of potential drug abuse or diversion identified. ;Assessed functional status. Chronic Pain > 50 MEDD Obtained or confirmed \"Consent for Opioid Use\" on file.      PDMP Monitoring:    Last PDMP Ruth Reza as Reviewed [facility]):  Review User Review Instant Review Result   Rosa Crowleys 9/1/2020  1:09 PM Reviewed PDMP [1]     Preferred Pharmacy:   [facility] [address] [facility] [phone] [facility] [phone] [address] [facility] [address]  [place] [address]  Phone: [phone] Fax: [phone]

## 2020-10-30 RX ORDER — ACETAMINOPHEN AND CODEINE PHOSPHATE 300; 30 MG/1; MG/1
1 TABLET ORAL EVERY 8 HOURS PRN
Qty: 90 TABLET | Refills: 0 | Status: SHIPPED | OUTPATIENT
Start: 2020-10-30 | End: 2020-12-08 | Stop reason: SDUPTHER

## 2020-10-31 NOTE — TELEPHONE ENCOUNTER
Next appt in September Constitutional: (-) fever, (-) chills  Cardiovascular: (-) chest pain  Respiratory: (-) cough  Gastrointestinal: (-) vomiting  Musculoskeletal: (-) neck pain, (-) back pain, (-) joint pain,  Integumentary: (+) rash, (-) edema, (-) bruises  Neuro: (-) numbness/tingling  Peripheral Vascular: (-) leg swelling  :  (-)dysuria,  (-) hematuria  Allergic/Immunologic: (-) pruritus

## 2020-11-06 ENCOUNTER — OFFICE VISIT (OUTPATIENT)
Dept: FAMILY MEDICINE CLINIC | Age: 75
End: 2020-11-06
Payer: COMMERCIAL

## 2020-11-06 ENCOUNTER — OFFICE VISIT (OUTPATIENT)
Dept: PRIMARY CARE CLINIC | Age: 75
End: 2020-11-06
Payer: COMMERCIAL

## 2020-11-06 VITALS
RESPIRATION RATE: 12 BRPM | WEIGHT: 133.13 LBS | DIASTOLIC BLOOD PRESSURE: 64 MMHG | BODY MASS INDEX: 22.85 KG/M2 | SYSTOLIC BLOOD PRESSURE: 120 MMHG | OXYGEN SATURATION: 93 % | HEART RATE: 107 BPM | TEMPERATURE: 98.8 F

## 2020-11-06 PROCEDURE — 99211 OFF/OP EST MAY X REQ PHY/QHP: CPT | Performed by: NURSE PRACTITIONER

## 2020-11-06 PROCEDURE — 93000 ELECTROCARDIOGRAM COMPLETE: CPT | Performed by: FAMILY MEDICINE

## 2020-11-06 PROCEDURE — 99214 OFFICE O/P EST MOD 30 MIN: CPT | Performed by: FAMILY MEDICINE

## 2020-11-06 NOTE — PROGRESS NOTES
Preoperative Consultation    Kristen Aviles  YOB: 1945    This patient presents to the office today for a preoperative consultation at the request of surgeon, Dr. Ephraim Jiménez, who plans on performing Elbow and hand surgery on November 12 at [facility]. Planned anesthesia: General   Known anesthesia problems: None   Bleeding risk: No recent or remote history of abnormal bleeding  Personal or FH of DVT/PE: No      Patient Active Problem List   Diagnosis    Essential hypertension    Generalized osteoarthrosis, involving multiple sites    Type 2 diabetes mellitus without complication ([address]    Degenerative lumbar spinal stenosis    Congenital clotting factor deficiency ([address]    Hypercholesterolemia    GERD (gastroesophageal reflux disease)    Late onset Alzheimer's disease without behavioral disturbance (HCC)    Drug dependence ([address]    Chronic low back pain without sciatica    Spondylolisthesis, lumbar region    Chronic pain syndrome    Hyperparathyroidism ([address]    Pain disorder with psychological factors    Current moderate episode of major depressive disorder without prior episode ([address]    TIA (transient ischemic attack)    Cubital tunnel syndrome on left    Left carpal tunnel syndrome    Right carpal tunnel syndrome     Past Surgical History:   Procedure Laterality Date    OTHER SURGICAL HISTORY  1977    breast mass local excision negative    OTHER SURGICAL HISTORY  1986    nasal polyps    PARVEZ AND BSO  1976       Allergies   Allergen Reactions    Hydrocodone      The patient has a history of a bleeding Ulcer, and this upset her stomach severely. Outpatient Medications Marked as Taking for the 11/6/20 encounter (Office Visit) with Pam Meek MD   Medication Sig Dispense Refill    acetaminophen-codeine (TYLENOL/CODEINE #3) 300-30 MG per tablet Take 1 tablet by mouth every 8 hours as needed for Pain for up to 30 days. Intended supply: 3 days.  Take lowest dose possible to manage pain 90 tablet 0    amLODIPine (NORVASC) 10 MG tablet TAKE ONE TABLET BY MOUTH DAILY 90 tablet 1    atorvastatin (LIPITOR) 40 MG tablet Take 1 tablet by mouth daily 90 tablet 0    busPIRone (BUSPAR) 15 MG tablet Take 15 mg by mouth 3 times daily as needed (anxiety) 90 tablet 1    omeprazole (PRILOSEC) 40 MG delayed release capsule TAKE ONE CAPSULE BY MOUTH DAILY 90 capsule 1    zoster recombinant adjuvanted vaccine (SHINGRIX) 50 MCG/0.5ML SUSR injection Inject 0.5 mLs into the muscle See Admin Instructions 1 dose now and repeat in 2-6 months 0.5 mL 1    donepezil (ARICEPT) 10 MG tablet TAKE TWO TABLETS BY MOUTH EVERY NIGHT AT BEDTIME (Patient taking differently: TAKE TWO TABLETS BY MOUTH EVERY NIGHT AT BEDTIME prn) 180 tablet 1    DULoxetine (CYMBALTA) 60 MG extended release capsule TAKE ONE CAPSULE BY MOUTH DAILY 90 capsule 1    mirtazapine (REMERON) 15 MG tablet Take 1 tablet by mouth nightly 30 tablet 5    diclofenac sodium (VOLTAREN) 1 % GEL Apply 2 g topically 3 times daily 2 Tube 1    Melatonin 5 MG CAPS 1-2 qhs for sleep 60 capsule 3    aspirin 81 MG EC tablet Take 1 tablet by mouth daily 30 tablet 3    Multiple Vitamins-Minerals (CENTRUM SILVER PO) Take 1 tablet by mouth daily          Social History     Tobacco Use    Smoking status: Never Smoker    Smokeless tobacco: Never Used   Substance Use Topics    Alcohol use: No     Alcohol/week: 0.0 standard drinks     Family History   Problem Relation Age of Onset    Diabetes Sister     Heart Disease Brother     Heart Disease Sister     Emphysema Sister     High Blood Pressure Other        Review of Systems  A comprehensive review of systems was negative except for what was noted in the HPI.      Physical Exam   /64 (Site: Right Upper Arm, Position: Sitting, Cuff Size: Medium Adult)   Pulse 107   Temp 98.8 °F (37.1 °C) (Tympanic)   Resp 12   Wt 133 lb 2 oz (60.4 kg)   SpO2 93%   BMI 22.85 kg/m²   Weight: 133 lb 2 oz (60.4 kg) Constitutional: She is oriented to person, place, and time. She appears well-developed and well-nourished. No distress. HENT:   Head: Normocephalic and atraumatic. Mouth/Throat: Uvula is midline, oropharynx is clear and moist and mucous membranes are normal.   Eyes: Conjunctivae and EOM are normal. Pupils are equal, round, and reactive to light. Neck: Trachea normal and normal range of motion. Neck supple. No JVD present. Carotid bruit is not present. No mass and no thyromegaly present. Cardiovascular: Normal rate, regular rhythm, normal heart sounds and intact distal pulses. Exam reveals no gallop and no friction rub. No murmur heard. Pulmonary/Chest: Effort normal and breath sounds normal. No respiratory distress. She has no wheezes. She has no rales. Abdominal: Soft. Normal aorta and bowel sounds are normal. She exhibits no distension and no mass. There is no hepatosplenomegaly. No tenderness. Musculoskeletal: She exhibits no edema and no tenderness. Neurological: She is alert and oriented to person, place, and time. She has normal strength. No cranial nerve deficit or sensory deficit. Coordination and gait normal.   Skin: Skin is warm and dry. No rash noted. No erythema. EKG normal sinus rhythm with left bundle branch block. Unchanged from prior ECG    Lab Review No       Assessment:       Jennifer Castanon was seen today for pre-op exam.    Diagnoses and all orders for this visit:    Cubital tunnel syndrome on left    Left carpal tunnel syndrome    Pre-op examination  -     EKG 12 Lead    Late onset Alzheimer's disease without behavioral disturbance ([address]    Essential hypertension    Current moderate episode of major depressive disorder without prior episode ([address]    Drug dependence ([address]    Type 2 diabetes mellitus without complication, without long-term current use of insulin ([address]      66 y.o. patient  approved for Surgery         Plan:     1. Preoperative workup as follows: none  2.  Change in medication regimen before surgery: Discontinue ASA 7 days before surgery  3. No contraindications to planned surgery  4. Medical decision making of moderate complexity. Note electronically signed by provider.

## 2020-11-06 NOTE — PATIENT INSTRUCTIONS

## 2020-11-06 NOTE — PROGRESS NOTES
Rich Torres received a viral test for COVID-19. They were educated on isolation and quarantine as appropriate. For any symptoms, they were directed to seek care from their PCP, given contact information to establish with a doctor, directed to an urgent care or the emergency room.

## 2020-11-08 LAB — SARS-COV-2: NOT DETECTED

## 2020-11-10 ENCOUNTER — TELEPHONE (OUTPATIENT)
Dept: ORTHOPEDIC SURGERY | Age: 75
End: 2020-11-10

## 2020-11-10 NOTE — TELEPHONE ENCOUNTER
Spoke to patient to let her know our office didn't call her today and it may have been a call from the hospital. She will check the number.

## 2020-11-10 NOTE — TELEPHONE ENCOUNTER
General Question     Subject: Patient returning call regarding surgery on 11/12  Patient : Nicolette Holland  Contact Number: [phone]

## 2020-11-11 NOTE — PROGRESS NOTES
Name_______________________________________Printed:____________________  Date and time of surgery________11/12/20 0945________________Arrival Time:____0730 [facility] [place]____________   1. The instructions given regarding when and if a patient needs to stop oral intake prior to surgery varies. Follow the specific instructions you were given                  _x__Nothing to eat or to drink after Midnight the night before.                             ____Endoscopy patient follow your DRS instructions-generally you will be doing a part of the prep after Midnight                   ____Carbo loading or ERAS instructions will be given to select patients-if you have been given those instructions -please do the following                           The evening before your surgery after dinner before midnight drink 40 ounces of gatorade. If you are diabetic use sugar free. The morning of surgery drink 40 ounces of water. This needs to be finished 3 hours prior to your surgery start time. 2. Take the following pills with a small sip of water on the morning of surgery_______amlodipine, omeprazole____________________________________________                  Do not take blood pressure medications ending in pril or sartan the deloris prior to surgery or the morning of surgery_   3. Aspirin, Ibuprofen, Advil, Naproxen, Vitamin E and other Anti-inflammatory products and supplements should be stopped for 5 -7days before surgery or as directed by your physician. 4. Check with your Doctor regarding stopping Plavix, Coumadin,Eliquis, Lovenox,Effient,Pradaxa,Xarelto, Fragmin or other blood thinners and follow their instructions. 5. Do not smoke, and do not drink any alcoholic beverages 24 hours prior to surgery. This includes NA Beer. Refrain from the usage of any recreational drugs. 6. You may brush your teeth and gargle the morning of surgery. DO NOT SWALLOW WATER   7.  You MUST make arrangements for a responsible adult to stay on site while you are here and take you home after your surgery. You will not be allowed to leave alone or drive yourself home. It is strongly suggested someone stay with you the first 24 hrs. Your surgery will be cancelled if you do not have a ride home. 8. A parent/legal guardian must accompany a child scheduled for surgery and plan to stay at the hospital until the child is discharged. Please do not bring other children with you. 9. Please wear simple, loose fitting clothing to the hospital.  Brendan Herb not bring valuables (money, credit cards, checkbooks, etc.) Do not wear any makeup (including no eye makeup) or nail polish on your fingers or toes. 10. DO NOT wear any jewelry or piercings on day of surgery. All body piercing jewelry must be removed. 11. If you have ___dentures, they will be removed before going to the OR; we will provide you a container. If you wear ___contact lenses or ___glasses, they will be removed; please bring a case for them. 12. Please see your family doctor/pediatrician for a history & physical and/or concerning medications. Bring any test results/reports from your physician's office. PCP________x__________Phone___________H&P Appt. Date________             13 If you  have a Living Will and Durable Power of  for Healthcare, please bring in a copy. 15. Notify your Surgeon if you develop any illness between now and surgery  time, cough, cold, fever, sore throat, nausea, vomiting, etc.  Please notify your surgeon if you experience dizziness, shortness of breath or blurred vision between now & the time of your surgery             15. DO NOT shave your operative site 96 hours prior to surgery. For face & neck surgery, men may use an electric razor 48 hours prior to surgery. 16. Shower the night before or morning of surgery using an antibacterial soap or as you have been instructed.              17. To provide excellent care visitors will be grounds please make sure they are back in time for pickup. Have the patient inform the staff on arrival what their rides plans are while the patient is in the facility. At the MAIN there is one visitor allowed. Please note that the visitor policy is subject to change.                                                                                                                                     PRE OP INSTRUCTIONS

## 2020-11-12 ENCOUNTER — ANESTHESIA (OUTPATIENT)
Dept: OPERATING ROOM | Age: 75
End: 2020-11-12
Payer: COMMERCIAL

## 2020-11-12 ENCOUNTER — HOSPITAL ENCOUNTER (OUTPATIENT)
Age: 75
Setting detail: OUTPATIENT SURGERY
Discharge: HOME | End: 2020-11-12
Attending: ORTHOPAEDIC SURGERY | Admitting: ORTHOPAEDIC SURGERY
Payer: COMMERCIAL

## 2020-11-12 ENCOUNTER — ANESTHESIA EVENT (OUTPATIENT)
Dept: OPERATING ROOM | Age: 75
End: 2020-11-12
Payer: COMMERCIAL

## 2020-11-12 VITALS
SYSTOLIC BLOOD PRESSURE: 128 MMHG | RESPIRATION RATE: 16 BRPM | HEIGHT: 64 IN | BODY MASS INDEX: 22.93 KG/M2 | WEIGHT: 134.31 LBS | OXYGEN SATURATION: 100 % | HEART RATE: 82 BPM | TEMPERATURE: 98 F | DIASTOLIC BLOOD PRESSURE: 57 MMHG

## 2020-11-12 VITALS
RESPIRATION RATE: 1 BRPM | DIASTOLIC BLOOD PRESSURE: 59 MMHG | OXYGEN SATURATION: 95 % | SYSTOLIC BLOOD PRESSURE: 128 MMHG | TEMPERATURE: 96.8 F

## 2020-11-12 PROCEDURE — 3700000001 HC ADD 15 MINUTES (ANESTHESIA): Performed by: ORTHOPAEDIC SURGERY

## 2020-11-12 PROCEDURE — 3700000000 HC ANESTHESIA ATTENDED CARE: Performed by: ORTHOPAEDIC SURGERY

## 2020-11-12 PROCEDURE — 6360000002 HC RX W HCPCS: Performed by: NURSE ANESTHETIST, CERTIFIED REGISTERED

## 2020-11-12 PROCEDURE — 7100000011 HC PHASE II RECOVERY - ADDTL 15 MIN: Performed by: ORTHOPAEDIC SURGERY

## 2020-11-12 PROCEDURE — 2500000003 HC RX 250 WO HCPCS: Performed by: NURSE ANESTHETIST, CERTIFIED REGISTERED

## 2020-11-12 PROCEDURE — 2500000003 HC RX 250 WO HCPCS: Performed by: ORTHOPAEDIC SURGERY

## 2020-11-12 PROCEDURE — 7100000010 HC PHASE II RECOVERY - FIRST 15 MIN: Performed by: ORTHOPAEDIC SURGERY

## 2020-11-12 PROCEDURE — 64718 REVISE ULNAR NERVE AT ELBOW: CPT | Performed by: ORTHOPAEDIC SURGERY

## 2020-11-12 PROCEDURE — 7100000001 HC PACU RECOVERY - ADDTL 15 MIN: Performed by: ORTHOPAEDIC SURGERY

## 2020-11-12 PROCEDURE — 3600000004 HC SURGERY LEVEL 4 BASE: Performed by: ORTHOPAEDIC SURGERY

## 2020-11-12 PROCEDURE — 2580000003 HC RX 258: Performed by: ORTHOPAEDIC SURGERY

## 2020-11-12 PROCEDURE — 2709999900 HC NON-CHARGEABLE SUPPLY: Performed by: ORTHOPAEDIC SURGERY

## 2020-11-12 PROCEDURE — 64721 CARPAL TUNNEL SURGERY: CPT | Performed by: ORTHOPAEDIC SURGERY

## 2020-11-12 PROCEDURE — 7100000000 HC PACU RECOVERY - FIRST 15 MIN: Performed by: ORTHOPAEDIC SURGERY

## 2020-11-12 PROCEDURE — 3600000014 HC SURGERY LEVEL 4 ADDTL 15MIN: Performed by: ORTHOPAEDIC SURGERY

## 2020-11-12 PROCEDURE — 6360000002 HC RX W HCPCS: Performed by: ORTHOPAEDIC SURGERY

## 2020-11-12 RX ORDER — LIDOCAINE HYDROCHLORIDE 10 MG/ML
0.5 INJECTION, SOLUTION EPIDURAL; INFILTRATION; INTRACAUDAL; PERINEURAL ONCE
Status: DISCONTINUED | OUTPATIENT
Start: 2020-11-12 | End: 2020-11-12 | Stop reason: HOSPADM

## 2020-11-12 RX ORDER — MEPERIDINE HYDROCHLORIDE 25 MG/ML
12.5 INJECTION INTRAMUSCULAR; INTRAVENOUS; SUBCUTANEOUS EVERY 5 MIN PRN
Status: DISCONTINUED | OUTPATIENT
Start: 2020-11-12 | End: 2020-11-12 | Stop reason: HOSPADM

## 2020-11-12 RX ORDER — SODIUM CHLORIDE 0.9 % (FLUSH) 0.9 %
10 SYRINGE (ML) INJECTION PRN
Status: DISCONTINUED | OUTPATIENT
Start: 2020-11-12 | End: 2020-11-12 | Stop reason: HOSPADM

## 2020-11-12 RX ORDER — ONDANSETRON 2 MG/ML
4 INJECTION INTRAMUSCULAR; INTRAVENOUS
Status: DISCONTINUED | OUTPATIENT
Start: 2020-11-12 | End: 2020-11-12 | Stop reason: HOSPADM

## 2020-11-12 RX ORDER — LIDOCAINE HYDROCHLORIDE 20 MG/ML
INJECTION, SOLUTION EPIDURAL; INFILTRATION; INTRACAUDAL; PERINEURAL PRN
Status: DISCONTINUED | OUTPATIENT
Start: 2020-11-12 | End: 2020-11-12 | Stop reason: SDUPTHER

## 2020-11-12 RX ORDER — HYDRALAZINE HYDROCHLORIDE 20 MG/ML
5 INJECTION INTRAMUSCULAR; INTRAVENOUS EVERY 10 MIN PRN
Status: DISCONTINUED | OUTPATIENT
Start: 2020-11-12 | End: 2020-11-12 | Stop reason: HOSPADM

## 2020-11-12 RX ORDER — HYDROMORPHONE HCL 110MG/55ML
0.25 PATIENT CONTROLLED ANALGESIA SYRINGE INTRAVENOUS EVERY 5 MIN PRN
Status: DISCONTINUED | OUTPATIENT
Start: 2020-11-12 | End: 2020-11-12 | Stop reason: HOSPADM

## 2020-11-12 RX ORDER — FENTANYL CITRATE 50 UG/ML
INJECTION, SOLUTION INTRAMUSCULAR; INTRAVENOUS PRN
Status: DISCONTINUED | OUTPATIENT
Start: 2020-11-12 | End: 2020-11-12 | Stop reason: SDUPTHER

## 2020-11-12 RX ORDER — LIDOCAINE HYDROCHLORIDE 10 MG/ML
1 INJECTION, SOLUTION EPIDURAL; INFILTRATION; INTRACAUDAL; PERINEURAL
Status: DISCONTINUED | OUTPATIENT
Start: 2020-11-12 | End: 2020-11-12 | Stop reason: HOSPADM

## 2020-11-12 RX ORDER — BUPIVACAINE HYDROCHLORIDE 2.5 MG/ML
INJECTION, SOLUTION EPIDURAL; INFILTRATION; INTRACAUDAL
Status: COMPLETED | OUTPATIENT
Start: 2020-11-12 | End: 2020-11-12

## 2020-11-12 RX ORDER — PROPOFOL 10 MG/ML
INJECTION, EMULSION INTRAVENOUS PRN
Status: DISCONTINUED | OUTPATIENT
Start: 2020-11-12 | End: 2020-11-12 | Stop reason: SDUPTHER

## 2020-11-12 RX ORDER — DEXAMETHASONE SODIUM PHOSPHATE 4 MG/ML
INJECTION, SOLUTION INTRA-ARTICULAR; INTRALESIONAL; INTRAMUSCULAR; INTRAVENOUS; SOFT TISSUE PRN
Status: DISCONTINUED | OUTPATIENT
Start: 2020-11-12 | End: 2020-11-12 | Stop reason: SDUPTHER

## 2020-11-12 RX ORDER — SODIUM CHLORIDE 0.9 % (FLUSH) 0.9 %
10 SYRINGE (ML) INJECTION EVERY 12 HOURS SCHEDULED
Status: DISCONTINUED | OUTPATIENT
Start: 2020-11-12 | End: 2020-11-12 | Stop reason: HOSPADM

## 2020-11-12 RX ORDER — ONDANSETRON 2 MG/ML
INJECTION INTRAMUSCULAR; INTRAVENOUS PRN
Status: DISCONTINUED | OUTPATIENT
Start: 2020-11-12 | End: 2020-11-12 | Stop reason: SDUPTHER

## 2020-11-12 RX ORDER — LABETALOL HYDROCHLORIDE 5 MG/ML
5 INJECTION, SOLUTION INTRAVENOUS EVERY 10 MIN PRN
Status: DISCONTINUED | OUTPATIENT
Start: 2020-11-12 | End: 2020-11-12 | Stop reason: HOSPADM

## 2020-11-12 RX ORDER — SODIUM CHLORIDE, SODIUM LACTATE, POTASSIUM CHLORIDE, CALCIUM CHLORIDE 600; 310; 30; 20 MG/100ML; MG/100ML; MG/100ML; MG/100ML
INJECTION, SOLUTION INTRAVENOUS CONTINUOUS
Status: DISCONTINUED | OUTPATIENT
Start: 2020-11-12 | End: 2020-11-12 | Stop reason: HOSPADM

## 2020-11-12 RX ORDER — SODIUM CHLORIDE 9 MG/ML
INJECTION, SOLUTION INTRAVENOUS CONTINUOUS
Status: DISCONTINUED | OUTPATIENT
Start: 2020-11-12 | End: 2020-11-12 | Stop reason: HOSPADM

## 2020-11-12 RX ORDER — TRAMADOL HYDROCHLORIDE 50 MG/1
50 TABLET ORAL EVERY 6 HOURS PRN
Qty: 12 TABLET | Refills: 0 | Status: SHIPPED | OUTPATIENT
Start: 2020-11-12 | End: 2020-11-15

## 2020-11-12 RX ADMIN — DEXAMETHASONE SODIUM PHOSPHATE 4 MG: 4 INJECTION, SOLUTION INTRAMUSCULAR; INTRAVENOUS at 09:28

## 2020-11-12 RX ADMIN — LIDOCAINE HYDROCHLORIDE 40 MG: 20 INJECTION, SOLUTION EPIDURAL; INFILTRATION; INTRACAUDAL; PERINEURAL at 09:22

## 2020-11-12 RX ADMIN — FENTANYL CITRATE 25 MCG: 50 INJECTION, SOLUTION INTRAMUSCULAR; INTRAVENOUS at 09:47

## 2020-11-12 RX ADMIN — FENTANYL CITRATE 25 MCG: 50 INJECTION, SOLUTION INTRAMUSCULAR; INTRAVENOUS at 09:59

## 2020-11-12 RX ADMIN — ONDANSETRON 4 MG: 2 INJECTION INTRAMUSCULAR; INTRAVENOUS at 09:28

## 2020-11-12 RX ADMIN — PHENYLEPHRINE HYDROCHLORIDE 100 MCG: 10 INJECTION INTRAVENOUS at 09:40

## 2020-11-12 RX ADMIN — FENTANYL CITRATE 25 MCG: 50 INJECTION, SOLUTION INTRAMUSCULAR; INTRAVENOUS at 09:56

## 2020-11-12 RX ADMIN — PHENYLEPHRINE HYDROCHLORIDE 100 MCG: 10 INJECTION INTRAVENOUS at 09:46

## 2020-11-12 RX ADMIN — FENTANYL CITRATE 25 MCG: 50 INJECTION, SOLUTION INTRAMUSCULAR; INTRAVENOUS at 09:51

## 2020-11-12 RX ADMIN — CEFAZOLIN SODIUM 2 G: 10 INJECTION, POWDER, FOR SOLUTION INTRAVENOUS at 08:59

## 2020-11-12 RX ADMIN — PHENYLEPHRINE HYDROCHLORIDE 50 MCG: 10 INJECTION INTRAVENOUS at 09:37

## 2020-11-12 RX ADMIN — SODIUM CHLORIDE: 9 INJECTION, SOLUTION INTRAVENOUS at 08:30

## 2020-11-12 RX ADMIN — PHENYLEPHRINE HYDROCHLORIDE 50 MCG: 10 INJECTION INTRAVENOUS at 09:35

## 2020-11-12 RX ADMIN — PROPOFOL 200 MG: 10 INJECTION, EMULSION INTRAVENOUS at 09:22

## 2020-11-12 ASSESSMENT — PULMONARY FUNCTION TESTS
PIF_VALUE: 2
PIF_VALUE: 2
PIF_VALUE: 1
PIF_VALUE: 2
PIF_VALUE: 5
PIF_VALUE: 2
PIF_VALUE: 2
PIF_VALUE: 1
PIF_VALUE: 1
PIF_VALUE: 2
PIF_VALUE: 1
PIF_VALUE: 2
PIF_VALUE: 1
PIF_VALUE: 2
PIF_VALUE: 7
PIF_VALUE: 2
PIF_VALUE: 1
PIF_VALUE: 1
PIF_VALUE: 2
PIF_VALUE: 1
PIF_VALUE: 2
PIF_VALUE: 1
PIF_VALUE: 2
PIF_VALUE: 2
PIF_VALUE: 1
PIF_VALUE: 2
PIF_VALUE: 7
PIF_VALUE: 2
PIF_VALUE: 0
PIF_VALUE: 28
PIF_VALUE: 2
PIF_VALUE: 0
PIF_VALUE: 2

## 2020-11-12 ASSESSMENT — LIFESTYLE VARIABLES: SMOKING_STATUS: 0

## 2020-11-12 ASSESSMENT — PAIN - FUNCTIONAL ASSESSMENT: PAIN_FUNCTIONAL_ASSESSMENT: 0-10

## 2020-11-12 NOTE — ANESTHESIA PRE PROCEDURE
Department of Anesthesiology  Preprocedure Note       Name:  Theresa See   Age:  66 y.o.  :  1945                                          MRN:  [id number]         Date:  2020      Surgeon: Daisha Mueller):  Tati Sánchez MD    Procedure: Procedure(s):  LEFT CUBITAL RELEASE; LEFT CARPAL TUNNEL RELEASE  POSSIBLE LEFT ULNAR NERVE TRANSPOSITION    Medications prior to admission:   Prior to Admission medications    Medication Sig Start Date End Date Taking? Authorizing Provider   acetaminophen-codeine (TYLENOL/CODEINE #3) 300-30 MG per tablet Take 1 tablet by mouth every 8 hours as needed for Pain for up to 30 days. Intended supply: 3 days.  Take lowest dose possible to manage pain 10/30/20 11/29/20  Yoan Montalvo MD   amLODIPine (NORVASC) 10 MG tablet TAKE ONE TABLET BY MOUTH DAILY 20   Yoan Montalvo MD   atorvastatin (LIPITOR) 40 MG tablet Take 1 tablet by mouth daily 20   Yoan Montalvo MD   busPIRone (BUSPAR) 15 MG tablet Take 15 mg by mouth 3 times daily as needed (anxiety) 20   Yoan Montalvo MD   omeprazole (PRILOSEC) 40 MG delayed release capsule TAKE ONE CAPSULE BY MOUTH DAILY 20   Yoan Montalvo MD   donepezil (ARICEPT) 10 MG tablet TAKE TWO TABLETS BY MOUTH EVERY NIGHT AT BEDTIME  Patient taking differently: TAKE TWO TABLETS BY MOUTH EVERY NIGHT AT BEDTIME prn 20   Yoan Montalvo MD   DULoxetine (CYMBALTA) 60 MG extended release capsule TAKE ONE CAPSULE BY MOUTH DAILY 20   Yoan Montalvo MD   mirtazapine (REMERON) 15 MG tablet Take 1 tablet by mouth nightly 20   Yoan Montalvo MD   diclofenac sodium (VOLTAREN) 1 % GEL Apply 2 g topically 3 times daily 20   Yoan Montalvo MD   Melatonin 5 MG CAPS 1-2 qhs for sleep 20   Yoan Montalvo MD   aspirin 81 MG EC tablet Take 1 tablet by mouth daily 20   Holden Powell MD   Multiple Vitamins-Minerals (CENTRUM SILVER PO) Take 1 tablet by mouth daily Historical Provider, MD       Current medications:    Current Facility-Administered Medications   Medication Dose Route Frequency Provider Last Rate Last Dose    lidocaine PF 1 % injection 0.5 mL  0.5 mL Subcutaneous Once Jyotsna Daniel MD        0.9 % sodium chloride infusion   Intravenous Continuous Jyotsna Daniel MD        ceFAZolin (ANCEF) 2 g in dextrose 5 % 100 mL IVPB  2 g Intravenous On Call to [facility], MD        HYDROmorphone (DILAUDID) injection 0.25 mg  0.25 mg Intravenous Q5 Min PRN Gary Morales MD        ondansetron [facility]) injection 4 mg  4 mg Intravenous Once PRN Gary Morales MD        labetalol (NORMODYNE;TRANDATE) injection 5 mg  5 mg Intravenous Q10 Min PRN Gary Morales MD        hydrALAZINE (APRESOLINE) injection 5 mg  5 mg Intravenous Q10 Min PRN Gary Morales MD        meperidine (DEMEROL) injection 12.5 mg  12.5 mg Intravenous Q5 Min PRN Gary Morales MD           Allergies: Allergies   Allergen Reactions    Hydrocodone      The patient has a history of a bleeding Ulcer, and this upset her stomach severely.        Problem List:    Patient Active Problem List   Diagnosis Code    Essential hypertension I10    Generalized osteoarthrosis, involving multiple sites M15.9    Type 2 diabetes mellitus without complication ([facility] X24.6    Degenerative lumbar spinal stenosis M48.061    Congenital clotting factor deficiency ([facility]) D68.2    Hypercholesterolemia E78.00    GERD (gastroesophageal reflux disease) K21.9    Late onset Alzheimer's disease without behavioral disturbance ([facility]) G30.1, F02.80    Drug dependence ([facility] F19.20    Chronic low back pain without sciatica M54.5, G89.29    Spondylolisthesis, lumbar region M43.16    Chronic pain syndrome G89.4    Hyperparathyroidism ([facility]) E21.3    Pain disorder with psychological factors F45.41    Current moderate episode of major depressive disorder without prior episode ([facility] F32.1    TIA (transient ischemic attack) G45.9    Cubital tunnel syndrome on left G56.22    Left carpal tunnel syndrome G56.02    Right carpal tunnel syndrome G56.01       Past Medical History:        Diagnosis Date    Acute gastritis without mention of hemorrhage     Acute sinusitis, unspecified     Acute upper respiratory infections of unspecified site     Allergic rhinitis, cause unspecified     Anemia, unspecified     Edema     Hyperlipidemia     Hypertension     Loss of weight     Lumbago     Lumbosacral root lesions, not elsewhere classified     Need for prophylactic vaccination and inoculation against influenza     Neuropathy     Other bursitis disorders     Other seborrheic keratosis     Symptomatic menopausal or female climacteric states     Type II or unspecified type diabetes mellitus without mention of complication, not stated as uncontrolled     Pt is not aware of having diabetes, does know her A1C runs high but blood sugar is generally normal       Past Surgical History:        Procedure Laterality Date    BREAST SURGERY      HYSTERECTOMY      OTHER SURGICAL HISTORY  1977    breast mass local excision negative    OTHER SURGICAL HISTORY  1986    nasal polyps    PARVEZ AND BSO  1976       Social History:    Social History     Tobacco Use    Smoking status: Never Smoker    Smokeless tobacco: Never Used   Substance Use Topics    Alcohol use: No     Alcohol/week: 0.0 standard drinks                                Counseling given: Not Answered      Vital Signs (Current):   Vitals:    11/11/20 0853 11/12/20 0807   BP:  (!) 147/70   Pulse:  75   Resp:  16   Temp:  98 °F (36.7 °C)   TempSrc:  Temporal   SpO2:  98%   Weight: 140 lb (63.5 kg) 134 lb 5 oz (60.9 kg)   Height: 5' 4\" (1.626 m) 5' 4\" (1.626 m)                                              BP Readings from Last 3 Encounters:   11/12/20 (!) 147/70   11/06/20 120/64   09/01/20 120/80       NPO Status: BMI:   Wt Readings from Last 3 Encounters:   11/12/20 134 lb 5 oz (60.9 kg)   11/06/20 133 lb 2 oz (60.4 kg)   10/26/20 140 lb (63.5 kg)     Body mass index is 23.05 kg/m². CBC:   Lab Results   Component Value Date    WBC 12.5 05/23/2020    RBC 4.27 05/23/2020    HGB 13.5 05/23/2020    HCT 40.1 05/23/2020    MCV 93.9 05/23/2020    RDW 13.7 05/23/2020     05/23/2020       CMP:   Lab Results   Component Value Date     05/22/2020    K 4.5 05/22/2020    CL 97 05/22/2020    CO2 24 05/22/2020    BUN 9 05/22/2020    CREATININE 0.6 05/22/2020    GFRAA >60 05/22/2020    GFRAA >60 11/17/2012    AGRATIO 1.4 05/22/2020    LABGLOM >60 05/22/2020    GLUCOSE 161 05/22/2020    PROT 7.7 05/22/2020    PROT 7.9 11/17/2012    CALCIUM 10.1 05/22/2020    BILITOT <0.2 05/22/2020    ALKPHOS 136 05/22/2020    AST 25 05/22/2020    ALT 20 05/22/2020       POC Tests: No results for input(s): POCGLU, POCNA, POCK, POCCL, POCBUN, POCHEMO, POCHCT in the last 72 hours.     Coags:   Lab Results   Component Value Date    PROTIME 11.8 05/22/2020    INR 1.02 05/22/2020    APTT 37.0 05/22/2020       HCG (If Applicable): No results found for: PREGTESTUR, PREGSERUM, HCG, HCGQUANT     ABGs: No results found for: PHART, PO2ART, SMF4LLA, GAD3LOG, BEART, V3RARQEQ     Type & Screen (If Applicable):  Lab Results   Component Value Date    LABABO O 01/24/2012    [address] Positive 01/24/2012       Drug/Infectious Status (If Applicable):  No results found for: HIV, HEPCAB    COVID-19 Screening (If Applicable):   Lab Results   Component Value Date    COVID19 Not Detected 11/06/2020         Anesthesia Evaluation  Patient summary reviewed and Nursing notes reviewed no history of anesthetic complications:   Airway: Mallampati: III  TM distance: >3 FB   Neck ROM: full  Mouth opening: > = 3 FB Dental: normal exam   (+) implants and caps      Pulmonary:normal exam  breath sounds clear to auscultation      (-) COPD, asthma, sleep apnea and not a current smoker                           Cardiovascular:    (+) hypertension (no current med use):, hyperlipidemia    (-) past MI, CAD, CABG/stent, dysrhythmias,  angina and  CHF    ECG reviewed  Rhythm: regular  Rate: normal                    Neuro/Psych:   (+) neuromuscular disease (lumbar DDD, chronic pain syndrome):, TIA (5/20), psychiatric history (alzheimers):depression/anxiety    (-) seizures           GI/Hepatic/Renal:   (+) GERD: well controlled,      (-) liver disease and no renal disease       Endo/Other:    (+) Diabetes (no current med use, a1c 5.1)Type II DM, , .    (-) hypothyroidism, hyperthyroidism               Abdominal:           Vascular:   + PVD, aortic or cerebral (Approximate 40% short-segment narrowing involving the right proximal ICA 5/20), . Anesthesia Plan      general     ASA 3       Induction: intravenous. MIPS: Postoperative opioids intended and Prophylactic antiemetics administered. Anesthetic plan and risks discussed with patient. Plan discussed with CRNA.                   Marybel Fernandez MD   11/12/2020

## 2020-11-12 NOTE — BRIEF OP NOTE
Brief Postoperative Note      Patient: Astrid Garcia  YOB: 1945  MRN: [id number]    Date of Procedure: 11/12/2020    Pre-Op Diagnosis: G56.20 LEFT CUBITAL TUNNEL SYNDROME; G56.22 LEFT ULNAR NEUROPATHY  G56.02 LEFT CARPAL TUNNEL SYNDROME    Post-Op Diagnosis: Same       Procedure(s):  LEFT CUBITAL RELEASE; LEFT CARPAL TUNNEL RELEASE  LEFT ULNAR NERVE SUBCUTANEOUS TRANSPOSITION    Surgeon(s):  Yaneth Mg MD    Assistant:  First Assistant: Chaya Chamberlain    Anesthesia: General    Estimated Blood Loss (mL): less than 50     Complications: None    Specimens:   * No specimens in log *    Implants:  * No implants in log *      Drains:   Urethral Catheter Double-lumen (Active)       Findings: subluxing ulnar nerve with inflammatory signs, tight flexor retinaculum at the carpal tunnel.     Electronically signed by Cali Kimbrough MD on 11/12/2020 at 10:07 AM

## 2020-11-12 NOTE — PROGRESS NOTES
Teaching/ education completed for home care including pain management,activity,safety precautions wound care and infection control. Patient verbalized understanding.

## 2020-11-12 NOTE — PROGRESS NOTES
Patient up to bathroom, tolerated well. Sling placed. Prescription with patient. All personal belongings with patient.    taking patient home in stable condition    Electronically signed by Mumtaz Cabrera RN on 11/12/2020 at 2856

## 2020-11-12 NOTE — PROGRESS NOTES
Received from or - drowsy,simple mask left arm dressing/splint dry and intact,circulation good,elevated,ice placed,vss.

## 2020-11-12 NOTE — H&P
Patient seen and examined. I have reviewed the history and physical and examined the patient and find no relevant changes. Surgery plan reviewed. The patient was counseled at length about the risks of bang Covid-19 during their perioperative period and any recovery window from their procedure. The patient was made aware that bang Covid-19  may worsen their prognosis for recovering from their procedure  and lend to a higher morbidity and/or mortality risk. All material risks, benefits, and reasonable alternatives including postponing the procedure were discussed. The patient does wish to proceed with the procedure at this time. Site marked and consent verified. All questions answered and antibiotic verified. Ready for left carpal tunnel and cubital tunnel release. Dayna Corona, [address] [place] [address] and Sports Medicine  11/12/20  9:04 AM

## 2020-11-15 NOTE — OP NOTE
Operative Note      Patient: Pretty Jolley  YOB: 1945  MRN: [id number]    Date of Procedure: 11/12/2020    Pre-Op Diagnosis: G56.20 LEFT CUBITAL TUNNEL SYNDROME; G56.22 LEFT ULNAR NEUROPATHY  G56.02 LEFT CARPAL TUNNEL SYNDROME    Post-Op Diagnosis: Same       Procedure(s):  LEFT CUBITAL RELEASE; LEFT CARPAL TUNNEL RELEASE  LEFT ULNAR NERVE TRANSPOSITION    Surgeon(s):  Brianna Walters MD    Assistant:   First Assistant: Foster Damon    Anesthesia: General    Estimated Blood Loss (mL): less than 50     Complications: None    Specimens:   * No specimens in log *    Implants:  * No implants in log *      Drains:   Urethral Catheter Double-lumen (Active)       Findings: unstable ulnar nerve at the cubital tunnel, released and subcutaneous anterior transposition completed. Tight flexor retinaculum at the carpal tunnel released. Operative Report: Indications: Ms. Pretty Jolley is a 66y.o.  year old female with left carpal tunnel syndrome & left cubital tunnel syndrome. I have discussed preoperatively with her  the complications, limitations, expectations, alternatives and risk of the planned surgical care which she understood & all of her questions were answered. Ms. Pretty Jolley has provided written informed consent to proceed. After written consent was obtained and the proper operative sites were identified and marked, Ms. Pretty Jolley was brought to the operating room and placed in the supine position on the operating room table with the left arm extended upon a hand table. General anesthesia was induced & the left upper extremity was prepped and draped in the usual sterile fashion. Description:   After Esmarch exsanguination, the pneuomo-tourniquet was inflated to 250 millimeters of Mercury about the arm. A curvilinear incision was fashioned over the posterior medial aspect of the elbow centered between the medial epicondyle and the tip of the olecranon. Dissection was carried carefully through the subcutaneous tissue identifying and protecting the superficial neurovascular structures. The cubital tunnel was identified and cleared of overlying soft tissue. The ulnar nerve was noted to be subluxing over the medial epicondyle prior to release. Careful incision allowed exposure of the ulnar nerve. The nerve was traced proximally and circumferential control of the nerve was obtained. It was dissected proximally to the level of the connection between the biceps and triceps muscles, approximately 3 cm proximal to the medial epicondyle. It was carefully mobilized from the medial intermuscular septum. It was traced distally, being completely freed along the course of the cubital tunnel, and was dissected distally to the level of the second motor branch as it split the heads of the FCU muscle. There was a tight fibrous band at the confluence of the heads of the FCU which was carefully divided. Digital palpation revealed that there were no further proximal or distal constriction about the nerve. The Ulnar Nerve was found to be unstable in it's groove with tendency toward subluxation or snapping. It was then transposed anterior the medial epicondyle in the subcutaneous plane. Care was taken to ensure no nerve impingement developed proximally or distal in the transposed position. The nerve was secured loosely with 3-0 vicryl to approximate the subcutaneous layer to the medial epicondyle fascia. Attention was turned to the carpal tunnel   A 2 cm longitudinal incision was fashioned at the base of the palm, paralleling the longitudinal thenar crease. Dissection was carried carefully through the subcutaneous tissue identifying and protecting the neurovascular structures. The palmar fascia was incised longitudinally, exposing the transverse carpal ligament. The transverse carpal ligament was incised from its proximal to distal most extent, under direct visualization.  The terminal 2 cm of antebrachial fascia was similarly incised under direct visualization. The contents of the carpal tunnel were inspected and found to be free of mass, lesion or other abnormality. Digital palpation revealed no further constriction about the median nerve. The wound was irrigated copiously with sterile saline for irrigation and the pneumotourniquet was deflated. The fingers were immediately pink & well perfused. Hemostasis was easily obtained with direct pressure and electrocautery and the wound was closed with interrupted 4-0 Nylon sutures at the carpal tunnel. The cubital tunnel incision was irrigated copiously with sterile saline. Hemostasis was easily obtained with direct pressure and electrocautery. The incision was closed in layers with interrupted absorbable sutures deep and running monocryl superficial.  Steri-strips were applied. The wound was dressed with dry sterile dressings after local anesthetic was instilled for postoperative analgesia. At the carpal tunnel incision, The wound was dressed with Xeroform, dry sterile dressings and a bulky soft hand & wrist dressing was applied. A short arm splint was applied. Ms. Lorin Schmidt was awakened from anesthesia having tolerated the procedure without apparent complication. She was returned to the recovery room in stable condition. At the conclusion of the procedure, all needle, instrument and sponge counts were correct. Electronically signed by:  Beatrice Corona, [address] [place] [address] and Sports Medicine

## 2020-11-16 ENCOUNTER — TELEPHONE (OUTPATIENT)
Dept: ORTHOPEDIC SURGERY | Age: 75
End: 2020-11-16

## 2020-11-20 ENCOUNTER — OFFICE VISIT (OUTPATIENT)
Dept: ORTHOPEDIC SURGERY | Age: 75
End: 2020-11-20

## 2020-11-20 VITALS — TEMPERATURE: 97.9 F | WEIGHT: 135 LBS | BODY MASS INDEX: 23.05 KG/M2 | HEIGHT: 64 IN

## 2020-11-20 PROCEDURE — 99024 POSTOP FOLLOW-UP VISIT: CPT | Performed by: PHYSICIAN ASSISTANT

## 2020-11-20 NOTE — PROGRESS NOTES
Patient Name: Jeannie Gamboa  Medical Record Number: [id number]  YOB: 1945  Date of Encounter: 11/20/2020     Chief Complaint   Patient presents with    Post-Op Check     LEFT CUBITAL RELEASE; LEFT CARPAL TUNNEL RELEASE; LEFT ULNAR NERVE TRANSPOSITION; DOS 11/12/20. History of Present Illness:   Ms. Jeannie Gamboa is here in 1 week follow up regarding her left cubital and carpal tunnel release as well as ulnar nerve transposition. Patient feels she is doing great. She reports very minimal pain. She is still having some numbness and tingling in the left hand but understands that is normal.  She states surgical incisions appear to be healing well. The patient's past medical history, medications, allergies, family history, social history, and review of systems have been reviewed, and dated and are recorded in the chart under the 'MEDIA\" tab. Physical Exam:    Ms. Jeannie Gamboa appears well, she is in no apparent distress, she demonstrates appropriate mood & affect. She is alert and oriented to person, place and time. Temp 97.9 °F (36.6 °C) (Infrared)   Ht 5' 4\" (1.626 m)   Wt 135 lb (61.2 kg)   BMI 23.17 kg/m²     On examination of patient's left elbow and wrist there is mild postsurgical swelling and ecchymosis which is improving as expected. Surgical incisions are healing well without signs of infection. Sutures are still in place over the carpal tunnel site. Patient has great range of motion of the left elbow. She is lacking some extension of her fingers but states that is normal.  Capillary refill <2 seconds. Impression:   Diagnosis Orders   1. 11/12/20 LEFT carpal tunnel release     2. 11/12/20 LEFT cubital tunnel release     3. 11/12/20 LEFT ulnar nerve transposition at elbow         Treatment Plan:    Patient is 8 days postop and doing great. She reports minimal pain. Surgical incisions are healing well. She is given wound care instructions.   She is to continue avoiding any heavy lifting, pushing or pulling. She will start working on elbow, wrist and hand range of motion. I do not feel she warrants occupational therapy at this point. She will follow back in 2 weeks for wound check. We will reconsider the need for occupational therapy at that time. She is advised to follow back before then with any concerns. Naty Anne was informed of the results of any imaging. We discussed treatment options and a time was given to answer questions. A plan was proposed and Naty Anne understand and accepts this course of care. Electronically signed by Javi Renee PA-C on 31/95/9545  Board Certified [facility]    Please note that portions of this note were completed with a voice recognition program.  Efforts were made to edit the dictations but occasionally words are mis-transcribed.

## 2020-11-24 RX ORDER — BUSPIRONE HYDROCHLORIDE 15 MG/1
TABLET ORAL
Qty: 90 TABLET | Refills: 0 | Status: SHIPPED | OUTPATIENT
Start: 2020-11-24 | End: 2020-12-24

## 2020-11-24 NOTE — TELEPHONE ENCOUNTER
Medication:   Requested Prescriptions     Pending Prescriptions Disp Refills    busPIRone (BUSPAR) 15 MG tablet [Pharmacy Med Name: BUSPIRONE HCL 15 MG TABLET] 90 tablet 0     Sig: TAKE ONE TABLET BY MOUTH THREE TIMES A DAY AS NEEDED FOR ANXIETY          Patient Phone Number: [phone] (home)     Last appt: 11/6/2020   Next appt: 12/1/2020    Last OARRS:   RX Monitoring 12/3/2019   Attestation -   Periodic Controlled Substance Monitoring Possible medication side effects, risk of tolerance/dependence & alternative treatments discussed. ;No signs of potential drug abuse or diversion identified. ;Assessed functional status. Chronic Pain > 50 MEDD Obtained or confirmed \"Consent for Opioid Use\" on file.      PDMP Monitoring:    Last PDMP Amy Rosas as Reviewed [facility]):  Review User Review Instant Review Result   Ifeoma Boston 9/1/2020  1:09 PM Reviewed PDMP [1]     Preferred Pharmacy:   [facility], [address] [facility] [phone] [facility] [phone]  [address] [facility] [place] [address]  Phone: [phone] Fax: [phone]

## 2020-11-25 RX ORDER — DONEPEZIL HYDROCHLORIDE 10 MG/1
TABLET, FILM COATED ORAL
Qty: 180 TABLET | Refills: 0 | Status: SHIPPED | OUTPATIENT
Start: 2020-11-25 | End: 2021-01-26

## 2020-11-30 RX ORDER — ATORVASTATIN CALCIUM 40 MG/1
TABLET, FILM COATED ORAL
Qty: 90 TABLET | Refills: 0 | OUTPATIENT
Start: 2020-11-30

## 2020-12-01 RX ORDER — ACETAMINOPHEN AND CODEINE PHOSPHATE 300; 30 MG/1; MG/1
TABLET ORAL
Qty: 90 TABLET | Refills: 0 | OUTPATIENT
Start: 2020-12-01

## 2020-12-01 RX ORDER — ATORVASTATIN CALCIUM 40 MG/1
TABLET, FILM COATED ORAL
Qty: 90 TABLET | Refills: 0 | OUTPATIENT
Start: 2020-12-01

## 2020-12-04 ENCOUNTER — OFFICE VISIT (OUTPATIENT)
Dept: ORTHOPEDIC SURGERY | Age: 75
End: 2020-12-04

## 2020-12-04 VITALS — WEIGHT: 136 LBS | HEIGHT: 64 IN | BODY MASS INDEX: 23.22 KG/M2 | TEMPERATURE: 97.5 F

## 2020-12-04 DIAGNOSIS — G56.22 CUBITAL TUNNEL SYNDROME ON LEFT: ICD-10-CM

## 2020-12-04 DIAGNOSIS — Z98.890 S/P DECOMPRESSION OF ULNAR NERVE AT ELBOW: ICD-10-CM

## 2020-12-04 DIAGNOSIS — G56.02 LEFT CARPAL TUNNEL SYNDROME: Primary | ICD-10-CM

## 2020-12-04 PROCEDURE — 99024 POSTOP FOLLOW-UP VISIT: CPT | Performed by: ORTHOPAEDIC SURGERY

## 2020-12-04 NOTE — PROGRESS NOTES
Kayla De Oliveira  [id number]  Encounter Date: 12/4/2020  YOB: 1945    Chief Complaint   Patient presents with    Post-Op Check     2 wk po, left cubital and carpal dos - 11/12/2020, feels it is doing well       History:Ms. Kayla De Oliveira is here in follow up regarding her left ulnar nerve transposition and left carpal tunnel release  Pain is at most 2/10. She is currently 3 weeks post op. She denies any issues with drainage from her wounds, fevers or chills post-op. Exam:  Temp 97.5 °F (36.4 °C)   Ht 5' 4\" (1.626 m)   Wt 136 lb (61.7 kg)   BMI 23.34 kg/m²   General: Alert and oriented x3, No acute distress, Cooperative and conversant. Obesity Absent   Mood and affect are appropriate. Left arm : Both incision at the elbow and palm are well-healed. No erythema. She can almost fully extend her elbow and tolerates elbow flexion beyond 115 degrees. No pain with pronation or supination of the forearm. Left hand  strength 4/5. Thumb extension 5/5. Sensation to light touch grossly present throughout the left upper extremity  Capillary refill < 2 seconds and palpable distal pulses  Skin: no erythema, lesions or rashes    Assessment:   Diagnosis Orders   1. 11/12/20 LEFT carpal tunnel release     2. 11/12/20 LEFT cubital tunnel release     3. 11/12/20 LEFT ulnar nerve transposition at elbow         Orders  No orders of the defined types were placed in this encounter. Plan:  We discussed treatment options today. She will continue to avoid lifting objects more than approximately 5 pounds for the next couple weeks. She can begin and should continue working on active and passive range of motion of her wrist, fingers and elbow. She will follow up in 4 weeks to check her progress. She understands and accepts this course of care.     By signing my name below, Radha Ariza, attest that this documentation has been prepared under the direction and in the presence of [employer] Wendy Cheung MD.   Electronically Signed: Flor Hoover, 12/4/20, 12:19 PM EST. Hever Ford MD, personally performed the services described in this documentation. All medical record entries made by the scribe were at my direction and in my presence. I have reviewed the chart and discharge instructions (if applicable) and agree that the record reflects my personal performance and is accurate and complete. Laura Yoder MD, 1/1/21, 6:38 PM EST. Documentation was done using voice recognition dragon software. Every effort was made to ensure accuracy; however, inadvertent  Unintentional computerized transcription errors may be present.

## 2020-12-08 ENCOUNTER — OFFICE VISIT (OUTPATIENT)
Dept: FAMILY MEDICINE CLINIC | Age: 75
End: 2020-12-08
Payer: COMMERCIAL

## 2020-12-08 ENCOUNTER — TELEPHONE (OUTPATIENT)
Dept: FAMILY MEDICINE CLINIC | Age: 75
End: 2020-12-08

## 2020-12-08 VITALS
TEMPERATURE: 98.6 F | DIASTOLIC BLOOD PRESSURE: 70 MMHG | SYSTOLIC BLOOD PRESSURE: 122 MMHG | RESPIRATION RATE: 12 BRPM | OXYGEN SATURATION: 94 % | HEART RATE: 89 BPM | WEIGHT: 133 LBS | BODY MASS INDEX: 22.83 KG/M2

## 2020-12-08 PROCEDURE — 99214 OFFICE O/P EST MOD 30 MIN: CPT | Performed by: FAMILY MEDICINE

## 2020-12-08 RX ORDER — ACETAMINOPHEN AND CODEINE PHOSPHATE 300; 30 MG/1; MG/1
1 TABLET ORAL EVERY 8 HOURS PRN
Qty: 90 TABLET | Refills: 0 | Status: SHIPPED | OUTPATIENT
Start: 2020-12-08 | End: 2021-01-06

## 2020-12-08 RX ORDER — MIRTAZAPINE 15 MG/1
15 TABLET, FILM COATED ORAL NIGHTLY
Qty: 30 TABLET | Refills: 5 | Status: SHIPPED | OUTPATIENT
Start: 2020-12-08 | End: 2021-06-22

## 2020-12-08 RX ORDER — DULOXETIN HYDROCHLORIDE 60 MG/1
CAPSULE, DELAYED RELEASE ORAL
Qty: 90 CAPSULE | Refills: 1 | Status: SHIPPED | OUTPATIENT
Start: 2020-12-08 | End: 2021-06-04

## 2020-12-08 RX ORDER — ATORVASTATIN CALCIUM 40 MG/1
40 TABLET, FILM COATED ORAL DAILY
Qty: 90 TABLET | Refills: 1 | Status: SHIPPED | OUTPATIENT
Start: 2020-12-08 | End: 2021-06-04

## 2020-12-08 NOTE — TELEPHONE ENCOUNTER
Patient was seen today by Dr Chery Peña, she states he was suppose to receive Rx for Hydrocodone but the pharmacy never received it.  She would like it called in    [address], [address] [facility] [phone] Bernice Habermann [phone]      Please advise

## 2020-12-08 NOTE — PROGRESS NOTES
(Office Visit) with Cata Schuster MD   Medication Sig Dispense Refill    donepezil (ARICEPT) 10 MG tablet TAKE TWO TABLETS BY MOUTH EVERY NIGHT AT BEDTIME 180 tablet 0    busPIRone (BUSPAR) 15 MG tablet TAKE ONE TABLET BY MOUTH THREE TIMES A DAY AS NEEDED FOR ANXIETY 90 tablet 0    amLODIPine (NORVASC) 10 MG tablet TAKE ONE TABLET BY MOUTH DAILY 90 tablet 1    atorvastatin (LIPITOR) 40 MG tablet Take 1 tablet by mouth daily 90 tablet 0    omeprazole (PRILOSEC) 40 MG delayed release capsule TAKE ONE CAPSULE BY MOUTH DAILY 90 capsule 1    DULoxetine (CYMBALTA) 60 MG extended release capsule TAKE ONE CAPSULE BY MOUTH DAILY 90 capsule 1    mirtazapine (REMERON) 15 MG tablet Take 1 tablet by mouth nightly 30 tablet 5    diclofenac sodium (VOLTAREN) 1 % GEL Apply 2 g topically 3 times daily 2 Tube 1    Melatonin 5 MG CAPS 1-2 qhs for sleep 60 capsule 3    aspirin 81 MG EC tablet Take 1 tablet by mouth daily 30 tablet 3    Multiple Vitamins-Minerals (CENTRUM SILVER PO) Take 1 tablet by mouth daily          Allergies   Allergen Reactions    Hydrocodone      The patient has a history of a bleeding Ulcer, and this upset her stomach severely. Social History     Tobacco Use    Smoking status: Never Smoker    Smokeless tobacco: Never Used   Substance Use Topics    Alcohol use: No     Alcohol/week: 0.0 standard drinks       /70 (Site: Right Upper Arm, Position: Sitting, Cuff Size: Medium Adult)   Pulse 89   Temp 98.6 °F (37 °C) (Tympanic)   Resp 12   Wt 133 lb (60.3 kg)   SpO2 94%   BMI 22.83 kg/m²     Objective:   Physical Exam  Vitals signs and nursing note reviewed. Constitutional:       General: She is not in acute distress. Appearance: Normal appearance. She is well-developed and well-groomed. Neck:      Vascular: No carotid bruit. Cardiovascular:      Rate and Rhythm: Normal rate and regular rhythm.       Pulses:           Dorsalis pedis pulses are 2+ on the right side and 2+ on the left side. Posterior tibial pulses are 2+ on the right side and 2+ on the left side. Heart sounds: Normal heart sounds. No murmur. No friction rub. No gallop. Pulmonary:      Effort: Pulmonary effort is normal.      Breath sounds: Normal breath sounds. Musculoskeletal:      Lumbar back: She exhibits decreased range of motion and tenderness (Sacroiliac joint bilaterally and sacrum). She exhibits no swelling, no edema, no deformity and no spasm. Right upper leg: Normal. She exhibits no tenderness, no swelling and no deformity. Left upper leg: Normal. She exhibits no tenderness, no swelling and no deformity. Right lower leg: No edema. Left lower leg: No edema. Skin:     General: Skin is warm and dry. Comments: Postop surgical scar of the left elbow and the left wrist is healing well   Neurological:      Mental Status: She is alert and oriented to person, place, and time. Sensory: Sensation is intact. Motor: Motor function is intact. Deep Tendon Reflexes:      Reflex Scores:       Patellar reflexes are 2+ on the right side and 2+ on the left side. Achilles reflexes are 2+ on the right side and 2+ on the left side. Psychiatric:         Attention and Perception: Attention and perception normal.         Mood and Affect: Mood and affect normal.         Speech: Speech normal.         Behavior: Behavior normal. Behavior is cooperative. Cognition and Memory: Cognition normal. Memory is impaired. Judgment: Judgment normal.         Assessment:      [facility] was seen today for follow-up, hypertension and hyperlipidemia. Diagnoses and all orders for this visit:    Type 2 diabetes mellitus without complication, without long-term current use of insulin (HCC)  -     Hemoglobin A1C; Future  -     Comprehensive Metabolic Panel - [facility];  Future    Congenital clotting factor deficiency (HCC)    Chronic low back pain without sciatica, unspecified back pain laterality  -     acetaminophen-codeine (TYLENOL/CODEINE #3) 300-30 MG per tablet; Take 1 tablet by mouth every 8 hours as needed for Pain for up to 30 days. Take lowest dose possible to manage pain    Current moderate episode of major depressive disorder without prior episode ([address]    Drug dependence ([address]    Other orders  -     atorvastatin (LIPITOR) 40 MG tablet; Take 1 tablet by mouth daily  -     DULoxetine (CYMBALTA) 60 MG extended release capsule; TAKE ONE CAPSULE BY MOUTH DAILY  -     mirtazapine (REMERON) 15 MG tablet; Take 1 tablet by mouth nightly    OARRS report checked          Plan:      Laboratory profile ordered again in regards to diabetic management    Maintain current medications    Continue to limit the pain medication and would like to start weaning this off since her arm pain is significantly improved    Discussed importance of diet and exercise    Follow-up with neurosurgery in regards to left arm and left wrist    RTC 3 months    Please note that this chart was generated using Dragon dictation software. Although every effort was made to ensure the accuracy of this automated transcription, some errors in transcription may have occurred.

## 2020-12-18 ENCOUNTER — TELEPHONE (OUTPATIENT)
Dept: FAMILY MEDICINE CLINIC | Age: 75
End: 2020-12-18

## 2020-12-24 RX ORDER — BUSPIRONE HYDROCHLORIDE 15 MG/1
TABLET ORAL
Qty: 90 TABLET | Refills: 0 | Status: SHIPPED | OUTPATIENT
Start: 2020-12-24 | End: 2021-01-25

## 2020-12-24 NOTE — TELEPHONE ENCOUNTER
Medication:   Requested Prescriptions     Pending Prescriptions Disp Refills    busPIRone (BUSPAR) 15 MG tablet [Pharmacy Med Name: BUSPIRONE HCL 15 MG TABLET] 90 tablet 0     Sig: TAKE ONE TABLET BY MOUTH THREE TIMES A DAY AS NEEDED FOR ANXIETY          Patient Phone Number: [phone] (home)     Last appt: 12/8/2020   Next appt: Visit date not found    Last OARRS:   RX Monitoring 12/3/2019   Attestation -   Periodic Controlled Substance Monitoring Possible medication side effects, risk of tolerance/dependence & alternative treatments discussed. ;No signs of potential drug abuse or diversion identified. ;Assessed functional status. Chronic Pain > 50 MEDD Obtained or confirmed \"Consent for Opioid Use\" on file.      PDMP Monitoring:    Last PDMP Ravi Klein as Reviewed [facility]):  Review User Review Instant Review Result   Quita Small 9/1/2020  1:09 PM Reviewed PDMP [1]     [facility] Pharmacy:   [facility], [address] [facility] [phone] Shane Lemon [phone] [address] [facility] [address]  Phone: [phone] Fax: [phone]

## 2020-12-28 ENCOUNTER — TELEPHONE (OUTPATIENT)
Dept: FAMILY MEDICINE CLINIC | Age: 75
End: 2020-12-28

## 2020-12-28 RX ORDER — OMEPRAZOLE 40 MG/1
CAPSULE, DELAYED RELEASE ORAL
Qty: 90 CAPSULE | Refills: 1 | Status: SHIPPED | OUTPATIENT
Start: 2020-12-28 | End: 2021-02-05 | Stop reason: SDUPTHER

## 2020-12-28 NOTE — TELEPHONE ENCOUNTER
Timmy Daley [address], [address] [phone] Markell Valenciamauricio [phone]   [address] [facility] [address]     omeprazole (PRILOSEC) 40 MG delayed release capsule [[id number]]     Order Details  Dose, Route, Frequency: As Directed     Patient states that you said she can take medication twice a day and pharmacy will not refill it because its too soon    Patient is out of her medication

## 2021-01-04 ENCOUNTER — TELEPHONE (OUTPATIENT)
Dept: FAMILY MEDICINE CLINIC | Age: 76
End: 2021-01-04

## 2021-01-04 NOTE — LETTER
[address] [facility] [address]  Phone: [phone]  Fax: [phone]    Areli Browne MD        January 4, 2021     Patient: Aziza Herrmann   YOB: 1945   Date of Visit: 1/4/2021       To Whom It May Concern: It is my medical opinion that Simón Mayorga requires a disability parking placard for the following reasons:  She cannot walk without assistance from another person or the use of an assistance device (cane, crutch, prosthetic device, wheelchair, etc.). She cannot walk 200 feet without stopping to rest.  Duration of need: permanent    If you have any questions or concerns, please don't hesitate to call.     Sincerely,        Areli Browne MD

## 2021-01-06 DIAGNOSIS — M54.50 CHRONIC LOW BACK PAIN WITHOUT SCIATICA, UNSPECIFIED BACK PAIN LATERALITY: ICD-10-CM

## 2021-01-06 DIAGNOSIS — G89.29 CHRONIC LOW BACK PAIN WITHOUT SCIATICA, UNSPECIFIED BACK PAIN LATERALITY: ICD-10-CM

## 2021-01-06 NOTE — TELEPHONE ENCOUNTER
Medication:   Requested Prescriptions     Pending Prescriptions Disp Refills    acetaminophen-codeine (TYLENOL #3) 300-30 MG per tablet [Pharmacy Med Name: ACETAMINOPHEN-CODEINE 300-30 MG TAB] 90 tablet 0     Sig: TAKE ONE TABLET BY MOUTH EVERY 8 HOURS AS NEEDED FOR PAIN FOR UP TO 30 DAYS. TAKE LOWEST DOSE POSSIBLE TO MANAGE PAIN      Last Filled:  12/8/2020    Patient Phone Number: [phone] (home)     Last appt: 12/8/2020   Next appt: Visit date not found    Last OARRS:   RX Monitoring 12/3/2019   Attestation -   Periodic Controlled Substance Monitoring Possible medication side effects, risk of tolerance/dependence & alternative treatments discussed. ;No signs of potential drug abuse or diversion identified. ;Assessed functional status. Chronic Pain > 50 MEDD Obtained or confirmed \"Consent for Opioid Use\" on file.      PDMP Monitoring:    Last PDMP Itz Villasenor as Reviewed [facility]):  Review User Review Instant Review Result   Bertram Horse 9/1/2020  1:09 PM Reviewed PDMP [1]     Preferred Pharmacy:   [facility] [address], [address] [phone] [employer] [phone] [address]  [place] [address]  Phone: [phone] Fax: [phone]

## 2021-01-08 RX ORDER — ACETAMINOPHEN AND CODEINE PHOSPHATE 300; 30 MG/1; MG/1
TABLET ORAL
Qty: 90 TABLET | Refills: 0 | Status: SHIPPED | OUTPATIENT
Start: 2021-01-08 | End: 2021-02-09

## 2021-01-15 ENCOUNTER — OFFICE VISIT (OUTPATIENT)
Dept: ORTHOPEDIC SURGERY | Age: 76
End: 2021-01-15

## 2021-01-15 VITALS — BODY MASS INDEX: 22.71 KG/M2 | WEIGHT: 133 LBS | TEMPERATURE: 97.3 F | HEIGHT: 64 IN

## 2021-01-15 DIAGNOSIS — Z98.890 S/P DECOMPRESSION OF ULNAR NERVE AT ELBOW: ICD-10-CM

## 2021-01-15 DIAGNOSIS — G56.02 LEFT CARPAL TUNNEL SYNDROME: Primary | ICD-10-CM

## 2021-01-15 DIAGNOSIS — G56.22 CUBITAL TUNNEL SYNDROME ON LEFT: ICD-10-CM

## 2021-01-15 PROCEDURE — 99024 POSTOP FOLLOW-UP VISIT: CPT | Performed by: PHYSICIAN ASSISTANT

## 2021-01-15 NOTE — PROGRESS NOTES
Patient Name: Nayana Ferrell  Medical Record Number: [id number]  YOB: 1945  Date of Encounter: 1/15/2021     Chief Complaint   Patient presents with    Post-Op Check     left cubital and carpal dos - 11/12/2020 - doing great no complaints        History of Present Illness:   Ms. Nayana Ferrell is here in 9 week follow up regarding her left cubital and carpal tunnel release as well as ulnar nerve transposition on 11/12/20. Patient feels she is doing very well. She denies having any pain. She states she still has some tingling in the left hand but denies having any more numbness. The patient's past medical history, medications, allergies, family history, social history, and review of systems have been reviewed, and dated and are recorded in the chart under the 'MEDIA\" tab. Physical Exam:    Ms. Nayana Ferrell appears well, she is in no apparent distress, she demonstrates appropriate mood & affect. She is alert and oriented to person, place and time. Temp 97.3 °F (36.3 °C) (Infrared)   Ht 5' 4\" (1.626 m)   Wt 133 lb (60.3 kg)   BMI 22.83 kg/m²     On examination of patient's left elbow and wrist there is no swelling or joint effusion. Patient surgical incisions are well-healed. She denies tenderness on palpation. She has full range of motion of the left elbow and wrist without pain. Her strength is 4+/5. Impression:   Diagnosis Orders   1. 11/12/20 LEFT carpal tunnel release     2. 11/12/20 LEFT cubital tunnel release     3. 11/12/20 LEFT ulnar nerve transposition at elbow         Treatment Plan:    Patient is doing great 9 weeks postop. She denies having any pain. She has great range of motion and strength of the left elbow and wrist as well as with  strength. She denies having any additional numbness in the left hand but still has some \"tingling\". She is very pleased with surgery.   She is starting to have some symptoms on the right and will call the office when she

## 2021-01-25 RX ORDER — BUSPIRONE HYDROCHLORIDE 15 MG/1
TABLET ORAL
Qty: 90 TABLET | Refills: 5 | Status: SHIPPED | OUTPATIENT
Start: 2021-01-25 | End: 2021-07-21

## 2021-01-25 NOTE — TELEPHONE ENCOUNTER
Medication:   Requested Prescriptions     Pending Prescriptions Disp Refills    busPIRone (BUSPAR) 15 MG tablet [Pharmacy Med Name: busPIRone HCL 15 MG TABLET] 90 tablet 0     Sig: TAKE ONE TABLET BY MOUTH THREE TIMES A DAY AS NEEDED FOR ANXIETY      Last Filled:      Patient Phone Number: [phone] (home)     Last appt: 12/8/2020   Next appt: Visit date not found    Last OARRS:   RX Monitoring 12/3/2019   Attestation -   Periodic Controlled Substance Monitoring Possible medication side effects, risk of tolerance/dependence & alternative treatments discussed. ;No signs of potential drug abuse or diversion identified. ;Assessed functional status. Chronic Pain > 50 MEDD Obtained or confirmed \"Consent for Opioid Use\" on file.      PDMP Monitoring:    Last PDMP Rosita Arteaga as Reviewed [facility]):  Review User Review Instant Review Result   Yordymarco a Jensen 9/1/2020  1:09 PM Reviewed PDMP [1]     Preferred Pharmacy:   [facility] [address], [address] [phone] Meme Montanez [phone]  [address] [facility] [address]  Phone: [phone] Fax: [phone]

## 2021-01-26 RX ORDER — DONEPEZIL HYDROCHLORIDE 10 MG/1
TABLET, FILM COATED ORAL
Qty: 180 TABLET | Refills: 1 | Status: SHIPPED | OUTPATIENT
Start: 2021-01-26 | End: 2021-10-12

## 2021-01-26 NOTE — TELEPHONE ENCOUNTER
Medication:   Requested Prescriptions     Pending Prescriptions Disp Refills    donepezil (ARICEPT) 10 MG tablet [Pharmacy Med Name: DONEPEZIL HCL 10 MG TABLET] 180 tablet 0     Sig: TAKE TWO TABLETS BY MOUTH EVERY NIGHT AT BEDTIME      Last Filled:      Patient Phone Number: [phone] (home)     Last appt: 12/8/2020   Next appt: Visit date not found    Last OARRS:   RX Monitoring 12/3/2019   Attestation -   Periodic Controlled Substance Monitoring Possible medication side effects, risk of tolerance/dependence & alternative treatments discussed. ;No signs of potential drug abuse or diversion identified. ;Assessed functional status. Chronic Pain > 50 MEDD Obtained or confirmed \"Consent for Opioid Use\" on file.      PDMP Monitoring:    Last PDMP Katelynn Bang as Reviewed [facility]):  Review User Review Instant Review Result   Nilton Velazco 9/1/2020  1:09 PM Reviewed PDMP [1]     Preferred Pharmacy:   [address] [place] [address] [address] [facility] [phone] Sal Smith [phone]  [address] [facility] [place] [address]  Phone: [phone] Fax: [phone]

## 2021-02-01 NOTE — TELEPHONE ENCOUNTER
omeprazole (PRILOSEC) 40 MG delayed release capsule [[id number]    [facility] [address], [address] [phone] - F [phone]       Patient states that she want her to take this medication twice a day and instructions need to reflect that

## 2021-02-05 RX ORDER — OMEPRAZOLE 40 MG/1
CAPSULE, DELAYED RELEASE ORAL
Qty: 90 CAPSULE | Refills: 0 | Status: SHIPPED | OUTPATIENT
Start: 2021-02-05 | End: 2021-08-05 | Stop reason: SDUPTHER

## 2021-02-06 DIAGNOSIS — G89.29 CHRONIC LOW BACK PAIN WITHOUT SCIATICA, UNSPECIFIED BACK PAIN LATERALITY: ICD-10-CM

## 2021-02-06 DIAGNOSIS — M54.50 CHRONIC LOW BACK PAIN WITHOUT SCIATICA, UNSPECIFIED BACK PAIN LATERALITY: ICD-10-CM

## 2021-02-09 RX ORDER — ACETAMINOPHEN AND CODEINE PHOSPHATE 300; 30 MG/1; MG/1
TABLET ORAL
Qty: 90 TABLET | Refills: 0 | Status: SHIPPED | OUTPATIENT
Start: 2021-02-09 | End: 2021-03-18 | Stop reason: SDUPTHER

## 2021-02-09 NOTE — TELEPHONE ENCOUNTER
Medication:   Requested Prescriptions     Pending Prescriptions Disp Refills    acetaminophen-codeine (TYLENOL #3) 300-30 MG per tablet [Pharmacy Med Name: ACETAMINOPHEN-CODEINE 300-30 MG TAB] 90 tablet 0     Sig: TAKE ONE TABLET BY MOUTH EVERY 8 HOURS AS NEEDED FOR PAIN FOR UP TO 30 DAYS. TAKE LOWEST DOSE POSSIBLE TO MANAGE PAIN      Last Filled:      Patient Phone Number: [phone] (home)     Last appt: 12/8/2020   Next appt: Visit date not found    Last OARRS:   RX Monitoring 12/3/2019   Attestation -   Periodic Controlled Substance Monitoring Possible medication side effects, risk of tolerance/dependence & alternative treatments discussed. ;No signs of potential drug abuse or diversion identified. ;Assessed functional status. Chronic Pain > 50 MEDD Obtained or confirmed \"Consent for Opioid Use\" on file.      PDMP Monitoring:    Last PDMP Travis Tran as Reviewed [facility]):  Review User Review Instant Review Result   Victorino Cardona 9/1/2020  1:09 PM Reviewed PDMP [1]     Preferred Pharmacy:   [facility] [address], [address] [facility] [phone] Sherley Cardozo [phone] [address] [facility] [place] [address]  Phone: [phone] Fax: [phone]

## 2021-02-12 ENCOUNTER — TELEPHONE (OUTPATIENT)
Dept: FAMILY MEDICINE CLINIC | Age: 76
End: 2021-02-12

## 2021-02-12 DIAGNOSIS — M54.50 LOW BACK PAIN, UNSPECIFIED BACK PAIN LATERALITY, UNSPECIFIED CHRONICITY, UNSPECIFIED WHETHER SCIATICA PRESENT: Primary | ICD-10-CM

## 2021-02-12 NOTE — TELEPHONE ENCOUNTER
----- Message from José Miguel Hameed sent at 2/12/2021  9:30 AM EST -----  Subject: Referral Request    QUESTIONS   Reason for referral request? Pt would like DR Gely Hernandez to order an   xray for her tail bone as she fell and it is giving her pain. Pt has an   appointment on 2/16 that way he can take a look at it before her appt if   possible. If that won't work then pt would like to be advised. Has the physician seen you for this condition before? No   Preferred Specialist (if applicable)? Do you already have an appointment scheduled? Additional Information for Provider?   ---------------------------------------------------------------------------  --------------  CALL BACK INFO  What is the best way for the office to contact you? OK to leave message on   voicemail  Preferred Call Back Phone Number?  [phone]

## 2021-02-13 ENCOUNTER — HOSPITAL ENCOUNTER (OUTPATIENT)
Age: 76
Discharge: HOME | End: 2021-02-13
Payer: COMMERCIAL

## 2021-02-13 ENCOUNTER — HOSPITAL ENCOUNTER (OUTPATIENT)
Dept: GENERAL RADIOLOGY | Age: 76
Discharge: HOME | End: 2021-02-13
Payer: COMMERCIAL

## 2021-02-13 DIAGNOSIS — M54.50 LOW BACK PAIN, UNSPECIFIED BACK PAIN LATERALITY, UNSPECIFIED CHRONICITY, UNSPECIFIED WHETHER SCIATICA PRESENT: ICD-10-CM

## 2021-02-13 PROCEDURE — 72220 X-RAY EXAM SACRUM TAILBONE: CPT

## 2021-02-16 ENCOUNTER — TELEPHONE (OUTPATIENT)
Dept: FAMILY MEDICINE CLINIC | Age: 76
End: 2021-02-16

## 2021-02-16 ENCOUNTER — OFFICE VISIT (OUTPATIENT)
Dept: FAMILY MEDICINE CLINIC | Age: 76
End: 2021-02-16
Payer: COMMERCIAL

## 2021-02-16 ENCOUNTER — NURSE TRIAGE (OUTPATIENT)
Dept: OTHER | Facility: CLINIC | Age: 76
End: 2021-02-16

## 2021-02-16 VITALS
TEMPERATURE: 96.9 F | SYSTOLIC BLOOD PRESSURE: 116 MMHG | HEART RATE: 78 BPM | OXYGEN SATURATION: 96 % | BODY MASS INDEX: 21.46 KG/M2 | WEIGHT: 125 LBS | DIASTOLIC BLOOD PRESSURE: 62 MMHG

## 2021-02-16 DIAGNOSIS — E11.9 TYPE 2 DIABETES MELLITUS WITHOUT COMPLICATION, WITHOUT LONG-TERM CURRENT USE OF INSULIN (HCC): ICD-10-CM

## 2021-02-16 DIAGNOSIS — M54.50 CHRONIC LOW BACK PAIN WITHOUT SCIATICA, UNSPECIFIED BACK PAIN LATERALITY: Primary | ICD-10-CM

## 2021-02-16 DIAGNOSIS — E78.00 HYPERCHOLESTEROLEMIA: ICD-10-CM

## 2021-02-16 DIAGNOSIS — G60.9 IDIOPATHIC PERIPHERAL NEUROPATHY: ICD-10-CM

## 2021-02-16 DIAGNOSIS — I10 ESSENTIAL HYPERTENSION: ICD-10-CM

## 2021-02-16 DIAGNOSIS — G89.29 CHRONIC LOW BACK PAIN WITHOUT SCIATICA, UNSPECIFIED BACK PAIN LATERALITY: Primary | ICD-10-CM

## 2021-02-16 DIAGNOSIS — G56.23 ULNAR NEUROPATHY OF BOTH UPPER EXTREMITIES: ICD-10-CM

## 2021-02-16 PROCEDURE — 99214 OFFICE O/P EST MOD 30 MIN: CPT | Performed by: FAMILY MEDICINE

## 2021-02-16 NOTE — PROGRESS NOTES
no deformity. Left upper leg: Normal. She exhibits no tenderness, no swelling and no deformity. Skin:     General: Skin is warm and dry. Findings: No rash. Neurological:      Mental Status: She is alert and oriented to person, place, and time. Sensory: Sensation is intact. Motor: Motor function is intact. Deep Tendon Reflexes:      Reflex Scores:       Patellar reflexes are 2+ on the right side and 2+ on the left side. Achilles reflexes are 2+ on the right side and 2+ on the left side. Comments: Strength is 5 out of 5 in both upper and lower extremities. Psychiatric:         Behavior: Behavior is cooperative. Assessment:      Derrek Cranker was seen today for lower back pain. Diagnoses and all orders for this visit:    Chronic low back pain without sciatica, unspecified back pain laterality    Ulnar neuropathy of both upper extremities    Idiopathic peripheral neuropathy  -     Comprehensive Metabolic Panel; Future  -     TSH without Reflex; Future  -     Vitamin B12 & Folate; Future    Essential hypertension  -     TSH without Reflex; Future    Hypercholesterolemia  -     Comprehensive Metabolic Panel; Future  -     Lipid Panel; Future    Type 2 diabetes mellitus without complication, without long-term current use of insulin (HCC)  -     Comprehensive Metabolic Panel; Future  -     Hemoglobin A1C; Future    OARRS report checked          Plan:      I reviewed orthopedics and EMG notes with patient. Obviously she still has some numbness in her upper extremities secondary to the nerve irritation and this will take some time to improve and advises prior take about 6 months. Lower back pain is chronic in nature and she obviously did not sustain a fracture. She is already on pain medications and she is taking Cymbalta as she did not improve with gabapentin medication. No change of therapy.     For the peripheral neuropathy of the feet we will go ahead and pursue laboratory

## 2021-02-16 NOTE — TELEPHONE ENCOUNTER
----- Message from Maggie Perkins sent at 2/16/2021  8:44 AM EST -----  Subject: Refill Request    QUESTIONS  Name of Medication? omeprazole (PRILOSEC) 40 MG delayed release capsule  Patient-reported dosage and instructions? 1 40MG delayed release capsule   daily   How many days do you have left? 0  Preferred Pharmacy? [facility] [address]  Pharmacy phone number (if available)? [phone]  Additional Information for Provider? Patient would like to have a week   filled until she is able to come in due to the weather. ---------------------------------------------------------------------------  --------------  Christy COTO  What is the best way for the office to contact you? OK to leave message on   voicemail  Preferred Call Back Phone Number?  [phone]

## 2021-02-16 NOTE — TELEPHONE ENCOUNTER
Reason for Disposition   [1] Weakness of arm / hand, or leg / foot AND [2] is a chronic symptom (recurrent or ongoing AND present > 4 weeks)    Answer Assessment - Initial Assessment Questions  1. SYMPTOM: \"What is the main symptom you are concerned about? \" (e.g., weakness, numbness)      Weakness, numbness in left hand    2. ONSET: \"When did this start? \" (minutes, hours, days; while sleeping)      States at least a year. States surgery a couple months ago for the numbness and tingling and states it is not getting any better. 3. LAST NORMAL: \"When was the last time you were normal (no symptoms)? \"      States right after surgery    4. PATTERN \"Does this come and go, or has it been constant since it started? \"  \"Is it present now? \"      All the time    5. CARDIAC SYMPTOMS: \"Have you had any of the following symptoms: chest pain, difficulty breathing, palpitations? \"      Denies    6. NEUROLOGIC SYMPTOMS: \"Have you had any of the following symptoms: headache, dizziness, vision loss, double vision, changes in speech, unsteady on your feet? \"      Denies    7. OTHER SYMPTOMS: \"Do you have any other symptoms? \"      Denies    8. PREGNANCY: \"Is there any chance you are pregnant? \" \"When was your last menstrual period? \"      n/a    Protocols used: NEUROLOGIC DEFICIT-ADULT-AH    Patient called [facility] at [facility])  with red flag complaint. Brief description of triage: see above    Triage indicates for patient to be seen in office in the next 3 days. Patient calling to reschedule appt d/t weather. Care advice provided, patient verbalizes understanding; denies any other questions or concerns; instructed to call back for any new or worsening symptoms. Writer provided warm transfer to [facility] for appointment scheduling. Attention Provider: Thank you for allowing me to participate in the care of your patient.   The patient was connected to triage in response to information provided to the ECC. Please do not respond through this encounter as the response is not directed to a shared pool.

## 2021-02-16 NOTE — TELEPHONE ENCOUNTER
Pt states she was seen 2/16/21 and forgot to ask that when she washes her hands they become very slimy and sticky. She would like to know what's the cause of this. It's been happening for awhile now.     Please advise

## 2021-02-16 NOTE — TELEPHONE ENCOUNTER
This sensation is all probably secondary to the nerve problem she has in both hands.   There is no additional medicines that will help this simply time since her prior surgery was just in November

## 2021-02-26 RX ORDER — AMLODIPINE BESYLATE 10 MG/1
TABLET ORAL
Qty: 90 TABLET | Refills: 0 | Status: SHIPPED | OUTPATIENT
Start: 2021-02-26 | End: 2021-05-26

## 2021-03-16 ENCOUNTER — HOSPITAL ENCOUNTER (OUTPATIENT)
Age: 76
Discharge: HOME | End: 2021-03-16
Payer: COMMERCIAL

## 2021-03-16 DIAGNOSIS — E78.00 HYPERCHOLESTEROLEMIA: ICD-10-CM

## 2021-03-16 DIAGNOSIS — G60.9 IDIOPATHIC PERIPHERAL NEUROPATHY: ICD-10-CM

## 2021-03-16 DIAGNOSIS — E11.9 TYPE 2 DIABETES MELLITUS WITHOUT COMPLICATION, WITHOUT LONG-TERM CURRENT USE OF INSULIN (HCC): ICD-10-CM

## 2021-03-16 DIAGNOSIS — I10 ESSENTIAL HYPERTENSION: ICD-10-CM

## 2021-03-16 LAB
A/G RATIO: 1.3 (ref 1.1–2.2)
ALBUMIN SERPL-MCNC: 4.3 G/DL (ref 3.4–5)
ALP BLD-CCNC: 106 U/L (ref 40–129)
ALT SERPL-CCNC: 141 U/L (ref 10–40)
ANION GAP SERPL CALCULATED.3IONS-SCNC: 10 MMOL/L (ref 3–16)
AST SERPL-CCNC: 124 U/L (ref 15–37)
BILIRUB SERPL-MCNC: 0.3 MG/DL (ref 0–1)
BUN BLDV-MCNC: 16 MG/DL (ref 7–20)
CALCIUM SERPL-MCNC: 9.9 MG/DL (ref 8.3–10.6)
CHLORIDE BLD-SCNC: 100 MMOL/L (ref 99–110)
CHOLESTEROL, TOTAL: 176 MG/DL (ref 0–199)
CO2: 28 MMOL/L (ref 21–32)
CREAT SERPL-MCNC: 0.8 MG/DL (ref 0.6–1.2)
FOLATE: >20 NG/ML (ref 4.78–24.2)
GFR AFRICAN AMERICAN: >60
GFR NON-AFRICAN AMERICAN: >60
GLOBULIN: 3.4 G/DL
GLUCOSE BLD-MCNC: 89 MG/DL (ref 70–99)
HDLC SERPL-MCNC: 98 MG/DL (ref 40–60)
LDL CHOLESTEROL CALCULATED: 65 MG/DL
POTASSIUM SERPL-SCNC: 4.6 MMOL/L (ref 3.5–5.1)
SODIUM BLD-SCNC: 138 MMOL/L (ref 136–145)
TOTAL PROTEIN: 7.7 G/DL (ref 6.4–8.2)
TRIGL SERPL-MCNC: 63 MG/DL (ref 0–150)
TSH SERPL DL<=0.05 MIU/L-ACNC: 2.41 UIU/ML (ref 0.27–4.2)
VITAMIN B-12: 624 PG/ML (ref 211–911)
VLDLC SERPL CALC-MCNC: 13 MG/DL

## 2021-03-16 PROCEDURE — 83036 HEMOGLOBIN GLYCOSYLATED A1C: CPT

## 2021-03-16 PROCEDURE — 82607 VITAMIN B-12: CPT

## 2021-03-16 PROCEDURE — 84443 ASSAY THYROID STIM HORMONE: CPT

## 2021-03-16 PROCEDURE — 82746 ASSAY OF FOLIC ACID SERUM: CPT

## 2021-03-16 PROCEDURE — 80053 COMPREHEN METABOLIC PANEL: CPT

## 2021-03-16 PROCEDURE — 80061 LIPID PANEL: CPT

## 2021-03-17 ENCOUNTER — OFFICE VISIT (OUTPATIENT)
Dept: FAMILY MEDICINE CLINIC | Age: 76
End: 2021-03-17
Payer: COMMERCIAL

## 2021-03-17 VITALS
WEIGHT: 132.25 LBS | RESPIRATION RATE: 16 BRPM | TEMPERATURE: 97.1 F | HEART RATE: 70 BPM | BODY MASS INDEX: 22.7 KG/M2 | DIASTOLIC BLOOD PRESSURE: 70 MMHG | SYSTOLIC BLOOD PRESSURE: 122 MMHG

## 2021-03-17 DIAGNOSIS — R74.8 ELEVATED LIVER ENZYMES: ICD-10-CM

## 2021-03-17 DIAGNOSIS — G89.29 CHRONIC LOW BACK PAIN WITHOUT SCIATICA, UNSPECIFIED BACK PAIN LATERALITY: ICD-10-CM

## 2021-03-17 DIAGNOSIS — D68.2 CONGENITAL CLOTTING FACTOR DEFICIENCY (HCC): ICD-10-CM

## 2021-03-17 DIAGNOSIS — E11.9 TYPE 2 DIABETES MELLITUS WITHOUT COMPLICATION, WITHOUT LONG-TERM CURRENT USE OF INSULIN (HCC): Primary | ICD-10-CM

## 2021-03-17 DIAGNOSIS — F32.1 CURRENT MODERATE EPISODE OF MAJOR DEPRESSIVE DISORDER WITHOUT PRIOR EPISODE (HCC): ICD-10-CM

## 2021-03-17 DIAGNOSIS — M54.50 CHRONIC LOW BACK PAIN WITHOUT SCIATICA, UNSPECIFIED BACK PAIN LATERALITY: ICD-10-CM

## 2021-03-17 DIAGNOSIS — E78.00 HYPERCHOLESTEROLEMIA: ICD-10-CM

## 2021-03-17 LAB
ESTIMATED AVERAGE GLUCOSE: 108.3 MG/DL
HBA1C MFR BLD: 5.4 %

## 2021-03-17 PROCEDURE — 99213 OFFICE O/P EST LOW 20 MIN: CPT | Performed by: FAMILY MEDICINE

## 2021-03-17 RX ORDER — ACETAMINOPHEN AND CODEINE PHOSPHATE 300; 30 MG/1; MG/1
TABLET ORAL
Qty: 90 TABLET | Refills: 0 | Status: CANCELLED | OUTPATIENT
Start: 2021-03-17 | End: 2021-04-16

## 2021-03-17 NOTE — PROGRESS NOTES
encounter (Office Visit) with Freddy Jewell MD   Medication Sig Dispense Refill    amLODIPine (NORVASC) 10 MG tablet TAKE ONE TABLET BY MOUTH DAILY 90 tablet 0    omeprazole (PRILOSEC) 40 MG delayed release capsule TAKE ONE CAPSULE BY MOUTH DAILY 90 capsule 0    donepezil (ARICEPT) 10 MG tablet TAKE TWO TABLETS BY MOUTH EVERY NIGHT AT BEDTIME 180 tablet 1    busPIRone (BUSPAR) 15 MG tablet TAKE ONE TABLET BY MOUTH THREE TIMES A DAY AS NEEDED FOR ANXIETY 90 tablet 5    atorvastatin (LIPITOR) 40 MG tablet Take 1 tablet by mouth daily 90 tablet 1    DULoxetine (CYMBALTA) 60 MG extended release capsule TAKE ONE CAPSULE BY MOUTH DAILY 90 capsule 1    mirtazapine (REMERON) 15 MG tablet Take 1 tablet by mouth nightly 30 tablet 5    diclofenac sodium (VOLTAREN) 1 % GEL Apply 2 g topically 3 times daily 2 Tube 1    Melatonin 5 MG CAPS 1-2 qhs for sleep 60 capsule 3    aspirin 81 MG EC tablet Take 1 tablet by mouth daily 30 tablet 3    Multiple Vitamins-Minerals (CENTRUM SILVER PO) Take 1 tablet by mouth daily          Allergies   Allergen Reactions    Hydrocodone      The patient has a history of a bleeding Ulcer, and this upset her stomach severely. Social History     Tobacco Use    Smoking status: Never Smoker    Smokeless tobacco: Never Used   Substance Use Topics    Alcohol use: No     Alcohol/week: 0.0 standard drinks       /70 (Site: Left Upper Arm, Position: Sitting, Cuff Size: Medium Adult)   Pulse 70   Temp 97.1 °F (36.2 °C)   Resp 16   Wt 132 lb 4 oz (60 kg)   BMI 22.70 kg/m²     Objective:   Physical Exam  Vitals signs and nursing note reviewed. Constitutional:       General: She is not in acute distress. Appearance: Normal appearance. She is well-developed and well-groomed. Neck:      Vascular: No carotid bruit. Cardiovascular:      Rate and Rhythm: Normal rate and regular rhythm.       Pulses:           Dorsalis pedis pulses are 2+ on the right side and 2+ on the Comprehensive Metabolic Panel; Future    Congenital clotting factor deficiency (HCC)    Hypercholesterolemia    Current moderate episode of major depressive disorder without prior episode ([facility] [address]      OARRS report checked        Plan:      Pt appears overall stable & reviewed labs with patient. Laboratory testing does demonstrate elevated liver function test which is new for her. This should be reassessed in the next 3 months. This may be combination of the pain medications and cholesterol management    Encourage patient continue with back stretching exercise    Patient received counseling on the following healthy behaviors: nutrition and exercise     Patient given educational materials     Health maintenance updated    Discussed use, benefit, and side effects of prescribed medications. Barriers to medication compliance addressed. All patient questions answered. Pt voiced understanding. Patient needs RTC in 3 months. Medical decision making of moderate complexity. Please note that this chart was generated using Dragon dictation software. Although every effort was made to ensure the accuracy of this automated transcription, some errors in transcription may have occurred.

## 2021-03-18 ENCOUNTER — TELEPHONE (OUTPATIENT)
Dept: FAMILY MEDICINE CLINIC | Age: 76
End: 2021-03-18

## 2021-03-18 DIAGNOSIS — G89.29 CHRONIC LOW BACK PAIN WITHOUT SCIATICA, UNSPECIFIED BACK PAIN LATERALITY: ICD-10-CM

## 2021-03-18 DIAGNOSIS — M54.50 CHRONIC LOW BACK PAIN WITHOUT SCIATICA, UNSPECIFIED BACK PAIN LATERALITY: ICD-10-CM

## 2021-03-18 RX ORDER — ACETAMINOPHEN AND CODEINE PHOSPHATE 300; 30 MG/1; MG/1
1 TABLET ORAL EVERY 8 HOURS PRN
Qty: 90 TABLET | Refills: 0 | Status: SHIPPED | OUTPATIENT
Start: 2021-03-18 | End: 2021-04-19

## 2021-03-18 NOTE — TELEPHONE ENCOUNTER
Medication:   Requested Prescriptions     Pending Prescriptions Disp Refills    acetaminophen-codeine (TYLENOL #3) 300-30 MG per tablet 90 tablet 0     Sig: Take 1 tablet by mouth every 8 hours as needed for Pain for up to 30 days. Last Filled:  2/9/21    Patient Phone Number: [phone] (home)     Last appt: 3/17/2021   Next appt: 6/18/2021    Last OARRS:   RX Monitoring 12/3/2019   Attestation -   Periodic Controlled Substance Monitoring Possible medication side effects, risk of tolerance/dependence & alternative treatments discussed. ;No signs of potential drug abuse or diversion identified. ;Assessed functional status. Chronic Pain > 50 MEDD Obtained or confirmed \"Consent for Opioid Use\" on file.      PDMP Monitoring:    Last PDMP Julius Correa as Reviewed [facility]):  Review User Review Instant Review Result   Mitali Denny 3/17/2021  2:22 PM Reviewed PDMP [1]     Preferred Pharmacy:   [facility] [address] [facility] [phone] Marcus Bergeron [phone]  [address] [facility]  [place] [address]  Phone: [phone] Fax: [phone]

## 2021-03-31 ENCOUNTER — TELEPHONE (OUTPATIENT)
Dept: PRIMARY CARE CLINIC | Age: 76
End: 2021-03-31

## 2021-03-31 NOTE — TELEPHONE ENCOUNTER
PT is requesting something for her Neuropathy. PT states she feels her arms and Legs feel heavy and doesn't know what to take she also states that she has mentioned it to Dr Rahul Loving about it.    [address] [place] [address] [address] [facility] [address], [address] [facility] [phone]         Please Advise

## 2021-03-31 NOTE — TELEPHONE ENCOUNTER
I would recommend that she stay on Cymbalta medication and just take Tylenol for muscular skeletal pain.

## 2021-04-09 DIAGNOSIS — G60.9 IDIOPATHIC PERIPHERAL NEUROPATHY: Primary | ICD-10-CM

## 2021-04-09 NOTE — TELEPHONE ENCOUNTER
Patient is requesting for something stronger to help with her neuropathy. She is currently on DULoxetine (CYMBALTA) 60 MG extended release capsule and states it feels like her hands & legs are heavy and her feet are numb. When she walks, it feels like \"pads\" on her feet.     Please advise

## 2021-04-15 DIAGNOSIS — G89.29 CHRONIC LOW BACK PAIN WITHOUT SCIATICA, UNSPECIFIED BACK PAIN LATERALITY: ICD-10-CM

## 2021-04-15 DIAGNOSIS — M54.50 CHRONIC LOW BACK PAIN WITHOUT SCIATICA, UNSPECIFIED BACK PAIN LATERALITY: ICD-10-CM

## 2021-04-15 NOTE — TELEPHONE ENCOUNTER
Medication:   Requested Prescriptions     Pending Prescriptions Disp Refills    acetaminophen-codeine (TYLENOL #3) 300-30 MG per tablet [Pharmacy Med Name: ACETAMINOPHEN-CODEINE 300-30 MG TAB] 90 tablet 0     Sig: TAKE ONE TABLET BY MOUTH EVERY 8 HOURS AS NEEDED FOR PAIN **REDUCE DOSES TAKEN AS PAIN BECOMES MANAGEABLE**      Last Filled:  3/18/2021    Patient Phone Number: [phone] (home)     Last appt: 3/17/2021   Next appt: 6/18/2021    Last OARRS:   RX Monitoring 12/3/2019   Attestation -   Periodic Controlled Substance Monitoring Possible medication side effects, risk of tolerance/dependence & alternative treatments discussed. ;No signs of potential drug abuse or diversion identified. ;Assessed functional status. Chronic Pain > 50 MEDD Obtained or confirmed \"Consent for Opioid Use\" on file.      PDMP Monitoring:    Last PDMP Rachel Orozco as Reviewed [facility]):  Review User Review Instant Review Result   Raissa Centeno 3/17/2021  2:22 PM Reviewed PDMP [1]     Preferred Pharmacy:   [facility] [address], [address] [phone] Lyric Palencia [phone] [address] [facility] [address] [address]  Phone: [phone] Fax: [phone]

## 2021-04-19 RX ORDER — ACETAMINOPHEN AND CODEINE PHOSPHATE 300; 30 MG/1; MG/1
TABLET ORAL
Qty: 90 TABLET | Refills: 0 | Status: SHIPPED | OUTPATIENT
Start: 2021-04-19 | End: 2021-05-18

## 2021-04-27 ENCOUNTER — TELEPHONE (OUTPATIENT)
Dept: FAMILY MEDICINE CLINIC | Age: 76
End: 2021-04-27

## 2021-04-27 NOTE — TELEPHONE ENCOUNTER
Patient is requesting if she can get some rubber gloves from us since her hands get a slimmey feeling with her nereopathy       Please advise

## 2021-04-30 ENCOUNTER — TELEPHONE (OUTPATIENT)
Dept: FAMILY MEDICINE CLINIC | Age: 76
End: 2021-04-30

## 2021-04-30 NOTE — TELEPHONE ENCOUNTER
Patient would like a new handicap placard script.   She put it in the mail and thinks that it may be lost

## 2021-04-30 NOTE — LETTER
[address] [place] [address]  Phone: [phone]  Fax: [phone]    Tobi Mccall MD        April 30, 2021     Patient: Ginger Meek   YOB: 1945   Date of Visit: 4/30/2021       To Whom It May Concern: It is my medical opinion that Phil Pritchard requires a disability parking placard for the following reasons:  She cannot walk without assistance from another person or the use of an assistance device (cane, crutch, prosthetic device, wheelchair, etc.). She cannot walk 200 feet without stopping to rest.  Duration of need: permanent    If you have any questions or concerns, please don't hesitate to call.     Sincerely,        Tobi Mccall MD

## 2021-05-10 ENCOUNTER — OFFICE VISIT (OUTPATIENT)
Dept: NEUROLOGY | Age: 76
End: 2021-05-10
Payer: COMMERCIAL

## 2021-05-10 VITALS
BODY MASS INDEX: 22.2 KG/M2 | SYSTOLIC BLOOD PRESSURE: 135 MMHG | DIASTOLIC BLOOD PRESSURE: 73 MMHG | HEIGHT: 64 IN | WEIGHT: 130 LBS | HEART RATE: 81 BPM

## 2021-05-10 DIAGNOSIS — R20.0 BILATERAL NUMBNESS AND TINGLING OF ARMS AND LEGS: Primary | ICD-10-CM

## 2021-05-10 DIAGNOSIS — R20.2 BILATERAL NUMBNESS AND TINGLING OF ARMS AND LEGS: Primary | ICD-10-CM

## 2021-05-10 PROCEDURE — 99214 OFFICE O/P EST MOD 30 MIN: CPT | Performed by: PSYCHIATRY & NEUROLOGY

## 2021-05-10 NOTE — PROGRESS NOTES
NEUROLOGY CONSULTATION     Chief Complaint   Patient presents with   Cornelia Fallow Patient     dr Jamal Hendricks for neuropathy, had awhile now and getting worse       HISTORY OF PRESENT ILLNESS :    Bennett Grimes is a 66 y.o. female who is referred by Dr. Selvin Dueñas   History was obtained from patient  Patient was referred for evaluation of numbness and weakness in her arms and legs. Arms are more involved than the legs. .  The left side is worse than the right side. Patient had an EMG study of her arms last year which showed bilateral ulnar nerve entrapments. Patient had surgery for the left ulnar nerve entrapment but there has been no improvement and in fact she feels it is worse. She also has some tingling and numbness in her legs at times. Symptom onset was several months ago and the symptoms are slowly getting worse. She feels that her arms are weaker as well. There is no true vertigo or diplopia. Patient has urinary incontinence.     REVIEW OF SYSTEMS    Constitutional:  []   Chills   []  Fatigue   []  Fevers   []  Malaise   []  Weight loss     [x] Denies all of the above    Eyes:  []  Double vision   []  Blurry vision     [x] Denies all of the above    Ears, nose, mouth, throat, and face:   [] Hearing loss    []   Hoarseness      [x]  Snoring    [x]  Tinnitus       [] Denies all of the above     Respiratory:   []  Cough    []  Shortness of breath         [x] Denies all of the above     Cardiovascular:   []  Chest pain    []  Exertional chest pressure/discomfort           [] Palpitations    []  Syncope     [x] Denies all of the above    Gastrointestinal:   []  Abdominal pain   []  Constipation    []  Diarrhea    []   Dysphagia                      [x] Denies all of the above    Genitourinary:      []  Frequency   []  Hematuria     [x]  Urinary incontinence           [] Denies all of the above     Hematologic/lymphatic:  []  Bleeding    []  Easy bruising   []  Anemia  [x] Denies all of the above     Musculoskeletal:   [x] Back pain       []  Myalgias    []  Neck pain           [] Denies all of the above    Neurological: As noted in HPI    Behavioral/Psych:   [x] Anxiety    [x]  Depression     [x]  Mood swings     [] Denies all of the above     Endocrine:   []  Temperature intolerance     [] Fatigue      [x] Denies all of the above     Allergic/Immunologic:   [] Hay fever    [x] Denies all of the above     Past Medical History:   Diagnosis Date    Acute gastritis without mention of hemorrhage     Acute sinusitis, unspecified     Acute upper respiratory infections of unspecified site     Allergic rhinitis, cause unspecified     Anemia, unspecified     Edema     Hyperlipidemia     Hypertension     Loss of weight     Lumbago     Lumbosacral root lesions, not elsewhere classified     Need for prophylactic vaccination and inoculation against influenza     Neuropathy     Other bursitis disorders     Other seborrheic keratosis     Symptomatic menopausal or female climacteric states     Type II or unspecified type diabetes mellitus without mention of complication, not stated as uncontrolled     Pt is not aware of having diabetes, does know her A1C runs high but blood sugar is generally normal     Family History   Problem Relation Age of Onset    Diabetes Sister     Heart Disease Brother     Heart Disease Sister     Emphysema Sister     High Blood Pressure Other      Social History     Socioeconomic History    Marital status:      Spouse name: None    Number of children: None    Years of education: None    Highest education level: None   Occupational History    Occupation: Retired     Comment: 2004   Social Needs    Financial resource strain: None    Food insecurity     Worry: None     Inability: None    Transportation needs     Medical: None     Non-medical: None   Tobacco Use    Smoking status: Never Smoker    Smokeless tobacco: Never Used   Substance and Sexual Activity    Alcohol use: No     Alcohol/week: 0.0 standard drinks    Drug use: Never    Sexual activity: Yes     Partners: Male   Lifestyle    Physical activity     Days per week: None     Minutes per session: None    Stress: None   Relationships    Social connections     Talks on phone: None     Gets together: None     Attends Hinduism service: None     Active member of club or organization: None     Attends meetings of clubs or organizations: None     Relationship status: None    Intimate partner violence     Fear of current or ex partner: None     Emotionally abused: None     Physically abused: None     Forced sexual activity: None   Other Topics Concern    None   Social History Narrative    None       PHYSICAL EXAMINATION:  /73   Pulse 81   Ht 5' 4\" (1.626 m)   Wt 130 lb (59 kg)   BMI 22.31 kg/m²   Appearance: Well appearing, well nourished and in no distress  Mental Status Exam: Patient is alert, oriented to person, place and time. Recent and remote memory is normal  Fund of Knowledge is normal  Attention/concentration is normal.   Speech : No dysarthria  Language : No aphasia  Funduscopic Exam: sharp disc margins  Cranial Nerves:   II: Visual fields:  Full to confrontation  III: Pupils:  equal, round, reactive to light  III,IV,VI: Extra Ocular Movements are intact. No nystagmus  V: Facial sensation is intact to pin prick and light touch  VII: Facial strength and movements: intact and symmetric smile,cheek puffing and eyebrow elevation  VIII: Hearing:  Intact to finger rub bilaterally  IX: Palate  elevation is symmetric  XI: Shoulder shrug is intact  XII: Tongue movements are normal  Motor:  Muscle tone and bulk are normal.   Strength is symmetrical 5/5 in all four extremities. Sensory: Intact to light touch and  pin prick in all four extremities  Coordination:  Normal  Finger to Nose and Heel to Shin bilaterally    . Reflexes:  DTR 2+ in the upper extremities 3 at the knees and 2+ at the ankles. Plantar response: Equivocal response bilaterally  Gait: Gait and station is normal.   Romberg: negative  Vascular: No carotid bruit bilaterally          DATA:  LABS:  General Labs:    CBC:   Lab Results   Component Value Date    WBC 12.5 05/23/2020    RBC 4.27 05/23/2020    HGB 13.5 05/23/2020    HCT 40.1 05/23/2020    MCV 93.9 05/23/2020    MCH 31.6 05/23/2020    MCHC 33.7 05/23/2020    RDW 13.7 05/23/2020     05/23/2020    MPV 7.5 05/23/2020     BMP:    Lab Results   Component Value Date     03/16/2021    K 4.6 03/16/2021    K 4.5 05/22/2020     03/16/2021    CO2 28 03/16/2021    BUN 16 03/16/2021    LABALBU 4.3 03/16/2021    CREATININE 0.8 03/16/2021    CALCIUM 9.9 03/16/2021    GFRAA >60 03/16/2021    GFRAA >60 11/17/2012    LABGLOM >60 03/16/2021    GLUCOSE 89 03/16/2021       IMPRESSION :  Previous EMG had shown bilateral ulnar nerve entrapment at the elbow. Numbness in the legs is suggestive of peripheral neuropathy  TSH and B12 levels were normal.  Hemoglobin A1c and serum glucose was also normal.  Patient has brisk reflexes all over. She has equal vocal plantar reflexes. Hence we should be concerned about a cervical myelopathy as well. RECOMMENDATIONS :  Discussed with patient and her   I will see her back and do EMG nerve conduction studies of both upper extremities and 1 lower extremity. Depending on the test results, we will consider getting an MRI cervical spine. Thank you for this consultation. Please note a portion of this chart was generated using dragon dictation software. Although every effort was made to ensure the accuracy of this automated transcription, some errors in transcription may have occurred.

## 2021-05-14 ENCOUNTER — TELEPHONE (OUTPATIENT)
Dept: NEUROLOGY | Age: 76
End: 2021-05-14

## 2021-05-14 NOTE — TELEPHONE ENCOUNTER
Pt phoned would like to know if you can prescribe anything for the neuropathy. Pt uses [facility] on [address] [place]. She said it is really bothering her.   Please call pt

## 2021-05-17 ENCOUNTER — TELEPHONE (OUTPATIENT)
Dept: ENDOCRINOLOGY | Age: 76
End: 2021-05-17

## 2021-05-17 NOTE — TELEPHONE ENCOUNTER
PT would like to have medication called into [facility] on [address] for neuropathy. Pt has not been seen in office. She is scheduled for a NP appt in June.

## 2021-05-18 DIAGNOSIS — G89.29 CHRONIC LOW BACK PAIN WITHOUT SCIATICA, UNSPECIFIED BACK PAIN LATERALITY: ICD-10-CM

## 2021-05-18 DIAGNOSIS — M54.50 CHRONIC LOW BACK PAIN WITHOUT SCIATICA, UNSPECIFIED BACK PAIN LATERALITY: ICD-10-CM

## 2021-05-18 RX ORDER — ACETAMINOPHEN AND CODEINE PHOSPHATE 300; 30 MG/1; MG/1
TABLET ORAL
Qty: 90 TABLET | Refills: 0 | Status: SHIPPED | OUTPATIENT
Start: 2021-05-18 | End: 2021-05-18

## 2021-05-18 RX ORDER — ACETAMINOPHEN AND CODEINE PHOSPHATE 300; 30 MG/1; MG/1
1 TABLET ORAL EVERY 8 HOURS PRN
Qty: 90 TABLET | Refills: 0 | Status: SHIPPED | OUTPATIENT
Start: 2021-05-18 | End: 2021-06-21

## 2021-05-18 NOTE — TELEPHONE ENCOUNTER
Medication:   Requested Prescriptions     Pending Prescriptions Disp Refills    acetaminophen-codeine (TYLENOL #3) 300-30 MG per tablet [Pharmacy Med Name: ACETAMINOPHEN-CODEINE 300-30 MG TAB] 90 tablet 0     Sig: TAKE ONE TABLET BY MOUTH EVERY 8 HOURS AS NEEDED FOR PAIN ** REDUCE DOSES TAKEN AS PAIN BECOMES MANAGEABLE**      Last Filled:  4/19/2021    Patient Phone Number: [phone] (home)     Last appt: 3/17/2021   Next appt: 6/18/2021    Last OARRS:   RX Monitoring 12/3/2019   Attestation -   Periodic Controlled Substance Monitoring Possible medication side effects, risk of tolerance/dependence & alternative treatments discussed. ;No signs of potential drug abuse or diversion identified. ;Assessed functional status. Chronic Pain > 50 MEDD Obtained or confirmed \"Consent for Opioid Use\" on file.      PDMP Monitoring:    Last PDMP Abi Starr as Reviewed [facility]):  Review User Review Instant Review Result   Aakash Ball 3/17/2021  2:22 PM Reviewed PDMP [1]     Preferred Pharmacy:   [facility] [address], [address] [phone] Tyrese Crease [phone] [address] [facility] [address]  [place] [address]  Phone: [phone] Fax: [phone]

## 2021-05-26 RX ORDER — AMLODIPINE BESYLATE 10 MG/1
TABLET ORAL
Qty: 90 TABLET | Refills: 0 | Status: SHIPPED | OUTPATIENT
Start: 2021-05-26 | End: 2021-08-23

## 2021-05-26 NOTE — TELEPHONE ENCOUNTER
Medication:   Requested Prescriptions     Pending Prescriptions Disp Refills    amLODIPine (NORVASC) 10 MG tablet [Pharmacy Med Name: amLODIPine BESYLATE 10 MG TAB] 90 tablet 0     Sig: TAKE ONE TABLET BY MOUTH DAILY      Last Filled:     Patient Phone Number: [phone] (home)     Last appt: 3/17/2021   Next appt: 6/18/2021    Last OARRS:   RX Monitoring 12/3/2019   Attestation -   Periodic Controlled Substance Monitoring Possible medication side effects, risk of tolerance/dependence & alternative treatments discussed. ;No signs of potential drug abuse or diversion identified. ;Assessed functional status. Chronic Pain > 50 MEDD Obtained or confirmed \"Consent for Opioid Use\" on file.      PDMP Monitoring:    Last PDMP Juli Ferro as Reviewed [facility]):  Review User Review Instant Review Result   Saba Ally 3/17/2021  2:22 PM Reviewed PDMP [1]     Preferred Pharmacy:   [facility] [address], [address] [facility] [phone] [facility] [phone]  [address] [facility] [address]  Phone: [phone] Fax: [phone]

## 2021-06-03 NOTE — TELEPHONE ENCOUNTER
Medication:   Requested Prescriptions     Pending Prescriptions Disp Refills    DULoxetine (CYMBALTA) 60 MG extended release capsule [Pharmacy Med Name: DULOXETINE HCL DR 60 MG CAPSULE] 90 capsule 0     Sig: TAKE ONE CAPSULE BY MOUTH DAILY    atorvastatin (LIPITOR) 40 MG tablet [Pharmacy Med Name: ATORVASTATIN 40 MG TABLET] 90 tablet 0     Sig: TAKE ONE TABLET BY MOUTH DAILY      Last Filled:      Patient Phone Number: [phone] (home)     Last appt: 3/17/2021   Next appt: 6/18/2021    Last OARRS:   RX Monitoring 12/3/2019   Attestation -   Periodic Controlled Substance Monitoring Possible medication side effects, risk of tolerance/dependence & alternative treatments discussed. ;No signs of potential drug abuse or diversion identified. ;Assessed functional status. Chronic Pain > 50 MEDD Obtained or confirmed \"Consent for Opioid Use\" on file.      PDMP Monitoring:    Last PDMP Ruddy Ramos as Reviewed [facility]):  Review User Review Instant Review Result   Juan David Coyle 3/17/2021  2:22 PM Reviewed PDMP [1]     Preferred Pharmacy:   [facility] [address], [address] [phone] [facility] [phone] [address] [facility] [place] [address]  Phone: [phone] Fax: [phone]

## 2021-06-04 RX ORDER — ATORVASTATIN CALCIUM 40 MG/1
TABLET, FILM COATED ORAL
Qty: 90 TABLET | Refills: 0 | Status: SHIPPED | OUTPATIENT
Start: 2021-06-04 | End: 2021-09-03

## 2021-06-04 RX ORDER — DULOXETIN HYDROCHLORIDE 60 MG/1
CAPSULE, DELAYED RELEASE ORAL
Qty: 90 CAPSULE | Refills: 0 | Status: SHIPPED | OUTPATIENT
Start: 2021-06-04 | End: 2021-09-03

## 2021-06-14 ENCOUNTER — PROCEDURE VISIT (OUTPATIENT)
Dept: NEUROLOGY | Age: 76
End: 2021-06-14
Payer: COMMERCIAL

## 2021-06-14 ENCOUNTER — OFFICE VISIT (OUTPATIENT)
Dept: NEUROLOGY | Age: 76
End: 2021-06-14
Payer: COMMERCIAL

## 2021-06-14 VITALS
SYSTOLIC BLOOD PRESSURE: 132 MMHG | BODY MASS INDEX: 22.2 KG/M2 | WEIGHT: 130 LBS | DIASTOLIC BLOOD PRESSURE: 72 MMHG | HEIGHT: 64 IN | HEART RATE: 86 BPM

## 2021-06-14 DIAGNOSIS — G56.03 BILATERAL CARPAL TUNNEL SYNDROME: ICD-10-CM

## 2021-06-14 DIAGNOSIS — R20.2 BILATERAL NUMBNESS AND TINGLING OF ARMS AND LEGS: Primary | ICD-10-CM

## 2021-06-14 DIAGNOSIS — G95.9 CERVICAL MYELOPATHY (HCC): ICD-10-CM

## 2021-06-14 DIAGNOSIS — G56.23 ENTRAPMENT OF BOTH ULNAR NERVES AT ELBOW: ICD-10-CM

## 2021-06-14 DIAGNOSIS — R20.0 BILATERAL NUMBNESS AND TINGLING OF ARMS AND LEGS: Primary | ICD-10-CM

## 2021-06-14 DIAGNOSIS — G60.9 IDIOPATHIC PERIPHERAL NEUROPATHY: Primary | ICD-10-CM

## 2021-06-14 PROCEDURE — 95886 MUSC TEST DONE W/N TEST COMP: CPT | Performed by: PSYCHIATRY & NEUROLOGY

## 2021-06-14 PROCEDURE — 99213 OFFICE O/P EST LOW 20 MIN: CPT | Performed by: PSYCHIATRY & NEUROLOGY

## 2021-06-14 PROCEDURE — 95912 NRV CNDJ TEST 11-12 STUDIES: CPT | Performed by: PSYCHIATRY & NEUROLOGY

## 2021-06-14 NOTE — PROGRESS NOTES
Josie Romero   Neurology followup    Subjective:   CC/HP  History was obtained from the patient. Patient is here for a follow-up visit as well as EMG studies. She continues to have significant tingling numbness in both her arms as well as now in her legs as well. Symptoms are slowly getting worse. Patient has urinary incontinence. Detailed history:  Patient was referred for evaluation of numbness and weakness in her arms and legs. Arms are more involved than the legs. .  The left side is worse than the right side. Patient had an EMG study of her arms last year which showed bilateral ulnar nerve entrapments. Patient had surgery for the left ulnar nerve entrapment but there has been no improvement and in fact she feels it is worse. She also has some tingling and numbness in her legs at times. Symptom onset was several months ago and the symptoms are slowly getting worse. She feels that her arms are weaker as well.   There is no true vertigo or diplopia    REVIEW OF SYSTEMS    Constitutional:  []   Chills   []  Fatigue   []  Fevers   []  Malaise   []  Weight loss     [] Denies all of the above    Respiratory:   []  Cough    []  Shortness of breath         [] Denies all of the above     Cardiovascular:   []  Chest pain    []  Exertional chest pressure/discomfort           [] Palpitations    []  Syncope     [] Denies all of the above        Past Medical History:   Diagnosis Date    Acute gastritis without mention of hemorrhage     Acute sinusitis, unspecified     Acute upper respiratory infections of unspecified site     Allergic rhinitis, cause unspecified     Anemia, unspecified     Edema     Hyperlipidemia     Hypertension     Loss of weight     Lumbago     Lumbosacral root lesions, not elsewhere classified     Need for prophylactic vaccination and inoculation against influenza     Neuropathy     Other bursitis disorders     Other seborrheic keratosis     Symptomatic menopausal or female climacteric states     Type II or unspecified type diabetes mellitus without mention of complication, not stated as uncontrolled     Pt is not aware of having diabetes, does know her A1C runs high but blood sugar is generally normal     Family History   Problem Relation Age of Onset    Diabetes Sister     Heart Disease Brother     Heart Disease Sister     Emphysema Sister     High Blood Pressure Other      Social History     Socioeconomic History    Marital status:      Spouse name: None    Number of children: None    Years of education: None    Highest education level: None   Occupational History    Occupation: Retired     Comment: 2004   Tobacco Use    Smoking status: Never Smoker    Smokeless tobacco: Never Used   Vaping Use    Vaping Use: Never used   Substance and Sexual Activity    Alcohol use: No     Alcohol/week: 0.0 standard drinks    Drug use: Never    Sexual activity: Yes     Partners: Male   Other Topics Concern    None   Social History Narrative    None     Social Determinants of Health     Financial Resource Strain:     Difficulty of Paying Living Expenses:    Food Insecurity:     Worried About Running Out of Food in the Last Year:     Ran Out of Food in the Last Year:    Transportation Needs:     Lack of Transportation (Medical):      Lack of Transportation (Non-Medical):    Physical Activity:     Days of Exercise per Week:     Minutes of Exercise per Session:    Stress:     Feeling of Stress :    Social Connections:     Frequency of Communication with Friends and Family:     Frequency of Social Gatherings with Friends and Family:     Attends Catholic Services:     Active Member of Clubs or Organizations:     Attends Club or Organization Meetings:     Marital Status:    Intimate Partner Violence:     Fear of Current or Ex-Partner:     Emotionally Abused:     Physically Abused:     Sexually Abused:         Objective:  Exam:  /72   Pulse 86   Ht 5' 4\" (1.626 m)   Wt 130 lb (59 kg)   BMI 22.31 kg/m²   This is a well-nourished patient in no acute distress  Patient is awake, alert and oriented x3. Speech is normal.  Pupils are equal round reacting to light. Extraocular movements intact. Face symmetrical. Tongue midline. Motor exam shows normal symmetrical strength. Deep tendon reflexes 2+ in the upper extremities today at the knees and 2+ at the ankles. . Plantar reflexes are equivocal bilaterally. Sensory exam shows decreased light touch in the distal lower extremities. Coordination normal. Gait normal. No carotid bruit. No neck stiffness. Data :  LABS:  General Labs:    CBC:   Lab Results   Component Value Date    WBC 12.5 05/23/2020    RBC 4.27 05/23/2020    HGB 13.5 05/23/2020    HCT 40.1 05/23/2020    MCV 93.9 05/23/2020    MCH 31.6 05/23/2020    MCHC 33.7 05/23/2020    RDW 13.7 05/23/2020     05/23/2020    MPV 7.5 05/23/2020     BMP:    Lab Results   Component Value Date     03/16/2021    K 4.6 03/16/2021    K 4.5 05/22/2020     03/16/2021    CO2 28 03/16/2021    BUN 16 03/16/2021    LABALBU 4.3 03/16/2021    CREATININE 0.8 03/16/2021    CALCIUM 9.9 03/16/2021    GFRAA >60 03/16/2021    GFRAA >60 11/17/2012    LABGLOM >60 03/16/2021    GLUCOSE 89 03/16/2021       Impression :  EMG and nerve conduction studies showed rather severe peripheral neuropathy in both lower extremities.   There was also bilateral ulnar nerve entrapment at the elbow as well as bilateral carpal tunnel syndrome  Work-up so far has included T4 TSH B12 level and serum hemoglobin A1c which were all normal.  Because of the brisk reflexes in the lower extremities we have to rule out cervical myelopathy    Plan :  Discussed with patient and   Explained EMG findings  I will get an MRI of the cervical spine  I will get serum BESSY, B1 B6 and copper levels  I will also get serum mag antibodies and GM 1 antibody levels  I will check serum methylmalonic acid level  I will see her back in a couple months for follow-up. Please note a portion of  this chart was generated using dragon dictation software. Although every effort was made to ensure the accuracy of this automated transcription, some errors in transcription may have occurred.

## 2021-06-14 NOTE — PROGRESS NOTES
Taryn Aldrich M.D. [facility]) Physicians/[facility]  Board Certified in [address], [address]    EMG / NERVE CONDUCTION STUDY      PATIENT:  Marissa Burroughs       DATE OF EM21     YOB: 1945       REASON FOR EMG:   Tingling and numbness in both arms and both legs. REFERRING PHYSICIAN:  Taryn Aldrich MD    SUMMARY:   The left median motor and sensory nerve studies had prolonged distal latencies. The right median sensory had a slightly prolonged distal latency whereas the right median motor nerve study was normal.  Bilateral ulnar sensory nerve studies were normal.  Bilateral ulnar motor nerve studies had moderately severe slowing of conduction velocities across the elbow. The left peroneal motor nerve study was not recordable. The left posterior tibial motor nerve study had a low amplitude and slow conduction velocity. The left superficial peroneal sensory nerve study was not recordable. Needle EMG of several muscles in both upper extremities showed evidence of denervation in the first dorsal interosseous muscles bilaterally. Needle EMG of several muscles in the left lower extremity showed decreased motor units in the gastrocnemius muscle. CLINICAL DIAGNOSIS:  Peripheral neuropathy        EMG RESULTS:   1. This patient has a rather severe peripheral neuropathy in the lower extremities. 2.  Patient also has bilateral median nerve lesions at the wrist.  (Carpal tunnel syndrome)  The right side is mild but has a left side is moderately severe. 3.  Patient also has moderately severe bilateral ulnar nerve lesions at the elbow. Both sides are equally affected.      ---------------------------------------------  Taryn Aldrich M.D.   Electromyographer / Neurologist

## 2021-06-14 NOTE — LETTER
[facility]  [address] [place] [address] [address] [id number]  Phone: [phone]  Fax: [phone]    Miryam Austin MD    June 14, 2021     MD [address] [place] [address]    Patient: Andie Jimenez   MR Number: [id number]   YOB: 1945   Date of Visit: 6/14/2021       Dear Cassie Good: Thank you for referring Rosa Junior to me for evaluation/treatment. Below are the relevant portions of my assessment and plan of care. If you have questions, please do not hesitate to call me. I look forward to following Josee Maldonado along with you.     Sincerely,    MD Miryam Marcos MD

## 2021-06-18 ENCOUNTER — OFFICE VISIT (OUTPATIENT)
Dept: FAMILY MEDICINE CLINIC | Age: 76
End: 2021-06-18
Payer: COMMERCIAL

## 2021-06-18 VITALS
DIASTOLIC BLOOD PRESSURE: 70 MMHG | HEART RATE: 76 BPM | SYSTOLIC BLOOD PRESSURE: 108 MMHG | OXYGEN SATURATION: 98 % | TEMPERATURE: 97.4 F | WEIGHT: 136 LBS | BODY MASS INDEX: 23.34 KG/M2

## 2021-06-18 DIAGNOSIS — G56.01 RIGHT CARPAL TUNNEL SYNDROME: ICD-10-CM

## 2021-06-18 DIAGNOSIS — G30.1 LATE ONSET ALZHEIMER'S DISEASE WITHOUT BEHAVIORAL DISTURBANCE (HCC): ICD-10-CM

## 2021-06-18 DIAGNOSIS — F19.20 DRUG DEPENDENCE (HCC): ICD-10-CM

## 2021-06-18 DIAGNOSIS — F02.80 LATE ONSET ALZHEIMER'S DISEASE WITHOUT BEHAVIORAL DISTURBANCE (HCC): ICD-10-CM

## 2021-06-18 DIAGNOSIS — F32.1 CURRENT MODERATE EPISODE OF MAJOR DEPRESSIVE DISORDER WITHOUT PRIOR EPISODE (HCC): Primary | ICD-10-CM

## 2021-06-18 PROCEDURE — 99214 OFFICE O/P EST MOD 30 MIN: CPT | Performed by: FAMILY MEDICINE

## 2021-06-18 ASSESSMENT — PATIENT HEALTH QUESTIONNAIRE - PHQ9
SUM OF ALL RESPONSES TO PHQ QUESTIONS 1-9: 0
SUM OF ALL RESPONSES TO PHQ9 QUESTIONS 1 & 2: 0
2. FEELING DOWN, DEPRESSED OR HOPELESS: 0
SUM OF ALL RESPONSES TO PHQ QUESTIONS 1-9: 0
SUM OF ALL RESPONSES TO PHQ QUESTIONS 1-9: 0
1. LITTLE INTEREST OR PLEASURE IN DOING THINGS: 0

## 2021-06-18 NOTE — PROGRESS NOTES
Subjective:      Patient ID: Omero Michaud is a 66 y.o. female. CC: Patient presents for re-evaluation of chronic health problems including depression, chronic pain, dementia, and double tunnel syndrome. Sunita Torres HPI Patient presents today for a follow-up on chronic medications and medical conditions. Patient had an EMG done by Dr. Rigoberto Bonds a few days ago. She is requesting to know about the results. Patient's had several office evaluations with neurology since last office visit here. He is having increasing numbness in both her arms as well as her legs. EMG was consistent with carpal tunnel syndrome but also severe peripheral neuropathy. Patient still taking pain pills 3 times a day this is been managed by her  because of her memory issues.     Review of Systems     Patient Active Problem List   Diagnosis    Essential hypertension    Generalized osteoarthrosis, involving multiple sites    Type 2 diabetes mellitus without complication ([address]    Degenerative lumbar spinal stenosis    Congenital clotting factor deficiency (HCC)    Hypercholesterolemia    GERD (gastroesophageal reflux disease)    Late onset Alzheimer's disease without behavioral disturbance (HCC)    Drug dependence (HCC)    Chronic low back pain without sciatica    Spondylolisthesis, lumbar region    Chronic pain syndrome    Hyperparathyroidism ([address]    Pain disorder with psychological factors    Current moderate episode of major depressive disorder without prior episode ([address]    TIA (transient ischemic attack)    Cubital tunnel syndrome on left    Left carpal tunnel syndrome    Right carpal tunnel syndrome       Outpatient Medications Marked as Taking for the 6/18/21 encounter (Office Visit) with Kian Diallo MD   Medication Sig Dispense Refill    DULoxetine (CYMBALTA) 60 MG extended release capsule TAKE ONE CAPSULE BY MOUTH DAILY 90 capsule 0    atorvastatin (LIPITOR) 40 MG tablet TAKE ONE TABLET BY MOUTH DAILY 90 tablet 0    amLODIPine (NORVASC) 10 MG tablet TAKE ONE TABLET BY MOUTH DAILY 90 tablet 0    omeprazole (PRILOSEC) 40 MG delayed release capsule TAKE ONE CAPSULE BY MOUTH DAILY 90 capsule 0    donepezil (ARICEPT) 10 MG tablet TAKE TWO TABLETS BY MOUTH EVERY NIGHT AT BEDTIME 180 tablet 1    busPIRone (BUSPAR) 15 MG tablet TAKE ONE TABLET BY MOUTH THREE TIMES A DAY AS NEEDED FOR ANXIETY 90 tablet 5    diclofenac sodium (VOLTAREN) 1 % GEL Apply 2 g topically 3 times daily 2 Tube 1    Melatonin 5 MG CAPS 1-2 qhs for sleep 60 capsule 3    aspirin 81 MG EC tablet Take 1 tablet by mouth daily 30 tablet 3    Multiple Vitamins-Minerals (CENTRUM SILVER PO) Take 1 tablet by mouth daily          Allergies   Allergen Reactions    Hydrocodone      The patient has a history of a bleeding Ulcer, and this upset her stomach severely. Social History     Tobacco Use    Smoking status: Never Smoker    Smokeless tobacco: Never Used   Substance Use Topics    Alcohol use: No     Alcohol/week: 0.0 standard drinks       /70 (Site: Right Upper Arm, Position: Sitting, Cuff Size: Medium Adult)   Pulse 76   Temp 97.4 °F (36.3 °C) (Infrared)   Wt 136 lb (61.7 kg)   SpO2 98%   BMI 23.34 kg/m²       Objective:   Physical Exam  Vitals and nursing note reviewed. Constitutional:       General: She is not in acute distress. Appearance: Normal appearance. She is well-developed and well-groomed. Neck:      Vascular: No carotid bruit. Cardiovascular:      Rate and Rhythm: Normal rate and regular rhythm. Pulses:           Dorsalis pedis pulses are 2+ on the right side and 2+ on the left side. Posterior tibial pulses are 2+ on the right side and 2+ on the left side. Heart sounds: Normal heart sounds. No murmur heard. No friction rub. No gallop. Pulmonary:      Effort: Pulmonary effort is normal.      Breath sounds: Normal breath sounds.    Musculoskeletal:      Lumbar back: Tenderness (Sacroiliac joint bilaterally and sacrum) present. No swelling, edema, deformity or spasms. Decreased range of motion. Right upper leg: Normal. No swelling, deformity or tenderness. Left upper leg: Normal. No swelling, deformity or tenderness. Right lower leg: No edema. Left lower leg: No edema. Skin:     General: Skin is warm and dry. Neurological:      Mental Status: She is alert and oriented to person, place, and time. Sensory: Sensation is intact. Motor: Motor function is intact. Deep Tendon Reflexes:      Reflex Scores:       Patellar reflexes are 2+ on the right side and 2+ on the left side. Achilles reflexes are 2+ on the right side and 2+ on the left side. Psychiatric:         Attention and Perception: Attention and perception normal.         Mood and Affect: Mood and affect normal.         Speech: Speech normal.         Behavior: Behavior normal. Behavior is cooperative. Cognition and Memory: Cognition normal. Memory is impaired. Judgment: Judgment normal.         Assessment:      Gagan Alonso was seen today for 3 month follow-up. Diagnoses and all orders for this visit:    Current moderate episode of major depressive disorder without prior episode [facility])    Right carpal tunnel syndrome    Late onset Alzheimer's disease without behavioral disturbance ([facility]    Drug dependence ([facility]    OARRS report checked          Plan:      EMG reports reviewed with patient. Agree with additional evaluation already ordered by neurology. Maintain pain medication at minimum level    Continue current treatment plan regards to Alzheimer's dementia. No change in medication regards to depression and anxiety    RTC 3 months    Medical decision making of moderate complexity. Please note that this chart was generated using Dragon dictation software.  Although every effort was made to ensure the accuracy of this automated transcription, some errors in transcription may have occurred.

## 2021-06-19 DIAGNOSIS — G89.29 CHRONIC LOW BACK PAIN WITHOUT SCIATICA, UNSPECIFIED BACK PAIN LATERALITY: ICD-10-CM

## 2021-06-19 DIAGNOSIS — M54.50 CHRONIC LOW BACK PAIN WITHOUT SCIATICA, UNSPECIFIED BACK PAIN LATERALITY: ICD-10-CM

## 2021-06-21 ENCOUNTER — TELEPHONE (OUTPATIENT)
Dept: NEUROLOGY | Age: 76
End: 2021-06-21

## 2021-06-21 ENCOUNTER — HOSPITAL ENCOUNTER (OUTPATIENT)
Dept: MRI IMAGING | Age: 76
Discharge: HOME | End: 2021-06-21
Payer: COMMERCIAL

## 2021-06-21 DIAGNOSIS — G95.9 CERVICAL MYELOPATHY (HCC): ICD-10-CM

## 2021-06-21 PROCEDURE — 72141 MRI NECK SPINE W/O DYE: CPT

## 2021-06-21 RX ORDER — ACETAMINOPHEN AND CODEINE PHOSPHATE 300; 30 MG/1; MG/1
TABLET ORAL
Qty: 90 TABLET | Refills: 0 | Status: SHIPPED | OUTPATIENT
Start: 2021-06-21 | End: 2021-07-22

## 2021-06-21 NOTE — TELEPHONE ENCOUNTER
Medication:   Pending Prescriptions Disp Refills    acetaminophen-codeine (TYLENOL #3) 300-30 MG per tablet 90 tablet 0        Patient Phone Number: [phone]      Last appt: 6/18/2021   Next appt: 9/20/2021    Last OARRS:   RX Monitoring 12/3/2019   Attestation -   Periodic Controlled Substance Monitoring Possible medication side effects, risk of tolerance/dependence & alternative treatments discussed. ;No signs of potential drug abuse or diversion identified. ;Assessed functional status. Chronic Pain > 50 MEDD Obtained or confirmed \"Consent for Opioid Use\" on file.        Last PDMP Bandar Keyes as Reviewed [facility]):  Review User Review Instant Review Result   Aminta Jacob 6/18/2021  2:12 PM Reviewed PDMP [1]     Preferred Pharmacy:   [facility] [address], [address] [phone] - F [phone]

## 2021-06-22 ENCOUNTER — TELEPHONE (OUTPATIENT)
Dept: FAMILY MEDICINE CLINIC | Age: 76
End: 2021-06-22

## 2021-06-22 ENCOUNTER — TELEPHONE (OUTPATIENT)
Dept: NEUROLOGY | Age: 76
End: 2021-06-22

## 2021-06-22 ENCOUNTER — HOSPITAL ENCOUNTER (OUTPATIENT)
Age: 76
Discharge: HOME | End: 2021-06-22
Payer: COMMERCIAL

## 2021-06-22 ENCOUNTER — OFFICE VISIT (OUTPATIENT)
Dept: NEUROLOGY | Age: 76
End: 2021-06-22
Payer: COMMERCIAL

## 2021-06-22 VITALS — HEIGHT: 64 IN | BODY MASS INDEX: 23.22 KG/M2 | WEIGHT: 136 LBS

## 2021-06-22 DIAGNOSIS — R53.1 RAPIDLY PROGRESSIVE WEAKNESS: ICD-10-CM

## 2021-06-22 DIAGNOSIS — R74.8 ELEVATED LIVER ENZYMES: ICD-10-CM

## 2021-06-22 DIAGNOSIS — G60.9 IDIOPATHIC PERIPHERAL NEUROPATHY: ICD-10-CM

## 2021-06-22 DIAGNOSIS — G95.9 CERVICAL MYELOPATHY (HCC): Primary | ICD-10-CM

## 2021-06-22 LAB
A/G RATIO: 1.5 (ref 1.1–2.2)
ALBUMIN SERPL-MCNC: 4.2 G/DL (ref 3.4–5)
ALP BLD-CCNC: 90 U/L (ref 40–129)
ALT SERPL-CCNC: 14 U/L (ref 10–40)
ANION GAP SERPL CALCULATED.3IONS-SCNC: 13 MMOL/L (ref 3–16)
AST SERPL-CCNC: 22 U/L (ref 15–37)
BILIRUB SERPL-MCNC: 0.3 MG/DL (ref 0–1)
BUN BLDV-MCNC: 11 MG/DL (ref 7–20)
CALCIUM SERPL-MCNC: 10 MG/DL (ref 8.3–10.6)
CHLORIDE BLD-SCNC: 102 MMOL/L (ref 99–110)
CO2: 25 MMOL/L (ref 21–32)
CREAT SERPL-MCNC: 0.9 MG/DL (ref 0.6–1.2)
GFR AFRICAN AMERICAN: >60
GFR NON-AFRICAN AMERICAN: >60
GLOBULIN: 2.8 G/DL
GLUCOSE BLD-MCNC: 88 MG/DL (ref 70–99)
POTASSIUM SERPL-SCNC: 4.7 MMOL/L (ref 3.5–5.1)
SODIUM BLD-SCNC: 140 MMOL/L (ref 136–145)

## 2021-06-22 PROCEDURE — 83516 IMMUNOASSAY NONANTIBODY: CPT

## 2021-06-22 PROCEDURE — 86334 IMMUNOFIX E-PHORESIS SERUM: CPT

## 2021-06-22 PROCEDURE — 82525 ASSAY OF COPPER: CPT

## 2021-06-22 PROCEDURE — 84165 PROTEIN E-PHORESIS SERUM: CPT

## 2021-06-22 PROCEDURE — 84425 ASSAY OF VITAMIN B-1: CPT

## 2021-06-22 PROCEDURE — 84207 ASSAY OF VITAMIN B-6: CPT

## 2021-06-22 PROCEDURE — 83921 ORGANIC ACID SINGLE QUANT: CPT

## 2021-06-22 PROCEDURE — 99215 OFFICE O/P EST HI 40 MIN: CPT | Performed by: PSYCHIATRY & NEUROLOGY

## 2021-06-22 PROCEDURE — 36415 COLL VENOUS BLD VENIPUNCTURE: CPT

## 2021-06-22 PROCEDURE — 84155 ASSAY OF PROTEIN SERUM: CPT

## 2021-06-22 PROCEDURE — 80053 COMPREHEN METABOLIC PANEL: CPT

## 2021-06-22 RX ORDER — MIRTAZAPINE 15 MG/1
TABLET, FILM COATED ORAL
Qty: 30 TABLET | Refills: 2 | Status: SHIPPED | OUTPATIENT
Start: 2021-06-22 | End: 2021-09-21

## 2021-06-22 NOTE — TELEPHONE ENCOUNTER
PT is requesting a refill on acetaminophen-codeine (TYLENOL #3) 300-30 MG per tablet to please be called [facility], [address] - P [phone]

## 2021-06-22 NOTE — PROGRESS NOTES
keratosis     Symptomatic menopausal or female climacteric states     Type II or unspecified type diabetes mellitus without mention of complication, not stated as uncontrolled     Pt is not aware of having diabetes, does know her A1C runs high but blood sugar is generally normal     Family History   Problem Relation Age of Onset    Diabetes Sister     Heart Disease Brother     Heart Disease Sister     Emphysema Sister     High Blood Pressure Other      Social History     Socioeconomic History    Marital status:      Spouse name: None    Number of children: None    Years of education: None    Highest education level: None   Occupational History    Occupation: Retired     Comment: 2004   Tobacco Use    Smoking status: Never Smoker    Smokeless tobacco: Never Used   Vaping Use    Vaping Use: Never used   Substance and Sexual Activity    Alcohol use: No     Alcohol/week: 0.0 standard drinks    Drug use: Never    Sexual activity: Yes     Partners: Male   Other Topics Concern    None   Social History Narrative    None     Social Determinants of Health     Financial Resource Strain:     Difficulty of Paying Living Expenses:    Food Insecurity:     Worried About Running Out of Food in the Last Year:     Ran Out of Food in the Last Year:    Transportation Needs:     Lack of Transportation (Medical):      Lack of Transportation (Non-Medical):    Physical Activity:     Days of Exercise per Week:     Minutes of Exercise per Session:    Stress:     Feeling of Stress :    Social Connections:     Frequency of Communication with Friends and Family:     Frequency of Social Gatherings with Friends and Family:     Attends Bahai Services:     Active Member of Clubs or Organizations:     Attends Club or Organization Meetings:     Marital Status:    Intimate Partner Violence:     Fear of Current or Ex-Partner:     Emotionally Abused:     Physically Abused:     Sexually Abused: Objective:  Exam:  Ht 5' 4\" (1.626 m)   Wt 136 lb (61.7 kg)   BMI 23.34 kg/m²   This is a well-nourished patient in no acute distress  Patient is awake, alert and oriented x3. Speech is normal.  Pupils are equal round reacting to light. Extraocular movements intact. Face symmetrical. Tongue midline. Motor exam shows normal symmetrical strength. Deep tendon reflexes 2+ in the upper extremities today at the knees and 2+ at the ankles. . Plantar reflexes are equivocal bilaterally. Sensory exam shows decreased light touch in the distal lower extremities. Coordination normal. Gait normal. No carotid bruit. No neck stiffness. Data :  LABS:  General Labs:    CBC:   Lab Results   Component Value Date    WBC 12.5 05/23/2020    RBC 4.27 05/23/2020    HGB 13.5 05/23/2020    HCT 40.1 05/23/2020    MCV 93.9 05/23/2020    MCH 31.6 05/23/2020    MCHC 33.7 05/23/2020    RDW 13.7 05/23/2020     05/23/2020    MPV 7.5 05/23/2020     BMP:    Lab Results   Component Value Date     03/16/2021    K 4.6 03/16/2021    K 4.5 05/22/2020     03/16/2021    CO2 28 03/16/2021    BUN 16 03/16/2021    LABALBU 4.3 03/16/2021    CREATININE 0.8 03/16/2021    CALCIUM 9.9 03/16/2021    GFRAA >60 03/16/2021    GFRAA >60 11/17/2012    LABGLOM >60 03/16/2021    GLUCOSE 89 03/16/2021       Impression :  MRI cervical spine images were independently reviewed with the patient and her  in the office today. There was severe spinal stenosis with cord compression at the C3-C4 level with some mild myelomalacia as well. So her symptoms are probably from a combination of cervical myelopathy as well as peripheral neuropathy. EMG and nerve conduction studies showed rather severe peripheral neuropathy in both lower extremities.   There was also bilateral ulnar nerve entrapment at the elbow as well as bilateral carpal tunnel syndrome  Work-up so far has included T4 TSH B12 level and serum hemoglobin A1c which were all normal.  Patient has not had her other blood work done yet. Plan :  Discussed with patient and   Explained the MRI cervical spine findings  Explained EMG findings  I recommended that patient go and get the blood work-serum BESSY, B1 B6 and copper levelsserum mag antibodies and GM 1 antibody levels  I will check serum methylmalonic acid level  I will refer the patient to [place] neurosurgery to consider surgical options for the severe myelopathy. Because of the progressive nature of the weakness, I feel that she should see neurosurgery as soon as possible. In the meantime she will get laboratory testing done and if we find any significant abnormalities we will treat that appropriately as well. .        Please note a portion of  this chart was generated using dragon dictation software. Although every effort was made to ensure the accuracy of this automated transcription, some errors in transcription may have occurred.

## 2021-06-22 NOTE — TELEPHONE ENCOUNTER
Spoke to Michelle Moore and pt has been scheduled to see Dr Sandy Rowe on 7/2/2021 @ 11am. Referral and pertinent info has been faxed to [phone], per Michelle Moore and patient has been advised of this appt. Pt states understanding.     [address] [facility], [address] [employer]

## 2021-06-22 NOTE — LETTER
[facility]  [address] [place] [address] [address] [address]  Phone: [phone]  Fax: [phone]    Ava Pena MD    June 22, 2021     MD [address] [place] [address]    Patient: Bernadette Goldberg   MR Number: [id number]   YOB: 1945   Date of Visit: 6/22/2021       Dear Kody Maldonado: Thank you for referring Deng Mccarthy to me for evaluation/treatment. Below are the relevant portions of my assessment and plan of care. If you have questions, please do not hesitate to call me. I look forward to following Reji Vargas along with you.     Sincerely,    MD Ava Myrick MD

## 2021-06-22 NOTE — TELEPHONE ENCOUNTER
Called to schedule ASAP appt with [facility]. Left message for Dr Hauser   to r/c to discuss working in pt ASAP. Scheduling is not able to get pt in until 7/15/21.

## 2021-06-23 LAB
ALBUMIN SERPL-MCNC: 3.3 G/DL (ref 3.1–4.9)
ALPHA-1-GLOBULIN: 0.3 G/DL (ref 0.2–0.4)
ALPHA-2-GLOBULIN: 1.2 G/DL (ref 0.4–1.1)
BETA GLOBULIN: 1.4 G/DL (ref 0.9–1.6)
GAMMA GLOBULIN: 0.8 G/DL (ref 0.6–1.8)
SPE/IFE INTERPRETATION: NORMAL
TOTAL PROTEIN: 7 G/DL (ref 6.4–8.2)

## 2021-06-24 LAB — MISCELLANEOUS LAB TEST RESULT: NORMAL

## 2021-06-26 LAB
COPPER: 126.7 UG/DL (ref 80–155)
METHYLMALONIC ACID: 0.16 UMOL/L (ref 0–0.4)
MISCELLANEOUS LAB TEST ORDER: NORMAL
VITAMIN B1, PLASMA: 20 NMOL/L (ref 4–15)

## 2021-07-01 LAB — VITAMIN B6: 94 NMOL/L (ref 20–125)

## 2021-07-16 ENCOUNTER — TELEPHONE (OUTPATIENT)
Dept: FAMILY MEDICINE CLINIC | Age: 76
End: 2021-07-16

## 2021-07-16 NOTE — TELEPHONE ENCOUNTER
----- Message from Marisela Finn sent at 7/15/2021  2:50 PM EDT -----  Subject: Message to Provider    QUESTIONS  Information for Provider? Pt. needs a pre op physical and believes it   needs to be done 30 days before her surgery. I explained typically it   needs to be done within 30 days prior to her appt. Pt. would like   clarification and a call to discuss her pre op physical.   ---------------------------------------------------------------------------  --------------  CALL BACK INFO  What is the best way for the office to contact you? OK to leave message on   voicemail  Preferred Call Back Phone Number? [phone]  ---------------------------------------------------------------------------  --------------  SCRIPT ANSWERS  Relationship to Patient?  Self

## 2021-07-16 NOTE — TELEPHONE ENCOUNTER
Patient advised that her preop has to be within 30 days of her surgery and it is. Her surgery is on August 19 and her appt is on August 10.

## 2021-07-21 RX ORDER — BUSPIRONE HYDROCHLORIDE 15 MG/1
TABLET ORAL
Qty: 90 TABLET | Refills: 2 | Status: SHIPPED | OUTPATIENT
Start: 2021-07-21 | End: 2021-10-29

## 2021-07-22 DIAGNOSIS — M54.50 CHRONIC LOW BACK PAIN WITHOUT SCIATICA, UNSPECIFIED BACK PAIN LATERALITY: ICD-10-CM

## 2021-07-22 DIAGNOSIS — G89.29 CHRONIC LOW BACK PAIN WITHOUT SCIATICA, UNSPECIFIED BACK PAIN LATERALITY: ICD-10-CM

## 2021-07-22 RX ORDER — ACETAMINOPHEN AND CODEINE PHOSPHATE 300; 30 MG/1; MG/1
TABLET ORAL
Qty: 90 TABLET | Refills: 0 | Status: ON HOLD
Start: 2021-07-22 | End: 2021-08-19 | Stop reason: HOSPADM

## 2021-07-22 NOTE — TELEPHONE ENCOUNTER
Medication:   Requested Prescriptions     Pending Prescriptions Disp Refills    acetaminophen-codeine (TYLENOL #3) 300-30 MG per tablet [Pharmacy Med Name: ACETAMINOPHEN-CODEINE 300-30 MG TAB] 90 tablet 0     Sig: TAKE ONE TABLET BY MOUTH EVERY 8 HOURS AS NEEDED FOR PAIN . REDUCE DOSES TAKEN AS PAIN BECOMES MANAGEABLE      Last Filled:      Patient Phone Number: [phone] (home)     Last appt: 6/18/2021   Next appt: 8/10/2021    Last OARRS:   RX Monitoring 12/3/2019   Attestation -   Periodic Controlled Substance Monitoring Possible medication side effects, risk of tolerance/dependence & alternative treatments discussed. ;No signs of potential drug abuse or diversion identified. ;Assessed functional status. Chronic Pain > 50 MEDD Obtained or confirmed \"Consent for Opioid Use\" on file.      PDMP Monitoring:    Last PDMP Ruth Mckay as Reviewed [facility]):  Review User Review Instant Review Result   Kaela Fairbanks 6/18/2021  2:12 PM Reviewed PDMP [1]     Preferred Pharmacy:   [facility], [address] [facility] [phone] Harini Herrera [phone] [address] [facility] Kathryn Mijares [address]  Phone: [phone] Fax: [phone]

## 2021-07-23 ENCOUNTER — TELEPHONE (OUTPATIENT)
Dept: FAMILY MEDICINE CLINIC | Age: 76
End: 2021-07-23

## 2021-07-23 NOTE — TELEPHONE ENCOUNTER
If patient is under the care of Neurology for her peripheral neuropathy she needs to contact their office.

## 2021-07-23 NOTE — TELEPHONE ENCOUNTER
PT is shaking her arms and legs and she feels numbness and heaviness all over her body. PT is requesting something to help her feel better. PT is thinking it's her Neuropathy? ? Please Advise

## 2021-07-26 ENCOUNTER — TELEPHONE (OUTPATIENT)
Dept: NEUROLOGY | Age: 76
End: 2021-07-26

## 2021-07-26 DIAGNOSIS — G60.9 IDIOPATHIC PERIPHERAL NEUROPATHY: Primary | ICD-10-CM

## 2021-07-27 RX ORDER — GABAPENTIN 100 MG/1
CAPSULE ORAL
Qty: 90 CAPSULE | Refills: 0 | Status: SHIPPED
Start: 2021-07-27 | End: 2021-08-10 | Stop reason: SINTOL

## 2021-07-27 NOTE — TELEPHONE ENCOUNTER
Reached [facility]. LMOR advising gabapentin 100mg tid has been prescribed however she'll need to titrate or as tolerated from 1 tab qd for a few days, 1 tab bid for a few days then 1 tab tid thereafter.

## 2021-07-29 NOTE — PROGRESS NOTES
Name_______________________________________Printed:____________________  Date and time of surgery___8/17/21  0730_____________________Arrival Time:____0600  main____________   1. The instructions given regarding when and if a patient needs to stop oral intake prior to surgery varies. Follow the specific instructions you were given                  _XXX__Nothing to eat or to drink after Midnight the night before.                   ____Carbo loading or ERAS instructions will be given to select patients-if you have been given those instructions -please do the following                           The evening before your surgery after dinner before midnight drink 40 ounces of gatorade. If you are diabetic use sugar free. The morning of surgery drink 40 ounces of water. This needs to be finished 3 hours prior to your surgery start time. 2. Take the following pills with a small sip of water on the morning of surgery____amlodipine, prilosec_______________________________________________                  Do not take blood pressure medications ending in pril or sartan the deloris prior to surgery or the morning of surgery_   3. Aspirin, Ibuprofen, Advil, Naproxen, Vitamin E and other Anti-inflammatory products and supplements should be stopped for 5 -7days before surgery or as directed by your physician. 4. Check with your Doctor regarding stopping Plavix, Coumadin,Eliquis, Lovenox,Effient,Pradaxa,Xarelto, Fragmin or other blood thinners and follow their instructions. 5. Do not smoke, and do not drink any alcoholic beverages 24 hours prior to surgery. This includes NA Beer. Refrain from the usage of any recreational drugs. 6. You may brush your teeth and gargle the morning of surgery. DO NOT SWALLOW WATER   7. You MUST make arrangements for a responsible adult to stay on site while you are here and take you home after your surgery. You will not be allowed to leave alone or drive yourself home.   It is strongly suggested someone stay with you the first 24 hrs. Your surgery will be cancelled if you do not have a ride home. 8. A parent/legal guardian must accompany a child scheduled for surgery and plan to stay at the hospital until the child is discharged. Please do not bring other children with you. 9. Please wear simple, loose fitting clothing to the hospital.  Louvella Castleman not bring valuables (money, credit cards, checkbooks, etc.) Do not wear any makeup (including no eye makeup) or nail polish on your fingers or toes. 10. DO NOT wear any jewelry or piercings on day of surgery. All body piercing jewelry must be removed. 11. If you have ___dentures, they will be removed before going to the OR; we will provide you a container. If you wear ___contact lenses or ___glasses, they will be removed; please bring a case for them. 12. Please see your family doctor/pediatrician for a history & physical and/or concerning medications. Bring any test results/reports from your physician's office. PCP__________________Phone___________H&P Appt. Date________             13 If you  have a Living Will and Durable Power of  for Healthcare, please bring in a copy. 15. Notify your Surgeon if you develop any illness between now and surgery  time, cough, cold, fever, sore throat, nausea, vomiting, etc.  Please notify your surgeon if you experience dizziness, shortness of breath or blurred vision between now & the time of your surgery             15. DO NOT shave your operative site 96 hours prior to surgery. For face & neck surgery, men may use an electric razor 48 hours prior to surgery. 16. Shower the night before or morning of surgery using an antibacterial soap or as you have been instructed. 17. To provide excellent care visitors will be limited to one in the room at any given time. 18.  Please bring picture ID and insurance card.              19.  Visit our web site for additional information:  [email or web address]              20.During flu season no children under the age of 15 are permitted in the hospital for the safety of all patients. 21. If you take a long acting insulin in the evening only  take half of your usual  dose the night  before your procedure              22. If you use a c-pap please bring DOS if staying overnight,             23.For your convenience [facility] has a pharmacy on site to fill your prescriptions. 24. If you use oxygen and have a portable tank please bring it  with you the DOS             25. Bring a complete list of all your medications with name and dose include any supplements. 26. Other__________________________________________   *Please call pre admission testing if you any further questions   Marlon Jeter   [address]    [facility] [place] [place]  [phone]   [facility] [address] LORE SIMONS   [phone]           COVID TESTING    _X__ Vaccinated-patient instructed to bring card    ___ Covid test to be done 3-5 days prior to scheduled surgery if not vaccinated-patient aware they are REQUIRED to bring a copy of the negative result DOS-if they receive a positive result to notify their surgeon         If known - indicate where patient is getting covid test done ___________________________________________________________    ___ [facility] - DOS    ___ Other___________________________________      Sally Nevarez POLICY(subject to change)    There is a one visitor policy at [facility] for all surgeries and endoscopies. Whether the visitor can stay or will be asked to wait in the car will depend on the current policy and if social distancing can be maintained. The policy is subject to change at any time. Please make sure the visitor has a cell phone that is on,charged and able to accept calls, as this may be the way that the staff communicates with them. Pain management is NO VISITOR policyThe patients ride is expected to remain in the car with a cell phone for communication. If the ride is leaving the hospital grounds please make sure they are back in time for pickup. Have the patient inform the staff on arrival what their rides plans are while the patient is in the facility. At the MAIN there is one visitor allowed. Please note that the visitor policy is subject to change. All above information reviewed with patient in person or by phone. Patient verbalizes understanding. All questions and concerns addressed.                                                                                                  Patient/Rep____________________                                                                                                                                    PRE OP INSTRUCTIONS

## 2021-08-02 ENCOUNTER — HOSPITAL ENCOUNTER (OUTPATIENT)
Age: 76
Discharge: HOME | End: 2021-08-02
Payer: COMMERCIAL

## 2021-08-02 DIAGNOSIS — E11.9 TYPE 2 DIABETES MELLITUS WITHOUT COMPLICATION, WITHOUT LONG-TERM CURRENT USE OF INSULIN (HCC): ICD-10-CM

## 2021-08-02 DIAGNOSIS — Z01.818 PREOP TESTING: ICD-10-CM

## 2021-08-02 LAB
ANION GAP SERPL CALCULATED.3IONS-SCNC: 8 MMOL/L (ref 3–16)
APTT: 38.9 SEC (ref 26.2–38.6)
BUN BLDV-MCNC: 11 MG/DL (ref 7–20)
CALCIUM SERPL-MCNC: 10.4 MG/DL (ref 8.3–10.6)
CHLORIDE BLD-SCNC: 104 MMOL/L (ref 99–110)
CO2: 29 MMOL/L (ref 21–32)
CREAT SERPL-MCNC: 0.9 MG/DL (ref 0.6–1.2)
GFR AFRICAN AMERICAN: >60
GFR NON-AFRICAN AMERICAN: >60
GLUCOSE BLD-MCNC: 99 MG/DL (ref 70–99)
HCT VFR BLD CALC: 34.2 % (ref 36–48)
HEMOGLOBIN: 11.1 G/DL (ref 12–16)
INR BLD: 1.08 (ref 0.88–1.12)
MCH RBC QN AUTO: 26.4 PG (ref 26–34)
MCHC RBC AUTO-ENTMCNC: 32.3 G/DL (ref 31–36)
MCV RBC AUTO: 81.8 FL (ref 80–100)
PDW BLD-RTO: 16 % (ref 12.4–15.4)
PLATELET # BLD: 343 K/UL (ref 135–450)
PMV BLD AUTO: 7.5 FL (ref 5–10.5)
POTASSIUM SERPL-SCNC: 4.9 MMOL/L (ref 3.5–5.1)
PROTHROMBIN TIME: 12.2 SEC (ref 9.9–12.7)
RBC # BLD: 4.19 M/UL (ref 4–5.2)
SODIUM BLD-SCNC: 141 MMOL/L (ref 136–145)
WBC # BLD: 7 K/UL (ref 4–11)

## 2021-08-02 PROCEDURE — 36415 COLL VENOUS BLD VENIPUNCTURE: CPT

## 2021-08-02 PROCEDURE — 85027 COMPLETE CBC AUTOMATED: CPT

## 2021-08-02 PROCEDURE — 85730 THROMBOPLASTIN TIME PARTIAL: CPT

## 2021-08-02 PROCEDURE — 85610 PROTHROMBIN TIME: CPT

## 2021-08-02 PROCEDURE — 80048 BASIC METABOLIC PNL TOTAL CA: CPT

## 2021-08-05 RX ORDER — OMEPRAZOLE 40 MG/1
CAPSULE, DELAYED RELEASE ORAL
Qty: 90 CAPSULE | Refills: 0 | Status: SHIPPED | OUTPATIENT
Start: 2021-08-05 | End: 2021-11-09 | Stop reason: SDUPTHER

## 2021-08-05 NOTE — TELEPHONE ENCOUNTER
Disp Refills Start End    omeprazole (PRILOSEC) 40 MG delayed release capsule 90 capsule 0 2/5/2021     Sig: TAKE ONE CAPSULE BY MOUTH DAILY      Eulogio BYERS [address], [place] - [address] - P [phone] - F [phone]     Provider out of office    Please advise

## 2021-08-05 NOTE — TELEPHONE ENCOUNTER
Medication:   Requested Prescriptions     Pending Prescriptions Disp Refills    omeprazole (PRILOSEC) 40 MG delayed release capsule 90 capsule 0     Sig: TAKE ONE CAPSULE BY MOUTH DAILY          Patient Phone Number: [phone] (home)     Last appt: 6/18/2021   Next appt: 8/10/2021    Last OARRS:   RX Monitoring 12/3/2019   Attestation -   Periodic Controlled Substance Monitoring Possible medication side effects, risk of tolerance/dependence & alternative treatments discussed. ;No signs of potential drug abuse or diversion identified. ;Assessed functional status. Chronic Pain > 50 MEDD Obtained or confirmed \"Consent for Opioid Use\" on file.      PDMP Monitoring:    Last PDMP Kurtis Nunez as Reviewed [facility]):  Review User Review Instant Review Result   Juan Alberto Nguyen 6/18/2021  2:12 PM Reviewed PDMP [1]     Preferred Pharmacy:   [address] [place] [address] [address], [address] [facility] [phone] Hamida Hatton [phone] [address] [facility]  [address] [place] [address]  Phone: [phone] Fax: [phone]

## 2021-08-10 ENCOUNTER — OFFICE VISIT (OUTPATIENT)
Dept: FAMILY MEDICINE CLINIC | Age: 76
End: 2021-08-10
Payer: COMMERCIAL

## 2021-08-10 VITALS
BODY MASS INDEX: 25.4 KG/M2 | OXYGEN SATURATION: 99 % | WEIGHT: 148 LBS | TEMPERATURE: 97.2 F | SYSTOLIC BLOOD PRESSURE: 120 MMHG | HEART RATE: 92 BPM | DIASTOLIC BLOOD PRESSURE: 76 MMHG

## 2021-08-10 DIAGNOSIS — M48.02 CERVICAL STENOSIS OF SPINAL CANAL: Primary | ICD-10-CM

## 2021-08-10 DIAGNOSIS — I44.7 LEFT BUNDLE BRANCH BLOCK: ICD-10-CM

## 2021-08-10 DIAGNOSIS — Z01.818 PRE-OP EXAM: ICD-10-CM

## 2021-08-10 DIAGNOSIS — E11.9 TYPE 2 DIABETES MELLITUS WITHOUT COMPLICATION, WITHOUT LONG-TERM CURRENT USE OF INSULIN (HCC): ICD-10-CM

## 2021-08-10 DIAGNOSIS — I10 ESSENTIAL HYPERTENSION: ICD-10-CM

## 2021-08-10 DIAGNOSIS — F32.1 CURRENT MODERATE EPISODE OF MAJOR DEPRESSIVE DISORDER WITHOUT PRIOR EPISODE (HCC): ICD-10-CM

## 2021-08-10 DIAGNOSIS — G30.1 LATE ONSET ALZHEIMER'S DISEASE WITHOUT BEHAVIORAL DISTURBANCE (HCC): ICD-10-CM

## 2021-08-10 DIAGNOSIS — D68.2 CONGENITAL CLOTTING FACTOR DEFICIENCY (HCC): ICD-10-CM

## 2021-08-10 DIAGNOSIS — F02.80 LATE ONSET ALZHEIMER'S DISEASE WITHOUT BEHAVIORAL DISTURBANCE (HCC): ICD-10-CM

## 2021-08-10 PROCEDURE — 99214 OFFICE O/P EST MOD 30 MIN: CPT | Performed by: FAMILY MEDICINE

## 2021-08-10 PROCEDURE — 93000 ELECTROCARDIOGRAM COMPLETE: CPT | Performed by: FAMILY MEDICINE

## 2021-08-10 NOTE — PROGRESS NOTES
Preoperative Consultation    Evangelina Person  YOB: 1945    This patient presents to the office today for a preoperative consultation at the request of surgeon, Dr. Milan Marx, who plans on performing neck surgery (cervical spine) on August 17 at [facility].      Patient had recent evaluation with neurology demonstrating peripheral neuropathy but cervical spine x-ray demonstrated significant spinal stenosis and patient was referred to neurosurgery for surgical consultation.   She is also been on gabapentin medication I have not edema problems    Planned anesthesia: General   Known anesthesia problems: None   Bleeding risk: No recent or remote history of abnormal bleeding  Personal or FH of DVT/PE: Yes -congenital clotting factor deficiency    Patient Active Problem List   Diagnosis    Essential hypertension    Generalized osteoarthrosis, involving multiple sites    Type 2 diabetes mellitus without complication ([address]    Degenerative lumbar spinal stenosis    Congenital clotting factor deficiency (HCC)    Hypercholesterolemia    GERD (gastroesophageal reflux disease)    Late onset Alzheimer's disease without behavioral disturbance (HCC)    Drug dependence (HCC)    Chronic low back pain without sciatica    Spondylolisthesis, lumbar region    Chronic pain syndrome    Hyperparathyroidism ([address]    Pain disorder with psychological factors    Current moderate episode of major depressive disorder without prior episode ([address]    TIA (transient ischemic attack)    Cubital tunnel syndrome on left    Left carpal tunnel syndrome    Right carpal tunnel syndrome     Past Surgical History:   Procedure Laterality Date    ARM SURGERY Left 11/12/2020    LEFT ULNAR NERVE TRANSPOSITION performed by Sandra Michelle MD at [address] [place] [address] Left 11/12/2020    LEFT CUBITAL RELEASE; LEFT CARPAL TUNNEL RELEASE performed by Sandra Michelle MD 120/76 (Site: Right Upper Arm, Position: Sitting, Cuff Size: Medium Adult)   Pulse 92   Temp 97.2 °F (36.2 °C) (Infrared)   Wt 148 lb (67.1 kg)   SpO2 99%   BMI 25.40 kg/m²   Weight: 148 lb (67.1 kg)   Constitutional: She is oriented to person, place, and time. She appears well-developed and well-nourished. No distress. HENT:   Head: Normocephalic and atraumatic. Mouth/Throat: Uvula is midline, oropharynx is clear and moist and mucous membranes are normal.   Eyes: Conjunctivae and EOM are normal. Pupils are equal, round, and reactive to light. Neck: Trachea normal and normal range of motion. Neck supple. No JVD present. Carotid bruit is not present. No mass and no thyromegaly present. Cardiovascular: Normal rate, regular rhythm, normal heart sounds and intact distal pulses. Exam reveals no gallop and no friction rub. No murmur heard. Pulmonary/Chest: Effort normal and breath sounds normal. No respiratory distress. She has no wheezes. She has no rales. Abdominal: Soft. Normal aorta and bowel sounds are normal. She exhibits no distension and no mass. There is no hepatosplenomegaly. No tenderness. Musculoskeletal: She exhibits no edema and no tenderness. Neurological: She is alert and oriented to person, place, and time. She has normal strength. No cranial nerve deficit or sensory deficit. Coordination and gait normal.   Skin: Skin is warm and dry. No rash noted. No erythema. 2+ leg edema bilaterally    EKG Interpretation:  normal sinus rhythm, LBBB, unchanged from previous tracings.     Lab Review Yes       Assessment:       Darek Watt was seen today for pre-op exam.    Diagnoses and all orders for this visit:    Cervical stenosis of spinal canal    Pre-op exam  -     EKG 12 Lead    Essential hypertension-continue current blood pressure management    Left bundle branch block-chronic per old EKG does not require additional intervention    Current moderate episode of major depressive disorder without prior episode ([facility]    Late onset Alzheimer's disease without behavioral disturbance ([facility]    Type 2 diabetes mellitus without complication, without long-term current use of insulin (HCC)    Congenital clotting factor deficiency (HCC)-she has not had any issues with bleeding or clotting problems after prior surgeries. Plan:     1. Preoperative workup as follows: none  2. Change in medication regimen before surgery: Discontinue ASA 7 days before surgery and discontinue gabapentin medication which is causing edema  3. No contraindications to planned surgery  4. Medical decision making of moderate complexity. Note electronically signed by provider.

## 2021-08-12 ENCOUNTER — HOSPITAL ENCOUNTER (OUTPATIENT)
Dept: GENERAL RADIOLOGY | Age: 76
Discharge: HOME | End: 2021-08-12
Payer: COMMERCIAL

## 2021-08-12 DIAGNOSIS — Z78.0 POSTMENOPAUSAL: ICD-10-CM

## 2021-08-12 PROCEDURE — 77080 DXA BONE DENSITY AXIAL: CPT

## 2021-08-16 ENCOUNTER — ANESTHESIA EVENT (OUTPATIENT)
Dept: OPERATING ROOM | Age: 76
DRG: 472 | End: 2021-08-16
Payer: COMMERCIAL

## 2021-08-17 ENCOUNTER — APPOINTMENT (OUTPATIENT)
Dept: GENERAL RADIOLOGY | Age: 76
DRG: 472 | End: 2021-08-17
Attending: NEUROLOGICAL SURGERY
Payer: COMMERCIAL

## 2021-08-17 ENCOUNTER — HOSPITAL ENCOUNTER (INPATIENT)
Age: 76
LOS: 2 days | Discharge: HOME HEALTH | DRG: 472 | End: 2021-08-19
Attending: NEUROLOGICAL SURGERY | Admitting: NEUROLOGICAL SURGERY
Payer: COMMERCIAL

## 2021-08-17 ENCOUNTER — ANESTHESIA (OUTPATIENT)
Dept: OPERATING ROOM | Age: 76
DRG: 472 | End: 2021-08-17
Payer: COMMERCIAL

## 2021-08-17 VITALS
OXYGEN SATURATION: 100 % | TEMPERATURE: 96.1 F | SYSTOLIC BLOOD PRESSURE: 138 MMHG | RESPIRATION RATE: 13 BRPM | DIASTOLIC BLOOD PRESSURE: 64 MMHG

## 2021-08-17 DIAGNOSIS — M48.02 CERVICAL SPINAL STENOSIS: ICD-10-CM

## 2021-08-17 DIAGNOSIS — Z01.818 PREOP TESTING: Primary | ICD-10-CM

## 2021-08-17 LAB
GLUCOSE BLD-MCNC: 142 MG/DL (ref 70–99)
GLUCOSE BLD-MCNC: 165 MG/DL (ref 70–99)
GLUCOSE BLD-MCNC: 172 MG/DL (ref 70–99)
GLUCOSE BLD-MCNC: 230 MG/DL (ref 70–99)
PERFORMED ON: ABNORMAL

## 2021-08-17 PROCEDURE — 6370000000 HC RX 637 (ALT 250 FOR IP): Performed by: NEUROLOGICAL SURGERY

## 2021-08-17 PROCEDURE — P9045 ALBUMIN (HUMAN), 5%, 250 ML: HCPCS | Performed by: NURSE ANESTHETIST, CERTIFIED REGISTERED

## 2021-08-17 PROCEDURE — 6360000002 HC RX W HCPCS: Performed by: NURSE ANESTHETIST, CERTIFIED REGISTERED

## 2021-08-17 PROCEDURE — 3700000000 HC ANESTHESIA ATTENDED CARE: Performed by: NEUROLOGICAL SURGERY

## 2021-08-17 PROCEDURE — 6360000002 HC RX W HCPCS: Performed by: ANESTHESIOLOGY

## 2021-08-17 PROCEDURE — 1200000000 HC SEMI PRIVATE

## 2021-08-17 PROCEDURE — 94150 VITAL CAPACITY TEST: CPT

## 2021-08-17 PROCEDURE — 0RG2071 FUSION OF 2 OR MORE CERVICAL VERTEBRAL JOINTS WITH AUTOLOGOUS TISSUE SUBSTITUTE, POSTERIOR APPROACH, POSTERIOR COLUMN, OPEN APPROACH: ICD-10-PCS | Performed by: NEUROLOGICAL SURGERY

## 2021-08-17 PROCEDURE — 2500000003 HC RX 250 WO HCPCS: Performed by: NEUROLOGICAL SURGERY

## 2021-08-17 PROCEDURE — 3700000001 HC ADD 15 MINUTES (ANESTHESIA): Performed by: NEUROLOGICAL SURGERY

## 2021-08-17 PROCEDURE — 7100000000 HC PACU RECOVERY - FIRST 15 MIN: Performed by: NEUROLOGICAL SURGERY

## 2021-08-17 PROCEDURE — 6370000000 HC RX 637 (ALT 250 FOR IP): Performed by: INTERNAL MEDICINE

## 2021-08-17 PROCEDURE — 7100000001 HC PACU RECOVERY - ADDTL 15 MIN: Performed by: NEUROLOGICAL SURGERY

## 2021-08-17 PROCEDURE — 2720000010 HC SURG SUPPLY STERILE: Performed by: NEUROLOGICAL SURGERY

## 2021-08-17 PROCEDURE — 72020 X-RAY EXAM OF SPINE 1 VIEW: CPT

## 2021-08-17 PROCEDURE — 2500000003 HC RX 250 WO HCPCS: Performed by: NURSE ANESTHETIST, CERTIFIED REGISTERED

## 2021-08-17 PROCEDURE — 6370000000 HC RX 637 (ALT 250 FOR IP): Performed by: NURSE ANESTHETIST, CERTIFIED REGISTERED

## 2021-08-17 PROCEDURE — G0378 HOSPITAL OBSERVATION PER HR: HCPCS

## 2021-08-17 PROCEDURE — 6360000002 HC RX W HCPCS: Performed by: NEUROLOGICAL SURGERY

## 2021-08-17 PROCEDURE — C1713 ANCHOR/SCREW BN/BN,TIS/BN: HCPCS | Performed by: NEUROLOGICAL SURGERY

## 2021-08-17 PROCEDURE — 6370000000 HC RX 637 (ALT 250 FOR IP): Performed by: ANESTHESIOLOGY

## 2021-08-17 PROCEDURE — 94760 N-INVAS EAR/PLS OXIMETRY 1: CPT

## 2021-08-17 PROCEDURE — 2580000003 HC RX 258: Performed by: NEUROLOGICAL SURGERY

## 2021-08-17 PROCEDURE — 01N10ZZ RELEASE CERVICAL NERVE, OPEN APPROACH: ICD-10-PCS | Performed by: NEUROLOGICAL SURGERY

## 2021-08-17 PROCEDURE — 3600000014 HC SURGERY LEVEL 4 ADDTL 15MIN: Performed by: NEUROLOGICAL SURGERY

## 2021-08-17 PROCEDURE — 2709999900 HC NON-CHARGEABLE SUPPLY: Performed by: NEUROLOGICAL SURGERY

## 2021-08-17 PROCEDURE — 3209999900 FLUORO FOR SURGICAL PROCEDURES

## 2021-08-17 PROCEDURE — 3600000004 HC SURGERY LEVEL 4 BASE: Performed by: NEUROLOGICAL SURGERY

## 2021-08-17 DEVICE — MULTI AXIAL SCREW 3603512 3.5 X 12MM
Type: IMPLANTABLE DEVICE | Status: FUNCTIONAL
Brand: INFINITY™ OCCIPITOCERVICAL UPPER THORACIC SYSTEM

## 2021-08-17 DEVICE — GRAFT BONE SUB H2MM FACET SHIM FEE: Type: IMPLANTABLE DEVICE | Site: SPINE CERVICAL | Status: FUNCTIONAL

## 2021-08-17 RX ORDER — SODIUM CHLORIDE AND POTASSIUM CHLORIDE .9; .15 G/100ML; G/100ML
1000 SOLUTION INTRAVENOUS CONTINUOUS
Status: DISCONTINUED | OUTPATIENT
Start: 2021-08-17 | End: 2021-08-17 | Stop reason: ALTCHOICE

## 2021-08-17 RX ORDER — INSULIN LISPRO 100 [IU]/ML
0-12 INJECTION, SOLUTION INTRAVENOUS; SUBCUTANEOUS
Status: DISCONTINUED | OUTPATIENT
Start: 2021-08-17 | End: 2021-08-19 | Stop reason: HOSPADM

## 2021-08-17 RX ORDER — BUSPIRONE HYDROCHLORIDE 5 MG/1
15 TABLET ORAL 3 TIMES DAILY
Status: DISCONTINUED | OUTPATIENT
Start: 2021-08-17 | End: 2021-08-19 | Stop reason: HOSPADM

## 2021-08-17 RX ORDER — HYDROCODONE BITARTRATE AND ACETAMINOPHEN 5; 325 MG/1; MG/1
1 TABLET ORAL
Status: DISCONTINUED | OUTPATIENT
Start: 2021-08-17 | End: 2021-08-17

## 2021-08-17 RX ORDER — 0.9 % SODIUM CHLORIDE 0.9 %
500 INTRAVENOUS SOLUTION INTRAVENOUS PRN
Status: DISCONTINUED | OUTPATIENT
Start: 2021-08-17 | End: 2021-08-19 | Stop reason: HOSPADM

## 2021-08-17 RX ORDER — PROPOFOL 10 MG/ML
INJECTION, EMULSION INTRAVENOUS PRN
Status: DISCONTINUED | OUTPATIENT
Start: 2021-08-17 | End: 2021-08-17 | Stop reason: SDUPTHER

## 2021-08-17 RX ORDER — DEXTROSE MONOHYDRATE 50 MG/ML
100 INJECTION, SOLUTION INTRAVENOUS PRN
Status: DISCONTINUED | OUTPATIENT
Start: 2021-08-17 | End: 2021-08-19 | Stop reason: HOSPADM

## 2021-08-17 RX ORDER — ONDANSETRON 2 MG/ML
INJECTION INTRAMUSCULAR; INTRAVENOUS PRN
Status: DISCONTINUED | OUTPATIENT
Start: 2021-08-17 | End: 2021-08-17 | Stop reason: SDUPTHER

## 2021-08-17 RX ORDER — HYDROMORPHONE HCL 110MG/55ML
0.25 PATIENT CONTROLLED ANALGESIA SYRINGE INTRAVENOUS EVERY 5 MIN PRN
Status: COMPLETED | OUTPATIENT
Start: 2021-08-17 | End: 2021-08-17

## 2021-08-17 RX ORDER — SODIUM CHLORIDE 9 MG/ML
25 INJECTION, SOLUTION INTRAVENOUS PRN
Status: DISCONTINUED | OUTPATIENT
Start: 2021-08-17 | End: 2021-08-19 | Stop reason: HOSPADM

## 2021-08-17 RX ORDER — ATORVASTATIN CALCIUM 40 MG/1
40 TABLET, FILM COATED ORAL DAILY
Status: DISCONTINUED | OUTPATIENT
Start: 2021-08-17 | End: 2021-08-19 | Stop reason: HOSPADM

## 2021-08-17 RX ORDER — PANTOPRAZOLE SODIUM 40 MG/1
40 TABLET, DELAYED RELEASE ORAL
Status: DISCONTINUED | OUTPATIENT
Start: 2021-08-18 | End: 2021-08-19 | Stop reason: HOSPADM

## 2021-08-17 RX ORDER — LABETALOL HYDROCHLORIDE 5 MG/ML
5 INJECTION, SOLUTION INTRAVENOUS EVERY 10 MIN PRN
Status: DISCONTINUED | OUTPATIENT
Start: 2021-08-17 | End: 2021-08-17 | Stop reason: HOSPADM

## 2021-08-17 RX ORDER — LIDOCAINE HYDROCHLORIDE 20 MG/ML
INJECTION, SOLUTION INFILTRATION; PERINEURAL PRN
Status: DISCONTINUED | OUTPATIENT
Start: 2021-08-17 | End: 2021-08-17 | Stop reason: SDUPTHER

## 2021-08-17 RX ORDER — HYDRALAZINE HYDROCHLORIDE 20 MG/ML
10 INJECTION INTRAMUSCULAR; INTRAVENOUS EVERY 6 HOURS PRN
Status: DISCONTINUED | OUTPATIENT
Start: 2021-08-17 | End: 2021-08-19 | Stop reason: HOSPADM

## 2021-08-17 RX ORDER — EPHEDRINE SULFATE/0.9% NACL/PF 50 MG/5 ML
SYRINGE (ML) INTRAVENOUS PRN
Status: DISCONTINUED | OUTPATIENT
Start: 2021-08-17 | End: 2021-08-17 | Stop reason: SDUPTHER

## 2021-08-17 RX ORDER — LIDOCAINE HYDROCHLORIDE 10 MG/ML
0.5 INJECTION, SOLUTION EPIDURAL; INFILTRATION; INTRACAUDAL; PERINEURAL ONCE
Status: DISCONTINUED | OUTPATIENT
Start: 2021-08-17 | End: 2021-08-17 | Stop reason: HOSPADM

## 2021-08-17 RX ORDER — MAGNESIUM HYDROXIDE 1200 MG/15ML
LIQUID ORAL CONTINUOUS PRN
Status: COMPLETED | OUTPATIENT
Start: 2021-08-17 | End: 2021-08-17

## 2021-08-17 RX ORDER — DONEPEZIL HYDROCHLORIDE 5 MG/1
10 TABLET, FILM COATED ORAL NIGHTLY
Status: DISCONTINUED | OUTPATIENT
Start: 2021-08-17 | End: 2021-08-19 | Stop reason: HOSPADM

## 2021-08-17 RX ORDER — LIDOCAINE HYDROCHLORIDE 40 MG/ML
SOLUTION TOPICAL PRN
Status: DISCONTINUED | OUTPATIENT
Start: 2021-08-17 | End: 2021-08-17 | Stop reason: SDUPTHER

## 2021-08-17 RX ORDER — SODIUM CHLORIDE 0.9 % (FLUSH) 0.9 %
5-40 SYRINGE (ML) INJECTION PRN
Status: DISCONTINUED | OUTPATIENT
Start: 2021-08-17 | End: 2021-08-19 | Stop reason: HOSPADM

## 2021-08-17 RX ORDER — INSULIN LISPRO 100 [IU]/ML
0-6 INJECTION, SOLUTION INTRAVENOUS; SUBCUTANEOUS NIGHTLY
Status: DISCONTINUED | OUTPATIENT
Start: 2021-08-17 | End: 2021-08-19 | Stop reason: HOSPADM

## 2021-08-17 RX ORDER — SODIUM CHLORIDE 9 MG/ML
INJECTION, SOLUTION INTRAVENOUS CONTINUOUS
Status: DISCONTINUED | OUTPATIENT
Start: 2021-08-17 | End: 2021-08-18

## 2021-08-17 RX ORDER — APREPITANT 40 MG/1
40 CAPSULE ORAL ONCE
Status: COMPLETED | OUTPATIENT
Start: 2021-08-17 | End: 2021-08-17

## 2021-08-17 RX ORDER — SODIUM CHLORIDE, SODIUM LACTATE, POTASSIUM CHLORIDE, CALCIUM CHLORIDE 600; 310; 30; 20 MG/100ML; MG/100ML; MG/100ML; MG/100ML
INJECTION, SOLUTION INTRAVENOUS CONTINUOUS
Status: DISCONTINUED | OUTPATIENT
Start: 2021-08-17 | End: 2021-08-17

## 2021-08-17 RX ORDER — MAGNESIUM SULFATE HEPTAHYDRATE 500 MG/ML
INJECTION, SOLUTION INTRAMUSCULAR; INTRAVENOUS PRN
Status: DISCONTINUED | OUTPATIENT
Start: 2021-08-17 | End: 2021-08-17 | Stop reason: SDUPTHER

## 2021-08-17 RX ORDER — AMLODIPINE BESYLATE 5 MG/1
10 TABLET ORAL DAILY
Status: DISCONTINUED | OUTPATIENT
Start: 2021-08-17 | End: 2021-08-19 | Stop reason: HOSPADM

## 2021-08-17 RX ORDER — DEXAMETHASONE SODIUM PHOSPHATE 4 MG/ML
INJECTION, SOLUTION INTRA-ARTICULAR; INTRALESIONAL; INTRAMUSCULAR; INTRAVENOUS; SOFT TISSUE PRN
Status: DISCONTINUED | OUTPATIENT
Start: 2021-08-17 | End: 2021-08-17 | Stop reason: SDUPTHER

## 2021-08-17 RX ORDER — BUPIVACAINE HYDROCHLORIDE 5 MG/ML
INJECTION, SOLUTION EPIDURAL; INTRACAUDAL
Status: COMPLETED | OUTPATIENT
Start: 2021-08-17 | End: 2021-08-17

## 2021-08-17 RX ORDER — PROPOFOL 10 MG/ML
INJECTION, EMULSION INTRAVENOUS CONTINUOUS PRN
Status: DISCONTINUED | OUTPATIENT
Start: 2021-08-17 | End: 2021-08-17 | Stop reason: SDUPTHER

## 2021-08-17 RX ORDER — KETAMINE HYDROCHLORIDE 10 MG/ML
INJECTION, SOLUTION INTRAMUSCULAR; INTRAVENOUS PRN
Status: DISCONTINUED | OUTPATIENT
Start: 2021-08-17 | End: 2021-08-17 | Stop reason: SDUPTHER

## 2021-08-17 RX ORDER — POTASSIUM CHLORIDE 7.45 MG/ML
10 INJECTION INTRAVENOUS PRN
Status: DISCONTINUED | OUTPATIENT
Start: 2021-08-17 | End: 2021-08-19 | Stop reason: HOSPADM

## 2021-08-17 RX ORDER — ALBUMIN, HUMAN INJ 5% 5 %
SOLUTION INTRAVENOUS PRN
Status: DISCONTINUED | OUTPATIENT
Start: 2021-08-17 | End: 2021-08-17 | Stop reason: SDUPTHER

## 2021-08-17 RX ORDER — SODIUM CHLORIDE 0.9 % (FLUSH) 0.9 %
10 SYRINGE (ML) INJECTION EVERY 12 HOURS SCHEDULED
Status: DISCONTINUED | OUTPATIENT
Start: 2021-08-17 | End: 2021-08-17 | Stop reason: HOSPADM

## 2021-08-17 RX ORDER — ACETAMINOPHEN 325 MG/1
650 TABLET ORAL ONCE
Status: COMPLETED | OUTPATIENT
Start: 2021-08-17 | End: 2021-08-17

## 2021-08-17 RX ORDER — FENTANYL CITRATE 50 UG/ML
25 INJECTION, SOLUTION INTRAMUSCULAR; INTRAVENOUS EVERY 5 MIN PRN
Status: DISCONTINUED | OUTPATIENT
Start: 2021-08-17 | End: 2021-08-17 | Stop reason: HOSPADM

## 2021-08-17 RX ORDER — SODIUM CHLORIDE 9 MG/ML
25 INJECTION, SOLUTION INTRAVENOUS PRN
Status: DISCONTINUED | OUTPATIENT
Start: 2021-08-17 | End: 2021-08-17 | Stop reason: HOSPADM

## 2021-08-17 RX ORDER — SODIUM CHLORIDE 0.9 % (FLUSH) 0.9 %
10 SYRINGE (ML) INJECTION PRN
Status: DISCONTINUED | OUTPATIENT
Start: 2021-08-17 | End: 2021-08-17 | Stop reason: HOSPADM

## 2021-08-17 RX ORDER — LIDOCAINE HYDROCHLORIDE 10 MG/ML
1 INJECTION, SOLUTION EPIDURAL; INFILTRATION; INTRACAUDAL; PERINEURAL
Status: DISCONTINUED | OUTPATIENT
Start: 2021-08-17 | End: 2021-08-17 | Stop reason: HOSPADM

## 2021-08-17 RX ORDER — ONDANSETRON 2 MG/ML
4 INJECTION INTRAMUSCULAR; INTRAVENOUS EVERY 6 HOURS PRN
Status: DISCONTINUED | OUTPATIENT
Start: 2021-08-17 | End: 2021-08-19 | Stop reason: HOSPADM

## 2021-08-17 RX ORDER — DEXTROSE MONOHYDRATE 25 G/50ML
12.5 INJECTION, SOLUTION INTRAVENOUS PRN
Status: DISCONTINUED | OUTPATIENT
Start: 2021-08-17 | End: 2021-08-19 | Stop reason: HOSPADM

## 2021-08-17 RX ORDER — ONDANSETRON 2 MG/ML
4 INJECTION INTRAMUSCULAR; INTRAVENOUS
Status: DISCONTINUED | OUTPATIENT
Start: 2021-08-17 | End: 2021-08-17 | Stop reason: HOSPADM

## 2021-08-17 RX ORDER — OMEPRAZOLE 10 MG/1
40 CAPSULE, DELAYED RELEASE ORAL EVERY MORNING
Status: DISCONTINUED | OUTPATIENT
Start: 2021-08-18 | End: 2021-08-17 | Stop reason: CLARIF

## 2021-08-17 RX ORDER — TRAMADOL HYDROCHLORIDE 50 MG/1
50 TABLET ORAL EVERY 6 HOURS PRN
Status: DISCONTINUED | OUTPATIENT
Start: 2021-08-17 | End: 2021-08-19 | Stop reason: HOSPADM

## 2021-08-17 RX ORDER — BACITRACIN ZINC AND POLYMYXIN B SULFATE 500; 1000 [USP'U]/G; [USP'U]/G
OINTMENT TOPICAL
Status: COMPLETED | OUTPATIENT
Start: 2021-08-17 | End: 2021-08-17

## 2021-08-17 RX ORDER — PROCHLORPERAZINE EDISYLATE 5 MG/ML
5 INJECTION INTRAMUSCULAR; INTRAVENOUS
Status: DISCONTINUED | OUTPATIENT
Start: 2021-08-17 | End: 2021-08-17 | Stop reason: HOSPADM

## 2021-08-17 RX ORDER — POTASSIUM CHLORIDE 20 MEQ/1
40 TABLET, EXTENDED RELEASE ORAL PRN
Status: DISCONTINUED | OUTPATIENT
Start: 2021-08-17 | End: 2021-08-19 | Stop reason: HOSPADM

## 2021-08-17 RX ORDER — DULOXETIN HYDROCHLORIDE 30 MG/1
60 CAPSULE, DELAYED RELEASE ORAL DAILY
Status: DISCONTINUED | OUTPATIENT
Start: 2021-08-17 | End: 2021-08-19 | Stop reason: HOSPADM

## 2021-08-17 RX ORDER — SODIUM CHLORIDE 0.9 % (FLUSH) 0.9 %
5-40 SYRINGE (ML) INJECTION EVERY 12 HOURS SCHEDULED
Status: DISCONTINUED | OUTPATIENT
Start: 2021-08-17 | End: 2021-08-19 | Stop reason: HOSPADM

## 2021-08-17 RX ORDER — HYDROMORPHONE HYDROCHLORIDE 1 MG/ML
0.5 INJECTION, SOLUTION INTRAMUSCULAR; INTRAVENOUS; SUBCUTANEOUS
Status: DISCONTINUED | OUTPATIENT
Start: 2021-08-17 | End: 2021-08-19 | Stop reason: HOSPADM

## 2021-08-17 RX ORDER — NICOTINE POLACRILEX 4 MG
15 LOZENGE BUCCAL PRN
Status: DISCONTINUED | OUTPATIENT
Start: 2021-08-17 | End: 2021-08-19 | Stop reason: HOSPADM

## 2021-08-17 RX ORDER — GLYCOPYRROLATE 0.2 MG/ML
INJECTION INTRAMUSCULAR; INTRAVENOUS PRN
Status: DISCONTINUED | OUTPATIENT
Start: 2021-08-17 | End: 2021-08-17 | Stop reason: SDUPTHER

## 2021-08-17 RX ORDER — ASPIRIN 81 MG/1
81 TABLET, CHEWABLE ORAL DAILY
Status: ON HOLD | COMMUNITY
End: 2021-08-19 | Stop reason: SDUPTHER

## 2021-08-17 RX ORDER — METHOCARBAMOL 500 MG/1
500 TABLET, FILM COATED ORAL 3 TIMES DAILY
Status: DISCONTINUED | OUTPATIENT
Start: 2021-08-17 | End: 2021-08-19 | Stop reason: HOSPADM

## 2021-08-17 RX ORDER — FENTANYL CITRATE 50 UG/ML
INJECTION, SOLUTION INTRAMUSCULAR; INTRAVENOUS PRN
Status: DISCONTINUED | OUTPATIENT
Start: 2021-08-17 | End: 2021-08-17 | Stop reason: SDUPTHER

## 2021-08-17 RX ORDER — SUCCINYLCHOLINE CHLORIDE 20 MG/ML
INJECTION INTRAMUSCULAR; INTRAVENOUS PRN
Status: DISCONTINUED | OUTPATIENT
Start: 2021-08-17 | End: 2021-08-17 | Stop reason: SDUPTHER

## 2021-08-17 RX ORDER — MIRTAZAPINE 15 MG/1
30 TABLET, FILM COATED ORAL NIGHTLY
Status: DISCONTINUED | OUTPATIENT
Start: 2021-08-17 | End: 2021-08-19 | Stop reason: HOSPADM

## 2021-08-17 RX ORDER — ONDANSETRON 4 MG/1
4 TABLET, ORALLY DISINTEGRATING ORAL EVERY 8 HOURS PRN
Status: DISCONTINUED | OUTPATIENT
Start: 2021-08-17 | End: 2021-08-19 | Stop reason: HOSPADM

## 2021-08-17 RX ORDER — HYDRALAZINE HYDROCHLORIDE 20 MG/ML
5 INJECTION INTRAMUSCULAR; INTRAVENOUS EVERY 10 MIN PRN
Status: DISCONTINUED | OUTPATIENT
Start: 2021-08-17 | End: 2021-08-17 | Stop reason: HOSPADM

## 2021-08-17 RX ORDER — DEXAMETHASONE SODIUM PHOSPHATE 4 MG/ML
INJECTION, SOLUTION INTRA-ARTICULAR; INTRALESIONAL; INTRAMUSCULAR; INTRAVENOUS; SOFT TISSUE PRN
Status: DISCONTINUED | OUTPATIENT
Start: 2021-08-17 | End: 2021-08-17

## 2021-08-17 RX ADMIN — PHENYLEPHRINE HYDROCHLORIDE 200 MCG: 10 INJECTION INTRAVENOUS at 09:58

## 2021-08-17 RX ADMIN — PHENYLEPHRINE HYDROCHLORIDE 100 MCG: 10 INJECTION INTRAVENOUS at 07:49

## 2021-08-17 RX ADMIN — DEXAMETHASONE SODIUM PHOSPHATE 10 MG: 4 INJECTION, SOLUTION INTRAMUSCULAR; INTRAVENOUS at 07:47

## 2021-08-17 RX ADMIN — FENTANYL CITRATE 50 MCG: 50 INJECTION, SOLUTION INTRAMUSCULAR; INTRAVENOUS at 08:31

## 2021-08-17 RX ADMIN — CEFAZOLIN 2000 MG: 10 INJECTION, POWDER, FOR SOLUTION INTRAVENOUS at 21:43

## 2021-08-17 RX ADMIN — KETAMINE HYDROCHLORIDE 10 MG: 10 INJECTION, SOLUTION INTRAMUSCULAR; INTRAVENOUS at 08:24

## 2021-08-17 RX ADMIN — Medication 5 MG: at 09:54

## 2021-08-17 RX ADMIN — BUSPIRONE HYDROCHLORIDE 15 MG: 5 TABLET ORAL at 19:42

## 2021-08-17 RX ADMIN — HYDROMORPHONE HYDROCHLORIDE 0.25 MG: 2 INJECTION, SOLUTION INTRAMUSCULAR; INTRAVENOUS; SUBCUTANEOUS at 11:14

## 2021-08-17 RX ADMIN — DULOXETINE HYDROCHLORIDE 60 MG: 30 CAPSULE, DELAYED RELEASE ORAL at 14:30

## 2021-08-17 RX ADMIN — KETAMINE HYDROCHLORIDE 10 MG: 10 INJECTION, SOLUTION INTRAMUSCULAR; INTRAVENOUS at 09:00

## 2021-08-17 RX ADMIN — GLYCOPYRROLATE 0.2 MG: 0.2 INJECTION, SOLUTION INTRAMUSCULAR; INTRAVENOUS at 08:15

## 2021-08-17 RX ADMIN — HYDROMORPHONE HYDROCHLORIDE 0.25 MG: 2 INJECTION, SOLUTION INTRAMUSCULAR; INTRAVENOUS; SUBCUTANEOUS at 11:23

## 2021-08-17 RX ADMIN — ALBUMIN (HUMAN) 250 ML: 12.5 INJECTION, SOLUTION INTRAVENOUS at 07:50

## 2021-08-17 RX ADMIN — PHENYLEPHRINE HYDROCHLORIDE 200 MCG: 10 INJECTION INTRAVENOUS at 10:03

## 2021-08-17 RX ADMIN — Medication 5 MG: at 08:09

## 2021-08-17 RX ADMIN — PHENYLEPHRINE HYDROCHLORIDE 200 MCG: 10 INJECTION INTRAVENOUS at 08:16

## 2021-08-17 RX ADMIN — FENTANYL CITRATE 50 MCG: 50 INJECTION, SOLUTION INTRAMUSCULAR; INTRAVENOUS at 11:04

## 2021-08-17 RX ADMIN — PHENYLEPHRINE HYDROCHLORIDE 100 MCG: 10 INJECTION INTRAVENOUS at 07:38

## 2021-08-17 RX ADMIN — HYDROMORPHONE HYDROCHLORIDE 0.25 MG: 2 INJECTION, SOLUTION INTRAMUSCULAR; INTRAVENOUS; SUBCUTANEOUS at 11:31

## 2021-08-17 RX ADMIN — PHENYLEPHRINE HYDROCHLORIDE 100 MCG: 10 INJECTION INTRAVENOUS at 08:12

## 2021-08-17 RX ADMIN — HYDROMORPHONE HYDROCHLORIDE 0.25 MG: 2 INJECTION, SOLUTION INTRAMUSCULAR; INTRAVENOUS; SUBCUTANEOUS at 13:40

## 2021-08-17 RX ADMIN — SUCCINYLCHOLINE CHLORIDE 100 MG: 20 INJECTION, SOLUTION INTRAMUSCULAR; INTRAVENOUS at 07:41

## 2021-08-17 RX ADMIN — PHENYLEPHRINE HYDROCHLORIDE 100 MCG: 10 INJECTION INTRAVENOUS at 08:36

## 2021-08-17 RX ADMIN — KETAMINE HYDROCHLORIDE 10 MG: 10 INJECTION, SOLUTION INTRAMUSCULAR; INTRAVENOUS at 07:57

## 2021-08-17 RX ADMIN — PROPOFOL 50 MCG/KG/MIN: 10 INJECTION, EMULSION INTRAVENOUS at 07:45

## 2021-08-17 RX ADMIN — HYDROMORPHONE HYDROCHLORIDE 0.5 MG: 1 INJECTION, SOLUTION INTRAMUSCULAR; INTRAVENOUS; SUBCUTANEOUS at 18:15

## 2021-08-17 RX ADMIN — PHENYLEPHRINE HYDROCHLORIDE 100 MCG: 10 INJECTION INTRAVENOUS at 09:46

## 2021-08-17 RX ADMIN — PHENYLEPHRINE HYDROCHLORIDE 200 MCG: 10 INJECTION INTRAVENOUS at 08:40

## 2021-08-17 RX ADMIN — ACETAMINOPHEN 650 MG: 325 TABLET ORAL at 06:57

## 2021-08-17 RX ADMIN — HYDROMORPHONE HYDROCHLORIDE 0.25 MG: 2 INJECTION, SOLUTION INTRAMUSCULAR; INTRAVENOUS; SUBCUTANEOUS at 12:17

## 2021-08-17 RX ADMIN — PHENYLEPHRINE HYDROCHLORIDE 100 MCG: 10 INJECTION INTRAVENOUS at 07:45

## 2021-08-17 RX ADMIN — METHOCARBAMOL 500 MG: 500 TABLET ORAL at 14:30

## 2021-08-17 RX ADMIN — INSULIN LISPRO 4 UNITS: 100 INJECTION, SOLUTION INTRAVENOUS; SUBCUTANEOUS at 17:42

## 2021-08-17 RX ADMIN — Medication 5 MG: at 07:55

## 2021-08-17 RX ADMIN — HYDROMORPHONE HYDROCHLORIDE 0.5 MG: 1 INJECTION, SOLUTION INTRAMUSCULAR; INTRAVENOUS; SUBCUTANEOUS at 21:40

## 2021-08-17 RX ADMIN — HYDROMORPHONE HYDROCHLORIDE 0.25 MG: 2 INJECTION, SOLUTION INTRAMUSCULAR; INTRAVENOUS; SUBCUTANEOUS at 12:35

## 2021-08-17 RX ADMIN — Medication 5 MG: at 07:48

## 2021-08-17 RX ADMIN — HYDROMORPHONE HYDROCHLORIDE 0.25 MG: 2 INJECTION, SOLUTION INTRAMUSCULAR; INTRAVENOUS; SUBCUTANEOUS at 11:41

## 2021-08-17 RX ADMIN — PHENYLEPHRINE HYDROCHLORIDE 100 MCG: 10 INJECTION INTRAVENOUS at 08:08

## 2021-08-17 RX ADMIN — GLYCOPYRROLATE 0.2 MG: 0.2 INJECTION, SOLUTION INTRAMUSCULAR; INTRAVENOUS at 07:37

## 2021-08-17 RX ADMIN — LIDOCAINE HYDROCHLORIDE 100 MG: 20 INJECTION, SOLUTION INFILTRATION; PERINEURAL at 07:39

## 2021-08-17 RX ADMIN — PHENYLEPHRINE HYDROCHLORIDE 200 MCG: 10 INJECTION INTRAVENOUS at 09:53

## 2021-08-17 RX ADMIN — PHENYLEPHRINE HYDROCHLORIDE 100 MCG: 10 INJECTION INTRAVENOUS at 07:53

## 2021-08-17 RX ADMIN — MAGNESIUM SULFATE HEPTAHYDRATE 1 G: 500 INJECTION, SOLUTION INTRAMUSCULAR; INTRAVENOUS at 07:46

## 2021-08-17 RX ADMIN — INSULIN LISPRO 1 UNITS: 100 INJECTION, SOLUTION INTRAVENOUS; SUBCUTANEOUS at 23:26

## 2021-08-17 RX ADMIN — HYDROMORPHONE HYDROCHLORIDE 0.25 MG: 2 INJECTION, SOLUTION INTRAMUSCULAR; INTRAVENOUS; SUBCUTANEOUS at 13:35

## 2021-08-17 RX ADMIN — PHENYLEPHRINE HYDROCHLORIDE 200 MCG: 10 INJECTION INTRAVENOUS at 08:51

## 2021-08-17 RX ADMIN — LIDOCAINE HYDROCHLORIDE 4 ML: 40 SOLUTION TOPICAL at 07:42

## 2021-08-17 RX ADMIN — APREPITANT 40 MG: 40 CAPSULE ORAL at 06:57

## 2021-08-17 RX ADMIN — MIRTAZAPINE 30 MG: 15 TABLET, FILM COATED ORAL at 19:43

## 2021-08-17 RX ADMIN — TRAMADOL HYDROCHLORIDE 50 MG: 50 TABLET, FILM COATED ORAL at 15:56

## 2021-08-17 RX ADMIN — CEFAZOLIN 2000 MG: 10 INJECTION, POWDER, FOR SOLUTION INTRAVENOUS at 14:38

## 2021-08-17 RX ADMIN — PROPOFOL 50 MG: 10 INJECTION, EMULSION INTRAVENOUS at 08:28

## 2021-08-17 RX ADMIN — CEFAZOLIN SODIUM 2000 MG: 10 INJECTION, POWDER, FOR SOLUTION INTRAVENOUS at 07:33

## 2021-08-17 RX ADMIN — GLYCOPYRROLATE 0.2 MG: 0.2 INJECTION, SOLUTION INTRAMUSCULAR; INTRAVENOUS at 08:25

## 2021-08-17 RX ADMIN — Medication 5 MG: at 08:15

## 2021-08-17 RX ADMIN — Medication 5 MG: at 09:58

## 2021-08-17 RX ADMIN — SODIUM CHLORIDE: 9 INJECTION, SOLUTION INTRAVENOUS at 11:44

## 2021-08-17 RX ADMIN — DONEPEZIL HYDROCHLORIDE 10 MG: 5 TABLET, FILM COATED ORAL at 21:40

## 2021-08-17 RX ADMIN — HYDROMORPHONE HYDROCHLORIDE 0.5 MG: 1 INJECTION, SOLUTION INTRAMUSCULAR; INTRAVENOUS; SUBCUTANEOUS at 14:32

## 2021-08-17 RX ADMIN — Medication 5 MG: at 08:22

## 2021-08-17 RX ADMIN — PROPOFOL 20 MG: 10 INJECTION, EMULSION INTRAVENOUS at 08:02

## 2021-08-17 RX ADMIN — PROPOFOL 30 MG: 10 INJECTION, EMULSION INTRAVENOUS at 07:59

## 2021-08-17 RX ADMIN — Medication 5 MG: at 08:00

## 2021-08-17 RX ADMIN — PROPOFOL 100 MG: 10 INJECTION, EMULSION INTRAVENOUS at 07:40

## 2021-08-17 RX ADMIN — Medication 5 MG: at 08:28

## 2021-08-17 RX ADMIN — ALBUMIN (HUMAN) 250 ML: 12.5 INJECTION, SOLUTION INTRAVENOUS at 08:29

## 2021-08-17 RX ADMIN — FENTANYL CITRATE 50 MCG: 50 INJECTION, SOLUTION INTRAMUSCULAR; INTRAVENOUS at 10:57

## 2021-08-17 RX ADMIN — SODIUM CHLORIDE, POTASSIUM CHLORIDE, SODIUM LACTATE AND CALCIUM CHLORIDE: 600; 310; 30; 20 INJECTION, SOLUTION INTRAVENOUS at 06:58

## 2021-08-17 RX ADMIN — TRAMADOL HYDROCHLORIDE 50 MG: 50 TABLET, FILM COATED ORAL at 23:27

## 2021-08-17 RX ADMIN — METHOCARBAMOL 500 MG: 500 TABLET ORAL at 19:42

## 2021-08-17 RX ADMIN — Medication 5 MG: at 09:51

## 2021-08-17 RX ADMIN — ONDANSETRON 4 MG: 2 INJECTION INTRAMUSCULAR; INTRAVENOUS at 07:50

## 2021-08-17 RX ADMIN — FENTANYL CITRATE 50 MCG: 50 INJECTION, SOLUTION INTRAMUSCULAR; INTRAVENOUS at 08:27

## 2021-08-17 RX ADMIN — FENTANYL CITRATE 50 MCG: 50 INJECTION, SOLUTION INTRAMUSCULAR; INTRAVENOUS at 07:39

## 2021-08-17 RX ADMIN — FENTANYL CITRATE 50 MCG: 50 INJECTION, SOLUTION INTRAMUSCULAR; INTRAVENOUS at 08:35

## 2021-08-17 RX ADMIN — BUSPIRONE HYDROCHLORIDE 15 MG: 5 TABLET ORAL at 14:30

## 2021-08-17 ASSESSMENT — ENCOUNTER SYMPTOMS
COUGH: 0
SHORTNESS OF BREATH: 0
COLOR CHANGE: 0
SHORTNESS OF BREATH: 0
EYE REDNESS: 0
VOMITING: 0
NAUSEA: 0
EYE PAIN: 0
BACK PAIN: 0

## 2021-08-17 ASSESSMENT — PULMONARY FUNCTION TESTS
PIF_VALUE: 3
PIF_VALUE: 23
PIF_VALUE: 23
PIF_VALUE: 22
PIF_VALUE: 23
PIF_VALUE: 22
PIF_VALUE: 21
PIF_VALUE: 1
PIF_VALUE: 22
PIF_VALUE: 22
PIF_VALUE: 3
PIF_VALUE: 16
PIF_VALUE: 22
PIF_VALUE: 20
PIF_VALUE: 22
PIF_VALUE: 20
PIF_VALUE: 22
PIF_VALUE: 21
PIF_VALUE: 22
PIF_VALUE: 22
PIF_VALUE: 23
PIF_VALUE: 22
PIF_VALUE: 21
PIF_VALUE: 22
PIF_VALUE: 10
PIF_VALUE: 23
PIF_VALUE: 22
PIF_VALUE: 21
PIF_VALUE: 22
PIF_VALUE: 22
PIF_VALUE: 16
PIF_VALUE: 2
PIF_VALUE: 0
PIF_VALUE: 22
PIF_VALUE: 23
PIF_VALUE: 5
PIF_VALUE: 22
PIF_VALUE: 21
PIF_VALUE: 22
PIF_VALUE: 14
PIF_VALUE: 22
PIF_VALUE: 1
PIF_VALUE: 14
PIF_VALUE: 11
PIF_VALUE: 23
PIF_VALUE: 22
PIF_VALUE: 22
PIF_VALUE: 21
PIF_VALUE: 22
PIF_VALUE: 16
PIF_VALUE: 22
PIF_VALUE: 16
PIF_VALUE: 22
PIF_VALUE: 22
PIF_VALUE: 16
PIF_VALUE: 23
PIF_VALUE: 23
PIF_VALUE: 16
PIF_VALUE: 22
PIF_VALUE: 14
PIF_VALUE: 4
PIF_VALUE: 22
PIF_VALUE: 4
PIF_VALUE: 2
PIF_VALUE: 21
PIF_VALUE: 22
PIF_VALUE: 16
PIF_VALUE: 22
PIF_VALUE: 16
PIF_VALUE: 22
PIF_VALUE: 21
PIF_VALUE: 21
PIF_VALUE: 14
PIF_VALUE: 22
PIF_VALUE: 22
PIF_VALUE: 1
PIF_VALUE: 22
PIF_VALUE: 21
PIF_VALUE: 1
PIF_VALUE: 22
PIF_VALUE: 1
PIF_VALUE: 22
PIF_VALUE: 21
PIF_VALUE: 22
PIF_VALUE: 16
PIF_VALUE: 22
PIF_VALUE: 1
PIF_VALUE: 16
PIF_VALUE: 15
PIF_VALUE: 22
PIF_VALUE: 22
PIF_VALUE: 6
PIF_VALUE: 23
PIF_VALUE: 16
PIF_VALUE: 22
PIF_VALUE: 11
PIF_VALUE: 14
PIF_VALUE: 22
PIF_VALUE: 14
PIF_VALUE: 1
PIF_VALUE: 15
PIF_VALUE: 22
PIF_VALUE: 23
PIF_VALUE: 4
PIF_VALUE: 22
PIF_VALUE: 21
PIF_VALUE: 22
PIF_VALUE: 21
PIF_VALUE: 22
PIF_VALUE: 3
PIF_VALUE: 16
PIF_VALUE: 22
PIF_VALUE: 3
PIF_VALUE: 22
PIF_VALUE: 16
PIF_VALUE: 16
PIF_VALUE: 22
PIF_VALUE: 22
PIF_VALUE: 2
PIF_VALUE: 22
PIF_VALUE: 22
PIF_VALUE: 19
PIF_VALUE: 23
PIF_VALUE: 23
PIF_VALUE: 21
PIF_VALUE: 22
PIF_VALUE: 16
PIF_VALUE: 22
PIF_VALUE: 22
PIF_VALUE: 1
PIF_VALUE: 22
PIF_VALUE: 16
PIF_VALUE: 22
PIF_VALUE: 22
PIF_VALUE: 16
PIF_VALUE: 22
PIF_VALUE: 15
PIF_VALUE: 1
PIF_VALUE: 23
PIF_VALUE: 15
PIF_VALUE: 14
PIF_VALUE: 22
PIF_VALUE: 15
PIF_VALUE: 22
PIF_VALUE: 14
PIF_VALUE: 22
PIF_VALUE: 23
PIF_VALUE: 10
PIF_VALUE: 4
PIF_VALUE: 15
PIF_VALUE: 22
PIF_VALUE: 16
PIF_VALUE: 22
PIF_VALUE: 61
PIF_VALUE: 21
PIF_VALUE: 22
PIF_VALUE: 2
PIF_VALUE: 22
PIF_VALUE: 16
PIF_VALUE: 23
PIF_VALUE: 21
PIF_VALUE: 22
PIF_VALUE: 16
PIF_VALUE: 22

## 2021-08-17 ASSESSMENT — PAIN SCALES - GENERAL
PAINLEVEL_OUTOF10: 0
PAINLEVEL_OUTOF10: 8
PAINLEVEL_OUTOF10: 7
PAINLEVEL_OUTOF10: 7
PAINLEVEL_OUTOF10: 8
PAINLEVEL_OUTOF10: 10
PAINLEVEL_OUTOF10: 5
PAINLEVEL_OUTOF10: 10
PAINLEVEL_OUTOF10: 8
PAINLEVEL_OUTOF10: 10
PAINLEVEL_OUTOF10: 7
PAINLEVEL_OUTOF10: 8
PAINLEVEL_OUTOF10: 7
PAINLEVEL_OUTOF10: 8
PAINLEVEL_OUTOF10: 4
PAINLEVEL_OUTOF10: 6
PAINLEVEL_OUTOF10: 7
PAINLEVEL_OUTOF10: 10

## 2021-08-17 ASSESSMENT — PAIN - FUNCTIONAL ASSESSMENT: PAIN_FUNCTIONAL_ASSESSMENT: 0-10

## 2021-08-17 ASSESSMENT — PAIN DESCRIPTION - PAIN TYPE
TYPE: SURGICAL PAIN

## 2021-08-17 ASSESSMENT — PAIN DESCRIPTION - ORIENTATION
ORIENTATION: POSTERIOR

## 2021-08-17 ASSESSMENT — PAIN DESCRIPTION - LOCATION
LOCATION: NECK

## 2021-08-17 ASSESSMENT — PAIN DESCRIPTION - DESCRIPTORS
DESCRIPTORS: SHOOTING
DESCRIPTORS: ACHING

## 2021-08-17 ASSESSMENT — PAIN DESCRIPTION - PROGRESSION
CLINICAL_PROGRESSION: GRADUALLY IMPROVING
CLINICAL_PROGRESSION: GRADUALLY IMPROVING

## 2021-08-17 NOTE — PROGRESS NOTES
Patient alert, VSS and O2 sat maintained on 2L via NC. Pain controlled. Posterior neck dressing CDI, JACK with moderate amount bloody drainage. BUE neuro check WNL. Guillaume patent. Patient meets all phase 1 discharge criteria, seen by anesthesia. Will transfer to [address] [place] [address] when bed available.

## 2021-08-17 NOTE — PROGRESS NOTES
Date of Surgery Update:  Mallory Pop was seen, history and physical examination reviewed, and patient examined by me today. There have been no significant clinical changes since the completion of the previous history and physical.    The risk, benefits, and alternatives of the proposed procedure have been explained to the patient (or appropriate guardian) and understanding verbalized. All questions answered. Patient wishes to proceed.     Electronically signed by: Shona Schulz MD,8/17/2021,7:17 AM

## 2021-08-17 NOTE — CONSULTS
Consult -Internal Medicine  Dr. Sandra Sellers  8/17/2021      PCP: Stacia Hand MD  Referring Physician: Naldo Wilson MD    Code Status: Full Code  Current Diet: ADULT DIET; Regular; 4 carb choices (60 gm/meal)      Cc: Elijah Aquino is a63 y.o. female who presents with Cervical spinal stenosis. Active Hospital Problems    Diagnosis Date Noted    Cervical spinal stenosis [M48.02] 08/17/2021    Late onset Alzheimer's disease without behavioral disturbance ([facility] [G30.1, F02.80] 02/07/2015    Type 2 diabetes mellitus without complication ([facility] [Z82.5] 03/04/2013    Hypercholesterolemia [E78.00] 03/04/2013    Essential hypertension [I10] 12/20/2012     HPI: Elijah Aquino has been admitted by Naldo Wilson MD with intractable neck pain. Pt is a 47-year-old female with progressive cervical myelopathy, over a year in duration. Rated 8.10 at worst. Aching in nature. MRI is reieweed, it demonstrates spinal cord compression at C2-C3 and C3-C4 with myelomalacia and significant stenosis. Because of symptoms of persistent spinal stenosis, she was recommended to have surgery    Pt has undergone decompressive laminectomy C23 C34 without any apparentcomplications. Pt is doing well post operatively. Pain is controlled at this time.   I have been asked to see the patient for evaluation of her internal medicine issues:  has a past medical history of Acute gastritis without mention of hemorrhage, Acute sinusitis, unspecified, Acute upper respiratory infections of unspecified site, Allergic rhinitis, cause unspecified, Anemia, unspecified, Edema, Hyperlipidemia, Hypertension, Loss of weight, Lumbago, Lumbosacral root lesions, not elsewhere classified, Need for prophylactic vaccination and inoculation against influenza, Neuropathy, Other bursitis disorders, Other seborrheic keratosis, Symptomatic menopausal or female climacteric states, and Type II or unspecified type diabetes mellitus without mention of complication, not stated as uncontrolled. .  Home meds have been reveiwed and restartedas indicated. Pt denies having other complaints at this time. Doing well post op  Seen in recovery  No issues noted  Has not yet worked with therapy  Pain appears controlled - on iv pain meds    Problem list of hospitalization thus far: Active Hospital Problems    Diagnosis     Cervical spinal stenosis [M48.02]     Late onset Alzheimer's disease without behavioral disturbance (HCC) [G30.1, F02.80]     Type 2 diabetes mellitus without complication ([facility] [C35.6]     Hypercholesterolemia [E78.00]     Essential hypertension [I10]          Review of Systems: (1 system for EPF, 2-9 for detailed, 10+ for comprehensive)  Review of Systems   Constitutional: Negative for chills and fever. HENT: Negative for ear discharge and ear pain. Eyes: Negative for pain and redness. Respiratory: Negative for cough and shortness of breath. Cardiovascular: Negative for chest pain and leg swelling. Gastrointestinal: Negative for nausea and vomiting. Endocrine: Negative for polydipsia and polyphagia. Genitourinary: Negative for frequency and urgency. Musculoskeletal: Positive for neck pain. Negative for back pain. Skin: Negative for color change. Allergic/Immunologic: Negative for food allergies. Neurological: Negative for dizziness and seizures. Hematological: Negative for adenopathy. Psychiatric/Behavioral: Negative for behavioral problems and decreased concentration.            Past Medical History:   Past Medical History:   Diagnosis Date    Acute gastritis without mention of hemorrhage     Acute sinusitis, unspecified     Acute upper respiratory infections of unspecified site     Allergic rhinitis, cause unspecified     Anemia, unspecified     Edema     Hyperlipidemia     Hypertension     Loss of weight     Lumbago     Lumbosacral root lesions, not elsewhere classified     Need for prophylactic vaccination and inoculation against influenza     Neuropathy     Other bursitis disorders     Other seborrheic keratosis     Symptomatic menopausal or female climacteric states     Type II or unspecified type diabetes mellitus without mention of complication, not stated as uncontrolled     Pt is not aware of having diabetes, does know her A1C runs high but blood sugar is generally normal       Past Surgical History:   Past Surgical History:   Procedure Laterality Date    ARM SURGERY Left 11/12/2020    LEFT ULNAR NERVE TRANSPOSITION performed by Fox Wong MD at [address] [facility] [address] Left 11/12/2020    LEFT CUBITAL RELEASE; LEFT CARPAL TUNNEL RELEASE performed by Fox Wong MD at [address] 8/17/2021    CERVICAL2-CERVICAL3; [facility], [facility] ([facility], [address], [address], [address], [address], [address], [address], [address] [facility], [address] [place]) - [employer] performed by Hetal Reese MD at [address] [place]    breast mass local excision negative    OTHER SURGICAL HISTORY  1986    nasal polyps    PARVEZ AND BSO  1976       Social History:   Social History     Tobacco History     Smoking Status  Never Smoker    Smokeless Tobacco Use  Never Used          Alcohol History     Alcohol Use Status  No          Drug Use     Drug Use Status  Never          Sexual Activity     Sexually Active  Yes Partners  Male                Fam History:   Family History   Problem Relation Age of Onset    Diabetes Sister     Heart Disease Brother     Heart Disease Sister     Emphysema Sister     High Blood Pressure Other        PFSH: The above PMHx, PSHx, SocHx, FamHx has been reviewed by myself.  (1 area for detailed, 2-3 for comprehensive)      Code Status: Full Code    Meds  following list ofhome medications from electronic chart has been reviewed by myself  Prior to Admission medications Medication Sig Start Date End Date Taking? Authorizing Provider   aspirin 81 MG chewable tablet Take 81 mg by mouth daily   Yes Historical Provider, MD   omeprazole (PRILOSEC) 40 MG delayed release capsule TAKE ONE CAPSULE BY MOUTH DAILY 8/5/21  Yes Judge Tiffany MD   acetaminophen-codeine (TYLENOL #3) 300-30 MG per tablet TAKE ONE TABLET BY MOUTH EVERY 8 HOURS AS NEEDED FOR PAIN . REDUCE DOSES TAKEN AS PAIN BECOMES MANAGEABLE 7/22/21 8/21/21 Yes Anca Marks MD   busPIRone (BUSPAR) 15 MG tablet TAKE ONE TABLET BY MOUTH THREE TIMES A DAY AS NEEDED FOR ANXIETY 7/21/21  Yes Fidelina Cruz MD   mirtazapine (REMERON) 15 MG tablet TAKE ONE TABLET BY MOUTH ONCE NIGHTLY 6/22/21  Yes Fidelina Cruz MD   DULoxetine (CYMBALTA) 60 MG extended release capsule TAKE ONE CAPSULE BY MOUTH DAILY 6/4/21  Yes Fidelina Cruz MD   atorvastatin (LIPITOR) 40 MG tablet TAKE ONE TABLET BY MOUTH DAILY 6/4/21  Yes Fidelina Cruz MD   amLODIPine (NORVASC) 10 MG tablet TAKE ONE TABLET BY MOUTH DAILY 5/26/21  Yes Fidelina Cruz MD   donepezil (ARICEPT) 10 MG tablet TAKE TWO TABLETS BY MOUTH EVERY NIGHT AT BEDTIME 1/26/21  Yes Fidelina Cruz MD   Melatonin 5 MG CAPS 1-2 qhs for sleep 5/27/20  Yes Fidelina Cruz MD   Multiple Vitamins-Minerals (CENTRUM SILVER PO) Take 1 tablet by mouth daily    Yes Historical Provider, MD         Allergies   Allergen Reactions    Hydrocodone      The patient has a history of a bleeding Ulcer, and this upset her stomach severely.              EXAM: (2-7 system forEPF/Detailed, ?8 for Comprehensive)  BP (!) 144/75   Pulse 96   Temp 98.2 °F (36.8 °C) (Oral)   Resp 16   Ht 5' 4\" (1.626 m)   Wt 141 lb 0.8 oz (64 kg)   SpO2 97%   BMI 24.21 kg/m²   Constitutional: vitals asabove: alert and appears stated age  Psychiatric: normal insight and judgment, oriented to person, place, time, and general circumstances  Head: Normocephalic, without obvious abnormality, atraumatic  Eyes:lids and lashes normal, conjunctivae and sclerae normal and pupils equal, round, reactive to light and accomodation  EMNT: external ears normal, lips noraml  Neck: dressing CDI   Respiratory: clear to auscultation without wheezes or rales  Cardiovascular: normal rate, normal S1 and S2, no gallops and intact distal pulses  Gastrointestinal: soft, non-tender, non-distended, normal bowel sounds, no masses or organomegaly  Lymphatic:   Extremities: no edema, no clubbing     Skin: No rashes or nodules noted. Neurologic:    LABS:  Labs Reviewed   POCT GLUCOSE - Abnormal; Notable for the following components:       Result Value    POC Glucose 172 (*)     All other components within normal limits    Narrative:     Performed at:  [facility] [address], [place], [address]   Phone [phone]   HEMOGLOBIN A1C   POCT GLUCOSE   POCT GLUCOSE         IMAGING:  Imaging has been reviewed in the computerized chart  DEXA BONE DENSITY AXIAL SKELETON    Result Date: 8/12/2021  EXAMINATION: BONE DENSITOMETRY 8/12/2021 9:58 am TECHNIQUE: A bone density DEXA scan was performed of the lumbar spine and bilateral hips on a [employer] system. COMPARISON: 2018 HISTORY: ORDERING SYSTEM PROVIDED HISTORY: Postmenopausal FINDINGS: LUMBAR SPINE: The bone mineral density in the lumbar spine including the L1-L4 levels is measured at 0.92 g/cm2, which corresponds to a T-score of -1.1 and a Z-score of 1.4. This is within the osteopenia range by WHO criteria. No great change from the comparison LEFT HIP: The bone mineral density in the total hip is measured at 0.72 g/cm2 corresponding to a T-score of -1.8 and a Z-score of 0.1. This is within the osteopenia range by WHO criteria. The bone mineral density of the femoral neck is measured at 0.54 g/cm2 corresponding to a T-score of -2.8 and a Z-score of -0.6. This is within the osteoporosis range by WHO criteria.  Findings represent a 10% decrease in density from the previous study RIGHT HIP: The bone mineral density in the total hip is measured at 0.73 g/cm2 corresponding to a T-score of -1.7 and a Z-score of 0.2. This is within the osteopenia range by WHO criteria. The bone mineral density of the femoral neck is measured at 0.55 g/cm2 corresponding to a T-score of -2.7 and a Z-score of -0.5. This is within the osteoporosis range by WHO criteria. This no great change from the comparison     Osteoporosis by WHO criteria. XR CERVICAL SPINE 1 VW    Result Date: 8/17/2021  EXAMINATION: SPOT FLUOROSCOPIC IMAGES 8/17/2021 9:03 am TECHNIQUE: Fluoroscopy was provided by the radiology department for procedure. Radiologist was not present during examination. FLUOROSCOPY DOSE AND TYPE OR TIME AND EXPOSURES: 4 seconds, 1 image COMPARISON: MRI cervical spine June 21, 2021. HISTORY: Intraprocedural imaging. Neck pain. Intraoperative imaging during C2-C3 and C3-C4 decompressive laminectomy and fusion. FINDINGS: Single intraoperative spot image of the cervical spine was submitted. Initial image demonstrates placement of surgical equipment posteriorly at the C2 through C4 levels. Intraprocedural fluoroscopic spot images as above. See separate procedure report for more information. FLUORO FOR SURGICAL PROCEDURES    Result Date: 8/17/2021  Radiology exam is complete. No Radiologist dictation. Please follow up with ordering provider. EKG:           MEDICAL DECISION MAKING:    Principal Problem:    Cervical spinal stenosis -New Problem to me. Doing well post op. Pain appears to be controlled. Has not yet worked with therapy  Plan: Continue on post-operative pathway. PT/OT to see patient. Continue pain control - on IV pain meds acutely post op. Work on transitioning to oral pain meds when possible. Will follow serial h/h to monitor for acute blood loss anemia - labs ordered for tomorrow.    Active Problems:    Essential hypertension -Established problem. Stable. 144/75  Plan: Pt home BP meds reviewed and will be continued. IV Hydralazine ordered for control of extremely high blood pressures  Will monitor labs to assess Creat/K for possible complications of medications. Type 2 diabetes mellitus without complication ([facility] -Established problem. Stable. Glu ok  Plan: Patient placed on controlled carbohydrate diet. Fingerstick sugars to be checked to monitor for both hypoglycemia as well as hyperglycemia. Sliding scale insulin ordered. Glucagon and dextrose ordered for hypoglycemia. Patient will be continued on home medications. Hemoglobin a1c to be ordered to assess efficacy of therapy. Hypercholesterolemia -Established problem. Stable. Plan: Continue present orders/plan. Late onset Alzheimer's disease without behavioral disturbance ([facility] -Established problem. Stable. Plan: Continue present orders/plan. Diagnoses as listed above, designated as new or established and plan outlined for each. Data Reviewed:   (1) Lab tests were reviewed or ordered. (1) Radiology tests were reviewed or ordered. (1) Medical test (Echo, EKG, PFT/pascale) were not ordered. (1) History was not obtained from someone other than patient  (1) Old records  were reviewed - see HPI/MDM for pertinent details if review done. (2) Case was discussed with another health care provider: Dr Isaac Davidson  (2) Imaging was viewed by myself. (2) EKG  was not viewed by myself. Thanks for the consult! Will follow along daily while patient is in house.       Giovanni Vivas MD  8/17/2021

## 2021-08-17 NOTE — BRIEF OP NOTE
Brief Postoperative Note      Patient: Mart Knight  YOB: 1945  MRN: [id number]    Date of Procedure: 8/17/2021    Pre-Op Diagnosis: M50.01  CERVICAL DISC DISORDER WITH MYELOPATHY CERVICAL2-3, 3-4    Post-Op Diagnosis: Same       Procedure(s):  CERVICAL2-CERVICAL3; CERVICAL3-CERVICAL4 DECOMPRESSIVE LAMINECTOMY, POSTERIOR LATERAL FUSION AND POSTERIOR FIXATION ([facility] , [address], [address], [address], [address] [facility], [address], [address] [facility], [address]) - [employer]    Surgeon(s):  Vesta Tadeo MD    Assistant:  First Assistant: Criselda Isidro    Anesthesia: General    Estimated Blood Loss (mL): less than 604     Complications: None    Specimens:   * No specimens in log *    Implants:  Implant Name Type Inv. Item Serial No.  Lot No. LRB No. Used Action   GRAFT BONE SUB H2MM FACET TAI FEE - [id number]  GRAFT BONE SUB H2MM FACET TAI FEE [id number] [employer] [id number] N/A 1 Implanted   GRAFT BONE SUB H2MM FACET TAI FEE - [id number]  GRAFT BONE SUB H2MM FACET TAI FEE [id number] [employer]WD [id number] N/A 1 Implanted   GRAFT BONE SUB H2MM FACET TAI FEE - [id number]  GRAFT BONE SUB H2MM FACET TAI FEE [id number] [employer] [place] [employer] [id number] N/A 1 Implanted   GRAFT BONE SUB H2MM FACET TAI FEE - [id number]  GRAFT BONE SUB H2MM FACET TAI FEE [id number] [employer] [place] [employer]WD [id number] N/A 1 Implanted   SCREW SPNL L12MM DIA3. 5MM OCCIPITOCERVICAL UP THOR  SCREW SPNL L12MM DIA3. 5MM OCCIPITOCERVICAL UP THOR  [employer]WD  N/A 6 Implanted   SET SCR SPNL STD OCCIPITOCERVICAL UP THOR INFIN  SET SCR SPNL STD OCCIPITOCERVICAL UP THOR INFIN  [employer] SOFAMOR DANEK-WD  N/A 6 Implanted   EWA SPNL L40MM DIA3. 5MM OCCIPITOCERVICAL UP THOR PRECUT  EWA SPNL L40MM DIA3. 5MM OCCIPITOCERVICAL UP THOR PRECUT  [employer] SOFAMOR DANEK-WD  N/A 2 Implanted         Drains:   Closed/Suction Drain Posterior Neck Bulb  (Active)       Urethral Catheter Non-latex;Straight-tip 16 fr (Active)       [REMOVED] Urethral Catheter Double-lumen (Removed)       Findings: Y09,55 stenosis    Electronically signed by Richard Elise MD on 8/17/2021 at 10:29 AM

## 2021-08-17 NOTE — PROGRESS NOTES
Consult noted  See [facility] Problems    Diagnosis Date Noted    Cervical spinal stenosis [M48.02] 08/17/2021    [facility] without [facility] disturbance [facility] [G30.1, F02.80] 02/07/2015    Type 2 diabetes mellitus without complication ([facility] [T97.9] 03/04/2013    Hypercholesterolemia [E78.00] 03/04/2013    Essential hypertension [I10] 12/20/2012       Full note to follow

## 2021-08-17 NOTE — ANESTHESIA POSTPROCEDURE EVALUATION
Department of Anesthesiology  Postprocedure Note    Patient: Roseanne Burton  MRN: [id number]  YOB: 1945  Date of evaluation: 8/17/2021  Time:  1:17 PM     Procedure Summary     Date: 08/17/21 Room / Location: [address]    Anesthesia Start: 7171 Anesthesia Stop: 1107    Procedure: Santana [place]; [address] ([address], [address] [place] [address], [address] [place] [address], [address] [place] [address], [address], [address], [address], [address]) - [employer] (N/A Spine Cervical) Diagnosis: (M50.01  CERVICAL DISC DISORDER WITH MYELOPATHY CERVICAL2-3, 3-4)    Surgeons: Alicia Santana MD Responsible Provider: Sheba Vela MD    Anesthesia Type: general ASA Status: 3          Anesthesia Type: general    Christian Phase I: Christian Score: 9    Christian Phase II:      Last vitals: Reviewed and per EMR flowsheets.        Anesthesia Post Evaluation    Patient location during evaluation: PACU  Patient participation: complete - patient participated  Level of consciousness: awake and alert  Airway patency: patent  Nausea & Vomiting: no nausea and no vomiting  Complications: no  Cardiovascular status: hemodynamically stable  Respiratory status: acceptable  Hydration status: stable

## 2021-08-17 NOTE — PROGRESS NOTES
Patient transferred from OR to PACU, BP elevated and O2 sat maintained on 6L via simple mask. Patient complaining of pain in neck, medicated per CRNA for pain. Posterior neck dressing CDI, JACK compressed with moderate amount bloody drainage. Patient reports full sensation to BUE, all neuro checks WNL. Guillaume patent. Staples noted to right head. Continue to monitor.

## 2021-08-17 NOTE — OP NOTE
[facility]                     [facility], [address] [facility]                                OPERATIVE REPORT    PATIENT NAME: Ap Leija                    :        1945  MED REC NO:   [id number]                          ROOM:  ACCOUNT NO:   [de-identified]                           ADMIT DATE: 2021  PROVIDER:     Nano Brantley MD    DATE OF PROCEDURE:  2021    PREOPERATIVE DIAGNOSIS:  Cervical spinal stenosis C2-C3, C3-C4. POSTOPERATIVE DIAGNOSIS:  Cervical spinal stenosis C2-C3, C3-C4. OPERATION PERFORMED:  1. Posterior cervical decompressive laminectomy, C2-C3, C3-C4. 2.  Posterolateral intrafacet fusion C2-C3, C3-C4 with structural  allograft and morselized local bone. 3.  Posterolateral Infinity lateral mass fixation, C2, C3, C4.  4.  Evoked potential monitoring. 5.  Intraoperative fluoroscopy. SURGEON:  Nano Brantley MD    ASSISTANT:  Patricia Neff. ANESTHESIA:  General endotracheal.    ESTIMATED BLOOD LOSS:  Less than 100 mL. COMPLICATIONS:  None. DRAINS PLACED:  One Ahsan-Chandler. INDICATIONS:  The patient is a 47-year-old female with progressive  cervical myelopathy. Preoperative imaging demonstrated spinal cord  compression at C2-C3 and C3-C4 with myelomalacia and significant  stenosis. Because of symptoms of persistent spinal stenosis, she was  brought to the operating room for surgical intervention. DETAILS OF PROCEDURE:  The patient was brought to the operating room on  the surgical gurney. After the adequate induction of general  endotracheal anesthesia, she was placed in the prone position on chest  rolls with the head fixed in the Mart three-pin head benítez, the  neck maintained in a neutral to slightly flexed position, all bony  prominences padded. The posterior cervical region was shaved, prepped,  and draped in a routine sterile fashion.   Prior to positioning, evoked  potential baselines were obtained on the stretcher and then after  positioning on the operating table. This was both motor and  somatosensory. Those were then monitored throughout the rest of the  procedure. Once the cervical region was prepped and draped, a linear  incision was made from C2 to C6 using a #10 blade. Bovie divided  subcutaneous fat and fascia down to the spine, performed a subperiosteal  dissection across the lamina and facet joints bilaterally. X-ray  confirmation of the level was achieved. Then, using various size  punches, curettes, and rongeurs and a Midas Isra, a decompressive  laminectomy was performed at C2-C3 and C3-C4. Significant stenosis was  identified particularly at C3-C4. Once that was widely decompressed,  [employer] structural allograft with local bone filings was taken and  impacted into the facet joints at C2-C3 and C3-C4 for a posterolateral  fusion. Then, Infinity lateral mass screws were placed at C2, C3, and  C4 bilaterally. 12 mm x 3.5 mm screws were utilized. These were then  hooked up to a 40 mm josue. They were then locked into position. Evoked  potential monitoring was achieved throughout the entire procedure. There was intermittent EMG in an inconsistent fashion in the trapezius  on the right that eventually resolved. Motor potentials were intact and  preserved throughout the entire procedure. The wound was then copiously  irrigated with antibiotic irrigation. A 7-mm Ahsan-Chandler drain was  placed and exited through a separate stab wound. The wound was closed  in layers using interrupted 0 Vicryl stitches in the fascia, inverted  2-0 Vicryl stitches in the subcutaneous layer, and staples in the skin. A dry sterile dressing was applied. The patient went to the recovery  room in stable condition.         Lorie Hernandez MD    D: 08/17/2021 10:58:56       T: 08/17/2021 11:48:42     [id number]  Job#: [id number]     Doc#: [id number]    CC:

## 2021-08-17 NOTE — PROGRESS NOTES
Pt to room 4468 from PACU. Awake upon arrival. Noted to have had 2 staples placed to right head laceration. Guillaume in place draining yellow urine. IVF infusing to left arm IV. Pt can move all extremities on command. Reports that she had \"not exactly numbness but a weird feeling\" in IRMA UE prior to surgery and states she no longer has this.  is strong and equal IRMA. Dressing to posterion neck incision with JACK drain is clean dry and intact. Ross 20 ml bloody drainage present in JACK now. Oriented to room and POC. Bed alarm is armed.

## 2021-08-17 NOTE — PROGRESS NOTES
Incentive Spirometry education and demonstration completed by Respiratory Therapy Yes      Response to education: Excellent     Teaching Time: 5 minutes    Minimum Predicted Vital Capacity - 592 mL. Patient's Actual Vital Capacity - 600 mL. Turning over to Nursing for routine follow-up Yes.       Electronically signed by Abby Castro RCP on 8/17/2021 at 4:37 PM

## 2021-08-17 NOTE — ANESTHESIA PRE PROCEDURE
Department of Anesthesiology  Preprocedure Note       Name:  Leia Michael   Age:  66 y.o.  :  1945                                          MRN:  [id number]         Date:  2021      Surgeon: Cele Holbrook):  David Jha MD    Procedure: Procedure(s):  CERVICAL2-CERVICAL3; [address] [facility], [facility] ([facility], [address] [place] [address] Dr, [id number], [id number], [address] [place] [address], [employer], [phone]) - 24/7 Card    Medications prior to admission:   Prior to Admission medications    Medication Sig Start Date End Date Taking? Authorizing Provider   omeprazole (PRILOSEC) 40 MG delayed release capsule TAKE ONE CAPSULE BY MOUTH DAILY 21   Paco Nguyen MD   acetaminophen-codeine (TYLENOL #3) 300-30 MG per tablet TAKE ONE TABLET BY MOUTH EVERY 8 HOURS AS NEEDED FOR PAIN .  REDUCE DOSES TAKEN AS PAIN BECOMES MANAGEABLE 21  Osmin Roldan MD   busPIRone (BUSPAR) 15 MG tablet TAKE ONE TABLET BY MOUTH THREE TIMES A DAY AS NEEDED FOR ANXIETY 21   Gama Higgins MD   mirtazapine (REMERON) 15 MG tablet TAKE ONE TABLET BY MOUTH ONCE NIGHTLY 21   Gama Higgins MD   DULoxetine (CYMBALTA) 60 MG extended release capsule TAKE ONE CAPSULE BY MOUTH DAILY 21   Gama Higgins MD   atorvastatin (LIPITOR) 40 MG tablet TAKE ONE TABLET BY MOUTH DAILY 21   Gama Higgins MD   amLODIPine (NORVASC) 10 MG tablet TAKE ONE TABLET BY MOUTH DAILY 21   Gama Higgins MD   donepezil (ARICEPT) 10 MG tablet TAKE TWO TABLETS BY MOUTH EVERY NIGHT AT BEDTIME 21   Gama Higgins MD   Melatonin 5 MG CAPS 1-2 qhs for sleep 20   Gama Higgins MD   Multiple Vitamins-Minerals (CENTRUM SILVER PO) Take 1 tablet by mouth daily     Historical Provider, MD       Current medications:    Current Facility-Administered Medications   Medication Dose Route Frequency Provider Last Rate Last Admin    lactated ringers infusion Intravenous Continuous Tracey Moore MD        lidocaine PF 1 % injection 0.5 mL  0.5 mL Intradermal Once Tracey Moore MD        ceFAZolin (ANCEF) 2000 mg in dextrose 5 % 50 mL IVPB  2,000 mg Intravenous On Call to [facility], MD           Allergies: Allergies   Allergen Reactions    Hydrocodone      The patient has a history of a bleeding Ulcer, and this upset her stomach severely.        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    Hypercholesterolemia E78.00    GERD (gastroesophageal reflux disease) K21.9    Late onset Alzheimer's disease without behavioral disturbance ([facility]) G30.1, F02.80    Drug dependence ([address] F19.20    Chronic low back pain without sciatica M54.5, G89.29    Spondylolisthesis, lumbar region M43.16    Chronic pain syndrome G89.4    Hyperparathyroidism ([facility]) E21.3    Pain disorder with psychological factors F45.41    Current moderate episode of major depressive disorder without prior episode ([address] F32.1    TIA (transient ischemic attack) G45.9    Cubital tunnel syndrome on left G56.22    Left carpal tunnel syndrome G56.02    Right carpal tunnel syndrome G56.01       Past Medical History:        Diagnosis Date    Acute gastritis without mention of hemorrhage     Acute sinusitis, unspecified     Acute upper respiratory infections of unspecified site     Allergic rhinitis, cause unspecified     Anemia, unspecified     Edema     Hyperlipidemia     Hypertension     Loss of weight     Lumbago     Lumbosacral root lesions, not elsewhere classified     Need for prophylactic vaccination and inoculation against influenza     Neuropathy     Other bursitis disorders     Other seborrheic GFRAA >60 08/02/2021    GFRAA >60 11/17/2012    AGRATIO 1.5 06/22/2021    LABGLOM >60 08/02/2021    GLUCOSE 99 08/02/2021    PROT 7.0 06/22/2021    PROT 7.9 11/17/2012    CALCIUM 10.4 08/02/2021    BILITOT 0.3 06/22/2021    ALKPHOS 90 06/22/2021    AST 22 06/22/2021    ALT 14 06/22/2021       POC Tests: No results for input(s): POCGLU, POCNA, POCK, POCCL, POCBUN, POCHEMO, POCHCT in the last 72 hours. Coags:   Lab Results   Component Value Date    PROTIME 12.2 08/02/2021    INR 1.08 08/02/2021    APTT 38.9 08/02/2021       HCG (If Applicable): No results found for: PREGTESTUR, PREGSERUM, HCG, HCGQUANT     ABGs: No results found for: PHART, PO2ART, ZHE3ZLN, RPZ6ZTL, BEART, L0GKNEYV     Type & Screen (If Applicable):  Lab Results   Component Value Date    LABABO O 01/24/2012    [address] Positive 01/24/2012       Drug/Infectious Status (If Applicable):  No results found for: HIV, HEPCAB    COVID-19 Screening (If Applicable):   Lab Results   Component Value Date    COVID19 Not Detected 11/06/2020           Anesthesia Evaluation  Patient summary reviewed and Nursing notes reviewed no history of anesthetic complications:   Airway: Mallampati: I  TM distance: >3 FB   Neck ROM: full  Mouth opening: > = 3 FB Dental: normal exam         Pulmonary:       (-) asthma and shortness of breath                           Cardiovascular:    (+) hypertension:,     (-)  angina                Neuro/Psych:   (+) TIA, psychiatric history: stable with treatment   (-) CVA           GI/Hepatic/Renal:   (+) GERD:,      (-) liver disease       Endo/Other:    (+) DiabetesType II DM, , .    (-) hypothyroidism               Abdominal:             Vascular:     - PVD. Other Findings:           Anesthesia Plan      general     ASA 3       Induction: intravenous. MIPS: Postoperative opioids intended and Prophylactic antiemetics administered. Anesthetic plan and risks discussed with patient.     Use of blood products discussed with patient whom.   Plan discussed with CRNA.                   Gavino David MD   8/17/2021

## 2021-08-18 ENCOUNTER — APPOINTMENT (OUTPATIENT)
Dept: GENERAL RADIOLOGY | Age: 76
DRG: 472 | End: 2021-08-18
Attending: NEUROLOGICAL SURGERY
Payer: COMMERCIAL

## 2021-08-18 PROBLEM — D62 ACUTE BLOOD LOSS AS CAUSE OF POSTOPERATIVE ANEMIA: Status: ACTIVE | Noted: 2021-08-18

## 2021-08-18 LAB
ANION GAP SERPL CALCULATED.3IONS-SCNC: 10 MMOL/L (ref 3–16)
BASOPHILS ABSOLUTE: 0 K/UL (ref 0–0.2)
BASOPHILS RELATIVE PERCENT: 0.2 %
BILIRUBIN URINE: NEGATIVE
BLOOD, URINE: ABNORMAL
BUN BLDV-MCNC: 10 MG/DL (ref 7–20)
CALCIUM SERPL-MCNC: 10.3 MG/DL (ref 8.3–10.6)
CHLORIDE BLD-SCNC: 103 MMOL/L (ref 99–110)
CLARITY: CLEAR
CO2: 26 MMOL/L (ref 21–32)
COLOR: YELLOW
CREAT SERPL-MCNC: 0.7 MG/DL (ref 0.6–1.2)
EOSINOPHILS ABSOLUTE: 0 K/UL (ref 0–0.6)
EOSINOPHILS RELATIVE PERCENT: 0 %
ESTIMATED AVERAGE GLUCOSE: 122.6 MG/DL
GFR AFRICAN AMERICAN: >60
GFR NON-AFRICAN AMERICAN: >60
GLUCOSE BLD-MCNC: 110 MG/DL (ref 70–99)
GLUCOSE BLD-MCNC: 112 MG/DL (ref 70–99)
GLUCOSE BLD-MCNC: 116 MG/DL (ref 70–99)
GLUCOSE BLD-MCNC: 129 MG/DL (ref 70–99)
GLUCOSE BLD-MCNC: 140 MG/DL (ref 70–99)
GLUCOSE URINE: NEGATIVE MG/DL
HBA1C MFR BLD: 5.9 %
HCT VFR BLD CALC: 32.4 % (ref 36–48)
HEMOGLOBIN: 10.4 G/DL (ref 12–16)
KETONES, URINE: NEGATIVE MG/DL
LEUKOCYTE ESTERASE, URINE: ABNORMAL
LYMPHOCYTES ABSOLUTE: 2.5 K/UL (ref 1–5.1)
LYMPHOCYTES RELATIVE PERCENT: 12.8 %
MCH RBC QN AUTO: 26.1 PG (ref 26–34)
MCHC RBC AUTO-ENTMCNC: 32.2 G/DL (ref 31–36)
MCV RBC AUTO: 81.2 FL (ref 80–100)
MICROSCOPIC EXAMINATION: YES
MONOCYTES ABSOLUTE: 1.2 K/UL (ref 0–1.3)
MONOCYTES RELATIVE PERCENT: 6 %
NEUTROPHILS ABSOLUTE: 15.7 K/UL (ref 1.7–7.7)
NEUTROPHILS RELATIVE PERCENT: 81 %
NITRITE, URINE: NEGATIVE
PDW BLD-RTO: 16.7 % (ref 12.4–15.4)
PERFORMED ON: ABNORMAL
PH UA: 7 (ref 5–8)
PLATELET # BLD: 354 K/UL (ref 135–450)
PMV BLD AUTO: 7.2 FL (ref 5–10.5)
POTASSIUM SERPL-SCNC: 5.5 MMOL/L (ref 3.5–5.1)
PROTEIN UA: NEGATIVE MG/DL
RBC # BLD: 3.99 M/UL (ref 4–5.2)
RBC UA: NORMAL /HPF (ref 0–4)
SODIUM BLD-SCNC: 139 MMOL/L (ref 136–145)
SPECIFIC GRAVITY UA: 1.02 (ref 1–1.03)
URINE TYPE: ABNORMAL
UROBILINOGEN, URINE: 0.2 E.U./DL
WBC # BLD: 19.4 K/UL (ref 4–11)
WBC UA: NORMAL /HPF (ref 0–5)

## 2021-08-18 PROCEDURE — 1200000000 HC SEMI PRIVATE

## 2021-08-18 PROCEDURE — 36415 COLL VENOUS BLD VENIPUNCTURE: CPT

## 2021-08-18 PROCEDURE — 97530 THERAPEUTIC ACTIVITIES: CPT

## 2021-08-18 PROCEDURE — 6370000000 HC RX 637 (ALT 250 FOR IP): Performed by: NEUROLOGICAL SURGERY

## 2021-08-18 PROCEDURE — 80048 BASIC METABOLIC PNL TOTAL CA: CPT

## 2021-08-18 PROCEDURE — 96375 TX/PRO/DX INJ NEW DRUG ADDON: CPT

## 2021-08-18 PROCEDURE — 83036 HEMOGLOBIN GLYCOSYLATED A1C: CPT

## 2021-08-18 PROCEDURE — 97161 PT EVAL LOW COMPLEX 20 MIN: CPT

## 2021-08-18 PROCEDURE — 6370000000 HC RX 637 (ALT 250 FOR IP): Performed by: INTERNAL MEDICINE

## 2021-08-18 PROCEDURE — 97165 OT EVAL LOW COMPLEX 30 MIN: CPT

## 2021-08-18 PROCEDURE — 96374 THER/PROPH/DIAG INJ IV PUSH: CPT

## 2021-08-18 PROCEDURE — 71046 X-RAY EXAM CHEST 2 VIEWS: CPT

## 2021-08-18 PROCEDURE — 85025 COMPLETE CBC W/AUTO DIFF WBC: CPT

## 2021-08-18 PROCEDURE — 6360000002 HC RX W HCPCS: Performed by: INTERNAL MEDICINE

## 2021-08-18 PROCEDURE — G0378 HOSPITAL OBSERVATION PER HR: HCPCS

## 2021-08-18 PROCEDURE — 6360000002 HC RX W HCPCS: Performed by: NEUROLOGICAL SURGERY

## 2021-08-18 PROCEDURE — 81001 URINALYSIS AUTO W/SCOPE: CPT

## 2021-08-18 PROCEDURE — 6370000000 HC RX 637 (ALT 250 FOR IP): Performed by: NURSE PRACTITIONER

## 2021-08-18 RX ORDER — OXYCODONE HYDROCHLORIDE 5 MG/1
5 TABLET ORAL EVERY 6 HOURS PRN
Status: DISCONTINUED | OUTPATIENT
Start: 2021-08-18 | End: 2021-08-19 | Stop reason: HOSPADM

## 2021-08-18 RX ADMIN — METHOCARBAMOL 500 MG: 500 TABLET ORAL at 15:32

## 2021-08-18 RX ADMIN — BUSPIRONE HYDROCHLORIDE 15 MG: 5 TABLET ORAL at 07:36

## 2021-08-18 RX ADMIN — MIRTAZAPINE 30 MG: 15 TABLET, FILM COATED ORAL at 20:21

## 2021-08-18 RX ADMIN — METHOCARBAMOL 500 MG: 500 TABLET ORAL at 07:37

## 2021-08-18 RX ADMIN — OXYCODONE 5 MG: 5 TABLET ORAL at 20:21

## 2021-08-18 RX ADMIN — AMLODIPINE BESYLATE 10 MG: 5 TABLET ORAL at 07:36

## 2021-08-18 RX ADMIN — DONEPEZIL HYDROCHLORIDE 10 MG: 5 TABLET, FILM COATED ORAL at 20:15

## 2021-08-18 RX ADMIN — SODIUM ZIRCONIUM CYCLOSILICATE 10 G: 10 POWDER, FOR SUSPENSION ORAL at 11:06

## 2021-08-18 RX ADMIN — BUSPIRONE HYDROCHLORIDE 15 MG: 5 TABLET ORAL at 20:15

## 2021-08-18 RX ADMIN — OXYCODONE 5 MG: 5 TABLET ORAL at 11:19

## 2021-08-18 RX ADMIN — TRAMADOL HYDROCHLORIDE 50 MG: 50 TABLET, FILM COATED ORAL at 06:03

## 2021-08-18 RX ADMIN — DULOXETINE HYDROCHLORIDE 60 MG: 30 CAPSULE, DELAYED RELEASE ORAL at 07:37

## 2021-08-18 RX ADMIN — HYDROMORPHONE HYDROCHLORIDE 0.5 MG: 1 INJECTION, SOLUTION INTRAMUSCULAR; INTRAVENOUS; SUBCUTANEOUS at 01:17

## 2021-08-18 RX ADMIN — METHOCARBAMOL 500 MG: 500 TABLET ORAL at 20:15

## 2021-08-18 RX ADMIN — PANTOPRAZOLE SODIUM 40 MG: 40 TABLET, DELAYED RELEASE ORAL at 06:03

## 2021-08-18 RX ADMIN — BUSPIRONE HYDROCHLORIDE 15 MG: 5 TABLET ORAL at 15:32

## 2021-08-18 RX ADMIN — HYDRALAZINE HYDROCHLORIDE 10 MG: 20 INJECTION INTRAMUSCULAR; INTRAVENOUS at 20:27

## 2021-08-18 RX ADMIN — ATORVASTATIN CALCIUM 40 MG: 40 TABLET, FILM COATED ORAL at 07:37

## 2021-08-18 ASSESSMENT — PAIN DESCRIPTION - PAIN TYPE
TYPE: SURGICAL PAIN

## 2021-08-18 ASSESSMENT — PAIN SCALES - GENERAL
PAINLEVEL_OUTOF10: 4
PAINLEVEL_OUTOF10: 7
PAINLEVEL_OUTOF10: 6
PAINLEVEL_OUTOF10: 8
PAINLEVEL_OUTOF10: 0
PAINLEVEL_OUTOF10: 8
PAINLEVEL_OUTOF10: 4

## 2021-08-18 ASSESSMENT — PAIN DESCRIPTION - DESCRIPTORS: DESCRIPTORS: THROBBING

## 2021-08-18 ASSESSMENT — PAIN DESCRIPTION - LOCATION
LOCATION: NECK

## 2021-08-18 ASSESSMENT — PAIN DESCRIPTION - ORIENTATION
ORIENTATION: POSTERIOR

## 2021-08-18 ASSESSMENT — PAIN DESCRIPTION - PROGRESSION
CLINICAL_PROGRESSION: GRADUALLY IMPROVING

## 2021-08-18 NOTE — PLAN OF CARE
Problem: Falls - Risk of:  Goal: Will remain free from falls  Description: Will remain free from falls  Outcome: Ongoing  Goal: Absence of physical injury  Description: Absence of physical injury  Outcome: Ongoing     Problem: Pain:  Goal: Pain level will decrease  Description: Pain level will decrease  Outcome: Ongoing  Goal: Control of acute pain  Description: Control of acute pain  Outcome: Ongoing  Goal: Control of chronic pain  Description: Control of chronic pain  Outcome: Ongoing     Problem: Activity:  Goal: Ability to avoid complications of mobility impairment will improve  Description: Ability to avoid complications of mobility impairment will improve  Outcome: Ongoing  Goal: Ability to tolerate increased activity will improve  Description: Ability to tolerate increased activity will improve  Outcome: Ongoing     Problem:  Bowel/Gastric:  Goal: Gastrointestinal status for postoperative course will improve  Description: Gastrointestinal status for postoperative course will improve  Outcome: Ongoing     Problem: Fluid Volume:  Goal: Maintenance of adequate hydration will improve  Description: Maintenance of adequate hydration will improve  Outcome: Ongoing     Problem: Health Behavior:  Goal: Identification of resources available to assist in meeting health care needs will improve  Description: Identification of resources available to assist in meeting health care needs will improve  Outcome: Ongoing  Goal: Ability to state signs and symptoms to report to health care provider will improve  Description: Ability to state signs and symptoms to report to health care provider will improve  Outcome: Ongoing     Problem: Nutritional:  Goal: Ability to attain and maintain optimal nutritional status will improve  Description: Ability to attain and maintain optimal nutritional status will improve  Outcome: Ongoing     Problem: Physical Regulation:  Goal: Ability to maintain clinical measurements within normal limits will improve  Description: Ability to maintain clinical measurements within normal limits will improve  Outcome: Ongoing  Goal: Will remain free from infection  Description: Will remain free from infection  Outcome: Ongoing     Problem: Respiratory:  Goal: Respiratory status will improve  Description: Respiratory status will improve  Outcome: Ongoing     Problem: Sensory:  Goal: Pain level will decrease  Description: Pain level will decrease  Outcome: Ongoing  Goal: Satisfaction with pain management regimen will improve  Description: Satisfaction with pain management regimen will improve  Outcome: Ongoing     Problem: Skin Integrity:  Goal: Risk for impaired skin integrity will decrease  Description: Risk for impaired skin integrity will decrease  Outcome: Ongoing     Problem: Urinary Elimination:  Goal: Ability to achieve and maintain adequate urine output will improve  Description: Ability to achieve and maintain adequate urine output will improve  Outcome: Ongoing

## 2021-08-18 NOTE — PROGRESS NOTES
Physical Therapy    Facility/Department: [address] ORTHO/NEURO NURSING  Initial Assessment    NAME: Rachel Mak  : 1945  MRN: [id number]    Date of Service: 2021    Discharge Recommendations: Rachel Mak scored a 18/24 on the AM-PAC short mobility form. Current research shows that an AM-PAC score of 18 or greater is typically associated with a discharge to the patient's home setting. Based on the patient's AM-PAC score and their current functional mobility deficits, it is recommended that the patient have 2-3 sessions per week of Physical Therapy at d/c to increase the patient's independence. At this time, this patient demonstrates the endurance and safety to discharge home with Home Health services and a follow up treatment frequency of 2-3x/wk. Please see assessment section for further patient specific details. If patient discharges prior to next session this note will serve as a discharge summary. Please see below for the latest assessment towards goals. HOME HEALTH CARE: LEVEL 3 SAFETY     - Initial home health evaluation to occur within 24-48 hours, in patient home   - Therapy evaluations in home within 24-48 hours of discharge; including DME and home safety   - Frontload therapy 5 days, then 3x a week   - Therapy to evaluate if patient has [address] needs for personal care   -  evaluation within 24-48 hours, includes evaluation of resources and insurance to determine [place], [place], LTC, and Medicaid options     PT Equipment Recommendations  Equipment Needed: Yes  Mobility Devices: Thiago Carrera: Rolling    Assessment   Body structures, Functions, Activity limitations: Decreased functional mobility ; Decreased ADL status; Decreased ROM; Decreased strength;Decreased safe awareness;Decreased cognition;Decreased endurance;Decreased sensation;Decreased balance;Decreased fine motor control;Decreased coordination; Increased pain  Assessment: Prior to admission the patient would ambulate around her home independently or with a cane. Currently in the hospital she needs the support of a RW to ambulate. The pt had a decreased awareness of her deficits, leading to unsafe management of the RW during transfers. Pt would benefit from additional therapy in order to return her to her PLOF and ensure she can return home safely. Treatment Diagnosis: decreased functional mobility, ROM, endurance, and cognition  Prognosis: Good  Decision Making: Low Complexity  Clinical Presentation: stable/ uncomplicated  PT Education: PT Role;Plan of Care;Precautions;General Safety;Transfer Training;Functional Mobility Training;Gait Training;Orientation  Patient Education: d/c recommendations--pt verbalized understanding  Barriers to Learning: appears to have some memory trouble- didn't remember being allergic to medication, no awareness of diabetic diagnosis  REQUIRES PT FOLLOW UP: Yes  Activity Tolerance  Activity Tolerance: Patient limited by fatigue;Patient limited by pain; Patient limited by endurance  Activity Tolerance: pt reporting high levels of pain throughout therapy this date. Pt moved slowly guarding her neck. Pt needed a rest break after transitioning to the EOB, approx 5:00. Pt performed STS and again needed an additional restbreak due to dizziness and pain, approx 5:00. Upon second STS, the patient was able to ambulate to the bedside chair       Patient Diagnosis(es): The encounter diagnosis was Preop testing.      has a past medical history of Acute gastritis without mention of hemorrhage, Acute sinusitis, unspecified, Acute upper respiratory infections of unspecified site, Allergic rhinitis, cause unspecified, Anemia, unspecified, Edema, Hyperlipidemia, Hypertension, Loss of weight, Lumbago, Lumbosacral root lesions, not elsewhere classified, Need for prophylactic vaccination and inoculation against influenza, Neuropathy, Other bursitis disorders, Other seborrheic keratosis, Symptomatic menopausal or female climacteric states, and Type II or unspecified type diabetes mellitus without mention of complication, not stated as uncontrolled. has a past surgical history that includes other surgical history (1977); liset and bso (cervix removed) (1976); other surgical history (1986); Breast surgery; Carpal tunnel release (Left, 11/12/2020); Arm Surgery (Left, 11/12/2020); and cervical laminectomy (N/A, 8/17/2021). Restrictions  Restrictions/Precautions  Restrictions/Precautions: Fall Risk (Medium fall risk)  Required Braces or Orthoses?: No  Position Activity Restriction  Other position/activity restrictions: s/p decompressive laminectomy C2-3 C3-4     Vision/Hearing  Vision: Impaired  Vision Exceptions: Wears glasses at all times  Hearing: Within functional limits       Subjective  General  Chart Reviewed: Yes  Patient assessed for rehabilitation services?: Yes  Response To Previous Treatment: Not applicable  Family / Caregiver Present: No  Diagnosis: Cervical spinal stenosis  Follows Commands: Within Functional Limits  Subjective  Subjective: pt supine in bed, consented to PT/OT eval. pt reporting 8/10 pain in neck-RN aware and medication administered during session.   Pain Screening  Patient Currently in Pain: Yes  Pain Assessment  Pain Level: 8  Pain Type: Surgical pain  Pain Location: Neck  Vital Signs  Patient Currently in Pain: Yes       Orientation  Orientation  Overall Orientation Status: Within Functional Limits     Social/Functional History  Social/Functional History  Lives With: Spouse  Type of Home: House  Home Layout: One level, Laundry in basement  Home Access: Stairs to enter with rails  Entrance Stairs - Number of Steps: 3 THONY  Entrance Stairs - Rails: Both  Bathroom Shower/Tub: Walk-in shower  Bathroom Toilet: Standard  Bathroom Equipment: Grab bars in shower, Tub transfer bench, Grab bars around toilet  Bathroom Accessibility: Walker accessible  Home Equipment: Rolling walker, [facility]ries [facility])  Receives Help From: Family  ADL Assistance: Independent  Homemaking Assistance: Independent  Ambulation Assistance: Independent (Uses in house and community)  Transfer Assistance: Independent (Spouse provides supervision)  Active : Yes  Mode of Transportation: Car  Occupation: Retired  Type of occupation: [employer]  Leisure & Hobbies: Garage sales, 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  Additional Comments: Falls: Endorses 3 falls     Cognition   Cognition  Overall Cognitive Status: Exceptions  Arousal/Alertness: Appropriate responses to stimuli  Following Commands: Follows one step commands consistently  Attention Span: Appears intact  Memory: Decreased recall of biographical Information  Safety Judgement: Decreased awareness of need for safety  Insights: Decreased awareness of deficits  Initiation: Requires cues for some  Sequencing: Requires cues for some    Objective  PROM RLE (degrees)  RLE PROM: WFL  AROM RLE (degrees)  RLE AROM: WFL  PROM LLE (degrees)  LLE PROM: WFL  AROM LLE (degrees)  LLE AROM : WFL  Strength RLE  Strength RLE: WFL  Comment: grossly 3/5  Strength LLE  Strength LLE: WFL  Comment: grossly 3/5  Tone RLE  RLE Tone: Normotonic  Tone LLE  LLE Tone: Normotonic  Motor Control  Gross Motor?: WFL  Sensation  Overall Sensation Status: Impaired (decreased sensation in hands, decreased sensation in feet, pt states feet feel \"padded\")  Bed mobility  Supine to Sit: Minimal assistance (pt reached for therapist hands when moving to EOB. PT cued to use bedrail and side of bed to sit up)  Sit to Supine: Minimal assistance  Scooting: Minimal assistance (pt used therapist hands initially for support but then used bed rail and EOB)  Comment: pt performed sup<>sit x2 due to bout of dizziness upon inital transfer/ambulation and for BP readings. Transfers  Sit to Stand: Contact guard assistance (VC given for safe hand placement during transfer.  Performed 2 times, pt needed to lie back down and rest after first sit to stand due to pain and dizziness)  Stand to sit: Contact guard assistance  Comment: max VC for safe hand placement when transferring with RW with mod carryover noted  Ambulation  Ambulation?: Yes  Ambulation 1  Surface: level tile  Device: Rolling Walker  Assistance: Contact guard assistance;Minimal assistance  Quality of Gait: wide Gabrielle, decreased jean, decreased B step lengths/heights  Gait Deviations: Slow Jean;Decreased step length;Decreased step height  Distance: 2' forward + 2' backward + 15'  Comments: Pt initiated ambulation however then reporting increased dizziness and pain and began stepping backwards taking hands off walker. Pt required max VC to maintain hand placement with min carryover. Pt then performed second bout of ambulation and required CGA>Min A due to staying outside Gabrielle of walker and then began to  the walker while turning towards the chair with decreased safety awareness despite max VC. Stairs/Curb  Stairs?: No     Balance  Posture: Good  Sitting - Static: Fair;+ (BUE support while seated EOB, no LOB)  Sitting - Dynamic: Fair;+ (BUE support while scooting forward in bed, no LOB)  Standing - Static: Fair;- (decreased balance while standing, needed BUE support on RW)  Standing - Dynamic: Fair;- (BUE support on RW while ambulating to bedside chair)        Plan   Plan  Times per week: 7x  Times per day: Daily  Current Treatment Recommendations: Strengthening, ROM, Balance Training, Functional Mobility Training, Transfer Training, Endurance Training, Gait Training, Stair training, Pain Management, Home Exercise Program, Modalities, Neuromuscular Re-education, Safety Education & Training, Equipment Evaluation, Education, & procurement, Patient/Caregiver Education & Training  Safety Devices  Type of devices:  All fall risk precautions in place, Left in chair, Call light within reach, Chair alarm in place, Nurse notified, Gait belt, Patient at risk for falls  Restraints  Initially in place: No    G-Code       OutComes Score                                                  AM-PAC Score  AM-PAC Inpatient Mobility Raw Score : 18 (08/18/21 1155)  AM-PAC Inpatient T-Scale Score : 43.63 (08/18/21 1155)  Mobility Inpatient CMS 0-100% Score: 46.58 (08/18/21 1155)  Mobility Inpatient CMS G-Code Modifier : CK (08/18/21 1155)          Goals  Short term goals  Time Frame for Short term goals: before d/c  Short term goal 1: pt will perform supine to sit Mod I  Short term goal 2: pt will perform sit to stand with RW and SBA  Short term goal 3: pt will ambulate 27' with LRAD and CGA       Therapy Time   Individual Concurrent Group Co-treatment   Time In 1027         Time Out 1112         Minutes 45              Timed Code Treatment Minutes:   30    Total Treatment Minutes:  [address], [facility]    PT providing direct supervision during session and assisting in making skilled judgements throughout session.   [id number] [facility] PT, DPT [id number]    [id number] [address] Carole, PT

## 2021-08-18 NOTE — PROGRESS NOTES
Post-op Progress Note - Neurosurgery    Subjective:  Bala Lau is a 66 y.o. female. Pain is controlled. Patient complains of incisional pain, denies leg/arm pain, denies headache, hoarseness of voice, difficulty swallowing, N/V. Objective:  Blood pressure (!) 151/72, pulse 76, temperature 98.1 °F (36.7 °C), temperature source Oral, resp. rate 16, height 5' 4\" (1.626 m), weight 141 lb 0.8 oz (64 kg), SpO2 95 %, not currently breastfeeding. DRAIN/TUBE OUTPUT:  Closed/Suction Drain Posterior Neck Bulb -Output (ml): 25 ml    In: 480 [P.O.:480]  Out: 9013 [MKMYM:0000; Drains:70]    Physical Exam:  Incision:healing well  Motor:improved  Activity: Ambulating 15 feet. Labs:  BMP:   Lab Results   Component Value Date    GLUCOSE 110 08/18/2021    CO2 26 08/18/2021    BUN 10 08/18/2021    CREATININE 0.7 08/18/2021    CALCIUM 10.3 08/18/2021     WBC/Hgb/Hct/Plts:  19.4/10.4/32.4/354 (08/18 0654)     Imaging:  Xrays: none    Assessment and Plan:  Principal Problem:    Cervical spinal stenosis  Active Problems:    Essential hypertension    Type 2 diabetes mellitus without complication (HCC)    Hypercholesterolemia    Late onset Alzheimer's disease without behavioral disturbance (HCC)    Acute blood loss as cause of postoperative anemia    POD # 1 S/P cervical laminectomy/ and fusion C2-4 for myelopathy   OT/PT: Continue to advance activity. Disposition: Home tomorrow if pain controlled, voiding and ambulating better  Internal Medicine consult per Dr. Reina Mercado for medical management.     Mancil Grams, APRN - CNP  8/18/2021

## 2021-08-18 NOTE — DISCHARGE INSTR - COC
Administered    COVID-19, Moderna, PF, 100mcg/0.5mL 01/30/2021, 02/27/2021    Influenza A ([id number]) Vaccine IM 01/04/2010    Influenza Virus Vaccine 09/25/2012    Influenza, High Dose (Fluzone 65 yrs and older) 10/01/2013, 09/29/2014, 10/01/2015, 10/04/2016, 09/28/2017, 09/15/2018, 10/05/2020    Influenza, High-dose, Beard Pulse, 65 yrs +, IM (Fluzone) 10/05/2020    Influenza, Triv, inactivated, subunit, adjuvanted, IM (Fluad 65 yrs and older) 09/25/2019    Pneumococcal Conjugate 13-valent ([id number]) 07/17/2015    Pneumococcal Polysaccharide ([id number]) 10/30/2006, 10/29/2014    Td, unspecified formulation 03/16/2006    Zoster Live (Zostavax) 06/04/2008       Active Problems:  Patient Active Problem List   Diagnosis Code    Essential hypertension I10    Generalized osteoarthrosis, involving multiple sites M15.9    Type 2 diabetes mellitus without complication ([facility] [place] [address] K03.2    Degenerative lumbar spinal stenosis M48.061    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    Hypercholesterolemia E78.00    GERD (gastroesophageal reflux disease) K21.9    Late onset Alzheimer's disease without behavioral disturbance ([facility]) G30.1, F02.80    Drug dependence (HCC) F19.20    Chronic low back pain without sciatica M54.5, G89.29    Spondylolisthesis, lumbar region M43.16    Chronic pain syndrome G89.4    Hyperparathyroidism ([facility]) E21.3    Pain disorder with psychological factors F45.41    Current moderate episode of major depressive disorder without prior episode (HCC) F32.1    TIA (transient ischemic attack) G45.9    Cubital tunnel syndrome on left G56.22    Left carpal tunnel syndrome G56.02    Right carpal tunnel syndrome G56.01    Cervical spinal stenosis M48.02    Acute blood loss as cause of postoperative anemia D62       Isolation/Infection:   Isolation            No Isolation          Patient Infection Status       None to display            Nurse Assessment:  Last Vital Signs: BP (!) 151/72 Pulse 76   Temp 98.1 °F (36.7 °C) (Oral)   Resp 16   Ht 5' 4\" (1.626 m)   Wt 141 lb 0.8 oz (64 kg)   SpO2 95%   BMI 24.21 kg/m²     Last documented pain score (0-10 scale): Pain Level: 8  Last Weight:   Wt Readings from Last 1 Encounters:   08/17/21 141 lb 0.8 oz (64 kg)     Mental Status:  oriented, alert and forgetful at times    IV Access:  - None    Nursing Mobility/ADLs:  Walking   Assisted  Transfer  Assisted  Bathing  Assisted  Dressing  Assisted  Toileting  Assisted  Feeding  Independent  Med Admin  Independent  Med Delivery   whole    Wound Care Documentation and Therapy:        Elimination:  Continence:   · Bowel: Yes  · Bladder: Yes  Urinary Catheter: None   Colostomy/Ileostomy/Ileal Conduit: No       Date of Last BM: ***    Intake/Output Summary (Last 24 hours) at 8/18/2021 1525  Last data filed at 8/18/2021 1411  Gross per 24 hour   Intake 1610 ml   Output 5145 ml   Net -3535 ml     I/O last 3 completed shifts: In: 65 [P.O.:1610]  Out: 5842 [Urine:5075; Drains:70]    Safety Concerns:     None    Impairments/Disabilities:      None    Nutrition Therapy:  Current Nutrition Therapy:   - Oral Diet:  General    Routes of Feeding: Oral  Liquids: No Restrictions  Daily Fluid Restriction: no  Last Modified Barium Swallow with Video (Video Swallowing Test): not done    Treatments at the Time of Hospital Discharge:   Respiratory Treatments: ***  Oxygen Therapy:  is not on home oxygen therapy.   Ventilator:    - No ventilator support    Rehab Therapies: SN,PT,OT  Weight Bearing Status/Restrictions: No weight bearing restirctions  Other Medical Equipment (for information only, NOT a DME order):  walker  Other Treatments: HOME HEALTH CARE: LEVEL 3 SAFETY        -Initial home health evaluation to occur within 24-48 hours, in patient home    -Home health agency to establish plan of care for patient over 60 day period    -Medication Reconciliation    -PT/OT/Speech evaluations in home within 24-48 hours of discharge; including  -DME and home safety    -Frontload therapy 5 days, then 3x a week    -OT to evaluate if patient has [address] needs for personal care    - evaluation within 24-48 hours, includes evaluation of resources   and insurance to determine [place], [place], LTC, and Medicaid options    -PCP Visit scheduled within three to seven days of discharge   -May  Have Physical Therapy Start Of Care   Patient's personal belongings (please select all that are sent with patient):  {CHP DME Belongings:[id number]}    RN SIGNATURE:  Electronically signed by Lala Ayala RN on 8/19/21 at 1:52 PM EDT    CASE MANAGEMENT/SOCIAL WORK SECTION    Inpatient Status Date: ***    Readmission Risk Assessment Score:  Readmission Risk              Risk of Unplanned Readmission:  13           Discharging to Facility/ Agency   Name: [facility] will call for Appointment  Phone: [phone]  Fax: [phone]    Dialysis Facility (if applicable)   · Name:  · Address:  · Dialysis Schedule:  · Phone:  · Fax:    / signature: {Esignature:[id number]}    PHYSICIAN SECTION    Prognosis: Good    Condition at Discharge: Stable    Rehab Potential (if transferring to Rehab): {Prognosis:[id number]}    Recommended Labs or Other Treatments After Discharge: Disposition: Home SN/ OT/PT eval and treat. Home visiting nurse: for wound care and monitoring. Teach Caregiver to clean incision to neck daily with soap and water. Pat dry. Cover with dry dressing. POD#3 May leave incision open to air if no drainage present. F/U in 2 weeks as scheduled      Physician Certification: I certify the above information and transfer of Mallory Pop  is necessary for the continuing treatment of the diagnosis listed and that she requires Home Care for greater 30 days.      Update Admission H&P: No change in H&P    PHYSICIAN SIGNATURE:  Electronically signed by SHENA Hyman - PRASHANT /

## 2021-08-18 NOTE — PROGRESS NOTES
1953: Assessment complete, VSS, pulses intact in all extremities,  is strong, pt reports tingling in L fingers and L toes, states this is baseline na d is actually better now t king before surgery, posterior neck dressing CDI, JACK drain noted with med amt of drainage, pt confused when first entering the room, took some tome to reorient pt to time, pt now A/O x4, gave scheduled robaxin, see MAR, discussed care plan, pt mutually agrees, call light and belongings in reach, will continue monitoring. 2140: gave dilaudid for continuous neck pain, see MAR.    2327: gave ultram for surgical neck pain, see MAR, repositioned pt in bed.    0117: dilaudid given for posterior neck pain, see MAR, repositioned ice pack. 0600: changed posterior neck dressing, small amt of sanguinous drainage noted, incision closed with staples, well approximated, covered with dry dressing, pt tolerated well, gave ultram for pain, see MAR, and applied ice pack.   Irina Beth RN

## 2021-08-18 NOTE — PROGRESS NOTES
Pt resting up in chair after working with therapy. C/o severe pain at times when moving. Rating 8/10 in neck with movement. At times denies pain when still. JACK drain remains in place to post neck incision draining bloody drainage. Pt able to move extremities on command and denies numbness or tingling in UE. Tolerating diet well and took PO meds well with water. Perfect serve placed to Martha Hernandez to inform that PO ultram has not successfully controlled pain and pt has frequently required IV pain medication. Order noted for 5 mg oxycodone q6h prn PO noted. Pt states she has short term memory issues but has answered all orientation questions correctly today and is able to voice needs. Chair alarm armed. Will continue to monitor.

## 2021-08-18 NOTE — PROGRESS NOTES
Occupational Therapy   Occupational Therapy Initial Assessment  Date: 2021   Patient Name: Carolina Hein  MRN: [id number]     : 1945    Date of Service: 2021    Discharge Recommendations:Selin Mccauley scored a 18 on the AM-PAC ADL Inpatient form. Current research shows that an AM-PAC score of 18 or greater is typically associated with a discharge to the patient's home setting. Based on the patient's AM-PAC score, and their current ADL deficits, it is recommended that the patient have 2-3 sessions per week of Occupational Therapy at d/c to increase the patient's independence. At this time, this patient demonstrates the endurance and safety to discharge home with HOME (home vs OP services) and a follow up treatment frequency of 2-3x/wk. Please see assessment section for further patient specific details. If patient discharges prior to next session this note will serve as a discharge summary. Please see below for the latest assessment towards goals. HOME HEALTH CARE: LEVEL 3 SAFETY     - Initial home health evaluation to occur within 24-48 hours, in patient home   - Therapy evaluations in home within 24-48 hours of discharge; including DME and home safety   - Frontload therapy 5 days, then 3x a week   - Therapy to evaluate if patient has [address] needs for personal care   -  evaluation within 24-48 hours, includes evaluation of resources and insurance to determine AL, [place], LTC, and Medicaid options        OT Equipment Recommendations  Equipment Needed: Yes  Mobility Devices: ADL Assistive Devices  ADL Assistive Devices: Reacher    Assessment   Performance deficits / Impairments: Decreased functional mobility ; Decreased ADL status; Decreased endurance;Decreased balance;Decreased strength;Decreased safe awareness;Decreased cognition  Treatment Diagnosis: Decreased ADLs, IADLs, transfers, mobility associated with C2-C3, C3-C4 decompressing laminectomy  Prognosis: Good  Decision Making: Low Complexity  OT Education: OT Role;Plan of Care  Patient Education: Eval, discharge recommendations-patient verbalized understanding  Barriers to Learning: cognition  REQUIRES OT FOLLOW UP: Yes  Activity Tolerance  Activity Tolerance: Patient Tolerated treatment well;Patient limited by fatigue;Patient limited by pain;Treatment limited secondary to medical complications (free text) (dizziness)  Safety Devices  Safety Devices in place: Yes  Type of devices: All fall risk precautions in place;Call light within reach; Left in chair;Nurse notified; Chair alarm in place;Gait belt;Patient at risk for falls           Patient Diagnosis(es): The encounter diagnosis was Preop testing. has a past medical history of Acute gastritis without mention of hemorrhage, Acute sinusitis, unspecified, Acute upper respiratory infections of unspecified site, Allergic rhinitis, cause unspecified, Anemia, unspecified, Edema, Hyperlipidemia, Hypertension, Loss of weight, Lumbago, Lumbosacral root lesions, not elsewhere classified, Need for prophylactic vaccination and inoculation against influenza, Neuropathy, Other bursitis disorders, Other seborrheic keratosis, Symptomatic menopausal or female climacteric states, and Type II or unspecified type diabetes mellitus without mention of complication, not stated as uncontrolled. has a past surgical history that includes other surgical history (1977); liset and bso (cervix removed) (1976); other surgical history (1986); Breast surgery; Carpal tunnel release (Left, 11/12/2020); Arm Surgery (Left, 11/12/2020); and cervical laminectomy (N/A, 8/17/2021).     Treatment Diagnosis: Decreased ADLs, IADLs, transfers, mobility associated with C2-C3, C3-C4 decompressing laminectomy      Restrictions  Restrictions/Precautions  Restrictions/Precautions: Fall Risk (Medium fall risk)  Required Braces or Orthoses?: No  Position Activity Restriction  Other position/activity restrictions: s/p decompressive laminectomy C2-3 C3-4    Subjective   General  Chart Reviewed: Yes  Patient assessed for rehabilitation services?: Yes  Family / Caregiver Present: No  Diagnosis: Cervical Spinal stenosis, s/p C2-C3, C3-C4 decompressive laminectomy posterior lateral fusion  Subjective  Subjective: Patient supine in bed, agreeable to evaluation    Social/Functional History  Social/Functional History  Lives With: Spouse  Type of Home: House  Home Layout: One level, Laundry in basement  Home Access: Stairs to enter with rails  Entrance Stairs - Number of Steps: 3 THONY  Entrance Stairs - Rails: Both  Bathroom Shower/Tub: Walk-in shower  Bathroom Toilet: Standard  Bathroom Equipment: Grab bars in shower, Tub transfer bench, Grab bars around toilet  Bathroom Accessibility: Walker accessible  Home Equipment: Rolling walker, Cane (SBQC)  Receives Help From: Family  ADL Assistance: Independent  Homemaking Assistance: Independent  Ambulation Assistance: Independent (Uses in house and community)  Transfer Assistance: Independent (Spouse provides supervision)  Active : Yes  Mode of Transportation: Car  Occupation: Retired  Type of occupation: [employer]  Leisure & Hobbies: Garage sales, 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  Additional Comments: Falls: Endorses 3 falls       Objective   Vision: Impaired  Vision Exceptions: Wears glasses at all times  Hearing: Within functional limits    Orientation  Overall Orientation Status: Within Functional Limits     Balance  Sitting Balance: Minimal assistance (CGA to Aggie (posterior lean due to pain))  Standing Balance: Minimal assistance (CGA to Aggie (patient required cues for hand placement, safety, proximity to RW))  Standing Balance  Time: ~10-12 feet total (patient took 3-4 steps forward with RW, required stepping posteriorly towards bed due to increaszed pain and dizziness.  After brief return to supine, returned to sitting with symptoms resolved and completed ~5-7 feet with RW EOB>recliner (around foot of bed)  Wheelchair Bed Transfers  Wheelchair/Bed - Technique: Ambulating  Equipment Used: Bed;Other  Level of Asssistance: Contact guard assistance;Minimal assistance (Using RW, cues for safety, proximity, pt attempting to lift RW when turning to sit in recliner.)  ADL  Feeding: Setup  LE Dressing: Maximum assistance (donning socks)  Tone RUE  RUE Tone: Normotonic  Tone LUE  LUE Tone: Normotonic  Coordination  Movements Are Fluid And Coordinated: Yes     Bed mobility  Supine to Sit: Minimal assistance (HOB raised, rail used (multiple trials due to increased pain))  Sit to Supine: Minimal assistance; Moderate assistance (Brief return to supine due to dizziness, BP WNL))  Scooting: Minimal assistance  Transfers  Sit to stand: Minimal assistance  Stand to sit: Minimal assistance  Vision - Basic Assessment  Prior Vision: Wears glasses all the time  Patient Visual Report: No visual complaint reported. Cognition  Overall Cognitive Status: Exceptions  Arousal/Alertness: Appropriate responses to stimuli  Following Commands:  Follows one step commands consistently  Attention Span: Appears intact  Memory: Decreased recall of biographical Information;Decreased recall of precautions  Safety Judgement: Decreased awareness of need for safety  Insights: Decreased awareness of deficits  Initiation: Requires cues for some  Sequencing: Requires cues for some  Perception  Overall Perceptual Status: WFL     Sensation  Overall Sensation Status: Impaired (parathesias B hands, B feet neuropathy)        LUE AROM (degrees)  LUE AROM : WNL  RUE AROM (degrees)  RUE AROM : WNL  LUE Strength  L Hand General: 5/5  RUE Strength  R Hand General: 5/5                   Plan   Plan  Times per week: 7  Times per day: Daily  Current Treatment Recommendations: Strengthening, Balance Training, Functional Mobility Training, Endurance Training, Safety Education & Training, Self-Care / ADL, Patient/Caregiver Education & Training, Equipment Evaluation, Education, & procurement, Home Management Training    AM-PAC Score        AM-PAC Inpatient Daily Activity Raw Score: 18 (08/18/21 1456)  AM-PAC Inpatient ADL T-Scale Score : 38.66 (08/18/21 1456)  ADL Inpatient CMS 0-100% Score: 46.65 (08/18/21 1456)  ADL Inpatient CMS G-Code Modifier : CK (08/18/21 1456)    Goals  Short term goals  Time Frame for Short term goals: Discharge  Short term goal 1: Bed mobility supervision  Short term goal 2: Functional mobility with appropriate AD supervision  Short term goal 3: Functional ADL transfers supervision  Short term goal 4: LB ADLs Aggie  Long term goals  Time Frame for Long term goals : LTG=STG  Patient Goals   Patient goals : Home       Therapy Time   Individual Concurrent Group Co-treatment   Time In 1027         Time Out 1183         Minutes 45              Timed Code Treatment Minutes:   30    Total Treatment Minutes:  [address], OTR/L [id number]

## 2021-08-18 NOTE — PROGRESS NOTES
Units, Subcutaneous, TID WC  insulin lispro (1 Unit Dial) 0-6 Units, 0-6 Units, Subcutaneous, Nightly  glucose (GLUTOSE) 40 % oral gel 15 g, 15 g, Oral, PRN  dextrose 50 % IV solution, 12.5 g, Intravenous, PRN  glucagon (rDNA) injection 1 mg, 1 mg, Intramuscular, PRN  dextrose 5 % solution, 100 mL/hr, Intravenous, PRN         Objective:  BP (!) 162/73   Pulse 89   Temp 97.6 °F (36.4 °C) (Oral)   Resp 16   Ht 5' 4\" (1.626 m)   Wt 141 lb 0.8 oz (64 kg)   SpO2 95%   BMI 24.21 kg/m²      Patient Vitals for the past 24 hrs:   BP Temp Temp src Pulse Resp SpO2   08/17/21 2330 (!) 162/73 97.6 °F (36.4 °C) Oral 89 16 95 %   08/17/21 1945 (!) 165/74 97.7 °F (36.5 °C) Oral 97 18 92 %   08/17/21 1630     16 96 %   08/17/21 1504 (!) 144/75 98.2 °F (36.8 °C) Oral 96 16 97 %   08/17/21 1438 (!) 145/78 98 °F (36.7 °C) Axillary 99 16 99 %   08/17/21 1409 (!) 143/75 97.7 °F (36.5 °C) Axillary 100 16 95 %   08/17/21 1330 (!) 151/61   93 11 96 %   08/17/21 1300 (!) 160/72   92 21 97 %   08/17/21 1245 (!) 162/69   95 14 99 %   08/17/21 1230 (!) 153/59   97 13 95 %   08/17/21 1215 (!) 168/64   101 14 96 %   08/17/21 1200 (!) 166/67   90 11 96 %   08/17/21 1145 (!) 164/71   104 21 98 %   08/17/21 1130 (!) 171/74   95 15 92 %   08/17/21 1115 (!) 183/77 96.9 °F (36.1 °C) Temporal 104 28 96 %   08/17/21 1110 (!) 179/82   107 21 100 %   08/17/21 1105 (!) 172/79   107 16 100 %   08/17/21 1103 (!) 179/80 96.8 °F (36 °C) Temporal 108 21 100 %     Patient Vitals for the past 96 hrs (Last 3 readings):   Weight   08/17/21 0642 141 lb 0.8 oz (64 kg)           Intake/Output Summary (Last 24 hours) at 8/18/2021 0838  Last data filed at 8/18/2021 0601  Gross per 24 hour   Intake 2080 ml   Output 6615 ml   Net -4535 ml         Physical Exam:   BP (!) 162/73   Pulse 89   Temp 97.6 °F (36.4 °C) (Oral)   Resp 16   Ht 5' 4\" (1.626 m)   Wt 141 lb 0.8 oz (64 kg)   SpO2 95%   BMI 24.21 kg/m²   General appearance: alert, appears stated age and cooperative  Head: Normocephalic, without obvious abnormality, atraumatic  Neck: dressing cdi  Lungs: clear to auscultation bilaterally  Heart: regular rate and rhythm, S1, S2 normal, no murmur, click, rub or gallop  Abdomen: soft, non-tender; bowel sounds normal; no masses,  no organomegaly  Extremities: extremities normal, atraumatic, no cyanosis or edema    Labs:  Lab Results   Component Value Date    WBC 19.4 (H) 08/18/2021    HGB 10.4 (L) 08/18/2021    HCT 32.4 (L) 08/18/2021     08/18/2021    CHOL 176 03/16/2021    TRIG 63 03/16/2021    HDL 98 (H) 03/16/2021    ALT 14 06/22/2021    AST 22 06/22/2021     08/18/2021    K 5.5 (H) 08/18/2021     08/18/2021    CREATININE 0.7 08/18/2021    BUN 10 08/18/2021    CO2 26 08/18/2021    TSH 2.41 03/16/2021    INR 1.08 08/02/2021    LABA1C 5.4 03/16/2021    LABMICR Not Indicated 05/22/2020     Lab Results   Component Value Date    TROPONINI <0.01 05/23/2020       Recent Imaging Results are Reviewed:  DEXA BONE DENSITY AXIAL SKELETON    Result Date: 8/12/2021  EXAMINATION: BONE DENSITOMETRY 8/12/2021 9:58 am TECHNIQUE: A bone density DEXA scan was performed of the lumbar spine and bilateral hips on a BioVex system. COMPARISON: 2018 HISTORY: ORDERING SYSTEM PROVIDED HISTORY: Postmenopausal FINDINGS: LUMBAR SPINE: The bone mineral density in the lumbar spine including the L1-L4 levels is measured at 0.92 g/cm2, which corresponds to a T-score of -1.1 and a Z-score of 1.4. This is within the osteopenia range by WHO criteria. No great change from the comparison LEFT HIP: The bone mineral density in the total hip is measured at 0.72 g/cm2 corresponding to a T-score of -1.8 and a Z-score of 0.1. This is within the osteopenia range by WHO criteria. The bone mineral density of the femoral neck is measured at 0.54 g/cm2 corresponding to a T-score of -2.8 and a Z-score of -0.6. This is within the osteoporosis range by WHO criteria. Findings represent a 10% decrease in density from the previous study RIGHT HIP: The bone mineral density in the total hip is measured at 0.73 g/cm2 corresponding to a T-score of -1.7 and a Z-score of 0.2. This is within the osteopenia range by WHO criteria. The bone mineral density of the femoral neck is measured at 0.55 g/cm2 corresponding to a T-score of -2.7 and a Z-score of -0.5. This is within the osteoporosis range by WHO criteria. This no great change from the comparison     Osteoporosis by WHO criteria. XR CERVICAL SPINE 1 VW    Result Date: 8/17/2021  EXAMINATION: SPOT FLUOROSCOPIC IMAGES 8/17/2021 9:03 am TECHNIQUE: Fluoroscopy was provided by the radiology department for procedure. Radiologist was not present during examination. FLUOROSCOPY DOSE AND TYPE OR TIME AND EXPOSURES: 4 seconds, 1 image COMPARISON: MRI cervical spine June 21, 2021. HISTORY: Intraprocedural imaging. Neck pain. Intraoperative imaging during C2-C3 and C3-C4 decompressive laminectomy and fusion. FINDINGS: Single intraoperative spot image of the cervical spine was submitted. Initial image demonstrates placement of surgical equipment posteriorly at the C2 through C4 levels. Intraprocedural fluoroscopic spot images as above. See separate procedure report for more information. FLUORO FOR SURGICAL PROCEDURES    Result Date: 8/17/2021  Radiology exam is complete. No Radiologist dictation. Please follow up with ordering provider. Assessment and Plan:  Principal Problem:    Cervical spinal stenosis -Established problem. Improving. Doing ok. Pain controlled  Plan: stay on post op pathway. To work with pt/ot today. Transition to oral pain meds. Cont to follow h/h to assess post op anemia. Active Problems:    Essential hypertension -Established problem. A little elevated. 162/73. Could be up due to pain  Plan: stay on same meds. Continue narcotics as adequate pain control can assist with adequate BP control.      Type 2 diabetes mellitus without complication ([facility] [address] -Established problem. Stable. FSBS 116  Plan: cont ccc diet, sliding scale, home meds    Hypercholesterolemia  Plan: cont statin    Late onset Alzheimer's disease without behavioral disturbance (HCC)  Plan: Continue present orders/plan. Acute blood loss as cause of postoperative anemia -New Problem to me.  hgb 10.4  Plan: No indication for transfusion. Cont to monitor h/h to assess progression of anemia. Recommend ferrous sulfate or MVI as outpatient. Leukocytosis - wbc up to 19. Pt denies sx. Will check u/a, cxr for cause. As no sx, no indication for tx.     Disp - medically stable            Timoteo Carrillo MD  8/18/2021

## 2021-08-18 NOTE — CARE COORDINATION
Discharge Planning Assessment     discharge planner met with patient to discuss reason for admission, current living situation, and potential needs at the time of discharge    Demographics/Insurance verified Yes/yes    Current type of dwelling: private home    Patient from ECF/SW confirmed with: n/a    Living arrangements: lives with spouse  Type of Home: House  Home Layout: One level, Laundry in basement  Home Access: Stairs to enter with rails  Entrance Stairs - Number of Steps: 3 THONY  Entrance Stairs - Rails: Both  Bathroom Shower/Tub: Walk-in shower  Level of function/Support: independent     PCP:     Last Visit to PCP: 8/21    DME:  Bathroom Equipment: Grab bars in shower, Tub transfer bench, Grab bars around toilet  Bathroom Accessibility: Walker accessible  Home Equipment: Rolling walker, [facility]    Active with any community resources/agencies/skilled home care: no    Medication compliance issues: no    Financial issues that could impact healthcare: no        Tentative discharge plan:  Discussed and provided facilities of choice if transition to a skilled nursing facility is required at the time of discharge      Discussed with patient and/or family that on the day of discharge home tentative time of discharge will be between 10 AM and noon.     Transportation at the time of discharge: yes    Giancarlo NOBLE, [address] [place]

## 2021-08-19 VITALS
OXYGEN SATURATION: 95 % | WEIGHT: 141.05 LBS | DIASTOLIC BLOOD PRESSURE: 73 MMHG | HEIGHT: 64 IN | BODY MASS INDEX: 24.08 KG/M2 | HEART RATE: 89 BPM | RESPIRATION RATE: 16 BRPM | TEMPERATURE: 98.1 F | SYSTOLIC BLOOD PRESSURE: 118 MMHG

## 2021-08-19 LAB
ANION GAP SERPL CALCULATED.3IONS-SCNC: 13 MMOL/L (ref 3–16)
ANISOCYTOSIS: ABNORMAL
BANDED NEUTROPHILS RELATIVE PERCENT: 1 % (ref 0–7)
BASOPHILS ABSOLUTE: 0 K/UL (ref 0–0.2)
BASOPHILS RELATIVE PERCENT: 0 %
BUN BLDV-MCNC: 12 MG/DL (ref 7–20)
CALCIUM SERPL-MCNC: 10.2 MG/DL (ref 8.3–10.6)
CHLORIDE BLD-SCNC: 95 MMOL/L (ref 99–110)
CO2: 26 MMOL/L (ref 21–32)
CREAT SERPL-MCNC: 0.6 MG/DL (ref 0.6–1.2)
EOSINOPHILS ABSOLUTE: 0 K/UL (ref 0–0.6)
EOSINOPHILS RELATIVE PERCENT: 0 %
GFR AFRICAN AMERICAN: >60
GFR NON-AFRICAN AMERICAN: >60
GLUCOSE BLD-MCNC: 111 MG/DL (ref 70–99)
GLUCOSE BLD-MCNC: 116 MG/DL (ref 70–99)
GLUCOSE BLD-MCNC: 99 MG/DL (ref 70–99)
HCT VFR BLD CALC: 34.8 % (ref 36–48)
HEMOGLOBIN: 11.4 G/DL (ref 12–16)
HYPOCHROMIA: ABNORMAL
LYMPHOCYTES ABSOLUTE: 3.1 K/UL (ref 1–5.1)
LYMPHOCYTES RELATIVE PERCENT: 19 %
MCH RBC QN AUTO: 26.5 PG (ref 26–34)
MCHC RBC AUTO-ENTMCNC: 32.6 G/DL (ref 31–36)
MCV RBC AUTO: 81.1 FL (ref 80–100)
MONOCYTES ABSOLUTE: 1 K/UL (ref 0–1.3)
MONOCYTES RELATIVE PERCENT: 6 %
NEUTROPHILS ABSOLUTE: 12.1 K/UL (ref 1.7–7.7)
NEUTROPHILS RELATIVE PERCENT: 74 %
OVALOCYTES: ABNORMAL
PDW BLD-RTO: 16.7 % (ref 12.4–15.4)
PERFORMED ON: ABNORMAL
PERFORMED ON: NORMAL
PLATELET # BLD: 438 K/UL (ref 135–450)
PLATELET SLIDE REVIEW: ADEQUATE
PMV BLD AUTO: 7 FL (ref 5–10.5)
POLYCHROMASIA: ABNORMAL
POTASSIUM SERPL-SCNC: 4.4 MMOL/L (ref 3.5–5.1)
RBC # BLD: 4.29 M/UL (ref 4–5.2)
SLIDE REVIEW: ABNORMAL
SODIUM BLD-SCNC: 134 MMOL/L (ref 136–145)
WBC # BLD: 16.1 K/UL (ref 4–11)

## 2021-08-19 PROCEDURE — 97116 GAIT TRAINING THERAPY: CPT

## 2021-08-19 PROCEDURE — 97535 SELF CARE MNGMENT TRAINING: CPT

## 2021-08-19 PROCEDURE — 6370000000 HC RX 637 (ALT 250 FOR IP): Performed by: NURSE PRACTITIONER

## 2021-08-19 PROCEDURE — 6370000000 HC RX 637 (ALT 250 FOR IP): Performed by: NEUROLOGICAL SURGERY

## 2021-08-19 PROCEDURE — 85025 COMPLETE CBC W/AUTO DIFF WBC: CPT

## 2021-08-19 PROCEDURE — 6370000000 HC RX 637 (ALT 250 FOR IP): Performed by: INTERNAL MEDICINE

## 2021-08-19 PROCEDURE — 36415 COLL VENOUS BLD VENIPUNCTURE: CPT

## 2021-08-19 PROCEDURE — 97530 THERAPEUTIC ACTIVITIES: CPT

## 2021-08-19 PROCEDURE — 2580000003 HC RX 258: Performed by: NEUROLOGICAL SURGERY

## 2021-08-19 PROCEDURE — G0378 HOSPITAL OBSERVATION PER HR: HCPCS

## 2021-08-19 PROCEDURE — 80048 BASIC METABOLIC PNL TOTAL CA: CPT

## 2021-08-19 RX ORDER — METHOCARBAMOL 500 MG/1
500 TABLET, FILM COATED ORAL 3 TIMES DAILY
Qty: 60 TABLET | Refills: 0 | Status: SHIPPED | OUTPATIENT
Start: 2021-08-19 | End: 2021-09-08

## 2021-08-19 RX ORDER — ACETAMINOPHEN 160 MG/5ML
650 SOLUTION ORAL EVERY 6 HOURS PRN
Status: DISCONTINUED | OUTPATIENT
Start: 2021-08-19 | End: 2021-08-19 | Stop reason: HOSPADM

## 2021-08-19 RX ORDER — OXYCODONE HYDROCHLORIDE 5 MG/1
5 TABLET ORAL EVERY 6 HOURS PRN
Qty: 28 TABLET | Refills: 0 | Status: SHIPPED | OUTPATIENT
Start: 2021-08-19 | End: 2021-08-26

## 2021-08-19 RX ORDER — ASPIRIN 81 MG/1
81 TABLET, CHEWABLE ORAL DAILY
Qty: 1 TABLET | Refills: 3 | Status: SHIPPED
Start: 2021-08-24

## 2021-08-19 RX ADMIN — OXYCODONE 5 MG: 5 TABLET ORAL at 07:24

## 2021-08-19 RX ADMIN — PANTOPRAZOLE SODIUM 40 MG: 40 TABLET, DELAYED RELEASE ORAL at 07:24

## 2021-08-19 RX ADMIN — METHOCARBAMOL 500 MG: 500 TABLET ORAL at 13:59

## 2021-08-19 RX ADMIN — METHOCARBAMOL 500 MG: 500 TABLET ORAL at 08:39

## 2021-08-19 RX ADMIN — TRAMADOL HYDROCHLORIDE 50 MG: 50 TABLET, FILM COATED ORAL at 01:49

## 2021-08-19 RX ADMIN — BUSPIRONE HYDROCHLORIDE 15 MG: 5 TABLET ORAL at 08:39

## 2021-08-19 RX ADMIN — BUSPIRONE HYDROCHLORIDE 15 MG: 5 TABLET ORAL at 13:59

## 2021-08-19 RX ADMIN — PANTOPRAZOLE SODIUM 40 MG: 40 TABLET, DELAYED RELEASE ORAL at 08:39

## 2021-08-19 RX ADMIN — Medication 10 ML: at 08:44

## 2021-08-19 RX ADMIN — ACETAMINOPHEN ORAL SOLUTION 650 MG: 650 SOLUTION ORAL at 11:59

## 2021-08-19 RX ADMIN — TRAMADOL HYDROCHLORIDE 50 MG: 50 TABLET, FILM COATED ORAL at 09:54

## 2021-08-19 RX ADMIN — ATORVASTATIN CALCIUM 40 MG: 40 TABLET, FILM COATED ORAL at 08:40

## 2021-08-19 RX ADMIN — DULOXETINE HYDROCHLORIDE 60 MG: 30 CAPSULE, DELAYED RELEASE ORAL at 08:39

## 2021-08-19 RX ADMIN — AMLODIPINE BESYLATE 10 MG: 5 TABLET ORAL at 08:39

## 2021-08-19 ASSESSMENT — PAIN DESCRIPTION - LOCATION
LOCATION: NECK
LOCATION: HEAD

## 2021-08-19 ASSESSMENT — PAIN DESCRIPTION - PAIN TYPE
TYPE: SURGICAL PAIN
TYPE: ACUTE PAIN

## 2021-08-19 ASSESSMENT — PAIN DESCRIPTION - FREQUENCY: FREQUENCY: INTERMITTENT

## 2021-08-19 ASSESSMENT — PAIN DESCRIPTION - DESCRIPTORS: DESCRIPTORS: HEADACHE

## 2021-08-19 ASSESSMENT — PAIN - FUNCTIONAL ASSESSMENT: PAIN_FUNCTIONAL_ASSESSMENT: PREVENTS OR INTERFERES SOME ACTIVE ACTIVITIES AND ADLS

## 2021-08-19 ASSESSMENT — PAIN DESCRIPTION - ORIENTATION
ORIENTATION: POSTERIOR
ORIENTATION: POSTERIOR

## 2021-08-19 ASSESSMENT — PAIN SCALES - GENERAL
PAINLEVEL_OUTOF10: 7
PAINLEVEL_OUTOF10: 6
PAINLEVEL_OUTOF10: 8

## 2021-08-19 NOTE — PROGRESS NOTES
Progress Note - Dr. Yefri Graf - Internal Medicine  PCP: Cata Schuster  [facility] / Joyce Nunez [phone]    Hospital Day: 2  Code Status: Full Code  Current Diet: ADULT DIET; Regular; 4 carb choices (60 gm/meal)        CC: follow up on medical issues    Subjective:   Russell Hernandez is a 66 y.o. female. She denies problems    Doing ok  Feels better  Neck pain manageable  No issues noted  She wants to go home today    She denies chest pain, denies shortness of breath, denies nausea,  denies emesis. 10 system Review of Systems is reviewed with patient, and pertinent positives are noted in HPI above . Otherwise, Review of systems is negative. I have reviewed the patient's medical and social history in detail and updated the computerized patient record. To recap: She  has a past medical history of Acute gastritis without mention of hemorrhage, Acute sinusitis, unspecified, Acute upper respiratory infections of unspecified site, Allergic rhinitis, cause unspecified, Anemia, unspecified, Edema, Hyperlipidemia, Hypertension, Loss of weight, Lumbago, Lumbosacral root lesions, not elsewhere classified, Need for prophylactic vaccination and inoculation against influenza, Neuropathy, Other bursitis disorders, Other seborrheic keratosis, Symptomatic menopausal or female climacteric states, and Type II or unspecified type diabetes mellitus without mention of complication, not stated as uncontrolled. . She  has a past surgical history that includes other surgical history (1977); liset and bso (cervix removed) (1976); other surgical history (1986); Breast surgery; Carpal tunnel release (Left, 11/12/2020); Arm Surgery (Left, 11/12/2020); and cervical laminectomy (N/A, 8/17/2021). . She  reports that she has never smoked. She has never used smokeless tobacco. She reports that she does not drink alcohol and does not use drugs. .        Active Hospital Problems    Diagnosis Date Noted    Acute blood loss as cause of postoperative anemia [D62] 08/18/2021    Cervical spinal stenosis [M48.02] 08/17/2021    Late onset Alzheimer's disease without behavioral disturbance (HCC) [G30.1, F02.80] 02/07/2015    Type 2 diabetes mellitus without complication ([facility] [J88.4] 03/04/2013    Hypercholesterolemia [E78.00] 03/04/2013    Essential hypertension [I10] 12/20/2012       Current Facility-Administered Medications: oxyCODONE (ROXICODONE) immediate release tablet 5 mg, 5 mg, Oral, Q6H PRN  mirtazapine (REMERON) tablet 30 mg, 30 mg, Oral, Nightly  DULoxetine (CYMBALTA) extended release capsule 60 mg, 60 mg, Oral, Daily  donepezil (ARICEPT) tablet 10 mg, 10 mg, Oral, Nightly  busPIRone (BUSPAR) tablet 15 mg, 15 mg, Oral, TID  atorvastatin (LIPITOR) tablet 40 mg, 40 mg, Oral, Daily  amLODIPine (NORVASC) tablet 10 mg, 10 mg, Oral, Daily  sodium chloride flush 0.9 % injection 5-40 mL, 5-40 mL, Intravenous, 2 times per day  sodium chloride flush 0.9 % injection 5-40 mL, 5-40 mL, Intravenous, PRN  0.9 % sodium chloride infusion, 25 mL, Intravenous, PRN  ondansetron (ZOFRAN-ODT) disintegrating tablet 4 mg, 4 mg, Oral, Q8H PRN **OR** ondansetron (ZOFRAN) injection 4 mg, 4 mg, Intravenous, Q6H PRN  HYDROmorphone HCl PF (DILAUDID) injection 0.5 mg, 0.5 mg, Intravenous, Q3H PRN  traMADol (ULTRAM) tablet 50 mg, 50 mg, Oral, Q6H PRN  methocarbamol (ROBAXIN) tablet 500 mg, 500 mg, Oral, TID  pantoprazole (PROTONIX) tablet 40 mg, 40 mg, Oral, QAM AC  hydrALAZINE (APRESOLINE) injection 10 mg, 10 mg, Intravenous, Q6H PRN  0.9 % sodium chloride bolus, 500 mL, Intravenous, PRN  potassium chloride (KLOR-CON M) extended release tablet 40 mEq, 40 mEq, Oral, PRN **OR** potassium bicarb-citric acid (EFFER-K) effervescent tablet 40 mEq, 40 mEq, Oral, PRN **OR** potassium chloride 10 mEq/100 mL IVPB (Peripheral Line), 10 mEq, Intravenous, PRN  insulin lispro (1 Unit Dial) 0-12 Units, 0-12 Units, Subcutaneous, TID WC  insulin lispro (1 Unit Dial) 0-6 Units, 0-6 Units, Subcutaneous, Nightly  glucose (GLUTOSE) 40 % oral gel 15 g, 15 g, Oral, PRN  dextrose 50 % IV solution, 12.5 g, Intravenous, PRN  glucagon (rDNA) injection 1 mg, 1 mg, Intramuscular, PRN  dextrose 5 % solution, 100 mL/hr, Intravenous, PRN         Objective:  /74   Pulse 94   Temp 98.1 °F (36.7 °C) (Oral)   Resp 18   Ht 5' 4\" (1.626 m)   Wt 141 lb 0.8 oz (64 kg)   SpO2 95%   BMI 24.21 kg/m²      Patient Vitals for the past 24 hrs:   BP Temp Temp src Pulse Resp SpO2   08/19/21 0830 119/74 98.1 °F (36.7 °C) Oral 94 18 95 %   08/19/21 0049 (!) 147/76 98.2 °F (36.8 °C) Oral 96  96 %   08/18/21 2030 (!) 168/76   84     08/18/21 2021 (!) 176/77 98.2 °F (36.8 °C) Axillary 93 17 92 %   08/18/21 1609 (!) 154/72 98 °F (36.7 °C) Oral 84 16 95 %   08/18/21 1124 (!) 151/72 98.1 °F (36.7 °C) Oral 76 16 95 %     Patient Vitals for the past 96 hrs (Last 3 readings):   Weight   08/17/21 0642 141 lb 0.8 oz (64 kg)           Intake/Output Summary (Last 24 hours) at 8/19/2021 0929  Last data filed at 8/19/2021 0153  Gross per 24 hour   Intake 650 ml   Output 3500 ml   Net -2850 ml         Physical Exam:   /74   Pulse 94   Temp 98.1 °F (36.7 °C) (Oral)   Resp 18   Ht 5' 4\" (1.626 m)   Wt 141 lb 0.8 oz (64 kg)   SpO2 95%   BMI 24.21 kg/m²   General appearance: alert, appears stated age and cooperative  Head: Normocephalic, without obvious abnormality, atraumatic  Lungs: clear to auscultation bilaterally  Heart: regular rate and rhythm, S1, S2 normal, no murmur, click, rub or gallop  Abdomen: soft, non-tender; bowel sounds normal; no masses,  no organomegaly  Extremities: extremities normal, atraumatic, no cyanosis or edema    Labs:  Lab Results   Component Value Date    WBC 16.1 (H) 08/19/2021    HGB 11.4 (L) 08/19/2021    HCT 34.8 (L) 08/19/2021     08/19/2021    CHOL 176 03/16/2021    TRIG 63 03/16/2021    HDL 98 (H) 03/16/2021    ALT 14 06/22/2021    AST 22 06/22/2021     (L) 08/19/2021    K 4.4 08/19/2021    CL 95 (L) 08/19/2021    CREATININE 0.6 08/19/2021    BUN 12 08/19/2021    CO2 26 08/19/2021    TSH 2.41 03/16/2021    INR 1.08 08/02/2021    LABA1C 5.9 08/18/2021    LABMICR YES 08/18/2021     Lab Results   Component Value Date    TROPONINI <0.01 05/23/2020       Recent Imaging Results are Reviewed:  XR CHEST (2 VW)    Result Date: 8/18/2021  EXAMINATION: TWO XRAY VIEWS OF THE CHEST 8/18/2021 9:42 am COMPARISON: Chest x-ray dated 05/22/2020. HISTORY: ORDERING SYSTEM PROVIDED HISTORY: cough TECHNOLOGIST PROVIDED HISTORY: Reason for exam:->cough Reason for Exam: Cough Acuity: Acute Type of Exam: Initial FINDINGS: HEART/MEDIASTINUM: The cardiomediastinal silhouette is within normal limits. PLEURA/LUNGS: There are no focal consolidations or pleural effusions. There is no appreciable pneumothorax. Calcified granuloma noted in the right lower lung. BONES/SOFT TISSUE: No acute abnormality. No radiographic evidence of acute pulmonary disease. DEXA BONE DENSITY AXIAL SKELETON    Result Date: 8/12/2021  EXAMINATION: BONE DENSITOMETRY 8/12/2021 9:58 am TECHNIQUE: A bone density DEXA scan was performed of the lumbar spine and bilateral hips on a [employer] system. COMPARISON: 2018 HISTORY: ORDERING SYSTEM PROVIDED HISTORY: Postmenopausal FINDINGS: LUMBAR SPINE: The bone mineral density in the lumbar spine including the L1-L4 levels is measured at 0.92 g/cm2, which corresponds to a T-score of -1.1 and a Z-score of 1.4. This is within the osteopenia range by WHO criteria. No great change from the comparison LEFT HIP: The bone mineral density in the total hip is measured at 0.72 g/cm2 corresponding to a T-score of -1.8 and a Z-score of 0.1. This is within the osteopenia range by WHO criteria. The bone mineral density of the femoral neck is measured at 0.54 g/cm2 corresponding to a T-score of -2.8 and a Z-score of -0.6. This is within the osteoporosis range by WHO criteria.  Findings represent a 10% decrease in density from the previous study RIGHT HIP: The bone mineral density in the total hip is measured at 0.73 g/cm2 corresponding to a T-score of -1.7 and a Z-score of 0.2. This is within the osteopenia range by WHO criteria. The bone mineral density of the femoral neck is measured at 0.55 g/cm2 corresponding to a T-score of -2.7 and a Z-score of -0.5. This is within the osteoporosis range by WHO criteria. This no great change from the comparison     Osteoporosis by WHO criteria. XR CERVICAL SPINE 1 VW    Result Date: 8/17/2021  EXAMINATION: SPOT FLUOROSCOPIC IMAGES 8/17/2021 9:03 am TECHNIQUE: Fluoroscopy was provided by the radiology department for procedure. Radiologist was not present during examination. FLUOROSCOPY DOSE AND TYPE OR TIME AND EXPOSURES: 4 seconds, 1 image COMPARISON: MRI cervical spine June 21, 2021. HISTORY: Intraprocedural imaging. Neck pain. Intraoperative imaging during C2-C3 and C3-C4 decompressive laminectomy and fusion. FINDINGS: Single intraoperative spot image of the cervical spine was submitted. Initial image demonstrates placement of surgical equipment posteriorly at the C2 through C4 levels. Intraprocedural fluoroscopic spot images as above. See separate procedure report for more information. FLUORO FOR SURGICAL PROCEDURES    Result Date: 8/17/2021  Radiology exam is complete. No Radiologist dictation. Please follow up with ordering provider. Assessment and Plan:  Principal Problem:    Cervical spinal stenosis -Established problem. Improving. Plan: Continue narcotics as adequate pain control can assist with adequate BP control. Active Problems:    Essential hypertension -Established problem. Stable. 119/74  Plan: Continue present orders/plan. Type 2 diabetes mellitus without complication ([facility] -Established problem. Stable. Plan: Continue present orders/plan.      Hypercholesterolemia  Plan: cont statin    Late onset Alzheimer's disease without behavioral disturbance ([facility]  Plan: Continue present orders/plan.      Acute blood loss as cause of postoperative anemia      Disp - medically stable          Lg Best MD  8/19/2021

## 2021-08-19 NOTE — PROGRESS NOTES
[facility] home care referral. Spoke with pt and re: home care plan of care/services. Agreeable. Demographic's verified. Aware of discharge with home care. Home care orders sent to [facility]. Discharge planner notified.

## 2021-08-19 NOTE — PROGRESS NOTES
Physical Therapy  Facility/Department: [address] ORTHO/NEURO NURSING  Daily Treatment Note  NAME: Russell Hernandez  : 1945  MRN: [id number]    Date of Service: 2021    Discharge Recommendations: Russell Hernandez scored a 18/24 on the AM-PAC short mobility form. Current research shows that an AM-PAC score of 18 or greater is typically associated with a discharge to the patient's home setting. Based on the patient's AM-PAC score and their current functional mobility deficits, it is recommended that the patient have 2-3 sessions per week of Physical Therapy at d/c to increase the patient's independence. At this time, this patient demonstrates the endurance and safety to discharge home with HHPT and a follow up treatment frequency of 2-3x/wk. Please see assessment section for further patient specific details. HOME HEALTH CARE: LEVEL 3 SAFETY  - Initial home health evaluation to occur within 24-48 hours, in patient home   - Therapy evaluations in home within 24-48 hours of discharge; including DME and home safety   - Frontload therapy 5 days, then 3x a week   - Therapy to evaluate if patient has [address] needs for personal care   -  evaluation within 24-48 hours, includes evaluation of resources and insurance to determine [place], [place], LTC, and Medicaid options      If patient discharges prior to next session this note will serve as a discharge summary. Please see below for the latest assessment towards goals. PT Equipment Recommendations  Equipment Needed: Yes  Mobility Devices: [place]: Rolling    Assessment   Body structures, Functions, Activity limitations: Decreased functional mobility ; Decreased ROM; Decreased strength;Decreased safe awareness;Decreased cognition;Decreased endurance;Decreased sensation;Decreased balance;Decreased fine motor control;Decreased coordination; Increased pain  Assessment: Pt able to perform mobility with decreased assist this date and able to increase ambulation distance with RW. Pt does continue to require VC for safety awareness and safe walker management. Pt would benefit from continued skilled PT services to address deficits. Treatment Diagnosis: decreased functional mobility, ROM, endurance, and cognition  Prognosis: Good  Decision Making: Low Complexity  Clinical Presentation: stable/ uncomplicated  PT Education: PT Role;Plan of Care;Precautions;General Safety;Transfer Training;Functional Mobility Training;Gait Training;Orientation;Goals  Patient Education: d/c recommendations--pt verbalized understanding  Barriers to Learning: cognition  REQUIRES PT FOLLOW UP: Yes  Activity Tolerance  Activity Tolerance: Patient Tolerated treatment well  Activity Tolerance: pt reporting no dizziness during session     Patient Diagnosis(es): The primary encounter diagnosis was Preop testing. A diagnosis of Cervical spinal stenosis was also pertinent to this visit. has a past medical history of Acute gastritis without mention of hemorrhage, Acute sinusitis, unspecified, Acute upper respiratory infections of unspecified site, Allergic rhinitis, cause unspecified, Anemia, unspecified, Edema, Hyperlipidemia, Hypertension, Loss of weight, Lumbago, Lumbosacral root lesions, not elsewhere classified, Need for prophylactic vaccination and inoculation against influenza, Neuropathy, Other bursitis disorders, Other seborrheic keratosis, Symptomatic menopausal or female climacteric states, and Type II or unspecified type diabetes mellitus without mention of complication, not stated as uncontrolled. has a past surgical history that includes other surgical history (1977); liset and bso (cervix removed) (1976); other surgical history (1986); Breast surgery; Carpal tunnel release (Left, 11/12/2020); Arm Surgery (Left, 11/12/2020); and cervical laminectomy (N/A, 8/17/2021).     Restrictions  Restrictions/Precautions  Restrictions/Precautions: Fall Risk (Medium fall risk)  Required Braces or Orthoses?: No  Position Activity Restriction  Other position/activity restrictions: s/p decompressive laminectomy C2-3 C3-4     Subjective   General  Chart Reviewed: Yes  Response To Previous Treatment: Patient with no complaints from previous session. Family / Caregiver Present: No  Subjective  Subjective: Pt seated in chair upon arrival, agreeable to PT session. Pt reporting no pain at this time. Pain Screening  Patient Currently in Pain: Denies  Vital Signs  Patient Currently in Pain: Denies       Cognition   Cognition  Overall Cognitive Status: Exceptions  Arousal/Alertness: Appropriate responses to stimuli  Following Commands: Follows one step commands consistently  Attention Span: Appears intact  Memory: Decreased recall of biographical Information;Decreased recall of precautions  Safety Judgement: Decreased awareness of need for safety  Insights: Decreased awareness of deficits  Initiation: Requires cues for some  Sequencing: Requires cues for some     Objective   Bed mobility  Sit to Supine: Stand by assistance (HOB flat)  Scooting: Stand by assistance  Transfers  Sit to Stand: Contact guard assistance;Stand by assistance (CGA from recliner; SBA from toilet and recliner second time)  Stand to sit: Contact guard assistance;Stand by assistance  Comment: min VC for safe hand placement with improved carryover noted. Ambulation  Ambulation?: Yes  Ambulation 1  Surface: level tile  Device: Rolling Walker  Assistance: Contact guard assistance;Stand by assistance  Quality of Gait: wide Gabrielle, decreased jean, decreased B step lengths/heights  Distance: 10'+10'+150'  Comments: Pt required slightly increased time to perform, no LOB. Mod VC for safe walker management, especially with turns.   Stairs/Curb  Stairs?: No     Balance  Posture: Good  Sitting - Static: Good;- (supervision seated at toilet for toileting ~3-5 minutes total)  Sitting - Dynamic: Good;-  Standing - Static: Fair;+ (SBA with UE support standing at sink)  Standing - Dynamic: Fair;+ (CGA/SBA for ambulation with RW)            G-Code     OutComes Score       AM-PAC Score  AM-PAC Inpatient Mobility Raw Score : 18 (08/19/21 1252)  AM-PAC Inpatient T-Scale Score : 43.63 (08/19/21 1252)  Mobility Inpatient CMS 0-100% Score: 46.58 (08/19/21 1252)  Mobility Inpatient CMS G-Code Modifier : CK (08/19/21 1252)          Goals  Short term goals  Time Frame for Short term goals: before d/c  Short term goal 1: pt will perform supine to sit Mod I  Short term goal 2: pt will perform sit to stand with RW and SBA  Short term goal 3: pt will ambulate 27' with LRAD and CGA--MET 8/19/21  Short term goal 4: Pt will ambulate 150' with LRAD and supervision  1 GOAL MET THIS DATE    Plan    Plan  Times per week: 7x  Times per day: Daily  Current Treatment Recommendations: Strengthening, ROM, Balance Training, Functional Mobility Training, Transfer Training, Endurance Training, Gait Training, Stair training, Pain Management, Home Exercise Program, Modalities, Neuromuscular Re-education, Safety Education & Training, Equipment Evaluation, Education, & procurement, Patient/Caregiver Education & Training  Safety Devices  Type of devices:  All fall risk precautions in place, Call light within reach, Chair alarm in place, Nurse notified, Gait belt, Patient at risk for falls, Bed alarm in place, Left in bed  Restraints  Initially in place: No     Therapy Time   Individual Concurrent Group Co-treatment   Time In 0839         Time Out 0902         Minutes 23              Timed Code Treatment Minutes:   23    Total Treatment Minutes:  [address], [address] [facility], [address] [facility]

## 2021-08-19 NOTE — PROGRESS NOTES
Patient provided with discharge instructions, discussed in detail, new medications reviewed including use and side effects. Patient verbalized understanding. All questions answered, family at the bedside to transport home. Patient discharged home on 8/19/21 with all personal belongings and discharge paperwork.

## 2021-08-19 NOTE — PROGRESS NOTES
Morning assessment completed, VSS, paige drain in place, dressing dry, clean, intact. Care plan educated with pt. Verbalize understanding, call light within reach.

## 2021-08-19 NOTE — PROGRESS NOTES
Occupational Therapy  Facility/Department: [address] ORTHO/NEURO NURSING  Daily Treatment Note  NAME: Russell Hernandez  : 1945  MRN: [id number]    Date of Service: 2021    Discharge Recommendations:  Russell Hernandez scored a 18/24 on the AM-PAC ADL Inpatient form. Current research shows that an AM-PAC score of 18 or greater is typically associated with a discharge to the patient's home setting. Based on the patient's AM-PAC score, and their current ADL deficits, it is recommended that the patient have 2-3 sessions per week of Occupational Therapy at d/c to increase the patient's independence. At this time, this patient demonstrates the endurance and safety to discharge home with 24 hr assist and HHOT Level 1 and a follow up treatment frequency of 2-3x/wk. Please see assessment section for further patient specific details. HOME HEALTH CARE: LEVEL 1 STANDARD    - Initial home health evaluation to occur within 24-48 hours, in patient home   - Therapy to evaluate with goal of regaining prior level of functioning   - Therapy to evaluate if patient has [address] needs for personal care  If patient discharges prior to next session this note will serve as a discharge summary. Please see below for the latest assessment towards goals. OT Equipment Recommendations  Equipment Needed: Yes  ADL Assistive Devices: Reacher;Sock-Aid Hard    Assessment   Performance deficits / Impairments: Decreased functional mobility ; Decreased ADL status; Decreased endurance;Decreased balance;Decreased strength;Decreased safe awareness;Decreased cognition  Assessment: Pt requires Max A for LB dressing and Mod A for UB dressing, pt limited by pain. Pt SBA for functional transfers and mobility. Pt to d/c home with 24 hr assist and HHOT. Continue with POC.   Treatment Diagnosis: Decreased ADLs, IADLs, transfers, mobility associated with C2-C3, C3-C4 decompressing laminectomy  Prognosis: Good  OT Education: OT Role;Plan of Care;ADL Adaptive Strategies;Transfer Training  Patient Education: pt requires reinforcement for ADL strategies  Barriers to Learning: cognition  REQUIRES OT FOLLOW UP: Yes  Activity Tolerance  Activity Tolerance: Patient limited by pain; Patient limited by fatigue  Safety Devices  Safety Devices in place: Yes  Type of devices: All fall risk precautions in place;Call light within reach;Nurse notified;Gait belt;Patient at risk for falls; Left in bed;Bed alarm in place         Patient Diagnosis(es): The primary encounter diagnosis was Preop testing. A diagnosis of Cervical spinal stenosis was also pertinent to this visit. has a past medical history of Acute gastritis without mention of hemorrhage, Acute sinusitis, unspecified, Acute upper respiratory infections of unspecified site, Allergic rhinitis, cause unspecified, Anemia, unspecified, Edema, Hyperlipidemia, Hypertension, Loss of weight, Lumbago, Lumbosacral root lesions, not elsewhere classified, Need for prophylactic vaccination and inoculation against influenza, Neuropathy, Other bursitis disorders, Other seborrheic keratosis, Symptomatic menopausal or female climacteric states, and Type II or unspecified type diabetes mellitus without mention of complication, not stated as uncontrolled. has a past surgical history that includes other surgical history (1977); liset and bso (cervix removed) (1976); other surgical history (1986); Breast surgery; Carpal tunnel release (Left, 11/12/2020); Arm Surgery (Left, 11/12/2020); and cervical laminectomy (N/A, 8/17/2021).     Restrictions  Restrictions/Precautions  Restrictions/Precautions: Fall Risk (Medium fall risk)  Required Braces or Orthoses?: No  Position Activity Restriction  Other position/activity restrictions: s/p decompressive laminectomy C2-3 C3-4  Subjective   General  Chart Reviewed: Yes  Patient assessed for rehabilitation services?: Yes  Response to previous treatment: Patient with no complaints from previous session  Family / Caregiver Present: No  Diagnosis: Cervical Spinal stenosis, s/p C2-C3, C3-C4 decompressive laminectomy posterior lateral fusion  Subjective  Subjective: Pt supine in bed upon entry, agreeable to therapy session. General Comment  Comments: Pt supine to sit CGA. Pt sit to stand SBA, pt ambulated with rw ~12 ft to bathroom toilet at SBA. Pt toilet transfer SBA and pt toileted SBA (doffing brief). Pt in stance at sink SBA ~3 min for oral care and wash face. Pt stand to sit on BSC at sink SBA. Pt used shampoo cap. Pt washed UB wtih setup. Pt dressed UB (pj top) at Mod A to put over head an pull down in back. Pt threaded brief, due to pain requested assist to thread pj pants and assist to pull up pj pants, pt pulled up brief. Pt also required assist to don slippers. Pt ambualted to EOB ~12 ft with rw at SBA, stand to sit SBA and pt sit to supine SBA. Pt combed hair in bed with HOB raised. Call light in reach and bed alarm on. Pain Assessment  Pain Level: 6  Pain Type: Acute pain  Pain Location: Head  Pain Orientation: Posterior  Pain Descriptors: Headache  Pain Frequency: Intermittent  Functional Pain Assessment: Prevents or interferes some active activities and ADLs  Pre Treatment Pain Screening  Intervention List: Patient able to continue with treatment;Nurse called to administer meds;Nurse/Physician notified  Vital Signs  Patient Currently in Pain: Yes   Orientation  Orientation  Overall Orientation Status: Within Functional Limits  Objective    ADL  Grooming: Stand by assistance  UE Bathing: Setup  UE Dressing:  Moderate assistance;Setup  LE Dressing: Maximum assistance;Setup  Toileting: Stand by assistance        Balance  Sitting Balance: Stand by assistance  Standing Balance: Stand by assistance  Standing Balance  Time: ~10 min total  Activity: to/from bathroom, toilet transfer/toileting, in stance to groom, LB clothing mgmt  Functional Mobility  Functional - Mobility Device: Rolling Walker  Activity: To/from bathroom  Assist Level: Stand by assistance  Toilet Transfers  Toilet - Technique: Ambulating  Equipment Used: Standard toilet  Toilet Transfer: Stand by assistance  Bed mobility  Supine to Sit: Contact guard assistance  Sit to Supine: Stand by assistance  Scooting: Stand by assistance  Transfers  Sit to stand: Stand by assistance  Stand to sit: Stand by assistance                       Cognition  Overall Cognitive Status: Exceptions  Arousal/Alertness: Appropriate responses to stimuli  Following Commands:  Follows one step commands consistently  Attention Span: Appears intact  Memory: Decreased recall of biographical Information;Decreased recall of precautions  Safety Judgement: Decreased awareness of need for safety  Insights: Decreased awareness of deficits  Initiation: Requires cues for some  Sequencing: Requires cues for some                                         Plan   Plan  Times per week: 7  Times per day: Daily  Current Treatment Recommendations: Strengthening, Balance Training, Functional Mobility Training, Endurance Training, Safety Education & Training, Self-Care / ADL, Patient/Caregiver Education & Training, Equipment Evaluation, Education, & procurement, Home Management Training  G-Code     OutComes Score                                                  AM-PAC Score        AM-[facility] Inpatient Daily Activity Raw Score: 18 (08/19/21 1513)  AM-PAC Inpatient ADL T-Scale Score : 38.66 (08/19/21 1513)  ADL Inpatient CMS 0-100% Score: 46.65 (08/19/21 1513)  ADL Inpatient CMS G-Code Modifier : CK (08/19/21 1513)    Goals  Short term goals  Time Frame for Short term goals: Discharge  Short term goal 1: Bed mobility supervision - goal not met 8/19  Short term goal 2: Functional mobility with appropriate AD supervision - goal not met SBa 8/19  Short term goal 3: Functional ADL transfers supervision - goal not met SBA 8/19  Short term goal 4: LB ADLs Aggie - goal not met Max A 8/19  Long term goals  Time Frame for Long term goals : LTG=STG  Patient Goals   Patient goals : Home       Therapy Time   Individual Concurrent Group Co-treatment   Time In 1109         Time Out 6299         Minutes 25         Timed Code Treatment Minutes: 19 [address]

## 2021-08-19 NOTE — DISCHARGE SUMMARY
Discharge Summary    Date of Admission: 8/17/2021  5:58 AM  Date of Discharge: 8/19/2021  Admission Diagnosis:   Patient Active Problem List   Diagnosis    Essential hypertension    Generalized osteoarthrosis, involving multiple sites    Type 2 diabetes mellitus without complication ([address]    Degenerative lumbar spinal stenosis    [facility]enital clotting factor deficiency [facility]    Hypercholesterolemia    GERD (gastroesophageal reflux disease)    Late onset Alzheimer's disease without behavioral disturbance (HCC)    Drug dependence (HCC)    Chronic low back pain without sciatica    Spondylolisthesis, lumbar region    Chronic pain syndrome    Hyperparathyroidism ([address]    Pain disorder with psychological factors    Current moderate episode of major depressive disorder without prior episode ([facility]    TIA (transient ischemic attack)    Cubital tunnel syndrome on left    Left carpal tunnel syndrome    Right carpal tunnel syndrome    Cervical spinal stenosis    Acute blood loss as cause of postoperative anemia     Discharge Diagnosis: Same   Condition on Discharge: good  Attending for Admission: Dr. Lauren Beth  Procedures:   1. Posterior cervical decompressive laminectomy, C2-C3, C3-C4. 2.  Posterolateral intrafacet fusion C2-C3, C3-C4 with structural  allograft and morselized local bone. 3.  Posterolateral Infinity lateral mass fixation, C2, C3, C4.  4.  Evoked potential monitoring. 5.  Intraoperative fluoroscopy. Hospital Course: Russell Hernandez is a 66 y.o. female patient with progressive  cervical myelopathy. Preoperative imaging demonstrated spinal cord  compression at C2-C3 and C3-C4 with myelomalacia and significant  stenosis. Because of symptoms of persistent spinal stenosis,She underwent the procedure listed above on date of admission. After surgery, Her pre-operative radicular pain was improved. She complained of incisional pain. The pain was well-controlled on oral medications.  The dressing was clean, dry and intact. There was no erythema or edema around the surgical site. Prior to discharge She was eating well, urinating and ambulating with a steady gait with PT/OT. Discharge Vitals/Labs: /74   Pulse 94   Temp 98.1 °F (36.7 °C) (Oral)   Resp 18   Ht 5' 4\" (1.626 m)   Wt 141 lb 0.8 oz (64 kg)   SpO2 95%   BMI 24.21 kg/m²   CBC with Differential:    Lab Results   Component Value Date    WBC 16.1 08/19/2021    RBC 4.29 08/19/2021    HGB 11.4 08/19/2021    HCT 34.8 08/19/2021     08/19/2021    MCV 81.1 08/19/2021    MCH 26.5 08/19/2021    MCHC 32.6 08/19/2021    RDW 16.7 08/19/2021    SEGSPCT 48.2 04/30/2011    BANDSPCT 1 08/19/2021    LYMPHOPCT 19.0 08/19/2021    MONOPCT 6.0 08/19/2021    EOSPCT 2.6 04/30/2011    BASOPCT 0.0 08/19/2021    MONOSABS 1.0 08/19/2021    LYMPHSABS 3.1 08/19/2021    EOSABS 0.0 08/19/2021    BASOSABS 0.0 08/19/2021    DIFFTYPE Auto-K 04/30/2011     CMP:    Lab Results   Component Value Date     08/19/2021    K 4.4 08/19/2021    K 4.5 05/22/2020    CL 95 08/19/2021    CO2 26 08/19/2021    BUN 12 08/19/2021    CREATININE 0.6 08/19/2021    GFRAA >60 08/19/2021    GFRAA >60 11/17/2012    AGRATIO 1.5 06/22/2021    LABGLOM >60 08/19/2021    GLUCOSE 111 08/19/2021    PROT 7.0 06/22/2021    PROT 7.9 11/17/2012    LABALBU 3.3 06/22/2021    LABALBU 4.2 06/22/2021    CALCIUM 10.2 08/19/2021    BILITOT 0.3 06/22/2021    ALKPHOS 90 06/22/2021    AST 22 06/22/2021    ALT 14 06/22/2021      Discharge Medications:      Medication List      START taking these medications    methocarbamol 500 MG tablet  Commonly known as: ROBAXIN  Take 1 tablet by mouth 3 times daily for 20 days     oxyCODONE 5 MG immediate release tablet  Commonly known as: ROXICODONE  Take 1 tablet by mouth every 6 hours as needed for Pain for up to 7 days.         CHANGE how you take these medications    aspirin 81 MG chewable tablet  Take 1 tablet by mouth daily  Start taking on: August 24, 2021  What changed: These instructions start on August 24, 2021. If you are unsure what to do until then, ask your doctor or other care provider. CONTINUE taking these medications    amLODIPine 10 MG tablet  Commonly known as: NORVASC  TAKE ONE TABLET BY MOUTH DAILY     atorvastatin 40 MG tablet  Commonly known as: LIPITOR  TAKE ONE TABLET BY MOUTH DAILY     busPIRone 15 MG tablet  Commonly known as: BUSPAR  TAKE ONE TABLET BY MOUTH THREE TIMES A DAY AS NEEDED FOR ANXIETY     CENTRUM SILVER PO     donepezil 10 MG tablet  Commonly known as: ARICEPT  TAKE TWO TABLETS BY MOUTH EVERY NIGHT AT BEDTIME     DULoxetine 60 MG extended release capsule  Commonly known as: CYMBALTA  TAKE ONE CAPSULE BY MOUTH DAILY     Melatonin 5 MG Caps  1-2 qhs for sleep     mirtazapine 15 MG tablet  Commonly known as: REMERON  TAKE ONE TABLET BY MOUTH ONCE NIGHTLY     omeprazole 40 MG delayed release capsule  Commonly known as: PRILOSEC  TAKE ONE CAPSULE BY MOUTH DAILY        STOP taking these medications    acetaminophen-codeine 300-30 MG per tablet  Commonly known as: TYLENOL #3           Where to Get Your Medications      [facility] [address] [facility]  [address], [address] [facility]    Phone: [phone]   · methocarbamol 500 MG tablet  · oxyCODONE 5 MG immediate release tablet     Information about where to get these medications is not yet available    Ask your nurse or doctor about these medications  · aspirin 81 MG chewable tablet       Discharge Destination: The patient was discharged to Home with home care. Follow-up: The patient is to follow-up with our office in the office in 2-3 weeks. Pt should follow-up with PCP in 1-2 weeks  Discharge Instructions: Verbal and written discharge instructions as well as dressing change instructions were given to the patient at the time of consent.  No anticoagulation for 1 week post-operatively. No driving. No lifting or bending.       Mayito Bynum, APRN - CNP  8/19/2021

## 2021-08-19 NOTE — PLAN OF CARE
Problem: Falls - Risk of:  Goal: Will remain free from falls  Description: Will remain free from falls  Outcome: Ongoing  Goal: Absence of physical injury  Description: Absence of physical injury  Outcome: Ongoing     Problem: Pain:  Goal: Pain level will decrease  Description: Pain level will decrease  Outcome: Ongoing  Goal: Control of acute pain  Description: Control of acute pain  Outcome: Ongoing  Goal: Control of chronic pain  Description: Control of chronic pain  Outcome: Ongoing     Problem: Activity:  Goal: Ability to avoid complications of mobility impairment will improve  Description: Ability to avoid complications of mobility impairment will improve  Outcome: Ongoing  Goal: Ability to tolerate increased activity will improve  Description: Ability to tolerate increased activity will improve  Outcome: Ongoing     Problem:  Bowel/Gastric:  Goal: Gastrointestinal status for postoperative course will improve  Description: Gastrointestinal status for postoperative course will improve  Outcome: Ongoing     Problem: Fluid Volume:  Goal: Maintenance of adequate hydration will improve  Description: Maintenance of adequate hydration will improve  Outcome: Ongoing     Problem: Health Behavior:  Goal: Identification of resources available to assist in meeting health care needs will improve  Description: Identification of resources available to assist in meeting health care needs will improve  Outcome: Ongoing  Goal: Ability to state signs and symptoms to report to health care provider will improve  Description: Ability to state signs and symptoms to report to health care provider will improve  Outcome: Ongoing     Problem: Nutritional:  Goal: Ability to attain and maintain optimal nutritional status will improve  Description: Ability to attain and maintain optimal nutritional status will improve  Outcome: Ongoing     Problem: Physical Regulation:  Goal: Ability to maintain clinical measurements within normal limits will improve  Description: Ability to maintain clinical measurements within normal limits will improve  Outcome: Ongoing  Goal: Will remain free from infection  Description: Will remain free from infection  Outcome: Ongoing     Problem: Respiratory:  Goal: Respiratory status will improve  Description: Respiratory status will improve  Outcome: Ongoing     Problem: Sensory:  Goal: Pain level will decrease  Description: Pain level will decrease  Outcome: Ongoing  Goal: Satisfaction with pain management regimen will improve  Description: Satisfaction with pain management regimen will improve  Outcome: Ongoing     Problem: Skin Integrity:  Goal: Risk for impaired skin integrity will decrease  Description: Risk for impaired skin integrity will decrease  Outcome: Ongoing     Problem: Urinary Elimination:  Goal: Ability to achieve and maintain adequate urine output will improve  Description: Ability to achieve and maintain adequate urine output will improve  Outcome: Ongoing     Problem: Musculor/Skeletal Functional Status  Goal: Highest potential functional level  Outcome: Ongoing  Goal: Absence of falls  Outcome: Ongoing

## 2021-08-20 ENCOUNTER — CARE COORDINATION (OUTPATIENT)
Dept: CASE MANAGEMENT | Age: 76
End: 2021-08-20

## 2021-08-20 NOTE — CARE COORDINATION
Tim[facility] Transitions Initial Follow Up Call    Call within 2 business days of discharge: Yes    Patient: Russell Hernandez Patient : 1945   MRN: [id number]  Reason for Admission:   Discharge Date: 21 RARS: Readmission Risk Score: 13      Last Discharge [facility]       Complaint Diagnosis Description Type Department Provider    21  Preop testing . .. Admission (Discharged) Adele Guerra MD           Initial 24 hr call attempted, contact info left on vm. PC made to [facility], they have the referral and will be reaching out to the pt.          Follow Up  Future Appointments   Date Time Provider Carline Ramsay   2021  1:00 PM MD Darnell Ewing RN

## 2021-08-23 ENCOUNTER — CARE COORDINATION (OUTPATIENT)
Dept: CASE MANAGEMENT | Age: 76
End: 2021-08-23

## 2021-08-23 RX ORDER — AMLODIPINE BESYLATE 10 MG/1
TABLET ORAL
Qty: 90 TABLET | Refills: 0 | Status: SHIPPED | OUTPATIENT
Start: 2021-08-23 | End: 2021-11-19

## 2021-08-30 DIAGNOSIS — G89.29 CHRONIC LOW BACK PAIN WITHOUT SCIATICA, UNSPECIFIED BACK PAIN LATERALITY: ICD-10-CM

## 2021-08-30 DIAGNOSIS — M54.50 CHRONIC LOW BACK PAIN WITHOUT SCIATICA, UNSPECIFIED BACK PAIN LATERALITY: ICD-10-CM

## 2021-08-30 RX ORDER — ACETAMINOPHEN AND CODEINE PHOSPHATE 300; 30 MG/1; MG/1
TABLET ORAL
Qty: 90 TABLET | Refills: 0 | Status: SHIPPED | OUTPATIENT
Start: 2021-08-30 | End: 2021-10-04 | Stop reason: SDUPTHER

## 2021-08-30 NOTE — TELEPHONE ENCOUNTER
Medication:   Requested Prescriptions     Pending Prescriptions Disp Refills    acetaminophen-codeine (TYLENOL #3) 300-30 MG per tablet [Pharmacy Med Name: ACETAMINOPHEN-CODEINE 300-30 MG TAB] 90 tablet      Sig: TAKE ONE TABLET BY MOUTH EVERY 8 HOURS AS NEEDED FOR PAIN **REDUCE DOSES TAKEN AS PAIN BECOMES MANAGEABLE**      Last Filled:     Patient Phone Number: [phone] (home)     Last appt: 8/10/2021   Next appt: 9/20/2021    Last OARRS:   RX Monitoring 8/19/2021   Attestation -   Periodic Controlled Substance Monitoring Possible medication side effects, risk of tolerance/dependence & alternative treatments discussed. ;No signs of potential drug abuse or diversion identified.    Chronic Pain > 50 MEDD -     PDMP Monitoring:    Last PDMP Amy Rosas as Reviewed [facility]):  Review User Review Instant Review Result   Gillian Led 8/19/2021  8:15 AM Reviewed PDMP [1]     Preferred Pharmacy:   [facility] [address], [address] [phone] [facility] [phone]  [address]  [place] [address]  Phone: [phone] Fax: [phone]

## 2021-09-02 NOTE — TELEPHONE ENCOUNTER
Medication:   Requested Prescriptions     Pending Prescriptions Disp Refills    atorvastatin (LIPITOR) 40 MG tablet [Pharmacy Med Name: ATORVASTATIN 40 MG TABLET] 90 tablet 0     Sig: TAKE ONE TABLET BY MOUTH DAILY    DULoxetine (CYMBALTA) 60 MG extended release capsule [Pharmacy Med Name: DULOXETINE HCL DR 60 MG CAPSULE] 90 capsule 0     Sig: TAKE ONE CAPSULE BY MOUTH DAILY      Last Filled:      Patient Phone Number: [phone] (home)     Last appt: 8/10/2021   Next appt: 9/20/2021    Last OARRS:   RX Monitoring 8/19/2021   Attestation -   Periodic Controlled Substance Monitoring Possible medication side effects, risk of tolerance/dependence & alternative treatments discussed. ;No signs of potential drug abuse or diversion identified.    Chronic Pain > 50 MEDD -     PDMP Monitoring:    Last PDMP Janny Irwin as Reviewed [facility]):  Review User Review Instant Review Result   ParachuteFuturederm 8/19/2021  8:15 AM Reviewed PDMP [1]     Preferred Pharmacy:   [facility] [address], [address] [facility] [phone] Juanita Sender [phone] [address] [facility] [address]  [place] [address]  Phone: [phone] Fax: [phone]

## 2021-09-03 RX ORDER — ATORVASTATIN CALCIUM 40 MG/1
TABLET, FILM COATED ORAL
Qty: 90 TABLET | Refills: 0 | Status: SHIPPED | OUTPATIENT
Start: 2021-09-03 | End: 2021-12-20

## 2021-09-03 RX ORDER — DULOXETIN HYDROCHLORIDE 60 MG/1
CAPSULE, DELAYED RELEASE ORAL
Qty: 90 CAPSULE | Refills: 0 | Status: SHIPPED | OUTPATIENT
Start: 2021-09-03 | End: 2021-11-24

## 2021-09-21 RX ORDER — MIRTAZAPINE 15 MG/1
TABLET, FILM COATED ORAL
Qty: 30 TABLET | Refills: 2 | Status: SHIPPED | OUTPATIENT
Start: 2021-09-21 | End: 2021-12-20

## 2021-10-04 DIAGNOSIS — G89.29 CHRONIC LOW BACK PAIN WITHOUT SCIATICA, UNSPECIFIED BACK PAIN LATERALITY: ICD-10-CM

## 2021-10-04 DIAGNOSIS — M54.50 CHRONIC LOW BACK PAIN WITHOUT SCIATICA, UNSPECIFIED BACK PAIN LATERALITY: ICD-10-CM

## 2021-10-04 RX ORDER — ACETAMINOPHEN AND CODEINE PHOSPHATE 300; 30 MG/1; MG/1
1 TABLET ORAL EVERY 8 HOURS PRN
Qty: 90 TABLET | Refills: 0 | Status: SHIPPED | OUTPATIENT
Start: 2021-10-04 | End: 2021-11-01

## 2021-10-04 NOTE — TELEPHONE ENCOUNTER
acetaminophen-codeine (TYLENOL #3) 300-30 MG per tablet [[id number]]      [address] [place] [address] [address] [phone] Rajiv Negron [phone]   [facility], [address] [place] [address]   Phone:  [phone]  Fax:  [phone]

## 2021-10-04 NOTE — TELEPHONE ENCOUNTER
Medication:   Requested Prescriptions     Pending Prescriptions Disp Refills    acetaminophen-codeine (TYLENOL #3) 300-30 MG per tablet 90 tablet 0     Sig: Take 1 tablet by mouth every 8 hours as needed for Pain for up to 30 days. Last Filled:      Patient Phone Number: [phone] (home)     Last appt: 8/10/2021   Next appt: 10/25/2021    Last OARRS:   RX Monitoring 8/19/2021   Attestation -   Periodic Controlled Substance Monitoring Possible medication side effects, risk of tolerance/dependence & alternative treatments discussed. ;No signs of potential drug abuse or diversion identified.    Chronic Pain > 50 MEDD -     PDMP Monitoring:    Last PDMP Daniel Watkins as Reviewed [facility]):  Review User Review Instant Review Result   Cornelius Manual 8/19/2021  8:15 AM Reviewed PDMP [1]     Preferred Pharmacy:   [facility] [address], [address] [phone] Giovany Calix [phone]  [address] [facility] [place] [address]  Phone: [phone] Fax: [phone]

## 2021-10-12 RX ORDER — DONEPEZIL HYDROCHLORIDE 10 MG/1
TABLET, FILM COATED ORAL
Qty: 180 TABLET | Refills: 0 | Status: SHIPPED | OUTPATIENT
Start: 2021-10-12 | End: 2022-01-11 | Stop reason: SDUPTHER

## 2021-10-21 ENCOUNTER — HOSPITAL ENCOUNTER (OUTPATIENT)
Age: 76
Discharge: HOME | End: 2021-10-21
Payer: COMMERCIAL

## 2021-10-21 ENCOUNTER — HOSPITAL ENCOUNTER (OUTPATIENT)
Dept: GENERAL RADIOLOGY | Age: 76
Discharge: HOME | End: 2021-10-21
Payer: COMMERCIAL

## 2021-10-21 DIAGNOSIS — M54.50 CHRONIC LOW BACK PAIN, UNSPECIFIED BACK PAIN LATERALITY, UNSPECIFIED WHETHER SCIATICA PRESENT: ICD-10-CM

## 2021-10-21 DIAGNOSIS — G89.29 CHRONIC LOW BACK PAIN, UNSPECIFIED BACK PAIN LATERALITY, UNSPECIFIED WHETHER SCIATICA PRESENT: ICD-10-CM

## 2021-10-21 DIAGNOSIS — Z98.1 ARTHRODESIS STATUS: ICD-10-CM

## 2021-10-21 PROCEDURE — 72100 X-RAY EXAM L-S SPINE 2/3 VWS: CPT

## 2021-10-21 PROCEDURE — 72040 X-RAY EXAM NECK SPINE 2-3 VW: CPT

## 2021-10-22 ENCOUNTER — TELEPHONE (OUTPATIENT)
Dept: FAMILY MEDICINE CLINIC | Age: 76
End: 2021-10-22

## 2021-10-22 DIAGNOSIS — Z12.31 ENCOUNTER FOR SCREENING MAMMOGRAM FOR MALIGNANT NEOPLASM OF BREAST: Primary | ICD-10-CM

## 2021-10-28 ENCOUNTER — HOSPITAL ENCOUNTER (OUTPATIENT)
Age: 76
Discharge: HOME | End: 2021-10-28
Payer: COMMERCIAL

## 2021-10-28 ENCOUNTER — HOSPITAL ENCOUNTER (OUTPATIENT)
Dept: GENERAL RADIOLOGY | Age: 76
Discharge: HOME | End: 2021-10-28
Payer: COMMERCIAL

## 2021-10-28 PROCEDURE — 72100 X-RAY EXAM L-S SPINE 2/3 VWS: CPT

## 2021-10-29 RX ORDER — BUSPIRONE HYDROCHLORIDE 15 MG/1
TABLET ORAL
Qty: 90 TABLET | Refills: 0 | Status: SHIPPED | OUTPATIENT
Start: 2021-10-29 | End: 2021-11-29

## 2021-11-01 DIAGNOSIS — M54.50 CHRONIC LOW BACK PAIN WITHOUT SCIATICA, UNSPECIFIED BACK PAIN LATERALITY: ICD-10-CM

## 2021-11-01 DIAGNOSIS — G89.29 CHRONIC LOW BACK PAIN WITHOUT SCIATICA, UNSPECIFIED BACK PAIN LATERALITY: ICD-10-CM

## 2021-11-01 RX ORDER — ACETAMINOPHEN AND CODEINE PHOSPHATE 300; 30 MG/1; MG/1
TABLET ORAL
Qty: 90 TABLET | Refills: 0 | Status: SHIPPED | OUTPATIENT
Start: 2021-11-01 | End: 2021-11-30

## 2021-11-01 NOTE — TELEPHONE ENCOUNTER
Medication:   Requested Prescriptions     Pending Prescriptions Disp Refills    acetaminophen-codeine (TYLENOL #3) 300-30 MG per tablet [Pharmacy Med Name: ACETAMINOPHEN-CODEINE 300-30 MG TAB] 90 tablet      Sig: TAKE ONE TABLET BY MOUTH EVERY 8 HOURS AS NEEDED FOR PAIN . REDUCE DOSES TAKEN AS PAIN BECOMES MANAGEABLE      Last Filled:  8/30/2021    Patient Phone Number: [phone] (home)     Last appt: 8/10/2021   Next appt: 11/16/2021    Last OARRS:   RX Monitoring 8/19/2021   Attestation -   Periodic Controlled Substance Monitoring Possible medication side effects, risk of tolerance/dependence & alternative treatments discussed. ;No signs of potential drug abuse or diversion identified.    Chronic Pain > 50 MEDD -     PDMP Monitoring:    Last PDMP Major Revering as Reviewed [facility]):  Review User Review Instant Review Result   Lori Singh 8/19/2021  8:15 AM Reviewed PDMP [1]     Preferred Pharmacy:   [facility] [address], [address] [phone] Lynn Postin [phone] [address] [facility] [place] [address]  Phone: [phone] Fax: [phone]

## 2021-11-09 RX ORDER — OMEPRAZOLE 40 MG/1
CAPSULE, DELAYED RELEASE ORAL
Qty: 90 CAPSULE | Refills: 0 | Status: SHIPPED | OUTPATIENT
Start: 2021-11-09 | End: 2022-02-07

## 2021-11-16 ENCOUNTER — OFFICE VISIT (OUTPATIENT)
Dept: FAMILY MEDICINE CLINIC | Age: 76
End: 2021-11-16
Payer: COMMERCIAL

## 2021-11-16 VITALS
BODY MASS INDEX: 24.03 KG/M2 | OXYGEN SATURATION: 97 % | SYSTOLIC BLOOD PRESSURE: 122 MMHG | TEMPERATURE: 97.4 F | HEART RATE: 80 BPM | WEIGHT: 140 LBS | DIASTOLIC BLOOD PRESSURE: 80 MMHG

## 2021-11-16 DIAGNOSIS — E11.9 TYPE 2 DIABETES MELLITUS WITHOUT COMPLICATION, WITHOUT LONG-TERM CURRENT USE OF INSULIN (HCC): ICD-10-CM

## 2021-11-16 DIAGNOSIS — M54.50 CHRONIC LOW BACK PAIN WITHOUT SCIATICA, UNSPECIFIED BACK PAIN LATERALITY: ICD-10-CM

## 2021-11-16 DIAGNOSIS — M48.02 CERVICAL SPINAL STENOSIS: ICD-10-CM

## 2021-11-16 DIAGNOSIS — G30.1 LATE ONSET ALZHEIMER'S DISEASE WITHOUT BEHAVIORAL DISTURBANCE (HCC): Primary | ICD-10-CM

## 2021-11-16 DIAGNOSIS — F02.80 LATE ONSET ALZHEIMER'S DISEASE WITHOUT BEHAVIORAL DISTURBANCE (HCC): Primary | ICD-10-CM

## 2021-11-16 DIAGNOSIS — G89.29 CHRONIC LOW BACK PAIN WITHOUT SCIATICA, UNSPECIFIED BACK PAIN LATERALITY: ICD-10-CM

## 2021-11-16 DIAGNOSIS — E21.3 HYPERPARATHYROIDISM (HCC): ICD-10-CM

## 2021-11-16 DIAGNOSIS — F32.1 CURRENT MODERATE EPISODE OF MAJOR DEPRESSIVE DISORDER WITHOUT PRIOR EPISODE (HCC): ICD-10-CM

## 2021-11-16 PROBLEM — D62 ACUTE BLOOD LOSS AS CAUSE OF POSTOPERATIVE ANEMIA: Status: RESOLVED | Noted: 2021-08-18 | Resolved: 2021-11-16

## 2021-11-16 PROBLEM — E11.40 TYPE 2 DIABETES MELLITUS WITH DIABETIC NEUROPATHY (HCC): Status: ACTIVE | Noted: 2021-11-16

## 2021-11-16 PROCEDURE — 99214 OFFICE O/P EST MOD 30 MIN: CPT | Performed by: FAMILY MEDICINE

## 2021-11-16 ASSESSMENT — PATIENT HEALTH QUESTIONNAIRE - PHQ9
SUM OF ALL RESPONSES TO PHQ QUESTIONS 1-9: 0
1. LITTLE INTEREST OR PLEASURE IN DOING THINGS: 0
SUM OF ALL RESPONSES TO PHQ QUESTIONS 1-9: 0
2. FEELING DOWN, DEPRESSED OR HOPELESS: 0
SUM OF ALL RESPONSES TO PHQ QUESTIONS 1-9: 0
SUM OF ALL RESPONSES TO PHQ9 QUESTIONS 1 & 2: 0

## 2021-11-16 NOTE — PROGRESS NOTES
Subjective:      Patient ID: Tico Hein is a 66 y.o. female. CC: Patient presents for re-evaluation of chronic health problems including dementia, chronic back pain, depression, spinal stenosis, diabetes mellitus and hyperparathyroidism. HPI Patient presents today for a follow-up on chronic medications and medical conditions. Patient overall feels she is unchanged from last appointment time. She still requiring pain medication for chronic low back pain. She think she is only taken about twice a day but her prescription demonstrates she is taking 3 times a day. Patient states her  regulates the pain medication. She continues to improve in regards to her cervical spine issues and she seems to have little more strength in her hands than she did last time. She does have a repeat evaluation with neurosurgeon and continues with home exercise program.  She is due to have laboratory assessment performed in regards to diabetes mellitus and hyperparathyroidism.     Review of Systems     Patient Active Problem List   Diagnosis    Essential hypertension    Generalized osteoarthrosis, involving multiple sites    Type 2 diabetes mellitus without complication ([address]    Degenerative lumbar spinal stenosis    Congenital clotting factor deficiency (HCC)    Hypercholesterolemia    GERD (gastroesophageal reflux disease)    Late onset Alzheimer's disease without behavioral disturbance (HCC)    Drug dependence (HCC)    Chronic low back pain without sciatica    Spondylolisthesis, lumbar region    Chronic pain syndrome    Hyperparathyroidism ([address]    Pain disorder with psychological factors    Current moderate episode of major depressive disorder without prior episode ([address]    TIA (transient ischemic attack)    Cubital tunnel syndrome on left    Left carpal tunnel syndrome    Right carpal tunnel syndrome    Cervical spinal stenosis    Acute blood loss as cause of postoperative anemia Outpatient Medications Marked as Taking for the 11/16/21 encounter (Office Visit) with Narciso Rodriguez MD   Medication Sig Dispense Refill    omeprazole (PRILOSEC) 40 MG delayed release capsule TAKE ONE CAPSULE BY MOUTH DAILY 90 capsule 0    acetaminophen-codeine (TYLENOL #3) 300-30 MG per tablet TAKE ONE TABLET BY MOUTH EVERY 8 HOURS AS NEEDED FOR PAIN . REDUCE DOSES TAKEN AS PAIN BECOMES MANAGEABLE 90 tablet 0    busPIRone (BUSPAR) 15 MG tablet TAKE ONE TABLET BY MOUTH THREE TIMES A DAY AS NEEDED FOR ANXIETY 90 tablet 0    donepezil (ARICEPT) 10 MG tablet TAKE TWO TABLETS BY MOUTH EVERY NIGHT AT BEDTIME 180 tablet 0    mirtazapine (REMERON) 15 MG tablet TAKE ONE TABLET BY MOUTH ONCE NIGHTLY 30 tablet 2    atorvastatin (LIPITOR) 40 MG tablet TAKE ONE TABLET BY MOUTH DAILY 90 tablet 0    DULoxetine (CYMBALTA) 60 MG extended release capsule TAKE ONE CAPSULE BY MOUTH DAILY 90 capsule 0    amLODIPine (NORVASC) 10 MG tablet TAKE ONE TABLET BY MOUTH DAILY 90 tablet 0    aspirin 81 MG chewable tablet Take 1 tablet by mouth daily 1 tablet 3    Melatonin 5 MG CAPS 1-2 qhs for sleep 60 capsule 3    Multiple Vitamins-Minerals (CENTRUM SILVER PO) Take 1 tablet by mouth daily          Allergies   Allergen Reactions    Hydrocodone      The patient has a history of a bleeding Ulcer, and this upset her stomach severely. Social History     Tobacco Use    Smoking status: Never Smoker    Smokeless tobacco: Never Used   Substance Use Topics    Alcohol use: No     Alcohol/week: 0.0 standard drinks       /80 (Site: Right Upper Arm, Position: Sitting, Cuff Size: Medium Adult)   Pulse 80   Temp 97.4 °F (36.3 °C) (Infrared)   Wt 140 lb (63.5 kg)   SpO2 97%   BMI 24.03 kg/m²       Objective:   Physical Exam  Vitals and nursing note reviewed. Constitutional:       General: She is not in acute distress. Appearance: Normal appearance. She is well-developed and well-groomed.    Neck:      Vascular: No carotid bruit. Cardiovascular:      Rate and Rhythm: Normal rate and regular rhythm. Pulses:           Dorsalis pedis pulses are 2+ on the right side and 2+ on the left side. Posterior tibial pulses are 2+ on the right side and 2+ on the left side. Heart sounds: Normal heart sounds. No murmur heard. No friction rub. No gallop. Pulmonary:      Effort: Pulmonary effort is normal.      Breath sounds: Normal breath sounds. Musculoskeletal:      Lumbar back: Tenderness (Sacroiliac joint bilaterally and sacrum) present. No swelling, edema, deformity or spasms. Decreased range of motion. Right upper leg: Normal. No swelling, deformity or tenderness. Left upper leg: Normal. No swelling, deformity or tenderness. Right lower leg: No edema. Left lower leg: No edema. Skin:     General: Skin is warm and dry. Neurological:      Mental Status: She is alert and oriented to person, place, and time. Sensory: Sensation is intact. Motor: Motor function is intact. Deep Tendon Reflexes:      Reflex Scores:       Patellar reflexes are 2+ on the right side and 2+ on the left side. Achilles reflexes are 2+ on the right side and 2+ on the left side. Psychiatric:         Attention and Perception: Attention and perception normal.         Mood and Affect: Mood and affect normal.         Speech: Speech normal.         Behavior: Behavior normal. Behavior is cooperative. Cognition and Memory: Cognition normal. Memory is impaired. Judgment: Judgment normal.         Assessment:      Darion Lu was seen today for follow-up.     Diagnoses and all orders for this visit:    Late onset Alzheimer's disease without behavioral disturbance ([facility]    Hyperparathyroidism (HCC)    Chronic low back pain without sciatica, unspecified back pain laterality    Current moderate episode of major depressive disorder without prior episode ([address]    Cervical spinal stenosis    Type 2 diabetes mellitus without complication, without long-term current use of insulin (HCC)  -     Comprehensive Metabolic Panel; Future  -     Hemoglobin A1C; Future  -     Lipid Panel; Future  -     Microalbumin / Creatinine Urine Ratio; Future    OARRS report checked          Plan:      Proceed with laboratory testing. Adjustments of medication will be done after laboratory testing results available. Encourage patient continue working with her home exercise program and follow-up with neurosurgery. Patient received counseling on the following healthy behaviors: nutrition and exercise     Patient given educational materials     Health maintenance updated    Discussed use, benefit, and side effects of prescribed medications. Barriers to medication compliance addressed. All patient questions answered. Pt voiced understanding. Patient needs RTC in 3 months. Medical decision making of moderate complexity. Please note that this chart was generated using Dragon dictation software. Although every effort was made to ensure the accuracy of this automated transcription, some errors in transcription may have occurred.

## 2021-11-19 RX ORDER — AMLODIPINE BESYLATE 10 MG/1
TABLET ORAL
Qty: 90 TABLET | Refills: 0 | Status: SHIPPED | OUTPATIENT
Start: 2021-11-19 | End: 2022-02-16

## 2021-11-24 RX ORDER — DULOXETIN HYDROCHLORIDE 60 MG/1
CAPSULE, DELAYED RELEASE ORAL
Qty: 90 CAPSULE | Refills: 0 | Status: SHIPPED | OUTPATIENT
Start: 2021-11-24 | End: 2022-02-16 | Stop reason: SDUPTHER

## 2021-11-29 RX ORDER — BUSPIRONE HYDROCHLORIDE 15 MG/1
TABLET ORAL
Qty: 90 TABLET | Refills: 5 | Status: SHIPPED | OUTPATIENT
Start: 2021-11-29 | End: 2022-05-31

## 2021-11-29 NOTE — TELEPHONE ENCOUNTER
Medication:   Requested Prescriptions     Pending Prescriptions Disp Refills    busPIRone (BUSPAR) 15 MG tablet [Pharmacy Med Name: BUSPIRONE HCL 15 MG TABLET] 90 tablet 0     Sig: TAKE ONE TABLET BY MOUTH THREE TIMES A DAY AS NEEDED FOR ANXIETY      Last Filled:      Patient Phone Number: [phone] (home)     Last appt: 11/16/2021   Next appt: 2/16/2022    Last OARRS:   RX Monitoring 8/19/2021   Attestation -   Periodic Controlled Substance Monitoring Possible medication side effects, risk of tolerance/dependence & alternative treatments discussed. ;No signs of potential drug abuse or diversion identified.    Chronic Pain > 50 MEDD -     PDMP Monitoring:    Last PDMP [facility] as Reviewed [facility]):  Review User Review Instant Review Result   Joe Auburn 11/16/2021  3:15 PM Reviewed PDMP [1]     Preferred Pharmacy:   [facility] [address], [address] [facility] [phone] Rajiv Negron [phone]  [address] [place], [place] [address]y  [address] [place] [address]  Phone: [phone] Fax: [phone]

## 2021-11-30 DIAGNOSIS — G89.29 CHRONIC LOW BACK PAIN WITHOUT SCIATICA, UNSPECIFIED BACK PAIN LATERALITY: ICD-10-CM

## 2021-11-30 DIAGNOSIS — M54.50 CHRONIC LOW BACK PAIN WITHOUT SCIATICA, UNSPECIFIED BACK PAIN LATERALITY: ICD-10-CM

## 2021-11-30 RX ORDER — ACETAMINOPHEN AND CODEINE PHOSPHATE 300; 30 MG/1; MG/1
TABLET ORAL
Qty: 90 TABLET | Refills: 0 | Status: SHIPPED | OUTPATIENT
Start: 2021-11-30 | End: 2021-12-30

## 2021-11-30 NOTE — TELEPHONE ENCOUNTER
Medication:   Requested Prescriptions     Pending Prescriptions Disp Refills    acetaminophen-codeine (TYLENOL #3) 300-30 MG per tablet [Pharmacy Med Name: ACETAMINOPHEN-CODEINE 300-30 MG TAB] 90 tablet      Sig: TAKE ONE TABLET BY MOUTH EVERY 8 HOURS AS NEEDED FOR PAIN. REDUCE DOSES TAKEN AS PAIN BECOMES MANAGEABLE      Last Filled:  10/4/2021    Patient Phone Number: [phone] (home)     Last appt: 11/16/2021   Next appt: 2/16/2022    Last OARRS:   RX Monitoring 8/19/2021   Attestation -   Periodic Controlled Substance Monitoring Possible medication side effects, risk of tolerance/dependence & alternative treatments discussed. ;No signs of potential drug abuse or diversion identified.    Chronic Pain > 50 MEDD -     PDMP Monitoring:    Last PDMP [facility] as Reviewed [facility]):  Review User Review Instant Review Result   Marlee Shafer 11/16/2021  3:15 PM Reviewed PDMP [1]     Preferred Pharmacy:   [facility] [address], [address] [facility] [phone] Keira Riri [phone] [address] [place] [address] [address]  [address] [place], [place] [id number]  Phone: [phone] Fax: [phone]

## 2021-12-09 ENCOUNTER — HOSPITAL ENCOUNTER (OUTPATIENT)
Dept: PHYSICAL THERAPY | Age: 76
Setting detail: THERAPIES SERIES
Discharge: HOME | End: 2021-12-09
Payer: COMMERCIAL

## 2021-12-09 NOTE — FLOWSHEET NOTE
Physical Therapy  Cancellation/No-show Note  Patient Name:  Adela Dodson  :  1945   Date:  2021  Cancelled visits to date: 0  No-shows to date: 1    Patient status for today's appointment patient:  []  Cancelled  []  Rescheduled appointment  [x]  No-show  to PT eval     Reason given by patient:  []  Patient ill  []  Conflicting appointment  []  No transportation    []  Conflict with work  [x]  No reason given  []  Other:     Comments:      Phone call information:   []  Phone call made today to patient at _ time at number provided:      []  Patient answered, conversation as follows:    []  Patient did not answer, message left as follows:  [x]  Phone call not made today- Anette SHAW will call her tomorrow to f/u on today's NS as she also has an eval scheduled for  for the same dx. []  Phone call not needed - pt contacted us to cancel and provided reason for cancellation.      Electronically signed by:  Thalia Hernández PT

## 2021-12-16 ENCOUNTER — HOSPITAL ENCOUNTER (OUTPATIENT)
Dept: PHYSICAL THERAPY | Age: 76
Setting detail: THERAPIES SERIES
Discharge: HOME | End: 2021-12-16
Payer: COMMERCIAL

## 2021-12-16 PROCEDURE — 97110 THERAPEUTIC EXERCISES: CPT

## 2021-12-16 PROCEDURE — 97530 THERAPEUTIC ACTIVITIES: CPT

## 2021-12-16 PROCEDURE — 97161 PT EVAL LOW COMPLEX 20 MIN: CPT

## 2021-12-16 PROCEDURE — 97116 GAIT TRAINING THERAPY: CPT

## 2021-12-16 NOTE — FLOWSHEET NOTE
[facility]  Outpatient Physical Therapy  Phone: [phone]   Fax: [phone]    Physical Therapy Daily Treatment Note  Date:  2021    Patient Name:  Brooks Choudhury    :  1945  MRN: [id number]  Medical/Treatment Diagnosis Information:  Diagnosis: M47.817 (ICD-10-CM) - Spondylosis without myelopathy or radiculopathy, lumbosacral region, M43.17 (ICD-10-CM) - Spondylolisthesis, lumbosacral region, M54.89 (ICD-10-CM) - Other dorsalgia  Treatment Diagnosis: hypomobility L-P spine, flexibility deficits B hips/LE, strength deficits B LE and core mm, balance deficits, all contributing to cause impaired gait. Insurance/Certification information:  PT Insurance Information: [employer] $40 copay no deductible no auth  Physician Information:  Referring Practitioner: Court Killings CNP  Plan of care signed (Y/N): []  Yes [x]  No     Date of Patient follow up with Physician:      Progress Report: []  Yes  [x]  No     Date Range for reporting period:  Beginnin/16  Ending:     Progress report due (10 Rx/or 30 days whichever is less): visit #10 or 94    Recertification due (POC duration/ or 90 days whichever is less): visit #    Visit # Insurance Allowable Auth required? Date Range    [place] []  Yes  [x]  No NA       Latex Allergy:  [x]NO      []YES  Preferred Language for Healthcare:   [x]English       []other:    Functional Scale:           Date assessed:  RITU: raw score = 22/50; dysfunction = 44%     21  30 sec sit to stand test:  15x        12/16/21  6 min walk test: 774ft with RW and increasing unsteadiness as fatigue increased 21        Pain level:  2/10     SUBJECTIVE:  See eval    OBJECTIVE: 21 Pt's spouse Fady Middleton present bc pt  has memory problems and needs help remembering directions and instructions for home      RESTRICTIONS/PRECAUTIONS:  Fall risk Short term memory loss - spouse Nila Oglesby helps her to remember things. Fusion of cervical spine C3-5 in 2021. Short term memory loss - her spouse helps her. Osteoporosis and osetopenia, OA, neuropathy, OA in low back       Exercises/Interventions:     Therapeutic Exercises (81915) Resistance / level Sets/sec Reps Notes   nustep       IB/HR/TR       HS stretch on steps                            Mat ex  Fig 4 piriformis stretch  TrA iso   3x30\" B  10x5\"                    Therapeutic Activities (79028)       TG B squats       Sit to from stand 15x                                                GAIT TRG       Instruct in how to use RW  t/o clinic and for 6 min walk test.  Instructed pt to use RW when out in community or walking longer distances to reduce fall risk                                                        Neuromuscular Re-ed (98009)       Balance exercises       Multifidi walkouts with punches                                   Manual Intervention ([phone])       MET for sacral torsions prn  Next visits as appro                                             Modalities:     Pt. Education:  12/16/21 -patient and spouse Cata Leader educated on diagnosis, prognosis and expectations for rehab  -all patient questions were answered    Home Exercise Program:  Access Code: [email or web address]  URL: [email or web address]  Date: 12/16/2021  Prepared by: Monty Angelucci    Exercises  Supine Piriformis Stretch with Foot on Ground - 2 x daily - 7 x weekly - 1 sets - 3 reps - 30 hold  Supine Transversus Abdominis Bracing - Hands on Stomach - 2 x daily - 7 x weekly - 1 sets - 10 reps - 5 hold  Sit to Stand - 2 x daily - 7 x weekly - 1-2 sets - 10-15 reps  Walking - 2-3 x daily - 7 x weekly - 5-20 min hold        Therapeutic Exercise and NMR EXR  [] (73138) Provided verbal/tactile cueing for activities related to strengthening, flexibility, endurance, ROM for improvements in  [] LE / Lumbar: LE, proximal hip, and core control with self care, mobility, lifting, ambulation.   [] UE / Cervical: cervical, postural, scapular, scapulothoracic and UE control with self care, reaching, carrying, lifting, house/yardwork, driving, computer work.  [] (54847) Provided verbal/tactile cueing for activities related to improving balance, coordination, kinesthetic sense, posture, motor skill, proprioception to assist with   [] LE / lumbar: LE, proximal hip, and core control in self care, mobility, lifting, ambulation and eccentric single leg control. [] UE / cervical: cervical, scapular, scapulothoracic and UE control with self care, reaching, carrying, lifting, house/yardwork, driving, computer work.   [] (01422) Therapist is in constant attendance of 2 or more patients providing skilled therapy interventions, but not providing any significant amount of measurable one-on-one time to either patient, for improvements in  [] LE / lumbar: LE, proximal hip, and core control in self care, mobility, lifting, ambulation and eccentric single leg control. [] UE / cervical: cervical, scapular, scapulothoracic and UE control with self care, reaching, carrying, lifting, house/yardwork, driving, computer work.      NMR and Therapeutic Activities:    [] (87419 or 98776) Provided verbal/tactile cueing for activities related to improving balance, coordination, kinesthetic sense, posture, motor skill, proprioception and motor activation to allow for proper function of   [] LE: / Lumbar core, proximal hip and LE with self care and ADLs  [] UE / Cervical: cervical, postural, scapular, scapulothoracic and UE control with self care, carrying, lifting, driving, computer work.   [] (86158) Gait Re-education- Provided training and instruction to the patient for proper LE, core and proximal hip recruitment and positioning and eccentric body weight control with ambulation re-education including up and down stairs     Home Management Training / Self Care:  [] (00579) Provided self-care/home management training related to activities of daily living and compensatory training, and/or use of adaptive equipment for improvement with: ADLs and compensatory training, meal preparation, safety procedures and instruction in use of adaptive equipment, including bathing, grooming, dressing, personal hygiene, basic household cleaning and chores. Home Exercise Program:    [x] (43633) Reviewed/Progressed HEP activities related to strengthening, flexibility, endurance, ROM of   [] LE / Lumbar: core, proximal hip and LE for functional self-care, mobility, lifting and ambulation/stair navigation   [] UE / Cervical: cervical, postural, scapular, scapulothoracic and UE control with self care, reaching, carrying, lifting, house/yardwork, driving, computer work  [] (21629)Reviewed/Progressed HEP activities related to improving balance, coordination, kinesthetic sense, posture, motor skill, proprioception of   [] LE: core, proximal hip and LE for self care, mobility, lifting, and ambulation/stair navigation    [] UE / Cervical: cervical, postural,  scapular, scapulothoracic and UE control with self care, reaching, carrying, lifting, house/yardwork, driving, computer work    Manual Treatments:  PROM / STM / Oscillations-Mobs:  G-I, II, III, IV (PA's, Inf., Post.)  [] (85540) Provided manual therapy to mobilize LE, proximal hip and/or LS spine soft tissue/joints for the purpose of modulating pain, promoting relaxation,  increasing ROM, reducing/eliminating soft tissue swelling/inflammation/restriction, improving soft tissue extensibility and allowing for proper ROM for normal function with   [] LE / lumbar: self care, mobility, lifting and ambulation. [] UE / Cervical: self care, reaching, carrying, lifting, house/yardwork, driving, computer work.      Modalities:  [] (49173) Vasopneumatic compression: Utilized vasopneumatic compression to decrease edema / swelling for the purpose of improving mobility and quad tone / recruitment which will allow for increased overall function including but not limited to self-care, transfers, ambulation, and ascending / descending stairs. Charges:  Timed Code Treatment Minutes: 55   Total Treatment Minutes: 70     [x] EVAL - LOW (16902)   [] EVAL - MOD (14193)  [] EVAL - HIGH (66672)  [] RE-EVAL (33554)  [x] QB(75925) x  1     [] Ionto  [] NMR (84464) x       [] Vaso  [] Manual (23948) x       [] Ultrasound  [x] TA x   2     [] Mech Traction (35630)  [] Aquatic Therapy x     [] ES (un) (47277):   [] Home Management Training x  [] ES(attended) (30450)   [] Dry Needling 1-2 muscles (35622):  [] Dry Needling 3+ muscles (257339)  [] Group:      [x] Other: gait      GOALS:  Patient stated goal: get back to normal be able to drive  []? Progressing: []? Met: []? Not Met: []? Adjusted     Therapist goals for Patient:   Short Term Goals: To be achieved in: 2 weeks  1. Independent in HEP and progression per patient tolerance, in order to prevent re-injury. []? Progressing: []? Met: []? Not Met: []? Adjusted  2. Patient will have a decrease in pain to facilitate improvement in movement, function, and ADLs as indicated by improvement with respect to Functional Deficits. []? Progressing: []? Met: []? Not Met: []? Adjusted     Long Term Goals: To be achieved in: 6 weeks  1. Disability index score of 10 % or less on the   to assist with reaching prior level of function. []? Progressing: []? Met: []? Not Met: []? Adjusted  2. Patient will demonstrate increased AROM  to [facility], to allow for proper joint functioning to allow pt to resume cooking and cleaning her house without increase in symptoms. []? Progressing: []? Met: []? Not Met: []? Adjusted  3. Patient will demonstrate increased Strength and core activation to allow for proper functional mobility as indicated by patients Functional Deficits to allow pt to resume  Walking for exercise and going out in community  without increase in symptoms. []? Progressing: []? Met: []? Not Met: []? Adjusted  4.  Patient will return to functional activities including up/down the stairs, riding in car without increased symptoms or restriction. []? Progressing: []? Met: []? Not Met: []? Adjusted       Overall Progression Towards Functional goals/ Treatment Progress Update:  [] Patient is progressing as expected towards functional goals listed. [] Progression is slowed due to complexities/Impairments listed. [] Progression has been slowed due to co-morbidities. [x] Plan just implemented, too soon to assess goals progression <30days   [] Goals require adjustment due to lack of progress  [] Patient is not progressing as expected and requires additional follow up with physician  [] Other    Persisting Functional Limitations/Impairments:  []Sleeping [x]Sitting               [x]Standing [x]Transfers        [x]Walking [x]Kneeling               [x]Stairs [x]Squatting / bending   [x]ADLs [x]Reaching  [x]Lifting  [x]Housework  [x]Driving []Job related tasks  []Sports/Recreation []Other:        ASSESSMENT:  See eval    Treatment/Activity Tolerance:  [x] Patient able to complete tx [] Patient limited by fatigue  [] Patient limited by pain  [] Patient limited by other medical complications  [] Other:     Prognosis: [x] Good [] Fair  [] Poor    Patient Requires Follow-up: [x] Yes  [] No    PLAN:  strength, ROM/flexibility, posture and body mechs, manual, MOC, HEP, pt education     Frequency/Duration:  2 days per week for 6  Weeks:  Interventions:  [x]? Therapeutic exercise including:strength, ROM, flexibility  [x]? NMR activation and proprioception including postural re-education  [x]? Manual therapy as indicated to include: IASTM, STM, PROM, Gr I-IV mobilizations, manipulation. [x]? Modalities as needed that may include: thermal agents, E-stim, Biofeedback, US, iontophoresis as indicated  [x]? Patient education on joint protection, postural re-education, activity modification, progression of HEP.   Aquatic therapy    Plan for next treatment session:    PLAN: See eval. PT 2x / week for6 weeks. [] Continue per plan of care [] Alter current plan (see comments)  [x] Plan of care initiated [] Hold pending MD visit [] Discharge    Electronically signed by: Rosy Iyer PT PT, DPT    Note: If patient does not return for scheduled/ recommended follow up visits, this note will serve as a discharge from care along with most recent update on progress.

## 2021-12-16 NOTE — PLAN OF CARE
[facility]  Outpatient Physical Therapy, [address] [facility], [place], [facility] [address]  Phone: [phone]   Fax: [phone]                                                       Physical Therapy Certification    Dear Referring Practitioner: Davide Laughlin CNP,    We had the pleasure of evaluating the following patient for physical therapy services at [address] [place] [address]. A summary of our findings can be found in the initial assessment below. This includes our plan of care. If you have any questions or concerns regarding these findings, please do not hesitate to contact me at the office phone number checked above. Thank you for the referral.       Physician Signature:_______________________________Date:__________________  By signing above (or electronic signature), therapists plan is approved by physician      Patient: Jak Oglesby   : 1945   MRN: [id number]  Referring Physician: Referring Practitioner: Davide Laughlin CNP (PCP = Dr Abena Bishop)     Evaluation Date: 2021      Medical Diagnosis Information:  Diagnosis: M47.817 (ICD-10-CM) - Spondylosis without myelopathy or radiculopathy, lumbosacral region, M43.17 (ICD-10-CM) - Spondylolisthesis, lumbosacral region, M54.89 (ICD-10-CM) - Other dorsalgia   Treatment Diagnosis: hypomobility L-P spine, flexibility deficits B hips/LE, strength deficits B LE and core mm, balance deficits, all contributing to cause impaired gait. Insurance information: PT Insurance Information: [employer] $40 copay no deductible no auth      Preferred Language for Healthcare:   [x]English       []Other:    C-SSRS Triggered by Intake questionnaire (Past 2 wk assessment ):   [x] No, Questionnaire did not trigger screening.   [] Yes, Patient intake triggered C-SSRS Screening     [] Completed, no further action required.    [] Completed, PCP notified via Epic    SUBJECTIVE: had neck surgery - fusion of C3-5. Still has tingling in B hands. Pt has pain at tailbone and buttocks. Feels depressed bc of her pain. ONSET: at least 10 years. approx 12/1/2011    Fear avoidance: I should not do physical activities that (might) make my pain worse   [] True   [x] False     Current Level of Function: increased pain when she sits stands, walks, squats, drives, does stairs, lifts. 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 Short term memory loss - her spouse helps her. Osteoporosis and osetopenia, OA, neuropathy, OA in low back    Latex Allergy:  [x]NO      []YES  Relevant Medical History:    [x] Patient history, allergies, meds reviewed. Medical chart reviewed. See intake form. Review Of Systems (ROS):  [x]Performed Review of systems (Integumentary, CardioPulmonary, Neurological) by intake and observation. Intake form has been scanned into medical record. Patient has been instructed to contact their primary care physician regarding ROS issues if not already being addressed at this time.       Co-morbidities/Complexities (which will affect course of rehabilitation):  []None        []Hx of COVID   Arthritic conditions   []Rheumatoid arthritis (M05.9)  [x]Osteoarthritis (M19.91)  []Gout   Cardiovascular conditions   []Hypertension (I10)  []Hyperlipidemia (E78.5)  []Angina pectoris (I20)  []Atherosclerosis (I70)  []Pacemaker  []Hx of CABG/stent/  cardiac surgeries   Musculoskeletal conditions []Disc pathology   []Congenital spine pathologies   [x]Osteoporosis (M81.8)  [x]Osteopenia (M85.8)  []Scoliosis       Endocrine conditions   []Hypothyroid (E03.9)  []Hyperthyroid Gastrointestinal conditions   []Constipation (T04.10)   Metabolic conditions   []Morbid obesity (E66.01)  []Diabetes type 1(E10.65) or 2 (E11.65)   []Neuropathy (G60.9)     Cardio/Pulmonary conditions   []Asthma (J45)  []Coughing   []COPD (J44.9)  []CHF  []A-fib   Psychological Disorders  [x]Anxiety (F41.9)  [x]Depression (F32.9)   []Other:   Developmental Disorders  []Autism (F84.0)  []CP (G80)  []Down Syndrome (Q90.9)  []Developmental delay     Neurological conditions  []Prior Stroke (I69.30)  []Parkinson's (G20)  []Encephalopathy (G93.40)  []MS (G35)  []Post-polio (G14)  []SCI  []TBI  []ALS  X TIA,  Other conditions  []Fibromyalgia (M79.7)  []Vertigo  []Syncope  []Kidney Failure  []Cancer      []currently undergoing                treatment  []Pregnancy  []Incontinence   Prior surgeries  []involved limb  []previous spinal surgery  [] section birth  []hysterectomy  []bowel / bladder surgery  []other relevant surgeries   [x]Other:   Fusion of cervical spine C3-5 in 2021. Short term memory loss - her spouse helps her. Osteoporosis and osetopenia, OA, neuropathy           OBJECTIVE:   30 sec sit to stand test:  15x   6 min walk test: 774ft with RW and increasing unsteadiness as fatigue increased    Posture: pt prefers to sit with her LE crossed. Gait: unsteady with SBQC.  Intermittent scissoring of LE    Heel walk ( L4): unable due to balance deficits  Toe walk (S1):unable due to balance deficits  Unilat HR (1-2): unable due to balance deficits    BALANCE: no falls in over 1 yr  SLS: unable on either LE    Palpation:  Tender on dorsal aspect of sacrum    Neuro screen:  Dermatomes: WFL  Paredes's: WFL  Clonus: WFL  DTRs: (0=absent, 1=hypo, 2= normal, 3= hyper):  Patellar: WFL  [place]'s: WFL          ROM  Comments   Lumbar Flex personal factors that will affect rehab potential:              [x]Age  []Sex    []Smoker              [x]Motivation/Lack of Motivation                        [x]Co-Morbidities              [x]Cognitive Function, education/learning barriers - short term memory loss              []Environmental, home barriers              []profession/work barriers  [x]past PT/medical experience  []other:  Justification:    Falls Risk Assessment (30 days):   [x] Falls Risk assessed and no intervention required. [] Falls Risk assessed and Patient requires intervention due to being higher risk   TUG score (>12s at risk):     [] Falls education provided, including         ASSESSMENT: Pt presents with hypomobility L-P spine, flexibility deficits B hips/LE, strength deficits B LE and core mm, balance deficits, all contributing to cause impaired gait. These deficits contribute to pain and reduced tolerance of functional activities. Pt will benefit from skilled PT services to restore PLOF.       Functional Impairments:  Lumbar/lower quarter:     [x]Noted lumbar/proximal hip hypomobility   []Noted lumbosacral and/or generalized hypermobility   [x]Decreased Lumbosacral/hip/LE functional ROM   [x]Decreased core/proximal hip strength and neuromuscular control    [x]Decreased LE functional strength    []Abnormal reflexes/sensation/myotomal/dermatomal deficits  []Reduced ability to run, hop, cut or jump  []Reduced balance/proprioceptive control    []other:  reduced functional ROM of    []other:  reduce functional strength of    []other: myofascial changes and pain at    [x] Postural impairments:   []other:          Functional Activity Limitations (from functional questionnaire and intake)   [x]Reduced ability to tolerate prolonged functional positions   [x]Reduced ability or difficulty with changes of positions or transfers between positions   [x]Reduced ability to maintain good posture and demonstrate good body mechanics with sitting, bending, and lifting   [x]Reduced ability to sleep   [x] Reduced ability or tolerance with driving and/or computer work   [x]Reduced ability to perform lifting, reaching, carrying tasks   [x]Reduced ability to squat   [x]Reduced ability to forward bend   [x]Reduced ability to ambulate prolonged functional periods/distances/surfaces   [x]Reduced ability to ascend/descend stairs   []Reduced ability to concentrate    []Reduced ability to tolerate any impact through UE or spine   []other:     Participation Restrictions   [x]Reduced participation in self care activities   [x]Reduced participation in home management activities   [x]Reduced participation in work activities   [x]Reduced participation in social activities. [x]Reduced participation in sport/recreational activities. Classification:  Lumbar/Lower quarter:   []Signs/symptoms consistent with Lumbar instability/stabilization subgroup. []Signs/symptoms consistent with Lumbar mobilization/manipulation subgroup, myotomes and dermatomes intact. Meets manipulation criteria. []Signs/symptoms consistent with Lumbar direction specific/centralization subgroup   []Signs/symptoms consistent with Lumbar traction subgroup     []Signs/symptoms consistent with lumbar facet dysfunction   [x]Signs/symptoms consistent with lumbar stenosis type dysfunction   []Signs/symptoms consistent with nerve root involvement including myotome & dermatome dysfunction   []Signs/symptoms consistent with post-surgical status including: decreased ROM, strength and function.    []signs/symptoms consistent with pathology which may benefit from Dry needling    []Signs/symptoms consistent with joint sprain/strain  []Signs/symptoms consistent with patella-femoral syndrome   []Signs/symptoms consistent with knee OA/hip OA   []Signs/symptoms consistent with internal derangement of knee/Hip   [x]Signs/symptoms consistent with functional hip weakness/NMR control      []Signs/symptoms consistent with tendinitis/tendinosis    []signs/symptoms consistent with pathology which may benefit from Dry needling   []other:        Prognosis/Rehab Potential:      []Excellent   [x]Good    []Fair   []Poor    Tolerance of evaluation/treatment:    []Excellent   [x]Good    []Fair   []Poor     Physical Therapy Evaluation Complexity Justification  [x] A history of present problem with:  [] no personal factors and/or comorbidities that impact the plan of care;  [x]1-2 personal factors and/or comorbidities that impact the plan of care  []3 personal factors and/or comorbidities that impact the plan of care  [x] An examination of body systems using standardized tests and measures addressing any of the following: body structures and functions (impairments), activity limitations, and/or participation restrictions;:  [x] a total of 1-2 or more elements   [] a total of 3 or more elements   [] a total of 4 or more elements   [x] A clinical presentation with:  [x] stable and/or uncomplicated characteristics   [] evolving clinical presentation with changing characteristics  [] unstable and unpredictable characteristics;   [x] Clinical decision making of [x] low, [] moderate, [] high complexity using standardized patient assessment instrument and/or measurable assessment of functional outcome.     [x] EVAL (LOW) 47100 (typically 15 minutes face-to-face)  [] EVAL (MOD) 37859 (typically 30 minutes face-to-face)  [] EVAL (HIGH) 99469 (typically 45 minutes face-to-face)  [] RE-EVAL     HEP instruction: Written HEP instructions provided and reviewed    PLAN:  strength, ROM/flexibility, posture and body mechs, manual, MOC, HEP, pt education    Frequency/Duration:  2 days per week for 6  Weeks:  Interventions:  [x]  Therapeutic exercise including:strength, ROM, flexibility  [x]  NMR activation and proprioception including postural re-education  [x]  Manual therapy as indicated to include: IASTM, STM, PROM, Gr I-IV mobilizations, manipulation. [x]  Modalities as needed that may include: thermal agents, E-stim, Biofeedback, US, iontophoresis as indicated  [x]  Patient education on joint protection, postural re-education, activity modification, progression of HEP. Aquatic therapy    GOALS:  Patient stated goal: get back to normal be able to drive  [] Progressing: [] Met: [] Not Met: [] Adjusted    Therapist goals for Patient:   Short Term Goals: To be achieved in: 2 weeks  1. Independent in HEP and progression per patient tolerance, in order to prevent re-injury. [] Progressing: [] Met: [] Not Met: [] Adjusted  2. Patient will have a decrease in pain to facilitate improvement in movement, function, and ADLs as indicated by improvement with respect to Functional Deficits. [] Progressing: [] Met: [] Not Met: [] Adjusted    Long Term Goals: To be achieved in: 6 weeks  1. Disability index score of 10 % or less on the   to assist with reaching prior level of function. [] Progressing: [] Met: [] Not Met: [] Adjusted  2. Patient will demonstrate increased AROM  to [facility], to allow for proper joint functioning to allow pt to resume cooking and cleaning her house without increase in symptoms. [] Progressing: [] Met: [] Not Met: [] Adjusted  3. Patient will demonstrate increased Strength and core activation to allow for proper functional mobility as indicated by patients Functional Deficits to allow pt to resume  Walking for exercise and going out in community  without increase in symptoms. [] Progressing: [] Met: [] Not Met: [] Adjusted  4. Patient will return to functional activities including up/down the stairs, riding in car without increased symptoms or restriction.    [] Progressing: [] Met: [] Not Met: [] Adjusted    Electronically signed by:  Freedom eLroy, PT DPT

## 2021-12-20 RX ORDER — MIRTAZAPINE 15 MG/1
TABLET, FILM COATED ORAL
Qty: 30 TABLET | Refills: 2 | Status: SHIPPED | OUTPATIENT
Start: 2021-12-20 | End: 2022-02-16 | Stop reason: SDUPTHER

## 2021-12-20 RX ORDER — ATORVASTATIN CALCIUM 40 MG/1
TABLET, FILM COATED ORAL
Qty: 90 TABLET | Refills: 0 | Status: SHIPPED | OUTPATIENT
Start: 2021-12-20 | End: 2022-02-16 | Stop reason: SDUPTHER

## 2021-12-20 NOTE — TELEPHONE ENCOUNTER
Medication:   Requested Prescriptions     Pending Prescriptions Disp Refills    atorvastatin (LIPITOR) 40 MG tablet [Pharmacy Med Name: ATORVASTATIN 40 MG TABLET] 90 tablet 0     Sig: TAKE ONE TABLET BY MOUTH DAILY      Last Filled:     Patient Phone Number: [phone] (home)     Last appt: 11/16/2021   Next appt: 2/16/2022    Last OARRS:   RX Monitoring 8/19/2021   Attestation -   Periodic Controlled Substance Monitoring Possible medication side effects, risk of tolerance/dependence & alternative treatments discussed. ;No signs of potential drug abuse or diversion identified.    Chronic Pain > 50 MEDD -     PDMP Monitoring:    Last PDMP Farheen Ross as Reviewed [facility]):  Review User Review Instant Review Result   Celeste [facility] 11/16/2021  3:15 PM Reviewed PDMP [1]     Preferred Pharmacy:   [facility] [address], [address] [phone] Kenny Matute [phone] [address] [facility] [place] [address]  Phone: [phone] Fax: [phone]

## 2021-12-20 NOTE — TELEPHONE ENCOUNTER
Medication:   Requested Prescriptions     Pending Prescriptions Disp Refills    mirtazapine (REMERON) 15 MG tablet [Pharmacy Med Name: MIRTAZAPINE 15 MG TABLET] 30 tablet 2     Sig: TAKE ONE TABLET BY MOUTH ONCE NIGHTLY      Last Filled:      Patient Phone Number: [phone] (home)     Last appt: 11/16/2021   Next appt: 2/16/2022    Last OARRS:   RX Monitoring 8/19/2021   Attestation -   Periodic Controlled Substance Monitoring Possible medication side effects, risk of tolerance/dependence & alternative treatments discussed. ;No signs of potential drug abuse or diversion identified.    Chronic Pain > 50 MEDD -     PDMP Monitoring:    Last PDMP Dixon Nazario as Reviewed [facility]):  Review User Review Instant Review Result   Eric Ortega 11/16/2021  3:15 PM Reviewed PDMP [1]     Preferred Pharmacy:   [address] [place] [address] [address] [facility] [phone] Ericka [facility] [phone]  [address] [facility]  [place] [address]  Phone: [phone] Fax: [phone]

## 2021-12-31 DIAGNOSIS — G89.29 CHRONIC LOW BACK PAIN WITHOUT SCIATICA, UNSPECIFIED BACK PAIN LATERALITY: Primary | ICD-10-CM

## 2021-12-31 DIAGNOSIS — M54.50 CHRONIC LOW BACK PAIN WITHOUT SCIATICA, UNSPECIFIED BACK PAIN LATERALITY: Primary | ICD-10-CM

## 2022-01-03 RX ORDER — ACETAMINOPHEN AND CODEINE PHOSPHATE 300; 30 MG/1; MG/1
TABLET ORAL
Qty: 90 TABLET | Refills: 0 | Status: SHIPPED | OUTPATIENT
Start: 2022-01-03 | End: 2022-01-31

## 2022-01-04 ENCOUNTER — HOSPITAL ENCOUNTER (OUTPATIENT)
Dept: PHYSICAL THERAPY | Age: 77
Setting detail: THERAPIES SERIES
Discharge: HOME | End: 2022-01-04

## 2022-01-04 NOTE — FLOWSHEET NOTE
Physical Therapy  Cancellation/No-show Note  Patient Name:  Dave Shirley  :  1945   Date:  2022  Cancelled visits to date: 1  No-shows to date: 0    Patient status for today's appointment patient:  [x]  Cancelled 22  []  Rescheduled appointment  []  No-show     Reason given by patient:  []  Patient ill  [x]  Conflicting appointment 61  has conflicting MD appt  []  No transportation    []  Conflict with work  []  No reason given  []  Other:     Comments:      Phone call information:   []  Phone call made today to patient at _ time at number provided:      []  Patient answered, conversation as follows:    []  Patient did not answer, message left as follows:  [x]  22 Phone call not made today  []  Phone call not needed - pt contacted us to cancel and provided reason for cancellation.      Electronically signed by:  Lucia Porras PT

## 2022-01-11 ENCOUNTER — HOSPITAL ENCOUNTER (OUTPATIENT)
Dept: PHYSICAL THERAPY | Age: 77
Setting detail: THERAPIES SERIES
Discharge: HOME | End: 2022-01-11

## 2022-01-11 RX ORDER — DONEPEZIL HYDROCHLORIDE 10 MG/1
TABLET, FILM COATED ORAL
Qty: 180 TABLET | Refills: 0 | Status: SHIPPED | OUTPATIENT
Start: 2022-01-11 | End: 2022-02-16 | Stop reason: SDUPTHER

## 2022-01-11 NOTE — PROGRESS NOTES
Physical Therapy  Cancellation/No-show Note  Patient Name:  Jeane Simon  :  1945   Date:  2022  Cancelled visits to date: 1  No-shows to date: 1    Patient status for today's appointment patient:  []  Cancelled 22  []  Rescheduled appointment  [x]  No-show      Reason given by patient:  []  Patient ill  [x]  Conflicting appointment 75  has conflicting MD appt  []  No transportation    []  Conflict with work  []  No reason given  [x]  Other:     Comments: Thought she cancelled appointment     Phone call information:   [x]  Phone call made today to patient at _ time at number provided:      [x]  Patient answered, conversation as follows:  Pt thought she cancelled her appointment today. Went to confirm next apt. Pt wishes to cancel remaining therapy appointments at this time. States that she is able to do everything at home.    []  Patient did not answer, message left as follows:  []  22 Phone call not made today  []  Phone call not needed - pt contacted us to cancel and provided reason for cancellation. Cancelling remaining appointments due to Pt feeling she can perform exercises at home.      Electronically signed by:  Radha Torres, PT, DPT

## 2022-01-11 NOTE — TELEPHONE ENCOUNTER
Last office visit 11/16/2021     Last written     Next office visit scheduled 2/16/2022    Requested Prescriptions     Pending Prescriptions Disp Refills    donepezil (ARICEPT) 10 MG tablet 180 tablet 0

## 2022-01-31 DIAGNOSIS — M54.50 CHRONIC LOW BACK PAIN WITHOUT SCIATICA, UNSPECIFIED BACK PAIN LATERALITY: ICD-10-CM

## 2022-01-31 DIAGNOSIS — G89.29 CHRONIC LOW BACK PAIN WITHOUT SCIATICA, UNSPECIFIED BACK PAIN LATERALITY: ICD-10-CM

## 2022-01-31 RX ORDER — ACETAMINOPHEN AND CODEINE PHOSPHATE 300; 30 MG/1; MG/1
TABLET ORAL
Qty: 90 TABLET | Refills: 0 | Status: SHIPPED | OUTPATIENT
Start: 2022-01-31 | End: 2022-03-02

## 2022-01-31 NOTE — TELEPHONE ENCOUNTER
Medication:   Requested Prescriptions     Pending Prescriptions Disp Refills    acetaminophen-codeine (TYLENOL #3) 300-30 MG per tablet [Pharmacy Med Name: ACETAMINOPHEN-CODEINE 300-30 MG TAB] 90 tablet      Sig: TAKE ONE TABLET BY MOUTH EVERY 8 HOURS AS NEEDED FOR PAIN REDUCE DOSES TAKEN AS PAIN BECOMES MANAGEABLE      Last Filled:  1/3/2022    Patient Phone Number: [phone] (home)     Last appt: 11/16/2021   Next appt: 2/16/2022    Last OARRS:   RX Monitoring 8/19/2021   Attestation -   Periodic Controlled Substance Monitoring Possible medication side effects, risk of tolerance/dependence & alternative treatments discussed. ;No signs of potential drug abuse or diversion identified.    Chronic Pain > 50 MEDD -     PDMP Monitoring:    Last PDMP Melida Cano as Reviewed [facility]):  Review User Review Instant Review Result   Alessandra Samayoaley 11/16/2021  3:15 PM Reviewed PDMP [1]     Preferred Pharmacy:   [facility] [address], [address] [facility] [phone] Remigio Junior [phone]  [address] [facility] [address]  [place] [address]  Phone: [phone] Fax: [phone]

## 2022-02-01 ENCOUNTER — APPOINTMENT (OUTPATIENT)
Dept: PHYSICAL THERAPY | Age: 77
End: 2022-02-01
Payer: COMMERCIAL

## 2022-02-03 ENCOUNTER — APPOINTMENT (OUTPATIENT)
Dept: PHYSICAL THERAPY | Age: 77
End: 2022-02-03
Payer: COMMERCIAL

## 2022-02-07 RX ORDER — OMEPRAZOLE 40 MG/1
CAPSULE, DELAYED RELEASE ORAL
Qty: 90 CAPSULE | Refills: 0 | Status: SHIPPED | OUTPATIENT
Start: 2022-02-07 | End: 2022-05-06

## 2022-02-08 ENCOUNTER — APPOINTMENT (OUTPATIENT)
Dept: PHYSICAL THERAPY | Age: 77
End: 2022-02-08
Payer: COMMERCIAL

## 2022-02-10 ENCOUNTER — HOSPITAL ENCOUNTER (OUTPATIENT)
Age: 77
Discharge: HOME | End: 2022-02-10
Payer: COMMERCIAL

## 2022-02-10 ENCOUNTER — HOSPITAL ENCOUNTER (OUTPATIENT)
Dept: GENERAL RADIOLOGY | Age: 77
Discharge: HOME | End: 2022-02-10
Payer: COMMERCIAL

## 2022-02-10 DIAGNOSIS — Z98.1 ARTHRODESIS STATUS: ICD-10-CM

## 2022-02-10 PROCEDURE — 72040 X-RAY EXAM NECK SPINE 2-3 VW: CPT

## 2022-02-16 ENCOUNTER — OFFICE VISIT (OUTPATIENT)
Dept: FAMILY MEDICINE CLINIC | Age: 77
End: 2022-02-16
Payer: COMMERCIAL

## 2022-02-16 VITALS
RESPIRATION RATE: 12 BRPM | HEART RATE: 78 BPM | DIASTOLIC BLOOD PRESSURE: 60 MMHG | SYSTOLIC BLOOD PRESSURE: 100 MMHG | TEMPERATURE: 97.2 F | BODY MASS INDEX: 23.37 KG/M2 | WEIGHT: 136.13 LBS | OXYGEN SATURATION: 95 %

## 2022-02-16 DIAGNOSIS — G95.9 CERVICAL MYELOPATHY (HCC): ICD-10-CM

## 2022-02-16 DIAGNOSIS — E78.00 HYPERCHOLESTEROLEMIA: ICD-10-CM

## 2022-02-16 DIAGNOSIS — G30.1 LATE ONSET ALZHEIMER'S DISEASE WITHOUT BEHAVIORAL DISTURBANCE (HCC): ICD-10-CM

## 2022-02-16 DIAGNOSIS — D68.2 CONGENITAL CLOTTING FACTOR DEFICIENCY (HCC): ICD-10-CM

## 2022-02-16 DIAGNOSIS — E11.40 TYPE 2 DIABETES MELLITUS WITH DIABETIC NEUROPATHY, WITHOUT LONG-TERM CURRENT USE OF INSULIN (HCC): Primary | ICD-10-CM

## 2022-02-16 DIAGNOSIS — F02.80 LATE ONSET ALZHEIMER'S DISEASE WITHOUT BEHAVIORAL DISTURBANCE (HCC): ICD-10-CM

## 2022-02-16 DIAGNOSIS — E21.3 HYPERPARATHYROIDISM (HCC): ICD-10-CM

## 2022-02-16 DIAGNOSIS — F19.20 DRUG DEPENDENCE (HCC): ICD-10-CM

## 2022-02-16 DIAGNOSIS — F32.1 CURRENT MODERATE EPISODE OF MAJOR DEPRESSIVE DISORDER WITHOUT PRIOR EPISODE (HCC): ICD-10-CM

## 2022-02-16 PROCEDURE — 99214 OFFICE O/P EST MOD 30 MIN: CPT | Performed by: FAMILY MEDICINE

## 2022-02-16 RX ORDER — MIRTAZAPINE 15 MG/1
TABLET, FILM COATED ORAL
Qty: 90 TABLET | Refills: 1 | Status: SHIPPED | OUTPATIENT
Start: 2022-02-16 | End: 2022-08-17

## 2022-02-16 RX ORDER — DULOXETIN HYDROCHLORIDE 60 MG/1
CAPSULE, DELAYED RELEASE ORAL
Qty: 90 CAPSULE | Refills: 1 | Status: SHIPPED | OUTPATIENT
Start: 2022-02-16 | End: 2022-08-05

## 2022-02-16 RX ORDER — AMLODIPINE BESYLATE 10 MG/1
TABLET ORAL
Qty: 90 TABLET | Refills: 0 | Status: SHIPPED | OUTPATIENT
Start: 2022-02-16 | End: 2022-05-13

## 2022-02-16 RX ORDER — ATORVASTATIN CALCIUM 40 MG/1
TABLET, FILM COATED ORAL
Qty: 90 TABLET | Refills: 1 | Status: SHIPPED | OUTPATIENT
Start: 2022-02-16 | End: 2022-09-14

## 2022-02-16 RX ORDER — DONEPEZIL HYDROCHLORIDE 10 MG/1
TABLET, FILM COATED ORAL
Qty: 180 TABLET | Refills: 1 | Status: SHIPPED | OUTPATIENT
Start: 2022-02-16 | End: 2022-09-29

## 2022-02-16 RX ORDER — ZOSTER VACCINE RECOMBINANT, ADJUVANTED 50 MCG/0.5
0.5 KIT INTRAMUSCULAR SEE ADMIN INSTRUCTIONS
Qty: 0.5 ML | Refills: 0 | Status: SHIPPED | OUTPATIENT
Start: 2022-02-16 | End: 2022-08-15

## 2022-02-16 NOTE — PROGRESS NOTES
Subjective:      Patient ID: Elzbieta Marques is a 64 y.o. female. CC: Patient presents for re-evaluation of chronic health problems including diabetes mellitus, hypercholesterol, cervical radiculopathy, dementia, depression and congenital clotting factor deficiency. HPI pt is here for a follow up, med refill. Patient overall feels she is doing better at this point of time. She just had a recent evaluation with neurosurgeons and agreed to start physical therapy. She has been doing home exercises and feels she has regained a lot of her strength. She does use a cane and has had no falls.  takes care of her medications for her as she does have the memory issues. She knows she is lost some weight and she is more active at this point of time. She is due for laboratory assessment. Patient feels her anxiety and depression symptoms are controlled with her medications.     Review of Systems  Patient Active Problem List   Diagnosis    Essential hypertension    Generalized osteoarthrosis, involving multiple sites    Type 2 diabetes mellitus without complication ([address]    Degenerative lumbar spinal stenosis    Congenital clotting factor deficiency (HCC)    Hypercholesterolemia    GERD (gastroesophageal reflux disease)    Late onset Alzheimer's disease without behavioral disturbance (HCC)    Drug dependence (HCC)    Chronic low back pain without sciatica    Spondylolisthesis, lumbar region    Chronic pain syndrome    Hyperparathyroidism ([address]    Pain disorder with psychological factors    Current moderate episode of major depressive disorder without prior episode ([address]    TIA (transient ischemic attack)    Cubital tunnel syndrome on left    Left carpal tunnel syndrome    Right carpal tunnel syndrome    Cervical spinal stenosis    Type 2 diabetes mellitus with diabetic neuropathy       Outpatient Medications Marked as Taking for the 2/16/22 encounter (Office Visit) with Saba Pennington MD Dorsalis pedis pulses are 2+ on the right side and 2+ on the left side. Posterior tibial pulses are 2+ on the right side and 2+ on the left side. Heart sounds: Normal heart sounds. No murmur heard. No friction rub. No gallop. Pulmonary:      Effort: Pulmonary effort is normal.      Breath sounds: Normal breath sounds. Musculoskeletal:      Lumbar back: Tenderness (Sacroiliac joint bilaterally and sacrum) present. No swelling, edema, deformity or spasms. Decreased range of motion. Right upper leg: Normal. No swelling, deformity or tenderness. Left upper leg: Normal. No swelling, deformity or tenderness. Right lower leg: No edema. Left lower leg: No edema. Skin:     General: Skin is warm and dry. Neurological:      Mental Status: She is alert and oriented to person, place, and time. Sensory: Sensation is intact. Motor: Weakness present. Deep Tendon Reflexes:      Reflex Scores:       Patellar reflexes are 2+ on the right side and 2+ on the left side. Achilles reflexes are 2+ on the right side and 2+ on the left side. Comments: Strength testing is 5 out of 5 in both upper and lower extremities except for the left seems to be 4 out of 5    Patient does use a cane for ambulation   Psychiatric:         Attention and Perception: Attention and perception normal.         Mood and Affect: Mood and affect normal.         Speech: Speech normal.         Behavior: Behavior normal. Behavior is cooperative. Cognition and Memory: Cognition normal. Memory is impaired. Judgment: Judgment normal.         Assessment:      Adam Garcia was seen today for follow-up, hypertension and hyperlipidemia. Diagnoses and all orders for this visit:    Type 2 diabetes mellitus with diabetic neuropathy, without long-term current use of insulin ([facility]  -     Comprehensive Metabolic Panel; Future  -     Lipid Panel;  Future  -     Hemoglobin A1C; Future    Hypercholesterolemia    Cervical myelopathy (HCC)    Late onset Alzheimer's disease without behavioral disturbance (HCC)    Drug dependence (HCC)    Current moderate episode of major depressive disorder without prior episode ([facility]    Hyperparathyroidism ([facility]    Congenital clotting factor deficiency ([facility]    Other orders  -     donepezil (ARICEPT) 10 MG tablet; TAKE TWO TABLETS BY MOUTH EVERY NIGHT AT BEDTIME  -     atorvastatin (LIPITOR) 40 MG tablet; TAKE ONE TABLET BY MOUTH DAILY  -     mirtazapine (REMERON) 15 MG tablet; TAKE ONE TABLET BY MOUTH ONCE NIGHTLY  -     DULoxetine (CYMBALTA) 60 MG extended release capsule; TAKE ONE CAPSULE BY MOUTH DAILY  -     zoster recombinant adjuvanted vaccine (SHINGRIX) 50 MCG/0.5ML SUSR injection; Inject 0.5 mLs into the muscle See Admin Instructions 1 dose now and repeat in 2-6 months    OARRS report checked          Plan:      Proceed with laboratory testing. Adjustments of medication will be done after laboratory testing results available. Patient received counseling on the following healthy behaviors: nutrition and exercise     Patient given educational materials     Health maintenance updated    Discussed use, benefit, and side effects of prescribed medications. Barriers to medication compliance addressed. All patient questions answered. Pt voiced understanding. Patient needs RTC in 3 months. Medical decision making of moderate complexity. Please note that this chart was generated using Dragon dictation software. Although every effort was made to ensure the accuracy of this automated transcription, some errors in transcription may have occurred.

## 2022-02-16 NOTE — TELEPHONE ENCOUNTER
Medication:   Requested Prescriptions     Pending Prescriptions Disp Refills    amLODIPine (NORVASC) 10 MG tablet [Pharmacy Med Name: amLODIPine BESYLATE 10 MG TAB] 90 tablet 0     Sig: TAKE ONE TABLET BY MOUTH DAILY      Last Filled:      Patient Phone Number: [phone] (home)     Last appt: 11/16/2021   Next appt: 2/16/2022    Last OARRS:   RX Monitoring 8/19/2021   Attestation -   Periodic Controlled Substance Monitoring Possible medication side effects, risk of tolerance/dependence & alternative treatments discussed. ;No signs of potential drug abuse or diversion identified.    Chronic Pain > 50 MEDD -     PDMP Monitoring:    Last PDMP Ari Geller as Reviewed [facility]):  Review User Review Instant Review Result   Maldonado Rabago 11/16/2021  3:15 PM Reviewed PDMP [1]     Preferred Pharmacy:   [facility] [address], [address] [phone] Perla Isabel [phone]  [address] [facility] [address]  Phone: [phone] Fax: [phone]

## 2022-02-17 ENCOUNTER — HOSPITAL ENCOUNTER (OUTPATIENT)
Dept: PHYSICAL THERAPY | Age: 77
Setting detail: THERAPIES SERIES
Discharge: HOME | End: 2022-02-17
Payer: COMMERCIAL

## 2022-02-17 NOTE — FLOWSHEET NOTE
Physical Therapy  Cancellation/No-show Note  Patient Name:  Veronica Souza  :  1945   Date:  2022  Cancelled visits to date: 1  No-shows to date: 0    Patient status for today's appointment patient:  [x]  Cancelled 22 (eval)  []  Rescheduled appointment  []  No-show     Reason given by patient:  [x]  Patient ill  []  Conflicting appointment  []  No transportation    []  Conflict with work  []  No reason given  []  Other:     Comments:      Phone call information:   []  Phone call made today to patient at _ time at number provided:      []  Patient answered, conversation as follows:    []  Patient did not answer, message left as follows:  []  Phone call not made today  [x]  Phone call not needed - pt contacted us to cancel and provided reason for cancellation.      Electronically signed by:  Ya Castillo, PT, DPT

## 2022-02-21 ENCOUNTER — HOSPITAL ENCOUNTER (OUTPATIENT)
Dept: PHYSICAL THERAPY | Age: 77
Setting detail: THERAPIES SERIES
Discharge: HOME | End: 2022-02-21
Payer: COMMERCIAL

## 2022-02-21 NOTE — FLOWSHEET NOTE
Physical Therapy  Cancellation/No-show Note  Patient Name:  Neetu Matute  :  1945   Date:  2022  Cancelled visits to date: 1  No-shows to date: 1    Patient status for today's appointment patient:  []  Cancelled 22 (eval)  []  Rescheduled appointment  [x]  No-show  (eval)     Reason given by patient:  []  Patient ill  []  Conflicting appointment  []  No transportation    []  Conflict with work  []  No reason given  []  Other:     Comments:      Phone call information:   []  Phone call made today to patient at _ time at number provided:      []  Patient answered, conversation as follows:    []  Patient did not answer, message left as follows:  [x]  Phone call not made today  []  Phone call not needed - pt contacted us to cancel and provided reason for cancellation.      Electronically signed by:  Selena Rivero, PT, DPT

## 2022-02-22 ENCOUNTER — APPOINTMENT (OUTPATIENT)
Dept: PHYSICAL THERAPY | Age: 77
End: 2022-02-22
Payer: COMMERCIAL

## 2022-02-23 ENCOUNTER — HOSPITAL ENCOUNTER (OUTPATIENT)
Dept: PHYSICAL THERAPY | Age: 77
Setting detail: THERAPIES SERIES
Discharge: HOME | End: 2022-02-23
Payer: COMMERCIAL

## 2022-02-23 PROCEDURE — 97161 PT EVAL LOW COMPLEX 20 MIN: CPT

## 2022-02-23 PROCEDURE — 97530 THERAPEUTIC ACTIVITIES: CPT

## 2022-02-23 NOTE — PLAN OF CARE
[address], [address] [facility] [place], [address]  Phone: [phone]   Fax:     [phone]                                                       Physical Therapy Certification    Dear Referring Practitioner: SHENA Howard CNP,    We had the pleasure of evaluating the following patient for physical therapy services at [address] [place] [address]. A summary of our findings can be found in the initial assessment below. This includes our plan of care. If you have any questions or concerns regarding these findings, please do not hesitate to contact me at the office phone number checked above. Thank you for the referral.       Physician Signature:_______________________________Date:__________________  By signing above (or electronic signature), therapists plan is approved by physician      Patient: Jesi Vargas   : 1945   MRN: [id number]  Referring Physician: Referring Practitioner: SHENA Howard CNP      Evaluation Date: 2022      Medical Diagnosis Information:  Diagnosis: M54.2 (ICD-10-CM) - Cervicalgia; Z98.1 (ICD-10-CM) - Arthrodesis status; M48.02 (ICD-10-CM) - Spinal stenosis in cervical region   Treatment Diagnosis: Impaired cervical spine stability, Impaired posture, Decreased BUE & BLE strength, Impaired balance and gait                                         Insurance information: PT Insurance Information: [employer]  $40 copay    Precautions/ Contra-indications: C2-C4 fusion  Latex Allergy:  [x]NO      []YES  Preferred Language for Healthcare:   [x]English       []Other:    C-SSRS Triggered by Intake questionnaire (Past 2 wk assessment ):   [x] No, Questionnaire did not trigger screening.   [] Yes, Patient intake triggered C-SSRS Screening     [] Completed, no further action required.    [] Completed, PCP notified via Epic    SUBJECTIVE: Patient &  report cervical fusion of C2-C4 in 2021 due to central cord compression. Had radicular symptoms in BUEs and LEs were beginning to get weak effecting her balance and gait. Attempted to begin PT in December, however wasn't into it. Pt and  state motivation to participate in PT this time and wants to improve balance and safety with gait. Reports neck is feeling okay but lower back hurts some and LEs are very weak. Fear avoidance: I should not do physical activities that (might) make my pain worse   [] True   [x] False     Relevant Medical History: HTN, DM type II  Functional Outcome: NDI: raw score = 15; dysfunction = 30%   FGA:  8/30   30\" STS:  10.5   5x STS:  14\"    Pain Scale: 0/10 -denies, just heaviness in BUEs. Easing factors: -  Provocative factors: -   Type: []Constant   []Intermittent  []Radiating []Localized []other:  Numbness/Tingling: distal BUEs, bottom of feet, feels that she's walking on pads    Occupation/School/Hobbies:  Work in Field    Living Status/Prior Level of Function: Prior to this injury / incident, pt was independent with ADLs and IADLs, going up & down stairs easily  Ranch style home with basement, laundry in basement. 3 THONY home w/HR      OBJECTIVE:   Functional Mobility/Transfers: IND w/instruction and encouragement    Posture: sacral sits, increased thoracic spine kyphosis, cervical kyphosis, is able to increase lordosis at times during gait to improve visual field but not maintain    Gait: w/QC: forward flexed posture, decreased B step length, shuffling gait at times, dependent on  on left side for additional balance.       CERV ROM     Cervical Flexion WFL    Cervical Extension Sig limtied    Cervical SB Sig limited    Cervical rotation Limited but WFL B         ROM Left Right   Shoulder Flex 130 deg 149 deg   Shoulder Abd     Shoulder ER     Shoulder IR               Strength / Myotomes Left Right   Cervical Flexion (C1-2)     Cervical Side-bending (C3)     Shoulder Shrug (C4)     Shoulder Flex 4- 4 Shoulder Scap 4- 4-   Shoulder Abduction (C5)     Shoulder ER 3+ 3+   Shoulder IR 3+ 4+   Biceps (C6)     Triceps (C7)     Wrist Extension (C6)     Wrist Flexion (C7)           Thumb Abduction (C8)     Finger Abduction (T1)          PROM AROM    L R L R   Dorsiflexion    1 deg -2 deg   Plantarflexion        Inversion        Eversion            Strength (0-5) / Myotomes Left Right   Hip Flexion - supine     Hip Flexion - seated (L1-2) 4- 4-   Hip Abduction -seated 4- 4-   Hip Adduction     Hip ER     Hip IR     Quads (L2-4) 4+ 4+   Hamstrings 4 4   Ankle Dorsiflexion (L4-5) -in available AROM 4 4   Ankle Plantarflexion (S1-2)     Ankle Inversion     Ankle Eversion (S1-2)     Great Toe Extension (L5)          Flexibility     Hamstrings (90/90)     ITB (Mauricio)     Quads (Ely's)     Hip Flexor Trista Master)              Lower extremity myotomes:   []Normal     [x]Abnormal -reports HNP at L5/S1 area    Joint mobility: upper C-spine   []Normal    [x]Hypo   []Hyper    Orthopaedic Special Tests   Positive  Negative  NT Comments    Hautard's    x    Rhomberg   x    Sharps-Vasile   x    Cervical Torsion / Body Rotation    x    C2 Kick   x    Modified Shear   x    Compression   x    Distraction    x                                  [x] Patient history, allergies, meds reviewed. Medical chart reviewed. See intake form. Review Of Systems (ROS):  [x]Performed Review of systems (Integumentary, CardioPulmonary, Neurological) by intake and observation. Intake form has been scanned into medical record. Patient has been instructed to contact their primary care physician regarding ROS issues if not already being addressed at this time.       Co-morbidities/Complexities (which will affect course of rehabilitation):   []None        []Hx of COVID   Arthritic conditions   []Rheumatoid arthritis (M05.9)  []Osteoarthritis (M19.91)  []Gout   Cardiovascular conditions   [x]Hypertension (I10)  [x]Hyperlipidemia (E78.5)  []Angina pectoris (I20)  []Atherosclerosis (I70)  []Pacemaker  []Hx of CABG/stent/  cardiac surgeries   Musculoskeletal conditions   []Disc pathology   []Congenital spine pathologies   []Osteoporosis (M81.8)  []Osteopenia (M85.8)  []Scoliosis       Endocrine conditions   []Hypothyroid (E03.9)  []Hyperthyroid Gastrointestinal conditions   []Constipation (Z20.95)   Metabolic conditions   []Morbid obesity (E66.01)  [x]Diabetes type 1(E10.65) or 2 (E11.65)   [x]Neuropathy (G60.9)     Cardio/Pulmonary conditions   []Asthma (J45)  []Coughing   []COPD (J44.9)  []CHF  []A-fib   Psychological Disorders  []Anxiety (F41.9)  []Depression (F32.9)   []Other:   Developmental Disorders  []Autism (F84.0)  []CP (G80)  []Down Syndrome (Q90.9)  []Developmental delay     Neurological conditions  []Prior Stroke (I69.30)  []Parkinson's (G20)  []Encephalopathy (G93.40)  []MS (G35)  []Post-polio (G14)  []SCI  []TBI  []ALS Other conditions  []Fibromyalgia (M79.7)  []Vertigo  []Syncope  []Kidney Failure  []Cancer      []currently undergoing                treatment  []Pregnancy  []Incontinence   Prior surgeries  []involved limb  []previous spinal surgery  [] section birth  []hysterectomy  []bowel / bladder surgery  []other relevant surgeries   []Other:              Barriers to/and or personal factors that will affect rehab potential:              [x]Age  []Sex   []Smoker              []Motivation/Lack of Motivation                        [x]Co-Morbidities              []Cognitive Function, education/learning barriers              []Environmental, home barriers              []profession/work barriers  [x]past PT/medical experience  []other:    Falls Risk Assessment (30 days):    [] Falls Risk assessed and no intervention required.   [x] Falls Risk assessed and Patient requires intervention due to being higher risk   FGA :     [x] Falls education provided, including altered height of cane        ASSESSMENT: Patient is a 65 yo female who arrives to OP PT clinic with  to address remaining deficits s/p C2-C4 fusion August 2021 due to central cord compression. In addition to BUE radiculopathy, patient had balance and LE coordination issues prior to surgery. All have improved but are still present and pt is seeking PT treatment to improve balance and walking ability. Patient presents with decreased strength in extremities, impaired cervical and thoracic spine posture and impaired balance and gait. Patient can benefit from PT services to address these deficits.     Functional Impairments:     []Noted cervical/thoracic/GHJ joint hypomobility   []Noted cervical/thoracic/GHJ joint hypermobility   [x]Decreased cervical/UE functional ROM   [x]Noted Headache pain aggravated by neck movements with/without dizziness   []Abnormal reflexes/sensation/myotomal/dermatomal deficits   [x]Decreased DCF control or ability to hold head up   [x]Decreased RC/scapular/core strength and neuromuscular control    [x]Decreased UE & LE functional strength   [x]other: Impaired balance and gait    Functional Activity Limitations (from functional questionnaire and intake)   [x]Reduced ability to tolerate prolonged functional positions   [x]Reduced ability or difficulty with changes of positions or transfers between positions   [x]Reduced ability to maintain good posture and demonstrate good body mechanics with sitting, bending, and lifting   [] Reduced ability or tolerance with driving and/or computer work   [x]Reduced ability to perform lifting, reaching, carrying tasks   [x]Reduced ability to concentrate   [x]Reduced ability to sleep    []Reduced ability to tolerate any impact through UE or spine   [x]Reduced ability to ambulate prolonged functional periods/distances   []other:    Participation Restrictions   [x]Reduced participation in self care activities   [x]Reduced participation in home management activities   []Reduced participation in work activities   [x]Reduced participation in social activities. []Reduced participation in sport/recreational activities. Classification/Subgrouping:   [x]signs/symptoms consistent with neck pain with mobility deficits     [x]signs/symptoms consistent with neck pain with movement coordinated impairments, including postural stability   []signs/symptoms consistent with neck pain with radiating pain    [x]signs/symptoms consistent with neck pain with headaches (cervicogenic)    []Signs/symptoms consistent with nerve root involvement including myotome & dermatome dysfunction   []sign/symptoms consistent with facet dysfunction of cervical and thoracic spine    []signs/symptoms consistent suggesting central cord compression/UMN syndromes   []signs/symptoms consistent with discogenic cervical pain   []signs/symptoms consistent with rib dysfunction   [x]signs/symptoms consistent with postural dysfunction   []signs/symptoms consistent with shoulder pathology    [x]signs/symptoms consistent with post-surgical status including decreased ROM, strength and function.    []signs/symptoms consistent with pathology which may benefit from Dry Needling   []signs/symptoms which may limit the use of advanced manual therapy techniques: (Hypertension, recent trauma, intolerance to end range positions, prior TIA, visual issues, UE myotomes loss )     Prognosis/Rehab Potential:      []Excellent   [x]Good    []Fair   []Poor    Tolerance of evaluation/treatment:    []Excellent   [x]Good    []Fair   []Poor    Physical Therapy Evaluation Complexity Justification  [x] A history of present problem with:  [] no personal factors and/or comorbidities that impact the plan of care;  [x]1-2 personal factors and/or comorbidities that impact the plan of care  []3 personal factors and/or comorbidities that impact the plan of care  [x] An examination of body systems using standardized tests and measures addressing any of the following: body structures and functions (impairments), activity limitations, and/or participation restrictions;:  [] a total of 1-2 or more elements   [x] a total of 3 or more elements   [] a total of 4 or more elements   [x] A clinical presentation with:  [x] stable and/or uncomplicated characteristics   [] evolving clinical presentation with changing characteristics  [] unstable and unpredictable characteristics;   [x] Clinical decision making of [x] low, [] moderate, [] high complexity using standardized patient assessment instrument and/or measurable assessment of functional outcome. [x] EVAL (LOW) 91652 (typically 20 minutes face-to-face)  [] EVAL (MOD) 52094 (typically 30 minutes face-to-face)  [] EVAL (HIGH) 22090 (typically 45 minutes face-to-face)  [] RE-EVAL     PLAN:   Frequency/Duration:  2 days per week for 8 Weeks:  Interventions:  [x]  Therapeutic exercise including: strength training, ROM, for cervical spine,scapula, core and Upper extremity, including postural re-education. [x]  NMR activation and proprioception for Deep cervical flexors, periscapular and RC muscles and Core, including postural re-education. [x]  Manual therapy as indicated for C/T spine, ribs, Soft tissue to include: Dry Needling/IASTM, STM, PROM, Gr I-IV mobilizations, manipulation. [x] Modalities as needed that may include: thermal agents, E-stim, Biofeedback, US, iontophoresis as indicated  [x] Patient education on joint protection, postural re-education, activity modification, progression of HEP. HEP instruction: Written HEP instructions provided and reviewed. Access Code: [facility]  URL: [email or web address]  Date: 02/23/2022  Prepared by: June Ca    Exercises  Heel Toe Raises with Counter Support - 2 x daily - 7 x weekly - 10 reps  Walking - 6 x daily - 7 x weekly - 3-5 mins hold      GOALS:  Patient stated goal: get better, improve walking  [] Progressing: [] Met: [] Not Met: [] Adjusted    Therapist goals for Patient:   Short Term Goals:  To be achieved in: 2 weeks  1. Independent in HEP and progression per patient tolerance, in order to prevent re-injury. [] Progressing: [] Met: [] Not Met: [] Adjusted  2. Patient will have a decrease in pain to facilitate improvement in movement, function, and ADLs as indicated by Functional Deficits. [] Progressing: [] Met: [] Not Met: [] Adjusted    Long Term Goals: To be achieved in: 8weeks  1. Disability index score of 15% or less for the NDI to assist with reaching prior level of function. [] Progressing: [] Met: [] Not Met: [] Adjusted  2. Patient will demonstrate increased B shoulder strength to 4+/5 throughout in order to improve ability to reach [place]. [] Progressing: [] Met: [] Not Met: [] Adjusted  3. Patient will demonstrate an increase in cervical & thoracic postural awareness to allow for better environmental awareness during gait and dynamic tasks. [] Progressing: [] Met: [] Not Met: [] Adjusted  4. Patient will improve 30\" STS to 13 to demonstrate improved functional stamina in order to return to community activities safely. [] Progressing: [] Met: [] Not Met: [] Adjusted  5. Patient to improve FGA score to 20/30 to improve fall risk.   [] Progressing: [] Met: [] Not Met: [] Adjusted     Electronically signed by:  Caden Chavarria PT

## 2022-02-25 ENCOUNTER — HOSPITAL ENCOUNTER (OUTPATIENT)
Dept: PHYSICAL THERAPY | Age: 77
Setting detail: THERAPIES SERIES
Discharge: HOME | End: 2022-02-25
Payer: COMMERCIAL

## 2022-02-25 NOTE — FLOWSHEET NOTE
[address] [facility]  Outpatient Physical Therapy  Phone: [phone]   Fax: [phone]    Physical Therapy Daily Treatment Note  Date:  2022    Patient Name:  Elzbieta Marques    :  1945  MRN: [id number]  Medical/Treatment Diagnosis Information:  · Diagnosis: M54.2 (ICD-10-CM) - Cervicalgia; Z98.1 (ICD-10-CM) - Arthrodesis status; M48.02 (ICD-10-CM) - Spinal stenosis in cervical region  · Treatment Diagnosis: Impaired cervical spine stability, Impaired posture, Decreased BUE & BLE strength, Impaired balance and gait  Insurance/Certification information:  PT Insurance Information: [email or web address].  $40 copay  Physician Information:  Referring Practitioner: SHENA Kennedy CNP  Plan of care signed (Y/N): []  Yes [x]  No     Date of Patient follow up with Physician:      Progress Report: []  Yes  [x]  No     Date Range for reporting period:  Beginning  2022   Ending      Progress report due (10 Rx/or 30 days whichever is less): visit #10 or  (date)     Recertification due (POC duration/ or 90 days whichever is less): visit #16 or 22 (date)     Visit # Insurance Allowable Auth required?  Date Range    mn []  Yes  [x]  No pcy       Latex Allergy:  [x]NO      []YES  Preferred Language for Healthcare:   [x]English       []Other:      Functional Scale/Test Evaluation 2022 30 day  60 day  Discharge    30\" STS 10.5       5x STS 14\"       FGA 30            Pain level:  0/10  Location:  -    SUBJECTIVE:  See eval    OBJECTIVE: See eval      RESTRICTIONS/PRECAUTIONS: HTN    Interventions/Exercises:   Therapeutic Exercises (55235) Resistance / level Sets/sec Reps Notes   Seated stepper                            Neuromuscular Re-ed (13335)        Chikis Garland:  3-way hip  High marching  Side stepping       AirEx                                                 Therapeutic Activities (44381) /  Functional Tasks / Gait Training (50874)       Fwd step ups Manual Intervention (27108)                                                     Modalities:     Pt. Education:  2/23/2022 patient educated on diagnosis, prognosis and expectations for rehab, all patient questions were answered    HEP instruction:  Access Code: [facility]  URL: [email or web address]  Date: 02/23/2022  Prepared by: Ray Mancilla     Exercises  Heel Toe Raises with Counter Support - 2 x daily - 7 x weekly - 10 reps  Walking - 6 x daily - 7 x weekly - 3-5 mins hold      Therapeutic Exercise and NMR EXR  [x] (95947) Provided verbal/tactile cueing for activities related to strengthening, flexibility, endurance, ROM for improvements in  [x] LE / Core stability: LE, hip, and core control with self care, mobility, lifting, ambulation. [x] UE / Shoulder complex: cervical, postural, scapular, scapulothoracic and UE control with self care, reaching, carrying, lifting, house/yardwork, driving, computer work.  [] (51907) Provided verbal/tactile cueing for activities related to improving balance, coordination, kinesthetic sense, posture, motor skill, proprioception to assist with   [] LE / Core stability: LE, hip, and core control in self care, mobility, lifting, ambulation and eccentric single leg control. [] UE / Shoulder complex: cervical, scapular, scapulothoracic and UE control with self care, reaching, carrying, lifting, house/yardwork, driving, computer work.   [] (11561) Therapist is in constant attendance of 2 or more patients providing skilled therapy interventions, but not providing any significant amount of measurable one-on-one time to either patient, for improvements in  [] LE / Core stability: LE, hip, and core control in self care, mobility, lifting, ambulation and eccentric single leg control.    [] UE / Shoulder complex: cervical, scapular, scapulothoracic and UE control with self care, reaching, carrying, lifting, house/yardwork, driving, computer work. NMR and Therapeutic Activities:    [x] (45903 or 11066) Provided verbal/tactile cueing for activities related to improving balance, coordination, kinesthetic sense, posture, motor skill, proprioception and motor activation to allow for proper function of   [x] LE / Core, hip and LE with self care and ADLs  [x] UE / Shoulder complex: cervical, postural, scapular, scapulothoracic and UE control with self care, carrying, lifting, driving, computer work. [x] (38515) Gait Re-education- Provided training and instruction to the patient for proper LE, core and hip recruitment, positioning, and eccentric body weight control with ambulation re-education, including ascending & descending stairs     Home Management Training / Self Care:  [] (43787) Provided self-care/home management training related to activities of daily living and compensatory training, and/or use of adaptive equipment for improvement with: ADLs and compensatory training, meal preparation, safety procedures and instruction in use of adaptive equipment, including bathing, grooming, dressing, personal hygiene, basic household cleaning and chores.        Home Exercise Program:    [x] (21097) Reviewed/Progressed HEP activities related to strengthening, flexibility, endurance, ROM of   [] LE / Core stability: core, hip and LE for functional self-care, mobility, lifting and ambulation/stair navigation   [] UE / Shoulder complex: cervical, postural, scapular, scapulothoracic and UE control with self care, reaching, carrying, lifting, house/yardwork, driving, computer work  [] (86498)Reviewed/Progressed HEP activities related to improving balance, coordination, kinesthetic sense, posture, motor skill, proprioception of   [] LE: core, hip and LE for self care, mobility, lifting, and ambulation/stair navigation    [] UE / Shoulder complex: cervical, postural,  scapular, scapulothoracic and UE control with self care, reaching, carrying, lifting, house/yardwork, driving, computer work    Manual Treatments:  PROM / STM / Oscillations-Mobs:  G-I, II, III, IV (PA's, Inf., Post.)  [] (19455) Provided manual therapy to mobilize shoulder complex, hip, LE, and/or cervicothoracic/LS spine soft tissue/joints for the purpose of modulating pain, promoting relaxation,  increasing ROM, reducing/eliminating soft tissue swelling/inflammation/restriction, improving soft tissue extensibility and allowing for proper ROM for normal function with   [] LE / Core stability: self care, mobility, lifting and ambulation. [] UE / Shoulder complex: self care, reaching, carrying, lifting, house/yardwork, driving, computer work. Modalities:  [] (10865) Vasopneumatic compression: Utilized vasopneumatic compression to decrease edema / swelling for the purpose of improving mobility and quad tone / recruitment which will allow for increased overall function including but not limited to self-care, transfers, ambulation, and ascending / descending stairs. Charges:  Timed Code Treatment Minutes: Total Treatment Minutes:      [x] EVAL - LOW (45766)   [] EVAL - MOD (90136)  [] EVAL - HIGH (43226)  [] RE-EVAL (37452)  [] TE (68116) x      [] Ionto  [] NMR (79935) x      [] Vaso  [] Manual (16535) x      [] Ultrasound  [x] TA x 1      [] Mech Traction (87339)  [] Gait Training x     [] ES (un) (02248):   [] Aquatic therapy x   [] Other:   [] Group:     GOALS:   Patient stated goal: get better, improve walking  [] Progressing: [] Met: [] Not Met: [] Adjusted    Therapist goals for Patient:   Short Term Goals: To be achieved in: 2 weeks  1. Independent in HEP and progression per patient tolerance, in order to prevent re-injury. [] Progressing: [] Met: [] Not Met: [] Adjusted  2. Patient will have a decrease in pain to facilitate improvement in movement, function, and ADLs as indicated by Functional Deficits. [] Progressing: [] Met: [] Not Met: [] Adjusted    Long Term Goals:  To be achieved in: 8weeks  1. Disability index score of 15% or less for the NDI to assist with reaching prior level of function. [] Progressing: [] Met: [] Not Met: [] Adjusted  2. Patient will demonstrate increased B shoulder strength to 4+/5 throughout in order to improve ability to reach [place]. [] Progressing: [] Met: [] Not Met: [] Adjusted  3. Patient will demonstrate an increase in cervical & thoracic postural awareness to allow for better environmental awareness during gait and dynamic tasks. [] Progressing: [] Met: [] Not Met: [] Adjusted  4. Patient will improve 30\" STS to 13 to demonstrate improved functional stamina in order to return to community activities safely. [] Progressing: [] Met: [] Not Met: [] Adjusted  5. Patient to improve FGA score to 20/30 to improve fall risk. [] Progressing: [] Met: [] Not Met: [] Adjusted     Overall Progression Towards Functional goals/ Treatment Progress Update:  [] Patient is progressing as expected towards functional goals listed. [] Progression is slowed due to complexities/Impairments listed. [] Progression has been slowed due to co-morbidities.   [x] Plan just implemented, too soon to assess goals progression <30days   [] Goals require adjustment due to lack of progress  [] Patient is not progressing as expected and requires additional follow up with physician  [] Other    Persisting Functional Limitations/Impairments:  []Sleeping []Sitting               []Standing []Transfers        []Walking []Kneeling               []Stairs []Squatting / bending   []ADLs []Reaching  []Lifting  []Housework  []Driving []Job related tasks  []Sports/Recreation []Other:        ASSESSMENT:  See eval    Treatment/Activity Tolerance:  [] Patient able to complete tx [x] Patient limited by fatigue  [] Patient limited by pain  [] Patient limited by other medical complications  [] Other:     Prognosis: [x] Good [] Fair  [] Poor    Patient Requires Follow-up: [x] Yes  [] No    Plan for next treatment session:    PLAN: See eval. PT 2x / week for 8 weeks. [] Continue per plan of care [] Alter current plan (see comments)  [x] Plan of care initiated [] Hold pending MD visit [] Discharge    Electronically signed by: Wes Levine, PT PT, DPT    Note: If patient does not return for scheduled/ recommended follow up visits, his note will serve as a discharge from care along with most recent update on progress.

## 2022-02-25 NOTE — PROGRESS NOTES
[address] [facility]    Physical Therapy  Cancellation/No-show Note  Patient Name:  Lorie Jaime  :  1945   Date:  2022    Cancelled visits to date: 1  No-shows to date: 0    For today's appointment patient:  [x]  Cancelled -  []  Rescheduled appointment  []  No-show     Reason given by patient:  [x]  Patient ill  []  Conflicting appointment  []  No transportation    []  Conflict with work  []  No reason given  []  Other:     Comments:      Phone call information:   []  Phone call made today to patient at _ time at number provided:      []  Patient answered, conversation as follows:    []  Patient did not answer, message left as follows:  []  Phone call not made today  [x]  Phone call not needed - pt contacted us to cancel and provided reason for cancellation.      Electronically signed by:  Marcelo Gonzales, PT, DPT

## 2022-03-01 ENCOUNTER — HOSPITAL ENCOUNTER (OUTPATIENT)
Dept: PHYSICAL THERAPY | Age: 77
Setting detail: THERAPIES SERIES
Discharge: HOME | End: 2022-03-01
Payer: COMMERCIAL

## 2022-03-01 PROCEDURE — 97116 GAIT TRAINING THERAPY: CPT

## 2022-03-01 PROCEDURE — 97110 THERAPEUTIC EXERCISES: CPT

## 2022-03-01 PROCEDURE — 97112 NEUROMUSCULAR REEDUCATION: CPT

## 2022-03-01 NOTE — FLOWSHEET NOTE
[facility]  Outpatient Physical Therapy  Phone: [phone]   Fax: [phone]    Physical Therapy Daily Treatment Note  Date:  3/1/2022    Patient Name:  Jeane Simon    :  1945  MRN: [id number]  Medical/Treatment Diagnosis Information:  · Diagnosis: M54.2 (ICD-10-CM) - Cervicalgia; Z98.1 (ICD-10-CM) - Arthrodesis status; M48.02 (ICD-10-CM) - Spinal stenosis in cervical region  · Treatment Diagnosis: Impaired cervical spine stability, Impaired posture, Decreased BUE & BLE strength, Impaired balance and gait  Insurance/Certification information:  PT Insurance Information: [employer].  $40 copay  Physician Information:  Referring Practitioner: SHENA Lorenz CNP  Plan of care signed (Y/N): []  Yes [x]  No     Date of Patient follow up with Physician:      Progress Report: []  Yes  [x]  No     Date Range for reporting period:  Beginning  2022   Ending       Progress report due (10 Rx/or 30 days whichever is less): visit #10 or 30 (date)     Recertification due (POC duration/ or 90 days whichever is less): visit #16 or 22 (date)     Visit # Insurance Allowable Auth required? Date Range    mn []  Yes  [x]  No pcy       Latex Allergy:  [x]NO      []YES  Preferred Language for Healthcare:   [x]English       []Other:      Functional Scale/Test Evaluation 2022 30 day  60 day  Discharge    30\" STS 10.5       5x STS 14\"                   Pain level:  0/10  Location:  -    SUBJECTIVE:  Pt reports he does exercises at home with .      OBJECTIVE: See eval      RESTRICTIONS/PRECAUTIONS: HTN    Interventions/Exercises:   Therapeutic Exercises (09739) Resistance / level Sets/sec Reps Notes   Seated stepper  5 min     HR/TR  2 10    IB   2 x 30 sec            Neuromuscular Re-ed (07079)        Deborrah Hover:  3-way hip  High marching  Side stepping      2 laps  2 laps     AirEx  -horz head turns  -vert head turns  -marching    1 x 30 sec  1 x 30 sec  1       20    Balance  -Narrow MANISH  -semi tandem  -tandem  -SLS    1 x 30 sec  1 x 30 sec  2 x 30 sec  2 x 30 sec                                        Therapeutic Activities (26835) /  Functional Tasks / Gait Training (89216)       Fwd step ups 6\" 1 10 B    Gait  -arm swing + normal MANISH  -marching  -side stepping  -bwd  -horz head turn  -vert head trun    70 ft  70 ft  70 ft  35 ft  70 ft  70 ft                                                      Manual Intervention (65340)                                                     Modalities:     Pt. Education:  2/23/2022 patient educated on diagnosis, prognosis and expectations for rehab, all patient questions were answered    HEP instruction:  Access Code: [facility]  URL: [email or web address]  Date: 02/23/2022  Prepared by: Irl Press     Exercises  Heel Toe Raises with Counter Support - 2 x daily - 7 x weekly - 10 reps  Walking - 6 x daily - 7 x weekly - 3-5 mins hold      Therapeutic Exercise and NMR EXR  [x] (17831) Provided verbal/tactile cueing for activities related to strengthening, flexibility, endurance, ROM for improvements in  [x] LE / Core stability: LE, hip, and core control with self care, mobility, lifting, ambulation. [x] UE / Shoulder complex: cervical, postural, scapular, scapulothoracic and UE control with self care, reaching, carrying, lifting, house/yardwork, driving, computer work.  [] (01368) Provided verbal/tactile cueing for activities related to improving balance, coordination, kinesthetic sense, posture, motor skill, proprioception to assist with   [] LE / Core stability: LE, hip, and core control in self care, mobility, lifting, ambulation and eccentric single leg control.    [] UE / Shoulder complex: cervical, scapular, scapulothoracic and UE control with self care, reaching, carrying, lifting, house/yardwork, driving, computer work.   [] (77035) Therapist is in constant attendance of 2 or more patients providing skilled therapy interventions, but not providing any significant amount of measurable one-on-one time to either patient, for improvements in  [] LE / Core stability: LE, hip, and core control in self care, mobility, lifting, ambulation and eccentric single leg control. [] UE / Shoulder complex: cervical, scapular, scapulothoracic and UE control with self care, reaching, carrying, lifting, house/yardwork, driving, computer work. NMR and Therapeutic Activities:    [x] (56246 or 69137) Provided verbal/tactile cueing for activities related to improving balance, coordination, kinesthetic sense, posture, motor skill, proprioception and motor activation to allow for proper function of   [x] LE / Core, hip and LE with self care and ADLs  [x] UE / Shoulder complex: cervical, postural, scapular, scapulothoracic and UE control with self care, carrying, lifting, driving, computer work. [x] (91741) Gait Re-education- Provided training and instruction to the patient for proper LE, core and hip recruitment, positioning, and eccentric body weight control with ambulation re-education, including ascending & descending stairs     Home Management Training / Self Care:  [] (55550) Provided self-care/home management training related to activities of daily living and compensatory training, and/or use of adaptive equipment for improvement with: ADLs and compensatory training, meal preparation, safety procedures and instruction in use of adaptive equipment, including bathing, grooming, dressing, personal hygiene, basic household cleaning and chores.        Home Exercise Program:    [] (24112) Reviewed/Progressed HEP activities related to strengthening, flexibility, endurance, ROM of   [] LE / Core stability: core, hip and LE for functional self-care, mobility, lifting and ambulation/stair navigation   [] UE / Shoulder complex: cervical, postural, scapular, scapulothoracic and UE control with self care, reaching, carrying, lifting, house/yardwork, driving, computer work  [] (70008)Reviewed/Progressed HEP activities related to improving balance, coordination, kinesthetic sense, posture, motor skill, proprioception of   [] LE: core, hip and LE for self care, mobility, lifting, and ambulation/stair navigation    [] UE / Shoulder complex: cervical, postural,  scapular, scapulothoracic and UE control with self care, reaching, carrying, lifting, house/yardwork, driving, computer work    Manual Treatments:  PROM / STM / Oscillations-Mobs:  G-I, II, III, IV (PA's, Inf., Post.)  [] (08755) Provided manual therapy to mobilize shoulder complex, hip, LE, and/or cervicothoracic/LS spine soft tissue/joints for the purpose of modulating pain, promoting relaxation,  increasing ROM, reducing/eliminating soft tissue swelling/inflammation/restriction, improving soft tissue extensibility and allowing for proper ROM for normal function with   [] LE / Core stability: self care, mobility, lifting and ambulation. [] UE / Shoulder complex: self care, reaching, carrying, lifting, house/yardwork, driving, computer work. Modalities:  [] (67189) Vasopneumatic compression: Utilized vasopneumatic compression to decrease edema / swelling for the purpose of improving mobility and quad tone / recruitment which will allow for increased overall function including but not limited to self-care, transfers, ambulation, and ascending / descending stairs.          Charges:  Timed Code Treatment Minutes: 45   Total Treatment Minutes: 45     [] EVAL - LOW (10771)   [] EVAL - MOD (89841)  [] EVAL - HIGH (67774)  [] RE-EVAL (91211)  [x] TE (43249) x1      [] Ionto  [x] NMR (90045) x 1     [] Vaso  [] Manual (80367) x      [] Ultrasound  [] TA x 1      [] Mech Traction (49083)  [x] Gait Training x 1    [] ES (un) (89640):   [] Aquatic therapy x   [x] Other:   [] Group:     GOALS:   Patient stated goal: get better, improve walking  [] Progressing: [] Met: [] Not Met: [] Adjusted    Therapist goals for Patient:   Short Term Goals: To be achieved in: 2 weeks  1. Independent in HEP and progression per patient tolerance, in order to prevent re-injury. [] Progressing: [] Met: [] Not Met: [] Adjusted  2. Patient will have a decrease in pain to facilitate improvement in movement, function, and ADLs as indicated by Functional Deficits. [] Progressing: [] Met: [] Not Met: [] Adjusted    Long Term Goals: To be achieved in: 8weeks  1. Disability index score of 15% or less for the NDI to assist with reaching prior level of function. [] Progressing: [] Met: [] Not Met: [] Adjusted  2. Patient will demonstrate increased B shoulder strength to 4+/5 throughout in order to improve ability to reach [place]. [] Progressing: [] Met: [] Not Met: [] Adjusted  3. Patient will demonstrate an increase in cervical & thoracic postural awareness to allow for better environmental awareness during gait and dynamic tasks. [] Progressing: [] Met: [] Not Met: [] Adjusted  4. Patient will improve 30\" STS to 13 to demonstrate improved functional stamina in order to return to community activities safely. [] Progressing: [] Met: [] Not Met: [] Adjusted  5. Patient to improve FGA score to 20/30 to improve fall risk. [] Progressing: [] Met: [] Not Met: [] Adjusted     Overall Progression Towards Functional goals/ Treatment Progress Update:  [] Patient is progressing as expected towards functional goals listed. [] Progression is slowed due to complexities/Impairments listed. [] Progression has been slowed due to co-morbidities.   [x] Plan just implemented, too soon to assess goals progression <30days   [] Goals require adjustment due to lack of progress  [] Patient is not progressing as expected and requires additional follow up with physician  [] Other    Persisting Functional Limitations/Impairments:  []Sleeping []Sitting               []Standing []Transfers        []Walking []Kneeling               []Stairs []Squatting / bending   []ADLs []Reaching  []Lifting  []Housework  []Driving []Job related tasks  []Sports/Recreation []Other:        ASSESSMENT:  Focused on balance exercises and balance during ambulation this date. Required multiple VC for widening MANISH during standing exercises to improve balance. Had difficulty following multi-step commands with hurdles and cone step over activity with and without UE support. Pt tends to stand with feet in NBOS at rest. Pt required CGA for tilt board, and gait activities. Pt had most difficulty when on airex pad and tilt board. Tends to hold on with BUE support for balance rather than using hip or step strategy. Plan to continue to focus on improving dynamic balance during standing tasks and with ambulation. Treatment/Activity Tolerance:  [] Patient able to complete tx  [x] Patient limited by fatigue  [] Patient limited by pain  [] Patient limited by other medical complications  [] Other:     Prognosis: [x] Good [] Fair  [] Poor    Patient Requires Follow-up: [x] Yes  [] No    Plan for next treatment session:    PLAN: See christiano. PT 2x / week for 8 weeks. [] Continue per plan of care [] Alter current plan (see comments)  [x] Plan of care initiated [] Hold pending MD visit [] Discharge    Electronically signed by: Radha Torres PT, DPT    Note: If patient does not return for scheduled/ recommended follow up visits, his note will serve as a discharge from care along with most recent update on progress.

## 2022-03-03 DIAGNOSIS — G89.29 CHRONIC LOW BACK PAIN WITHOUT SCIATICA, UNSPECIFIED BACK PAIN LATERALITY: Primary | ICD-10-CM

## 2022-03-03 DIAGNOSIS — M54.50 CHRONIC LOW BACK PAIN WITHOUT SCIATICA, UNSPECIFIED BACK PAIN LATERALITY: Primary | ICD-10-CM

## 2022-03-03 NOTE — TELEPHONE ENCOUNTER
Medication:   Requested Prescriptions     Pending Prescriptions Disp Refills    acetaminophen-codeine (TYLENOL #3) 300-30 MG per tablet [Pharmacy Med Name: ACETAMINOPHEN-CODEINE 300-30 MG TAB] 90 tablet      Sig: TAKE ONE TABLET BY MOUTH EVERY 8 HOURS AS NEEDED FOR PAIN ** REDUCE DOSES TAKEN AS PAIN BECOMES MANAGEABLE**      Last Filled:  1/31/2022    Patient Phone Number: [phone] (home)     Last appt: 2/16/2022   Next appt: 5/17/2022    Last OARRS:   RX Monitoring 8/19/2021   Attestation -   Periodic Controlled Substance Monitoring Possible medication side effects, risk of tolerance/dependence & alternative treatments discussed. ;No signs of potential drug abuse or diversion identified.    Chronic Pain > 50 MEDD -     PDMP Monitoring:    Last PDMP Ari Geller as Reviewed [facility]):  Review User Review Instant Review Result   Jasonjosue Gem 2/16/2022  1:39 PM Reviewed PDMP [1]     Preferred Pharmacy:   [facility] [address], [address] [place] [phone] Perla Isabel [phone] [address] [facility] [place]  [place] [address]  Phone: [phone] Fax: [phone]

## 2022-03-04 RX ORDER — ACETAMINOPHEN AND CODEINE PHOSPHATE 300; 30 MG/1; MG/1
TABLET ORAL
Qty: 90 TABLET | Refills: 0 | Status: SHIPPED | OUTPATIENT
Start: 2022-03-04 | End: 2022-04-03

## 2022-03-07 ENCOUNTER — TELEPHONE (OUTPATIENT)
Dept: FAMILY MEDICINE CLINIC | Age: 77
End: 2022-03-07

## 2022-03-07 NOTE — TELEPHONE ENCOUNTER
----- Message from Arvell Mask sent at 3/7/2022  2:46 PM EST -----  Subject: Appointment Request    Reason for Call: Urgent (Patient Request) No Script    QUESTIONS  Type of Appointment? Established Patient  Reason for appointment request? No appointments available during search  Additional Information for Provider? Patient request call back. would like   to discuss medication   ---------------------------------------------------------------------------  --------------  CALL BACK INFO  What is the best way for the office to contact you? OK to leave message on   voicemail  Preferred Call Back Phone Number? [phone]  ---------------------------------------------------------------------------  --------------  SCRIPT ANSWERS  Relationship to Patient? Self  (Is the patient requesting to see the provider for a procedure?)? No  (Is the patient requesting to see the provider urgently  today or   tomorrow. )? Yes  Have you been diagnosed with, awaiting test results for, or told that you   are suspected of having COVID-19 (Coronavirus)? (If patient has tested   negative or was tested as a requirement for work, school, or travel and   not based on symptoms, answer no)? No  Within the past 10 days have you developed any of the following symptoms   (answer no if symptoms have been present longer than 10 days or began   more than 10 days ago)? Fever or Chills, Cough, Shortness of breath or   difficulty breathing, Loss of taste or smell, Sore throat, Nasal   congestion, Sneezing or runny nose, Fatigue or generalized body aches   (answer no if pain is specific to a body part e.g. back pain), Diarrhea,   Headache? No  Have you had close contact with someone with COVID-19 in the last 7 days? No  (Service Expert  click yes below to proceed with [employer] As Usual   Scheduling)?  Yes

## 2022-03-09 ENCOUNTER — HOSPITAL ENCOUNTER (OUTPATIENT)
Dept: PHYSICAL THERAPY | Age: 77
Setting detail: THERAPIES SERIES
Discharge: HOME | End: 2022-03-09
Payer: COMMERCIAL

## 2022-03-09 ENCOUNTER — TELEPHONE (OUTPATIENT)
Dept: FAMILY MEDICINE CLINIC | Age: 77
End: 2022-03-09

## 2022-03-09 NOTE — PROGRESS NOTES
[address] [facility]    Physical Therapy  Cancellation/No-show Note  Patient Name:  Ginny Alexander  :  1945   Date:  3/9/2022    Cancelled visits to date: 1  No-shows to date: 1    For today's appointment patient:  [x]  Cancelled -  []  Rescheduled appointment  [x]  No-show -3/9     Reason given by patient:  []  Patient ill  []  Conflicting appointment  []  No transportation    []  Conflict with work  [x]  No reason given  []  Other:     Comments:      Phone call information:   []  Phone call made today to patient at 5:15 pm time at number provided:      []  Patient answered, conversation as follows:    [x]  Patient did not answer, message left as follows: Pt missed apt today at 11:15 am this morning, calling to make sure Pt is ok. Reminded Pt of next apt on Friday at 3:15 pm. Call to cancel or reschedule if conflict comes up and cannot make it to therapy. []  Phone call not made today  [x]  Phone call not needed - pt contacted us to cancel and provided reason for cancellation.      Electronically signed by:  Lino Arrington, PT, DPT

## 2022-03-09 NOTE — TELEPHONE ENCOUNTER
Patient calling for a new prescription for Gabapentin. States Dr. Rosalva Phan had prescribed it in the past but I did not see in the chart where/when it was prescribed. Also states it was for 100 mg but she is wanting to know if it can get increased due to her neuropathy getting worse.  Call back number [phone]

## 2022-03-09 NOTE — TELEPHONE ENCOUNTER
Spoke with pt and advise that Dr. Monte Rinne prescribe this for her last. Pt stated that he advise to have [facility] send rx again. Please advise on increase dose.  Pt is aware PCP out of the office till Monday

## 2022-03-11 ENCOUNTER — HOSPITAL ENCOUNTER (OUTPATIENT)
Dept: PHYSICAL THERAPY | Age: 77
Setting detail: THERAPIES SERIES
Discharge: HOME | End: 2022-03-11
Payer: COMMERCIAL

## 2022-03-11 NOTE — PROGRESS NOTES
[address] [facility]    Physical Therapy  Cancellation/No-show Note  Patient Name:  Elzbieta Marques  :  1945   Date:  3/11/2022    Cancelled visits to date: 2  No-shows to date: 1    For today's appointment patient:  [x]  Cancelled -, 3/11  []  Rescheduled appointment  []  No-show -3/9     Reason given by patient:  [x]  Patient ill  []  Conflicting appointment  []  No transportation    []  Conflict with work  []  No reason given  []  Other:     Comments:      Phone call information:   []  Phone call made today to patient at 5:15 pm time at number provided:      []  Patient answered, conversation as follows:    []  Patient did not answer, message left as follows: Pt missed apt today at 11:15 am this morning, calling to make sure Pt is ok. Reminded Pt of next apt on Friday at 3:15 pm. Call to cancel or reschedule if conflict comes up and cannot make it to therapy. []  Phone call not made today  [x]  Phone call not needed - pt contacted us to cancel and provided reason for cancellation.      Electronically signed by:  Irl Press, PT, DPT

## 2022-03-14 ENCOUNTER — TELEPHONE (OUTPATIENT)
Dept: FAMILY MEDICINE CLINIC | Age: 77
End: 2022-03-14

## 2022-03-14 NOTE — TELEPHONE ENCOUNTER
Patient is already taking Cymbalta medication for her neuropathy and she was taken off the gabapentin medication as it was not effective. Would recommend she just continue with the Cymbalta medication.

## 2022-03-14 NOTE — TELEPHONE ENCOUNTER
Patient was asking if they Dr. Shiva Sanchez will call in her Neurontin? The surgeon isn't prescribing it for her anymore.         Diony

## 2022-03-15 ENCOUNTER — TELEPHONE (OUTPATIENT)
Dept: PHYSICAL THERAPY | Age: 77
End: 2022-03-15

## 2022-03-15 ENCOUNTER — HOSPITAL ENCOUNTER (OUTPATIENT)
Dept: PHYSICAL THERAPY | Age: 77
Setting detail: THERAPIES SERIES
Discharge: HOME | End: 2022-03-15
Payer: COMMERCIAL

## 2022-03-15 NOTE — PROGRESS NOTES
[address] [facility]    Physical Therapy  Cancellation/No-show Note  Patient Name:  Yarelis New  :  1945   Date:  3/15/2022    Cancelled visits to date: 2  No-shows to date: 2    For today's appointment patient:  []  Cancelled -, 3/11  []  Rescheduled appointment  [x]  No-show -3/9, 3/15     Reason given by patient:  []  Patient ill  []  Conflicting appointment  []  No transportation    []  Conflict with work  []  No reason given  []  Other:     Comments:      Phone call information:   []  Phone call made today to patient at 5:15 pm time at number provided:      []  Patient answered, conversation as follows:    []  Patient did not answer, message left as follows: Pt missed apt today at 11:15 am this morning, calling to make sure Pt is ok. Reminded Pt of next apt on Friday at 3:15 pm. Call to cancel or reschedule if conflict comes up and cannot make it to therapy. [x]  Phone call not made today  []  Phone call not needed - pt contacted us to cancel and provided reason for cancellation.      Electronically signed by:  Braden Manning, PT, DPT

## 2022-03-15 NOTE — TELEPHONE ENCOUNTER
Spoke w/patient regarding missed visit this date pt stated she thought her visit was tomorrow. Further noted that patient has now had 3 missed visits in a row, 4 total with only 1 completed follow-up visit since beginning PT services. Per our no-show and cancellation policy, patient is now discharged from PT services. Once she feels she will be able to participate more regularly in PT, to request new order from MD and return for new evaluation.       Ray Mancilla, [id number]

## 2022-03-15 NOTE — PROGRESS NOTES
Physical Therapy    Spoke w/patient regarding missed visit this date pt stated she thought her visit was tomorrow. Further noted that patient has now had 3 missed visits in a row, 4 total with only 1 completed follow-up visit since beginning PT services. Per our no-show and cancellation policy, patient is now discharged from PT services. Once she feels she will be able to participate more regularly in PT, to request new order from MD and return for new evaluation.       Libertad Shafer, [id number]

## 2022-03-18 ENCOUNTER — APPOINTMENT (OUTPATIENT)
Dept: PHYSICAL THERAPY | Age: 77
End: 2022-03-18
Payer: COMMERCIAL

## 2022-03-21 ENCOUNTER — TELEPHONE (OUTPATIENT)
Dept: FAMILY MEDICINE CLINIC | Age: 77
End: 2022-03-21

## 2022-03-21 NOTE — TELEPHONE ENCOUNTER
Patient states the Cymbalta is not working for him. She states she feels awful and her legs, feet, and arms feel heavy. She would like to try the Gabapentin again it worked well for her.

## 2022-03-22 ENCOUNTER — APPOINTMENT (OUTPATIENT)
Dept: PHYSICAL THERAPY | Age: 77
End: 2022-03-22
Payer: COMMERCIAL

## 2022-03-23 RX ORDER — GABAPENTIN 300 MG/1
600 CAPSULE ORAL NIGHTLY
Qty: 30 CAPSULE | Refills: 2 | Status: SHIPPED | OUTPATIENT
Start: 2022-03-23 | End: 2022-04-19

## 2022-03-24 ENCOUNTER — APPOINTMENT (OUTPATIENT)
Dept: PHYSICAL THERAPY | Age: 77
End: 2022-03-24
Payer: COMMERCIAL

## 2022-04-01 ENCOUNTER — TELEPHONE (OUTPATIENT)
Dept: FAMILY MEDICINE CLINIC | Age: 77
End: 2022-04-01

## 2022-04-01 NOTE — TELEPHONE ENCOUNTER
Patient is calling to see if  would \"ok\" her taking her Gabapentin 3 times a day per her surgeons advice.      Please advise     Provider out of the office

## 2022-04-04 ENCOUNTER — TELEPHONE (OUTPATIENT)
Dept: FAMILY MEDICINE CLINIC | Age: 77
End: 2022-04-04

## 2022-04-04 DIAGNOSIS — G89.29 CHRONIC LOW BACK PAIN WITHOUT SCIATICA, UNSPECIFIED BACK PAIN LATERALITY: Primary | ICD-10-CM

## 2022-04-04 DIAGNOSIS — M54.50 CHRONIC LOW BACK PAIN WITHOUT SCIATICA, UNSPECIFIED BACK PAIN LATERALITY: Primary | ICD-10-CM

## 2022-04-04 RX ORDER — ACETAMINOPHEN AND CODEINE PHOSPHATE 300; 30 MG/1; MG/1
TABLET ORAL
Qty: 90 TABLET | Refills: 0 | Status: SHIPPED | OUTPATIENT
Start: 2022-04-04 | End: 2022-05-03

## 2022-04-04 NOTE — TELEPHONE ENCOUNTER
If the specialist wants her on that medication 3 times a day they certainly can prescribe that for her.   Otherwise she has an appointment in May for RTC and we can discuss at that time

## 2022-04-04 NOTE — TELEPHONE ENCOUNTER
Medication:   Requested Prescriptions     Pending Prescriptions Disp Refills    acetaminophen-codeine (TYLENOL #3) 300-30 MG per tablet [Pharmacy Med Name: ACETAMINOPHEN-CODEINE 300-30 MG TAB] 90 tablet      Sig: TAKE ONE TABLET BY MOUTH EVERY 8 HOURS AS NEEDED FOR PAIN ** REDUCE DOSES AS PAIN BECOMES MANAGEABLE**      Last Filled:     Patient Phone Number: [phone] (home)     Last appt: 2/16/2022   Next appt: 5/17/2022    Last OARRS:   RX Monitoring 8/19/2021   Attestation -   Periodic Controlled Substance Monitoring Possible medication side effects, risk of tolerance/dependence & alternative treatments discussed. ;No signs of potential drug abuse or diversion identified.    Chronic Pain > 50 MEDD -     PDMP Monitoring:    Last PDMP Ravi Klein as Reviewed [facility]):  Review User Review Instant Review Result   [place] Small 2/16/2022  1:39 PM Reviewed PDMP [1]     Preferred Pharmacy:   [facility] [id number] Sammie Pickering [address] [place] [address] [phone]  [facility] [address]  [address] [place] [address]  Phone: [phone] Fax: [phone]

## 2022-04-04 NOTE — TELEPHONE ENCOUNTER
Patient called to ask if she could start taking her med 3x a day. It's helping.    gabapentin (NEURONTIN) 300 MG capsule 30 capsule 2 3/23/2022 6/21/2022    Sig - Route:  Take 2 capsules by mouth nightly for 90 days. - Oral      [facility] [employer]

## 2022-04-19 ENCOUNTER — OFFICE VISIT (OUTPATIENT)
Dept: FAMILY MEDICINE CLINIC | Age: 77
End: 2022-04-19
Payer: COMMERCIAL

## 2022-04-19 VITALS
TEMPERATURE: 97.1 F | SYSTOLIC BLOOD PRESSURE: 110 MMHG | WEIGHT: 141.38 LBS | DIASTOLIC BLOOD PRESSURE: 68 MMHG | OXYGEN SATURATION: 93 % | BODY MASS INDEX: 24.27 KG/M2 | RESPIRATION RATE: 12 BRPM | HEART RATE: 66 BPM

## 2022-04-19 DIAGNOSIS — M48.02 CERVICAL SPINAL STENOSIS: ICD-10-CM

## 2022-04-19 DIAGNOSIS — G30.1 LATE ONSET ALZHEIMER'S DISEASE WITHOUT BEHAVIORAL DISTURBANCE (HCC): ICD-10-CM

## 2022-04-19 DIAGNOSIS — F32.1 CURRENT MODERATE EPISODE OF MAJOR DEPRESSIVE DISORDER WITHOUT PRIOR EPISODE (HCC): ICD-10-CM

## 2022-04-19 DIAGNOSIS — F02.80 LATE ONSET ALZHEIMER'S DISEASE WITHOUT BEHAVIORAL DISTURBANCE (HCC): ICD-10-CM

## 2022-04-19 DIAGNOSIS — G95.9 CERVICAL MYELOPATHY (HCC): Primary | ICD-10-CM

## 2022-04-19 PROCEDURE — 99214 OFFICE O/P EST MOD 30 MIN: CPT | Performed by: FAMILY MEDICINE

## 2022-04-19 RX ORDER — GABAPENTIN 300 MG/1
300 CAPSULE ORAL 3 TIMES DAILY
Qty: 270 CAPSULE | Refills: 0 | Status: SHIPPED | OUTPATIENT
Start: 2022-04-19 | End: 2022-07-22 | Stop reason: SDUPTHER

## 2022-04-19 NOTE — PROGRESS NOTES
Subjective:      Patient ID: Carroll Ayala is a 64 y.o. female. CC: Patient presents for re-evaluation of chronic health problems including neuropathy, spinal stenosis with myopathy, depression and dementia. .    HPI pt is here to discuss going back to Gabapentin for her neuropathy. Patient called earlier wanting to go back on gabapentin medication. She was on gabapentin medication up to 2018 and this was discontinued. She is been on Cymbalta at 60 mg. She did go back and retry the gabapentin medication and increase up to twice a day and she feels this is helped her out and she would like to try to increase up to 3 times a day. She has discomfort in her left arm which is secondary to her underlying cervical myopathy problems. She did do physical therapy on one occasion and since that time is been doing physical therapy at home. Her  make sure she takes her medications correctly does not overtake medications. She would like to stop pain pills entirely.     Review of Systems  Patient Active Problem List   Diagnosis    Essential hypertension    Generalized osteoarthrosis, involving multiple sites    Type 2 diabetes mellitus without complication ([address]    Degenerative lumbar spinal stenosis    Congenital clotting factor deficiency (HCC)    Hypercholesterolemia    GERD (gastroesophageal reflux disease)    Late onset Alzheimer's disease without behavioral disturbance (HCC)    Drug dependence (HCC)    Chronic low back pain without sciatica    Spondylolisthesis, lumbar region    Chronic pain syndrome    Hyperparathyroidism ([address]    Pain disorder with psychological factors    Current moderate episode of major depressive disorder without prior episode ([address]    TIA (transient ischemic attack)    Cubital tunnel syndrome on left    Left carpal tunnel syndrome    Right carpal tunnel syndrome    Cervical spinal stenosis    Type 2 diabetes mellitus with diabetic neuropathy    Cervical myelopathy [facility])       Outpatient Medications Marked as Taking for the 4/19/22 encounter (Office Visit) with Yoan Montalvo MD   Medication Sig Dispense Refill    gabapentin (NEURONTIN) 300 MG capsule Take 1 capsule by mouth 2 times daily for 90 days. 270 capsule 0    acetaminophen-codeine (TYLENOL #3) 300-30 MG per tablet TAKE ONE TABLET BY MOUTH EVERY 8 HOURS AS NEEDED FOR PAIN ** REDUCE DOSES AS PAIN BECOMES MANAGEABLE** 90 tablet 0    amLODIPine (NORVASC) 10 MG tablet TAKE ONE TABLET BY MOUTH DAILY 90 tablet 0    donepezil (ARICEPT) 10 MG tablet TAKE TWO TABLETS BY MOUTH EVERY NIGHT AT BEDTIME 180 tablet 1    atorvastatin (LIPITOR) 40 MG tablet TAKE ONE TABLET BY MOUTH DAILY 90 tablet 1    mirtazapine (REMERON) 15 MG tablet TAKE ONE TABLET BY MOUTH ONCE NIGHTLY 90 tablet 1    DULoxetine (CYMBALTA) 60 MG extended release capsule TAKE ONE CAPSULE BY MOUTH DAILY 90 capsule 1    zoster recombinant adjuvanted vaccine (SHINGRIX) 50 MCG/0.5ML SUSR injection Inject 0.5 mLs into the muscle See Admin Instructions 1 dose now and repeat in 2-6 months 0.5 mL 0    omeprazole (PRILOSEC) 40 MG delayed release capsule TAKE ONE CAPSULE BY MOUTH DAILY 90 capsule 0    busPIRone (BUSPAR) 15 MG tablet TAKE ONE TABLET BY MOUTH THREE TIMES A DAY AS NEEDED FOR ANXIETY 90 tablet 5    aspirin 81 MG chewable tablet Take 1 tablet by mouth daily 1 tablet 3    Melatonin 5 MG CAPS 1-2 qhs for sleep 60 capsule 3    Multiple Vitamins-Minerals (CENTRUM SILVER PO) Take 1 tablet by mouth daily          Allergies   Allergen Reactions    Hydrocodone      The patient has a history of a bleeding Ulcer, and this upset her stomach severely.        Social History     Tobacco Use    Smoking status: Never Smoker    Smokeless tobacco: Never Used   Substance Use Topics    Alcohol use: No     Alcohol/week: 0.0 standard drinks       /68 (Site: Left Upper Arm, Position: Sitting, Cuff Size: Medium Adult)   Pulse 66   Temp 97.1 °F (36.2 °C) (Infrared)   Resp 12   Wt 141 lb 6 oz (64.1 kg)   SpO2 93%   BMI 24.27 kg/m²       Objective:   Physical Exam  Vitals and nursing note reviewed. Constitutional:       General: She is not in acute distress. Appearance: Normal appearance. She is well-developed and well-groomed. Neck:      Vascular: No carotid bruit. Cardiovascular:      Rate and Rhythm: Normal rate and regular rhythm. Pulses:           Dorsalis pedis pulses are 2+ on the right side and 2+ on the left side. Posterior tibial pulses are 2+ on the right side and 2+ on the left side. Heart sounds: Normal heart sounds. No murmur heard. No friction rub. No gallop. Pulmonary:      Effort: Pulmonary effort is normal.      Breath sounds: Normal breath sounds. Musculoskeletal:      Lumbar back: Tenderness (Sacroiliac joint bilaterally and sacrum) present. No swelling, edema, deformity or spasms. Decreased range of motion. Right upper leg: Normal. No swelling, deformity or tenderness. Left upper leg: Normal. No swelling, deformity or tenderness. Right lower leg: No edema. Left lower leg: No edema. Skin:     General: Skin is warm and dry. Neurological:      Mental Status: She is alert and oriented to person, place, and time. Sensory: Sensation is intact. Motor: Weakness present. Deep Tendon Reflexes:      Reflex Scores:       Patellar reflexes are 2+ on the right side and 2+ on the left side. Achilles reflexes are 2+ on the right side and 2+ on the left side. Comments: Strength testing is 5 out of 5 in both upper and lower extremities except for the left upper arm is 4 out of 5    Patient does use a cane for ambulation   Psychiatric:         Attention and Perception: Attention and perception normal.         Mood and Affect: Mood and affect normal.         Speech: Speech normal.         Behavior: Behavior normal. Behavior is cooperative.          Cognition and Memory: Cognition normal. Memory is impaired. Judgment: Judgment normal.         Assessment:      Meme Estrada was seen today for discuss medications. Diagnoses and all orders for this visit:    Cervical myelopathy ([facility]    Cervical spinal stenosis    Current moderate episode of major depressive disorder without prior episode ([facility]    Late onset Alzheimer's disease without behavioral disturbance ([facility]    Other orders  -     gabapentin (NEURONTIN) 300 MG capsule; Take 1 capsule by mouth 3 times daily for 90 days. OARRS report checked        Plan:      Encourage patient to proceed with laboratory test that was ordered last visit and decide on further diabetic management. We will go ahead and increase gabapentin medication to 3 times a day and hopefully wean off the medication entirely maintain Cymbalta. No change about her therapy. Continue having  help with medication compliance. RTC 3 months    Medical decision making of moderate complexity. Please note that this chart was generated using Dragon dictation software. Although every effort was made to ensure the accuracy of this automated transcription, some errors in transcription may have occurred.

## 2022-05-03 DIAGNOSIS — G89.29 CHRONIC LOW BACK PAIN WITHOUT SCIATICA, UNSPECIFIED BACK PAIN LATERALITY: ICD-10-CM

## 2022-05-03 DIAGNOSIS — M54.50 CHRONIC LOW BACK PAIN WITHOUT SCIATICA, UNSPECIFIED BACK PAIN LATERALITY: ICD-10-CM

## 2022-05-03 RX ORDER — ACETAMINOPHEN AND CODEINE PHOSPHATE 300; 30 MG/1; MG/1
TABLET ORAL
Qty: 90 TABLET | Refills: 0 | Status: SHIPPED | OUTPATIENT
Start: 2022-05-03 | End: 2022-06-02

## 2022-05-03 NOTE — TELEPHONE ENCOUNTER
Medication:   Requested Prescriptions     Pending Prescriptions Disp Refills    acetaminophen-codeine (TYLENOL #3) 300-30 MG per tablet [Pharmacy Med Name: ACETAMINOPHEN-CODEINE 300-30 MG TAB] 90 tablet      Sig: TAKE ONE TABLET BY MOUTH EVERY 8 HOURS AS NEEDED FOR PAIN  *REDUCE DOSES TAKEN AS PAIN BECOMES MANAGEABLE*      Last Filled:     Patient Phone Number: [phone] (home)     Last appt: 4/19/2022   Next appt: 7/19/2022    Last OARRS:   RX Monitoring 8/19/2021   Attestation -   Periodic Controlled Substance Monitoring Possible medication side effects, risk of tolerance/dependence & alternative treatments discussed. ;No signs of potential drug abuse or diversion identified.    Chronic Pain > 50 MEDD -     PDMP Monitoring:    Last PDMP Lisa Mar as Reviewed [facility]):  Review User Review Instant Review Result   Alanis Taylor 4/19/2022 11:15 AM Reviewed PDMP [1]     Preferred Pharmacy:   Akanksha Villaseñor [id number] Alverto Antoine [address] [place] [address] [phone]  [address] [place] [address] Road  Jose M Junior  Phone: [phone] Fax: [phone]

## 2022-05-05 NOTE — TELEPHONE ENCOUNTER
Medication:   Requested Prescriptions     Pending Prescriptions Disp Refills    omeprazole (PRILOSEC) 40 MG delayed release capsule [Pharmacy Med Name: OMEPRAZOLE DR 40 MG CAPSULE] 90 capsule 0     Sig: TAKE ONE CAPSULE BY MOUTH DAILY     Last Filled:  2/7/22    Last appt: 4/19/2022   Next appt: 7/19/2022    Last Lipid:   Lab Results   Component Value Date    CHOL 176 03/16/2021    TRIG 63 03/16/2021    HDL 98 03/16/2021    HDL 61 05/26/2012    LDLCALC 65 03/16/2021

## 2022-05-06 RX ORDER — OMEPRAZOLE 40 MG/1
CAPSULE, DELAYED RELEASE ORAL
Qty: 90 CAPSULE | Refills: 0 | Status: SHIPPED | OUTPATIENT
Start: 2022-05-06 | End: 2022-08-03

## 2022-05-13 RX ORDER — AMLODIPINE BESYLATE 10 MG/1
TABLET ORAL
Qty: 90 TABLET | Refills: 0 | Status: SHIPPED | OUTPATIENT
Start: 2022-05-13 | End: 2022-07-01 | Stop reason: SDUPTHER

## 2022-05-13 NOTE — TELEPHONE ENCOUNTER
Medication:   Requested Prescriptions     Pending Prescriptions Disp Refills    amLODIPine (NORVASC) 10 MG tablet [Pharmacy Med Name: amLODIPine BESYLATE 10 MG TAB] 90 tablet 0     Sig: TAKE ONE TABLET BY MOUTH DAILY      Last Filled:     Patient Phone Number: [phone] (home)     Last appt: 4/19/2022   Next appt: 7/19/2022    Last OARRS:   RX Monitoring 8/19/2021   Attestation -   Periodic Controlled Substance Monitoring Possible medication side effects, risk of tolerance/dependence & alternative treatments discussed. ;No signs of potential drug abuse or diversion identified.    Chronic Pain > 50 MEDD -     PDMP Monitoring:    Last PDMP Maura Shelton as Reviewed [facility]):  Review User Review Instant Review Result   Thor Thompson 4/19/2022 11:15 AM Reviewed PDMP [1]     Preferred Pharmacy:   [facility] [id number] [facility] Rubi [facility] [phone]  [address]  [address] [place] [address]  Phone: [phone] Fax: [phone]

## 2022-05-31 RX ORDER — BUSPIRONE HYDROCHLORIDE 15 MG/1
TABLET ORAL
Qty: 90 TABLET | Refills: 1 | Status: SHIPPED | OUTPATIENT
Start: 2022-05-31 | End: 2022-07-31 | Stop reason: SDUPTHER

## 2022-06-01 DIAGNOSIS — G89.29 CHRONIC LOW BACK PAIN WITHOUT SCIATICA, UNSPECIFIED BACK PAIN LATERALITY: ICD-10-CM

## 2022-06-01 DIAGNOSIS — M54.50 CHRONIC LOW BACK PAIN WITHOUT SCIATICA, UNSPECIFIED BACK PAIN LATERALITY: ICD-10-CM

## 2022-06-02 RX ORDER — ACETAMINOPHEN AND CODEINE PHOSPHATE 300; 30 MG/1; MG/1
TABLET ORAL
Qty: 90 TABLET | Refills: 0 | Status: SHIPPED | OUTPATIENT
Start: 2022-06-02 | End: 2022-07-01 | Stop reason: SDUPTHER

## 2022-06-02 NOTE — TELEPHONE ENCOUNTER
Medication:   Requested Prescriptions     Pending Prescriptions Disp Refills    acetaminophen-codeine (TYLENOL #3) 300-30 MG per tablet [Pharmacy Med Name: ACETAMINOPHEN-CODEINE 300-30 MG TAB] 90 tablet      Sig: TAKE ONE TABLET BY MOUTH EVERY 8 HOURS AS NEEDED FOR PAIN REDUCE DOSES TAKEN AS PAIN BECOMES MANAGEABLE      Last Filled:  5/3/22  Provider out of office    Patient Phone Number: [phone] (home)     Last appt: 4/19/2022   Next appt: 7/19/2022    Last OARRS:   RX Monitoring 8/19/2021   Attestation -   Periodic Controlled Substance Monitoring Possible medication side effects, risk of tolerance/dependence & alternative treatments discussed. ;No signs of potential drug abuse or diversion identified.    Chronic Pain > 50 MEDD -     PDMP Monitoring:    Last PDMP Marlys Seo as Reviewed [facility]):  Review User Review Instant Review Result   [place] Hasty 4/19/2022 11:15 AM Reviewed PDMP [1]     Preferred Pharmacy:   [facility] [id number] Dominga Veloz [address] [place] [address] [phone]  [address] [place] [address] Road  [address] [place] [address]  Phone: [phone] Fax: [phone]

## 2022-07-01 DIAGNOSIS — M54.50 CHRONIC LOW BACK PAIN WITHOUT SCIATICA, UNSPECIFIED BACK PAIN LATERALITY: ICD-10-CM

## 2022-07-01 DIAGNOSIS — G89.29 CHRONIC LOW BACK PAIN WITHOUT SCIATICA, UNSPECIFIED BACK PAIN LATERALITY: ICD-10-CM

## 2022-07-01 RX ORDER — AMLODIPINE BESYLATE 10 MG/1
TABLET ORAL
Qty: 90 TABLET | Refills: 0 | Status: SHIPPED | OUTPATIENT
Start: 2022-07-01 | End: 2022-10-07 | Stop reason: SDUPTHER

## 2022-07-01 RX ORDER — ACETAMINOPHEN AND CODEINE PHOSPHATE 300; 30 MG/1; MG/1
1 TABLET ORAL EVERY 8 HOURS PRN
Qty: 90 TABLET | Refills: 0 | Status: SHIPPED | OUTPATIENT
Start: 2022-07-01 | End: 2022-08-03 | Stop reason: SDUPTHER

## 2022-07-01 NOTE — TELEPHONE ENCOUNTER
Medication:   Requested Prescriptions     Pending Prescriptions Disp Refills    amLODIPine (NORVASC) 10 MG tablet 90 tablet 0     Sig: TAKE ONE TABLET BY MOUTH DAILY    acetaminophen-codeine (TYLENOL #3) 300-30 MG per tablet 90 tablet 0     Sig: Take 1 tablet by mouth every 8 hours as needed for Pain for up to 30 days. Last Filled:  6/2/2022    Patient Phone Number: [phone] (home)     Last appt: 4/19/2022   Next appt: 7/19/2022    Last OARRS:   RX Monitoring 8/19/2021   Attestation -   Periodic Controlled Substance Monitoring Possible medication side effects, risk of tolerance/dependence & alternative treatments discussed. ;No signs of potential drug abuse or diversion identified.    Chronic Pain > 50 MEDD -     PDMP Monitoring:    Last PDMP Giovanni Euceda as Reviewed [facility]):  Review User Review Instant Review Result   Marlee Shafer 4/19/2022 11:15 AM Reviewed PDMP [1]     Preferred Pharmacy:   Dereck [address] [id number] [address] [phone]  [address] [place] [address] Road  [address] [place] [address]  Phone: [phone] Fax: [phone]

## 2022-07-22 RX ORDER — GABAPENTIN 300 MG/1
CAPSULE ORAL
Qty: 270 CAPSULE | Refills: 0 | OUTPATIENT
Start: 2022-07-22

## 2022-07-22 RX ORDER — GABAPENTIN 300 MG/1
300 CAPSULE ORAL 3 TIMES DAILY
Qty: 270 CAPSULE | Refills: 0 | Status: SHIPPED | OUTPATIENT
Start: 2022-07-22 | End: 2022-10-07 | Stop reason: SDUPTHER

## 2022-07-26 RX ORDER — GABAPENTIN 300 MG/1
300 CAPSULE ORAL 3 TIMES DAILY
Qty: 270 CAPSULE | Refills: 0 | OUTPATIENT
Start: 2022-07-26 | End: 2022-10-24

## 2022-07-26 NOTE — TELEPHONE ENCOUNTER
Medication:   Requested Prescriptions     Pending Prescriptions Disp Refills    gabapentin (NEURONTIN) 300 MG capsule 270 capsule 0     Sig: Take 1 capsule by mouth in the morning and 1 capsule at noon and 1 capsule before bedtime. Do all this for 90 days. Last Filled:      Patient Phone Number: [phone] (home)     Last appt: 4/19/2022   Next appt: Visit date not found    Last OARRS:   RX Monitoring 8/19/2021   Attestation -   Periodic Controlled Substance Monitoring Possible medication side effects, risk of tolerance/dependence & alternative treatments discussed. ;No signs of potential drug abuse or diversion identified.    Chronic Pain > 50 MEDD -     PDMP Monitoring:    Last PDMP Steffany Barajas as Reviewed [facility]):  Review User Review Instant Review Result   Patricia Pearson 4/19/2022 11:15 AM Reviewed PDMP [1]     Preferred Pharmacy:   [facility] 21 [id number] Delmy Petty[address] [phone]  [facility] [address]  [address] [place] [address]  Phone: [phone] Fax: [phone]

## 2022-07-31 DIAGNOSIS — M54.50 CHRONIC LOW BACK PAIN WITHOUT SCIATICA, UNSPECIFIED BACK PAIN LATERALITY: ICD-10-CM

## 2022-07-31 DIAGNOSIS — G89.29 CHRONIC LOW BACK PAIN WITHOUT SCIATICA, UNSPECIFIED BACK PAIN LATERALITY: ICD-10-CM

## 2022-08-02 RX ORDER — BUSPIRONE HYDROCHLORIDE 15 MG/1
TABLET ORAL
Qty: 90 TABLET | Refills: 0 | OUTPATIENT
Start: 2022-08-02

## 2022-08-02 RX ORDER — ACETAMINOPHEN AND CODEINE PHOSPHATE 300; 30 MG/1; MG/1
TABLET ORAL
Qty: 90 TABLET | OUTPATIENT
Start: 2022-08-02

## 2022-08-02 RX ORDER — BUSPIRONE HYDROCHLORIDE 15 MG/1
TABLET ORAL
Qty: 90 TABLET | Refills: 0 | Status: SHIPPED | OUTPATIENT
Start: 2022-08-02 | End: 2022-08-29

## 2022-08-02 RX ORDER — ACETAMINOPHEN AND CODEINE PHOSPHATE 300; 30 MG/1; MG/1
1 TABLET ORAL EVERY 8 HOURS PRN
Qty: 90 TABLET | Refills: 0 | OUTPATIENT
Start: 2022-08-02 | End: 2022-09-01

## 2022-08-02 NOTE — TELEPHONE ENCOUNTER
Medication:   Requested Prescriptions     Pending Prescriptions Disp Refills    busPIRone (BUSPAR) 15 MG tablet [Pharmacy Med Name: BUSPIRONE HCL 15 MG TABLET] 90 tablet 1     Sig: TAKE ONE TABLET BY MOUTH THREE TIMES A DAY AS NEEDED FOR ANXIETY      Last Filled:      Patient Phone Number: [phone] (home)     Last appt: 4/19/2022   Next appt:     Last OARRS:   RX Monitoring 8/19/2021   Attestation -   Periodic Controlled Substance Monitoring Possible medication side effects, risk of tolerance/dependence & alternative treatments discussed. ;No signs of potential drug abuse or diversion identified.    Chronic Pain > 50 MEDD -     PDMP Monitoring:    Last PDMP [facility] as Reviewed [facility]):  Review User Review Instant Review Result   Wanda Mora 4/19/2022 11:15 AM Reviewed PDMP [1]     Preferred Pharmacy:   [facility] [id number] Ammy Mclean [address] [facility] [phone]  [facility] [address]  [address] [place] [address]  Phone: [phone] Fax: [phone]

## 2022-08-02 NOTE — TELEPHONE ENCOUNTER
Medication:   Requested Prescriptions     Pending Prescriptions Disp Refills    busPIRone (BUSPAR) 15 MG tablet 90 tablet 0     Sig: TAKE ONE TABLET BY MOUTH THREE TIMES A DAY AS NEEDED FOR ANXIETY    acetaminophen-codeine (TYLENOL #3) 300-30 MG per tablet 90 tablet 0     Sig: Take 1 tablet by mouth every 8 hours as needed for Pain for up to 30 days. Last Filled:      Patient Phone Number: [phone] (home)     Last appt: 4/19/2022   Next appt:    Last OARRS:   RX Monitoring 8/19/2021   Attestation -   Periodic Controlled Substance Monitoring Possible medication side effects, risk of tolerance/dependence & alternative treatments discussed. ;No signs of potential drug abuse or diversion identified.    Chronic Pain > 50 MEDD -     PDMP Monitoring:    Last PDMP Brendan Hyman as Reviewed [facility]):  Review User Review Instant Review Result   Paula Johnshalonda 4/19/2022 11:15 AM Reviewed PDMP [1]     Preferred Pharmacy:   [place] [id number] Rocco O[address] [phone]  [address] [place] [address] Road  [address] [place] [address]  Phone: [phone] Fax: [phone]

## 2022-08-03 DIAGNOSIS — M54.50 CHRONIC LOW BACK PAIN WITHOUT SCIATICA, UNSPECIFIED BACK PAIN LATERALITY: ICD-10-CM

## 2022-08-03 DIAGNOSIS — G89.29 CHRONIC LOW BACK PAIN WITHOUT SCIATICA, UNSPECIFIED BACK PAIN LATERALITY: ICD-10-CM

## 2022-08-03 RX ORDER — ACETAMINOPHEN AND CODEINE PHOSPHATE 300; 30 MG/1; MG/1
1 TABLET ORAL EVERY 8 HOURS PRN
Qty: 90 TABLET | Refills: 0 | Status: SHIPPED | OUTPATIENT
Start: 2022-08-03 | End: 2022-08-31

## 2022-08-03 RX ORDER — OMEPRAZOLE 40 MG/1
CAPSULE, DELAYED RELEASE ORAL
Qty: 90 CAPSULE | Refills: 0 | Status: SHIPPED | OUTPATIENT
Start: 2022-08-03 | End: 2022-10-07 | Stop reason: SDUPTHER

## 2022-08-03 NOTE — TELEPHONE ENCOUNTER
----- Message from Elen Saucedo sent at 8/3/2022  9:43 AM EDT -----  Subject: Message to Provider    QUESTIONS  Information for Provider? Patient called and stated she never received   notification of her last appointment scheduled on 7/19/2022 and she missed   it, the patient did reschedule on 10/7/2022 however she states she will   need medication refills before that.  ---------------------------------------------------------------------------  --------------  [address]  [phone]; OK to leave message on voicemail  ---------------------------------------------------------------------------  --------------  SCRIPT ANSWERS  Relationship to Patient?  Self

## 2022-08-05 RX ORDER — DULOXETIN HYDROCHLORIDE 60 MG/1
CAPSULE, DELAYED RELEASE ORAL
Qty: 90 CAPSULE | Refills: 0 | Status: SHIPPED | OUTPATIENT
Start: 2022-08-05 | End: 2022-10-07 | Stop reason: SDUPTHER

## 2022-08-11 ENCOUNTER — TELEPHONE (OUTPATIENT)
Dept: FAMILY MEDICINE CLINIC | Age: 77
End: 2022-08-11

## 2022-08-12 NOTE — TELEPHONE ENCOUNTER
Rx called to pharmacy on 04/12/19
Body Location Override (Optional - Billing Will Still Be Based On Selected Body Map Location If Applicable): left parietal scalp
Detail Level: Simple
Depth Of Biopsy: dermis
Was A Bandage Applied: Yes
Size Of Lesion In Cm: 0
Biopsy Type: H and E
Biopsy Method: Dermablade
Anesthesia Type: 1% lidocaine with epinephrine
Anesthesia Volume In Cc (Will Not Render If 0): 3
Hemostasis: Electrocautery
Wound Care: Bacitracin
Dressing: Band-Aid
Destruction After The Procedure: No
Type Of Destruction Used: Cryotherapy
Curettage Text: The wound bed was treated with curettage after the biopsy was performed.
Cryotherapy Text: The wound bed was treated with cryotherapy after the biopsy was performed.
Electrodesiccation Text: The wound bed was treated with electrodesiccation after the biopsy was performed.
Electrodesiccation And Curettage Text: The wound bed was treated with electrodesiccation and curettage after the biopsy was performed.
Silver Nitrate Text: The wound bed was treated with silver nitrate after the biopsy was performed.
Lab: [id number]
Lab Facility: [address] [place] [address]
Path Notes (To The Dermatopathologist): Size: 2.3cm x 2.0cm R/O: BCC vs SCC vs MM vs Alopecia Areata vs atopic dermatitis vs seborrheic dermatitis
Consent: Written consent was obtained and risks were reviewed including but not limited to scarring, infection, bleeding, scabbing, incomplete removal, nerve damage and allergy to anesthesia.
Post-Care Instructions: I reviewed with the patient in detail post-care instructions. Patient is to keep the biopsy site dry overnight, and then apply bacitracin twice daily until healed. Patient may apply hydrogen peroxide soaks to remove any crusting.
Notification Instructions: Patient will be notified of biopsy results. However, patient instructed to call the office if not contacted within 2 weeks.
Billing Type: [employer]
Information: Selecting Yes will display possible errors in your note based on the variables you have selected. This validation is only offered as a suggestion for you. PLEASE NOTE THAT THE VALIDATION TEXT WILL BE REMOVED WHEN YOU FINALIZE YOUR NOTE. IF YOU WANT TO FAX A PRELIMINARY NOTE YOU WILL NEED TO TOGGLE THIS TO 'NO' IF YOU DO NOT WANT IT IN YOUR FAXED NOTE.

## 2022-08-17 RX ORDER — MIRTAZAPINE 15 MG/1
TABLET, FILM COATED ORAL
Qty: 90 TABLET | Refills: 0 | Status: SHIPPED | OUTPATIENT
Start: 2022-08-17 | End: 2022-10-07 | Stop reason: SDUPTHER

## 2022-08-17 NOTE — TELEPHONE ENCOUNTER
Medication:   Requested Prescriptions     Pending Prescriptions Disp Refills    mirtazapine (REMERON) 15 MG tablet [Pharmacy Med Name: MIRTAZAPINE 15 MG TABLET] 90 tablet 1     Sig: TAKE ONE TABLET BY MOUTH ONCE NIGHTLY      Last Filled:      Patient Phone Number: [phone] (home)     Last appt: 4/19/2022   Next appt: 10/7/2022    Last OARRS:   RX Monitoring 8/19/2021   Attestation -   Periodic Controlled Substance Monitoring Possible medication side effects, risk of tolerance/dependence & alternative treatments discussed. ;No signs of potential drug abuse or diversion identified.    Chronic Pain > 50 MEDD -     PDMP Monitoring:    Last PDMP Cassie Woodall as Reviewed [facility]):  Review User Review Instant Review Result   Johnson Brown 4/19/2022 11:15 AM Reviewed PDMP [1]     Preferred Pharmacy:   [facility] [id number] Eugenio Hensley [address] [facility] [phone]  [facility] [address]  [address] [place] [address]  Phone: [phone] Fax: [phone]

## 2022-08-29 RX ORDER — BUSPIRONE HYDROCHLORIDE 15 MG/1
TABLET ORAL
Qty: 90 TABLET | Refills: 0 | Status: SHIPPED | OUTPATIENT
Start: 2022-08-29 | End: 2022-08-30 | Stop reason: SDUPTHER

## 2022-08-29 NOTE — TELEPHONE ENCOUNTER
Medication:   Requested Prescriptions     Pending Prescriptions Disp Refills    busPIRone (BUSPAR) 15 MG tablet [Pharmacy Med Name: BUSPIRONE HCL 15 MG TABLET] 90 tablet 0     Sig: TAKE ONE TABLET BY MOUTH THREE TIMES A DAY AS NEEDED FOR ANXIETY      Last Filled:      Patient Phone Number: [phone] (home)     Last appt: 4/19/2022   Next appt: 10/7/2022    Last OARRS:   RX Monitoring 8/19/2021   Attestation -   Periodic Controlled Substance Monitoring Possible medication side effects, risk of tolerance/dependence & alternative treatments discussed. ;No signs of potential drug abuse or diversion identified.    Chronic Pain > 50 MEDD -     PDMP Monitoring:    Last PDMP Farheen Ross as Reviewed [facility]):  Review User Review Instant Review Result   Celeste [facility] 4/19/2022 11:15 AM Reviewed PDMP [1]     Preferred Pharmacy:   [facility] [id number] Diana Moore 5556 [facility] [phone]  [facility] [address]  [address] [place] [address]  Phone: [phone] Fax: [phone]

## 2022-08-30 DIAGNOSIS — G89.29 CHRONIC LOW BACK PAIN WITHOUT SCIATICA, UNSPECIFIED BACK PAIN LATERALITY: ICD-10-CM

## 2022-08-30 DIAGNOSIS — M54.50 CHRONIC LOW BACK PAIN WITHOUT SCIATICA, UNSPECIFIED BACK PAIN LATERALITY: ICD-10-CM

## 2022-08-30 RX ORDER — ACETAMINOPHEN AND CODEINE PHOSPHATE 300; 30 MG/1; MG/1
1 TABLET ORAL EVERY 8 HOURS PRN
Qty: 90 TABLET | Refills: 0 | OUTPATIENT
Start: 2022-08-30 | End: 2022-09-29

## 2022-08-30 RX ORDER — BUSPIRONE HYDROCHLORIDE 15 MG/1
15 TABLET ORAL 3 TIMES DAILY PRN
Qty: 90 TABLET | Refills: 0 | Status: SHIPPED | OUTPATIENT
Start: 2022-08-30 | End: 2022-10-07 | Stop reason: SDUPTHER

## 2022-08-30 NOTE — TELEPHONE ENCOUNTER
Medication:   Requested Prescriptions     Pending Prescriptions Disp Refills    acetaminophen-codeine (TYLENOL #3) 300-30 MG per tablet 90 tablet 0     Sig: Take 1 tablet by mouth every 8 hours as needed for Pain for up to 30 days. busPIRone (BUSPAR) 15 MG tablet 90 tablet 0      Last Filled:  8/3/22 for Tylenol#3    Patient Phone Number: [phone] (home)     Last appt: 4/19/2022   Next appt: 10/7/2022    Last OARRS:   RX Monitoring 8/19/2021   Attestation -   Periodic Controlled Substance Monitoring Possible medication side effects, risk of tolerance/dependence & alternative treatments discussed. ;No signs of potential drug abuse or diversion identified.    Chronic Pain > 50 MEDD -     PDMP Monitoring:    Last PDMP Irena Duncan as Reviewed [facility]):  Review User Review Instant Review Result   Katia Ortiz 4/19/2022 11:15 AM Reviewed PDMP [1]     Preferred Pharmacy:   [facility] [id number] Ivan [address] [phone]  [address] [place] [address] Road  [address] [place] [address]  Phone: [phone] Fax: [phone]

## 2022-08-31 DIAGNOSIS — M54.50 CHRONIC LOW BACK PAIN WITHOUT SCIATICA, UNSPECIFIED BACK PAIN LATERALITY: ICD-10-CM

## 2022-08-31 DIAGNOSIS — G89.29 CHRONIC LOW BACK PAIN WITHOUT SCIATICA, UNSPECIFIED BACK PAIN LATERALITY: ICD-10-CM

## 2022-08-31 RX ORDER — ACETAMINOPHEN AND CODEINE PHOSPHATE 300; 30 MG/1; MG/1
TABLET ORAL
Qty: 90 TABLET | Refills: 0 | Status: SHIPPED | OUTPATIENT
Start: 2022-08-31 | End: 2022-09-29

## 2022-09-14 RX ORDER — ATORVASTATIN CALCIUM 40 MG/1
TABLET, FILM COATED ORAL
Qty: 90 TABLET | Refills: 1 | Status: SHIPPED | OUTPATIENT
Start: 2022-09-14

## 2022-09-29 DIAGNOSIS — F02.80 LATE ONSET ALZHEIMER'S DISEASE WITHOUT BEHAVIORAL DISTURBANCE (HCC): Primary | ICD-10-CM

## 2022-09-29 DIAGNOSIS — M54.50 CHRONIC LOW BACK PAIN WITHOUT SCIATICA, UNSPECIFIED BACK PAIN LATERALITY: ICD-10-CM

## 2022-09-29 DIAGNOSIS — G89.29 CHRONIC LOW BACK PAIN WITHOUT SCIATICA, UNSPECIFIED BACK PAIN LATERALITY: ICD-10-CM

## 2022-09-29 DIAGNOSIS — G30.1 LATE ONSET ALZHEIMER'S DISEASE WITHOUT BEHAVIORAL DISTURBANCE (HCC): Primary | ICD-10-CM

## 2022-09-29 RX ORDER — ACETAMINOPHEN AND CODEINE PHOSPHATE 300; 30 MG/1; MG/1
TABLET ORAL
Qty: 90 TABLET | Refills: 0 | Status: SHIPPED | OUTPATIENT
Start: 2022-09-29 | End: 2022-11-01 | Stop reason: SDUPTHER

## 2022-09-29 RX ORDER — DONEPEZIL HYDROCHLORIDE 10 MG/1
TABLET, FILM COATED ORAL
Qty: 180 TABLET | Refills: 0 | Status: SHIPPED | OUTPATIENT
Start: 2022-09-29

## 2022-10-03 DIAGNOSIS — G89.29 CHRONIC LOW BACK PAIN WITHOUT SCIATICA, UNSPECIFIED BACK PAIN LATERALITY: ICD-10-CM

## 2022-10-03 DIAGNOSIS — M54.50 CHRONIC LOW BACK PAIN WITHOUT SCIATICA, UNSPECIFIED BACK PAIN LATERALITY: ICD-10-CM

## 2022-10-04 RX ORDER — ACETAMINOPHEN AND CODEINE PHOSPHATE 300; 30 MG/1; MG/1
1 TABLET ORAL EVERY 8 HOURS PRN
Qty: 90 TABLET | Refills: 0 | OUTPATIENT
Start: 2022-10-04 | End: 2022-11-03

## 2022-10-04 NOTE — TELEPHONE ENCOUNTER
Medication:   Requested Prescriptions     Pending Prescriptions Disp Refills    acetaminophen-codeine (TYLENOL #3) 300-30 MG per tablet 90 tablet 0     Sig: Take 1 tablet by mouth every 8 hours as needed for Pain for up to 30 days. Last Filled:  10/7/22    Patient Phone Number: [phone] (home)     Last appt: 4/19/2022   Next appt: 10/7/2022    Last OARRS:   RX Monitoring 8/19/2021   Attestation -   Periodic Controlled Substance Monitoring Possible medication side effects, risk of tolerance/dependence & alternative treatments discussed. ;No signs of potential drug abuse or diversion identified.    Chronic Pain > 50 MEDD -     PDMP Monitoring:    Last PDMP Cassie Woodall as Reviewed [facility]):  Review User Review Instant Review Result   Brandno Sanders 9/29/2022  5:12 PM Reviewed PDMP [1]     Preferred Pharmacy:   [facility] [id number] Eugenio Hensley, [address] [facility] [phone]  [facility] [address]  [address] [place] [address]  Phone: [phone] Fax: [phone]

## 2022-10-05 ENCOUNTER — HOSPITAL ENCOUNTER (OUTPATIENT)
Age: 77
Discharge: HOME | End: 2022-10-05
Payer: COMMERCIAL

## 2022-10-05 DIAGNOSIS — E11.40 TYPE 2 DIABETES MELLITUS WITH DIABETIC NEUROPATHY, WITHOUT LONG-TERM CURRENT USE OF INSULIN (HCC): ICD-10-CM

## 2022-10-05 DIAGNOSIS — E11.9 TYPE 2 DIABETES MELLITUS WITHOUT COMPLICATION, WITHOUT LONG-TERM CURRENT USE OF INSULIN (HCC): ICD-10-CM

## 2022-10-05 LAB
A/G RATIO: 1.5 (ref 1.1–2.2)
ALBUMIN SERPL-MCNC: 4.4 G/DL (ref 3.4–5)
ALP BLD-CCNC: 97 U/L (ref 40–129)
ALT SERPL-CCNC: 21 U/L (ref 10–40)
ANION GAP SERPL CALCULATED.3IONS-SCNC: 12 MMOL/L (ref 3–16)
AST SERPL-CCNC: 27 U/L (ref 15–37)
BILIRUB SERPL-MCNC: <0.2 MG/DL (ref 0–1)
BUN BLDV-MCNC: 12 MG/DL (ref 7–20)
CALCIUM SERPL-MCNC: 10.3 MG/DL (ref 8.3–10.6)
CHLORIDE BLD-SCNC: 107 MMOL/L (ref 99–110)
CHOLESTEROL, TOTAL: 158 MG/DL (ref 0–199)
CO2: 23 MMOL/L (ref 21–32)
CREAT SERPL-MCNC: 0.9 MG/DL (ref 0.6–1.2)
GFR AFRICAN AMERICAN: >60
GFR NON-AFRICAN AMERICAN: >60
GLUCOSE BLD-MCNC: 98 MG/DL (ref 70–99)
HDLC SERPL-MCNC: 70 MG/DL (ref 40–60)
LDL CHOLESTEROL CALCULATED: 48 MG/DL
POTASSIUM SERPL-SCNC: 5 MMOL/L (ref 3.5–5.1)
SODIUM BLD-SCNC: 142 MMOL/L (ref 136–145)
TOTAL PROTEIN: 7.3 G/DL (ref 6.4–8.2)
TRIGL SERPL-MCNC: 202 MG/DL (ref 0–150)
VLDLC SERPL CALC-MCNC: 40 MG/DL

## 2022-10-05 PROCEDURE — 83036 HEMOGLOBIN GLYCOSYLATED A1C: CPT

## 2022-10-05 PROCEDURE — 36415 COLL VENOUS BLD VENIPUNCTURE: CPT

## 2022-10-05 PROCEDURE — 80053 COMPREHEN METABOLIC PANEL: CPT

## 2022-10-05 PROCEDURE — 80061 LIPID PANEL: CPT

## 2022-10-06 ENCOUNTER — TELEPHONE (OUTPATIENT)
Dept: FAMILY MEDICINE CLINIC | Age: 77
End: 2022-10-06

## 2022-10-06 LAB
ESTIMATED AVERAGE GLUCOSE: 125.5 MG/DL
HBA1C MFR BLD: 6 %

## 2022-10-06 NOTE — TELEPHONE ENCOUNTER
Pt called stating she is due for her mammogram and would like to schedule it. Please order mammogram and call pt when ordered so she can schedule.

## 2022-10-07 ENCOUNTER — OFFICE VISIT (OUTPATIENT)
Dept: FAMILY MEDICINE CLINIC | Age: 77
End: 2022-10-07
Payer: COMMERCIAL

## 2022-10-07 VITALS
TEMPERATURE: 97.7 F | HEART RATE: 67 BPM | RESPIRATION RATE: 12 BRPM | DIASTOLIC BLOOD PRESSURE: 70 MMHG | SYSTOLIC BLOOD PRESSURE: 126 MMHG | OXYGEN SATURATION: 93 % | BODY MASS INDEX: 24.61 KG/M2 | WEIGHT: 143.38 LBS

## 2022-10-07 DIAGNOSIS — F02.80 LATE ONSET ALZHEIMER'S DISEASE WITHOUT BEHAVIORAL DISTURBANCE (HCC): ICD-10-CM

## 2022-10-07 DIAGNOSIS — Z23 NEED FOR INFLUENZA VACCINATION: ICD-10-CM

## 2022-10-07 DIAGNOSIS — G95.9 CERVICAL MYELOPATHY (HCC): ICD-10-CM

## 2022-10-07 DIAGNOSIS — M43.16 SPONDYLOLISTHESIS, LUMBAR REGION: ICD-10-CM

## 2022-10-07 DIAGNOSIS — E78.00 HYPERCHOLESTEROLEMIA: ICD-10-CM

## 2022-10-07 DIAGNOSIS — G89.4 CHRONIC PAIN SYNDROME: ICD-10-CM

## 2022-10-07 DIAGNOSIS — Z12.31 ENCOUNTER FOR SCREENING MAMMOGRAM FOR MALIGNANT NEOPLASM OF BREAST: ICD-10-CM

## 2022-10-07 DIAGNOSIS — E11.40 TYPE 2 DIABETES MELLITUS WITH DIABETIC NEUROPATHY, WITHOUT LONG-TERM CURRENT USE OF INSULIN (HCC): Primary | ICD-10-CM

## 2022-10-07 DIAGNOSIS — G30.1 LATE ONSET ALZHEIMER'S DISEASE WITHOUT BEHAVIORAL DISTURBANCE (HCC): ICD-10-CM

## 2022-10-07 PROCEDURE — 1123F ACP DISCUSS/DSCN MKR DOCD: CPT | Performed by: FAMILY MEDICINE

## 2022-10-07 PROCEDURE — 3044F HG A1C LEVEL LT 7.0%: CPT | Performed by: FAMILY MEDICINE

## 2022-10-07 PROCEDURE — G0008 ADMIN INFLUENZA VIRUS VAC: HCPCS | Performed by: FAMILY MEDICINE

## 2022-10-07 PROCEDURE — 99214 OFFICE O/P EST MOD 30 MIN: CPT | Performed by: FAMILY MEDICINE

## 2022-10-07 PROCEDURE — 90694 VACC AIIV4 NO PRSRV 0.5ML IM: CPT | Performed by: FAMILY MEDICINE

## 2022-10-07 RX ORDER — GABAPENTIN 400 MG/1
400 CAPSULE ORAL 3 TIMES DAILY
Qty: 270 CAPSULE | Refills: 0 | Status: SHIPPED | OUTPATIENT
Start: 2022-10-07 | End: 2023-01-05

## 2022-10-07 RX ORDER — BUSPIRONE HYDROCHLORIDE 15 MG/1
15 TABLET ORAL 3 TIMES DAILY PRN
Qty: 90 TABLET | Refills: 3 | Status: SHIPPED | OUTPATIENT
Start: 2022-10-07 | End: 2022-10-31 | Stop reason: SDUPTHER

## 2022-10-07 RX ORDER — DULOXETIN HYDROCHLORIDE 60 MG/1
CAPSULE, DELAYED RELEASE ORAL
Qty: 90 CAPSULE | Refills: 1 | Status: SHIPPED | OUTPATIENT
Start: 2022-10-07

## 2022-10-07 RX ORDER — OMEPRAZOLE 40 MG/1
CAPSULE, DELAYED RELEASE ORAL
Qty: 90 CAPSULE | Refills: 1 | Status: SHIPPED | OUTPATIENT
Start: 2022-10-07 | End: 2022-10-31 | Stop reason: SDUPTHER

## 2022-10-07 RX ORDER — AMLODIPINE BESYLATE 10 MG/1
TABLET ORAL
Qty: 90 TABLET | Refills: 0 | Status: SHIPPED | OUTPATIENT
Start: 2022-10-07

## 2022-10-07 RX ORDER — MIRTAZAPINE 15 MG/1
TABLET, FILM COATED ORAL
Qty: 90 TABLET | Refills: 1 | Status: SHIPPED | OUTPATIENT
Start: 2022-10-07

## 2022-10-07 ASSESSMENT — PATIENT HEALTH QUESTIONNAIRE - PHQ9
8. MOVING OR SPEAKING SO SLOWLY THAT OTHER PEOPLE COULD HAVE NOTICED. OR THE OPPOSITE, BEING SO FIGETY OR RESTLESS THAT YOU HAVE BEEN MOVING AROUND A LOT MORE THAN USUAL: 0
9. THOUGHTS THAT YOU WOULD BE BETTER OFF DEAD, OR OF HURTING YOURSELF: 0
SUM OF ALL RESPONSES TO PHQ QUESTIONS 1-9: 0
3. TROUBLE FALLING OR STAYING ASLEEP: 0
SUM OF ALL RESPONSES TO PHQ QUESTIONS 1-9: 0
1. LITTLE INTEREST OR PLEASURE IN DOING THINGS: 0
7. TROUBLE CONCENTRATING ON THINGS, SUCH AS READING THE NEWSPAPER OR WATCHING TELEVISION: 0
2. FEELING DOWN, DEPRESSED OR HOPELESS: 0
6. FEELING BAD ABOUT YOURSELF - OR THAT YOU ARE A FAILURE OR HAVE LET YOURSELF OR YOUR FAMILY DOWN: 0
SUM OF ALL RESPONSES TO PHQ QUESTIONS 1-9: 0
SUM OF ALL RESPONSES TO PHQ QUESTIONS 1-9: 0
SUM OF ALL RESPONSES TO PHQ9 QUESTIONS 1 & 2: 0
5. POOR APPETITE OR OVEREATING: 0
10. IF YOU CHECKED OFF ANY PROBLEMS, HOW DIFFICULT HAVE THESE PROBLEMS MADE IT FOR YOU TO DO YOUR WORK, TAKE CARE OF THINGS AT HOME, OR GET ALONG WITH OTHER PEOPLE: 0
4. FEELING TIRED OR HAVING LITTLE ENERGY: 0

## 2022-10-07 NOTE — PROGRESS NOTES
Subjective:      Patient ID: Carla Duval is a 64 y.o. female. CC: Patient presents for re-evaluation of chronic health problems including diabetes mellitus, hypercholesterol, chronic pain syndrome, dementia and peripheral neuropathy. .    HPI pt is here for a follow up, med refill, test results. Patient has missed several appointments simply because she could not remember. She states her  continues take her medications. She is now been on gabapentin medication for the last 6 months and by her description I am not sure anything is significantly changed. She would like to maintain the gabapentin medication. She would like to try to stop the pain medication entirely. She is still on the Cymbalta at 60 mg daily. She states she got depressed at this time. She would also like to stop the anxiety medication. She did not bring her medications in today for review. She states she does take everything as prescribed with her 's health.     Review of Systems  Patient Active Problem List   Diagnosis    Essential hypertension    Generalized osteoarthrosis, involving multiple sites    Type 2 diabetes mellitus without complication (HCC)    Degenerative lumbar spinal stenosis    Congenital clotting factor deficiency (HCC)    Hypercholesterolemia    GERD (gastroesophageal reflux disease)    Late onset Alzheimer's disease without behavioral disturbance (HCC)    Drug dependence (HCC)    Chronic low back pain without sciatica    Spondylolisthesis, lumbar region    Chronic pain syndrome    Hyperparathyroidism (HCC)    Pain disorder with psychological factors    Current moderate episode of major depressive disorder without prior episode ([address]    TIA (transient ischemic attack)    Cubital tunnel syndrome on left    Left carpal tunnel syndrome    Right carpal tunnel syndrome    Cervical spinal stenosis    Type 2 diabetes mellitus with diabetic neuropathy    Cervical myelopathy (HCC)       Outpatient Medications Marked as Taking for the 10/7/22 encounter (Office Visit) with Edmundo Villa MD   Medication Sig Dispense Refill    donepezil (ARICEPT) 10 MG tablet TAKE TWO TABLETS BY MOUTH EVERY NIGHT AT BEDTIME 180 tablet 0    acetaminophen-codeine (TYLENOL #3) 300-30 MG per tablet TAKE ONE TABLET BY MOUTH EVERY 8 HOURS AS NEEDED FOR PAIN FOR UP TO 30 DAYS. REDUCE DOSES TAKEN AS PAIN BECOMES MANAGEABLE 90 tablet 0    atorvastatin (LIPITOR) 40 MG tablet TAKE ONE TABLET BY MOUTH DAILY 90 tablet 1    busPIRone (BUSPAR) 15 MG tablet Take 15 mg by mouth 3 times daily as needed (Anxiety) 90 tablet 0    mirtazapine (REMERON) 15 MG tablet TAKE ONE TABLET BY MOUTH ONCE NIGHTLY 90 tablet 0    DULoxetine (CYMBALTA) 60 MG extended release capsule TAKE ONE CAPSULE BY MOUTH DAILY 90 capsule 0    omeprazole (PRILOSEC) 40 MG delayed release capsule TAKE ONE CAPSULE BY MOUTH DAILY 90 capsule 0    gabapentin (NEURONTIN) 300 MG capsule Take 1 capsule by mouth in the morning and 1 capsule at noon and 1 capsule before bedtime. Do all this for 90 days. 270 capsule 0    amLODIPine (NORVASC) 10 MG tablet TAKE ONE TABLET BY MOUTH DAILY 90 tablet 0    aspirin 81 MG chewable tablet Take 1 tablet by mouth daily 1 tablet 3    Melatonin 5 MG CAPS 1-2 qhs for sleep 60 capsule 3    Multiple Vitamins-Minerals (CENTRUM SILVER PO) Take 1 tablet by mouth daily          Allergies   Allergen Reactions    Hydrocodone      The patient has a history of a bleeding Ulcer, and this upset her stomach severely. Social History     Tobacco Use    Smoking status: Never    Smokeless tobacco: Never   Substance Use Topics    Alcohol use: No     Alcohol/week: 0.0 standard drinks       /70 (Site: Left Upper Arm, Position: Sitting, Cuff Size: Medium Adult)   Pulse 67   Temp 97.7 °F (36.5 °C) (Infrared)   Resp 12   Wt 143 lb 6 oz (65 kg)   SpO2 93%   BMI 24.61 kg/m²      Objective:   Physical Exam  Vitals and nursing note reviewed.    Constitutional: General: She is not in acute distress. Appearance: Normal appearance. She is well-developed and well-groomed. Neck:      Vascular: No carotid bruit. Cardiovascular:      Rate and Rhythm: Normal rate and regular rhythm. Pulses:           Dorsalis pedis pulses are 2+ on the right side and 2+ on the left side. Posterior tibial pulses are 2+ on the right side and 2+ on the left side. Heart sounds: Normal heart sounds. No murmur heard. No friction rub. No gallop. Pulmonary:      Effort: Pulmonary effort is normal.      Breath sounds: Normal breath sounds. Musculoskeletal:      Lumbar back: Tenderness (Sacroiliac joint bilaterally and sacrum) present. No swelling, edema, deformity or spasms. Decreased range of motion. Right upper leg: Normal. No swelling, deformity or tenderness. Left upper leg: Normal. No swelling, deformity or tenderness. Right lower leg: No edema. Left lower leg: No edema. Skin:     General: Skin is warm and dry. Neurological:      Mental Status: She is alert and oriented to person, place, and time. Sensory: Sensation is intact. Motor: Weakness present. Gait: Gait abnormal.      Deep Tendon Reflexes:      Reflex Scores:       Patellar reflexes are 2+ on the right side and 2+ on the left side. Achilles reflexes are 2+ on the right side and 2+ on the left side. Comments: Strength testing is 5 out of 5 in both upper and lower extremities except for the left upper arm is 4 out of 5    Patient does use a cane for ambulation   Psychiatric:         Attention and Perception: Attention and perception normal.         Mood and Affect: Mood and affect normal.         Speech: Speech normal.         Behavior: Behavior normal. Behavior is cooperative. Cognition and Memory: Cognition normal. Memory is impaired.          Judgment: Judgment normal.       Assessment:      Ari Brennan was seen today for follow-up and hypertension. Diagnoses and all orders for this visit:    Type 2 diabetes mellitus with diabetic neuropathy, without long-term current use of insulin (HCC)    Need for influenza vaccination  -     Influenza, FLUAD, (age 72 y+), IM, PF, 0.5 mL    Encounter for screening mammogram for malignant neoplasm of breast  -     JESUS DIGITAL SCREEN W OR WO CAD BILATERAL; Future    Late onset Alzheimer's disease without behavioral disturbance (HCC)    Chronic pain syndrome    Spondylolisthesis, lumbar region    Cervical myelopathy (HCC)    Hypercholesterolemia    Other orders  -     busPIRone (BUSPAR) 15 MG tablet; Take 15 mg by mouth 3 times daily as needed (Anxiety)  -     mirtazapine (REMERON) 15 MG tablet; TAKE ONE TABLET BY MOUTH ONCE NIGHTLY  -     DULoxetine (CYMBALTA) 60 MG extended release capsule; TAKE ONE CAPSULE BY MOUTH DAILY  -     omeprazole (PRILOSEC) 40 MG delayed release capsule; TAKE ONE CAPSULE BY MOUTH DAILY  -     amLODIPine (NORVASC) 10 MG tablet; TAKE ONE TABLET BY MOUTH DAILY  -     gabapentin (NEURONTIN) 400 MG capsule; Take 1 capsule by mouth 3 times daily for 90 days. OARRS report checked        Plan:      Reviewed labs and test results with patient. Diabetes mellitus and cholesterol extremely well controlled maintain current medications. For the peripheral neuropathy plan to go ahead and increase the gabapentin medication to 3 times a day at 400 mg. Maintain Cymbalta medication. Go ahead and stop the pain medications    For the anxiety and depression maintain the Cymbalta and go ahead and wean off the BuSpar medication. Patient received counseling on the following healthy behaviors: nutrition and exercise     Patient given educational materials     Health maintenance updated    Discussed use, benefit, and side effects of prescribed medications. Barriers to medication compliance addressed. All patient questions answered. Pt voiced understanding.      Patient needs RTC in 3 months. Medical decision making of moderate complexity. Please note that this chart was generated using Dragon dictation software. Although every effort was made to ensure the accuracy of this automated transcription, some errors in transcription may have occurred.

## 2022-10-07 NOTE — PROGRESS NOTES
Vaccine Information Sheet, \"Influenza - Inactivated\"  given to Carla Duval, or parent/legal guardian of  Carla Duval and verbalized understanding. Patient responses:    Have you ever had a reaction to a flu vaccine? No  Are you able to eat eggs without adverse effects? Yes  Do you have any current illness? No  Have you ever had [place] Syndrome? No    Flu vaccine given per order. Please see immunization tab.     Immunization(s) given during visit:     Immunizations Administered       Name Date Dose Route    Influenza, FLUAD, (age 72 y+), Adjuvanted, 0.5mL 10/7/2022 0.5 mL Intramuscular    Site: Deltoid- Left    Lot: [id number]    NDC: 73778-896-20

## 2022-11-01 DIAGNOSIS — M54.50 CHRONIC LOW BACK PAIN WITHOUT SCIATICA, UNSPECIFIED BACK PAIN LATERALITY: ICD-10-CM

## 2022-11-01 DIAGNOSIS — G89.29 CHRONIC LOW BACK PAIN WITHOUT SCIATICA, UNSPECIFIED BACK PAIN LATERALITY: ICD-10-CM

## 2022-11-01 RX ORDER — OMEPRAZOLE 40 MG/1
CAPSULE, DELAYED RELEASE ORAL
Qty: 90 CAPSULE | Refills: 1 | Status: SHIPPED | OUTPATIENT
Start: 2022-11-01

## 2022-11-01 RX ORDER — ACETAMINOPHEN AND CODEINE PHOSPHATE 300; 30 MG/1; MG/1
1 TABLET ORAL EVERY 8 HOURS PRN
Qty: 90 TABLET | Refills: 0 | Status: SHIPPED | OUTPATIENT
Start: 2022-11-01 | End: 2022-12-01

## 2022-11-01 RX ORDER — BUSPIRONE HYDROCHLORIDE 15 MG/1
15 TABLET ORAL 3 TIMES DAILY PRN
Qty: 90 TABLET | Refills: 3 | Status: SHIPPED | OUTPATIENT
Start: 2022-11-01

## 2022-11-01 NOTE — TELEPHONE ENCOUNTER
Medication:   Requested Prescriptions     Pending Prescriptions Disp Refills    acetaminophen-codeine (TYLENOL #3) 300-30 MG per tablet 90 tablet 0     Sig: Take 1 tablet by mouth every 8 hours as needed for Pain for up to 30 days. Last Filled:  9/29/2022    Patient Phone Number: [phone] (home)     Last appt: 10/7/2022   Next appt: 1/10/2023    Last OARRS:   RX Monitoring 8/19/2021   Attestation -   Periodic Controlled Substance Monitoring Possible medication side effects, risk of tolerance/dependence & alternative treatments discussed. ;No signs of potential drug abuse or diversion identified.    Chronic Pain > 50 MEDD -     PDMP Monitoring:    Last PDMP Cassie Woodall as Reviewed [facility]):  Review User Review Instant Review Result   Johnson Brown 10/7/2022  3:09 PM Reviewed PDMP [1]     Preferred Pharmacy:   [facility] [id number] Eugenio Hensley, [address] [place] [address] [phone]  [facility] [address]  [address] [place] [address]  Phone: [phone] Fax: [phone]

## 2022-11-01 NOTE — TELEPHONE ENCOUNTER
Medication:   Requested Prescriptions     Pending Prescriptions Disp Refills    busPIRone (BUSPAR) 15 MG tablet 90 tablet 3     Sig: Take 15 mg by mouth 3 times daily as needed (Anxiety)    omeprazole (PRILOSEC) 40 MG delayed release capsule 90 capsule 1     Sig: TAKE ONE CAPSULE BY MOUTH DAILY      Last Filled:      Patient Phone Number: [phone] (home)     Last appt: 10/7/2022   Next appt: 1/10/2023    Last OARRS:   RX Monitoring 8/19/2021   Attestation -   Periodic Controlled Substance Monitoring Possible medication side effects, risk of tolerance/dependence & alternative treatments discussed. ;No signs of potential drug abuse or diversion identified.    Chronic Pain > 50 MEDD -     PDMP Monitoring:    Last PDMP Samanta Fuentes as Reviewed [facility]):  Review User Review Instant Review Result   Levon Borjas 10/7/2022  3:09 PM Reviewed PDMP [1]     Preferred Pharmacy:   [facility] [id number] [address] [phone]  [facility] [address]  [address] [place] [address]  Phone: [phone] Fax: [phone]

## 2022-12-01 ENCOUNTER — TELEPHONE (OUTPATIENT)
Dept: FAMILY MEDICINE CLINIC | Age: 77
End: 2022-12-01

## 2022-12-01 DIAGNOSIS — M54.50 CHRONIC LOW BACK PAIN WITHOUT SCIATICA, UNSPECIFIED BACK PAIN LATERALITY: ICD-10-CM

## 2022-12-01 DIAGNOSIS — G89.29 CHRONIC LOW BACK PAIN WITHOUT SCIATICA, UNSPECIFIED BACK PAIN LATERALITY: ICD-10-CM

## 2022-12-01 NOTE — TELEPHONE ENCOUNTER
Pt called to request a higher dose of gabapentin. She states she isn't getting relief from the current dose.  Doesn't want to send to on call provider-said she will wait until tomorrow for WM  Please advise

## 2022-12-02 RX ORDER — GABAPENTIN 600 MG/1
600 TABLET ORAL 3 TIMES DAILY
Qty: 90 TABLET | Refills: 2 | Status: SHIPPED | OUTPATIENT
Start: 2022-12-02 | End: 2023-03-02

## 2022-12-02 RX ORDER — ACETAMINOPHEN AND CODEINE PHOSPHATE 300; 30 MG/1; MG/1
TABLET ORAL
Qty: 90 TABLET | Refills: 0 | Status: SHIPPED | OUTPATIENT
Start: 2022-12-02 | End: 2023-01-01

## 2022-12-02 NOTE — TELEPHONE ENCOUNTER
Medication:   Requested Prescriptions     Pending Prescriptions Disp Refills    acetaminophen-codeine (TYLENOL #3) 300-30 MG per tablet [Pharmacy Med Name: ACETAMINOPHEN-CODEINE 300-30 MG TAB] 90 tablet      Sig: TAKE ONE TABLET BY MOUTH EVERY 8 HOURS AS NEEDED FOR PAIN REDUCE DOSES TAKEN AS PAIN BECOMES MANAGEABLE      Last Filled:  11/1/22    Patient Phone Number: [phone] (home)     Last appt: 10/7/2022   Next appt: 12/1/2022    Last OARRS:   RX Monitoring 8/19/2021   Attestation -   Periodic Controlled Substance Monitoring Possible medication side effects, risk of tolerance/dependence & alternative treatments discussed. ;No signs of potential drug abuse or diversion identified.    Chronic Pain > 50 MEDD -     PDMP Monitoring:    Last PDMP Bj Figueredo as Reviewed [facility]):  Review User Review Instant Review Result   Sandip Martinez 10/7/2022  3:09 PM Reviewed PDMP [1]     Preferred Pharmacy:   [facility] [id number] [facility] [phone]  [address] [place] [address] Road  [address] [place] [address]  Phone: [phone] Fax: [phone]

## 2022-12-26 DIAGNOSIS — G30.1 LATE ONSET ALZHEIMER'S DISEASE WITHOUT BEHAVIORAL DISTURBANCE (HCC): ICD-10-CM

## 2022-12-26 DIAGNOSIS — F02.80 LATE ONSET ALZHEIMER'S DISEASE WITHOUT BEHAVIORAL DISTURBANCE (HCC): ICD-10-CM

## 2022-12-27 RX ORDER — DONEPEZIL HYDROCHLORIDE 10 MG/1
TABLET, FILM COATED ORAL
Qty: 180 TABLET | Refills: 0 | Status: SHIPPED | OUTPATIENT
Start: 2022-12-27

## 2023-01-01 DIAGNOSIS — G89.29 CHRONIC LOW BACK PAIN WITHOUT SCIATICA, UNSPECIFIED BACK PAIN LATERALITY: ICD-10-CM

## 2023-01-01 DIAGNOSIS — M54.50 CHRONIC LOW BACK PAIN WITHOUT SCIATICA, UNSPECIFIED BACK PAIN LATERALITY: ICD-10-CM

## 2023-01-03 DIAGNOSIS — M54.50 CHRONIC LOW BACK PAIN WITHOUT SCIATICA, UNSPECIFIED BACK PAIN LATERALITY: ICD-10-CM

## 2023-01-03 DIAGNOSIS — G89.29 CHRONIC LOW BACK PAIN WITHOUT SCIATICA, UNSPECIFIED BACK PAIN LATERALITY: ICD-10-CM

## 2023-01-03 RX ORDER — ACETAMINOPHEN AND CODEINE PHOSPHATE 300; 30 MG/1; MG/1
TABLET ORAL
Qty: 90 TABLET | Refills: 0 | Status: SHIPPED | OUTPATIENT
Start: 2023-01-03 | End: 2023-02-02

## 2023-01-03 RX ORDER — ACETAMINOPHEN AND CODEINE PHOSPHATE 300; 30 MG/1; MG/1
TABLET ORAL
Qty: 90 TABLET | Refills: 0 | OUTPATIENT
Start: 2023-01-03 | End: 2023-02-02

## 2023-01-03 NOTE — TELEPHONE ENCOUNTER
Medication:   Requested Prescriptions     Pending Prescriptions Disp Refills    acetaminophen-codeine (TYLENOL #3) 300-30 MG per tablet [Pharmacy Med Name: ACETAMINOPHEN-CODEINE 300-30 MG TAB] 90 tablet      Sig: TAKE ONE TABLET BY MOUTH EVERY 8 HOURS AS NEEDED FOR PAIN . REDUCE DOSES TAKEN AS PAIN BECOMES MANAGEABLE      Last Filled:  12/2/2022    Patient Phone Number: [phone] (home)     Last appt: 10/7/2022   Next appt: 1/1/2023    Last OARRS:   RX Monitoring 8/19/2021   Attestation -   Periodic Controlled Substance Monitoring Possible medication side effects, risk of tolerance/dependence & alternative treatments discussed. ;No signs of potential drug abuse or diversion identified.    Chronic Pain > 50 MEDD -     PDMP Monitoring:    Last PDMP Major Revering as Reviewed [facility]):  Review User Review Instant Review Result   Sylvester Kellogg 10/7/2022  3:09 PM Reviewed PDMP [1]     Preferred Pharmacy:   [facility] [id number] Chris Cerda [address] [place] [address] [phone]  [address] [place] [address] Road  [address] [place] [address]  Phone: [phone] Fax: [phone]

## 2023-01-10 ENCOUNTER — OFFICE VISIT (OUTPATIENT)
Dept: FAMILY MEDICINE CLINIC | Age: 78
End: 2023-01-10
Payer: COMMERCIAL

## 2023-01-10 VITALS
SYSTOLIC BLOOD PRESSURE: 112 MMHG | DIASTOLIC BLOOD PRESSURE: 62 MMHG | TEMPERATURE: 97.2 F | BODY MASS INDEX: 25.23 KG/M2 | WEIGHT: 147 LBS

## 2023-01-10 DIAGNOSIS — G60.9 IDIOPATHIC PERIPHERAL NEUROPATHY: Primary | ICD-10-CM

## 2023-01-10 DIAGNOSIS — G30.1 LATE ONSET ALZHEIMER'S DISEASE WITHOUT BEHAVIORAL DISTURBANCE (HCC): ICD-10-CM

## 2023-01-10 DIAGNOSIS — F32.1 CURRENT MODERATE EPISODE OF MAJOR DEPRESSIVE DISORDER WITHOUT PRIOR EPISODE (HCC): ICD-10-CM

## 2023-01-10 DIAGNOSIS — G89.29 CHRONIC LOW BACK PAIN WITHOUT SCIATICA, UNSPECIFIED BACK PAIN LATERALITY: ICD-10-CM

## 2023-01-10 DIAGNOSIS — F02.80 LATE ONSET ALZHEIMER'S DISEASE WITHOUT BEHAVIORAL DISTURBANCE (HCC): ICD-10-CM

## 2023-01-10 DIAGNOSIS — G95.9 CERVICAL MYELOPATHY (HCC): ICD-10-CM

## 2023-01-10 DIAGNOSIS — M54.50 CHRONIC LOW BACK PAIN WITHOUT SCIATICA, UNSPECIFIED BACK PAIN LATERALITY: ICD-10-CM

## 2023-01-10 PROCEDURE — 99214 OFFICE O/P EST MOD 30 MIN: CPT | Performed by: FAMILY MEDICINE

## 2023-01-10 PROCEDURE — 3074F SYST BP LT 130 MM HG: CPT | Performed by: FAMILY MEDICINE

## 2023-01-10 PROCEDURE — 1123F ACP DISCUSS/DSCN MKR DOCD: CPT | Performed by: FAMILY MEDICINE

## 2023-01-10 PROCEDURE — 3078F DIAST BP <80 MM HG: CPT | Performed by: FAMILY MEDICINE

## 2023-01-10 ASSESSMENT — PATIENT HEALTH QUESTIONNAIRE - PHQ9
1. LITTLE INTEREST OR PLEASURE IN DOING THINGS: 0
SUM OF ALL RESPONSES TO PHQ QUESTIONS 1-9: 0
2. FEELING DOWN, DEPRESSED OR HOPELESS: 0
SUM OF ALL RESPONSES TO PHQ QUESTIONS 1-9: 0
SUM OF ALL RESPONSES TO PHQ9 QUESTIONS 1 & 2: 0

## 2023-01-10 NOTE — PROGRESS NOTES
Subjective:      Patient ID: Dmitry Colin is a 76 y.o. female. CC: Patient presents for re-evaluation of chronic health problems including neuropathy, dementia, cervical myopathy and chronic low back pain. HPIPatient presents today for a follow-up on chronic medications and medical conditions. Patient states she still has a persistent low back pain which is been for a number of years with no sciatica. Her biggest problem is her neuropathy is progressive in both legs and her arms. She does have cervical spine stenosis but she does not want a pursue any surgical intervention so therefore is never seen the neurosurgeons. She denies any weakness in her upper extremities just more of the nerve pain. She continues with the pain pills 3 times a day and she feels some of her symptoms are more anxiety induced. She is already taking anxiety medications regularly. She has a good support system with her  and denies any depression symptoms.     Review of Systems      Patient Active Problem List   Diagnosis    Essential hypertension    Generalized osteoarthrosis, involving multiple sites    Type 2 diabetes mellitus without complication (HCC)    Degenerative lumbar spinal stenosis    Congenital clotting factor deficiency (HCC)    Hypercholesterolemia    GERD (gastroesophageal reflux disease)    Late onset Alzheimer's disease without behavioral disturbance (HCC)    Drug dependence (HCC)    Chronic low back pain without sciatica    Spondylolisthesis, lumbar region    Chronic pain syndrome    Hyperparathyroidism (HCC)    Pain disorder with psychological factors    Current moderate episode of major depressive disorder without prior episode ([address]    TIA (transient ischemic attack)    Cubital tunnel syndrome on left    Left carpal tunnel syndrome    Right carpal tunnel syndrome    Cervical spinal stenosis    Type 2 diabetes mellitus with diabetic neuropathy    Cervical myelopathy (HCC)       Outpatient Medications Marked as Taking for the 1/10/23 encounter (Office Visit) with Irene Gonzalez MD   Medication Sig Dispense Refill    acetaminophen-codeine (TYLENOL #3) 300-30 MG per tablet TAKE ONE TABLET BY MOUTH EVERY 8 HOURS AS NEEDED FOR PAIN . REDUCE DOSES TAKEN AS PAIN BECOMES MANAGEABLE 90 tablet 0    donepezil (ARICEPT) 10 MG tablet TAKE TWO TABLETS BY MOUTH EVERY NIGHT AT BEDTIME 180 tablet 0    gabapentin (NEURONTIN) 600 MG tablet Take 1 tablet by mouth 3 times daily for 90 days. 90 tablet 2    busPIRone (BUSPAR) 15 MG tablet Take 15 mg by mouth 3 times daily as needed (Anxiety) 90 tablet 3    omeprazole (PRILOSEC) 40 MG delayed release capsule TAKE ONE CAPSULE BY MOUTH DAILY 90 capsule 1    mirtazapine (REMERON) 15 MG tablet TAKE ONE TABLET BY MOUTH ONCE NIGHTLY 90 tablet 1    DULoxetine (CYMBALTA) 60 MG extended release capsule TAKE ONE CAPSULE BY MOUTH DAILY 90 capsule 1    amLODIPine (NORVASC) 10 MG tablet TAKE ONE TABLET BY MOUTH DAILY 90 tablet 0    atorvastatin (LIPITOR) 40 MG tablet TAKE ONE TABLET BY MOUTH DAILY 90 tablet 1    aspirin 81 MG chewable tablet Take 1 tablet by mouth daily 1 tablet 3    Melatonin 5 MG CAPS 1-2 qhs for sleep 60 capsule 3    Multiple Vitamins-Minerals (CENTRUM SILVER PO) Take 1 tablet by mouth daily          Allergies   Allergen Reactions    Hydrocodone      The patient has a history of a bleeding Ulcer, and this upset her stomach severely. Social History     Tobacco Use    Smoking status: Never    Smokeless tobacco: Never   Substance Use Topics    Alcohol use: No     Alcohol/week: 0.0 standard drinks       Temp 97.2 °F (36.2 °C) (Infrared)   Wt 147 lb (66.7 kg)   BMI 25.23 kg/m²       Objective:   Physical Exam  Vitals and nursing note reviewed. Constitutional:       General: She is not in acute distress. Appearance: Normal appearance. She is well-developed and well-groomed. Neck:      Vascular: No carotid bruit.    Cardiovascular:      Rate and Rhythm: Normal rate and regular rhythm. Pulses:           Dorsalis pedis pulses are 2+ on the right side and 2+ on the left side. Posterior tibial pulses are 2+ on the right side and 2+ on the left side. Heart sounds: Normal heart sounds. No murmur heard. No friction rub. No gallop. Pulmonary:      Effort: Pulmonary effort is normal.      Breath sounds: Normal breath sounds. Musculoskeletal:      Lumbar back: Tenderness (Sacroiliac joint bilaterally and sacrum) present. No swelling, edema, deformity or spasms. Decreased range of motion. Right upper leg: Normal. No swelling, deformity or tenderness. Left upper leg: Normal. No swelling, deformity or tenderness. Right lower leg: No edema. Left lower leg: No edema. Skin:     General: Skin is warm and dry. Neurological:      Mental Status: She is alert and oriented to person, place, and time. Sensory: Sensation is intact. Motor: Weakness present. Coordination: Abnormal coordination: .oar. Gait: Gait abnormal.      Deep Tendon Reflexes:      Reflex Scores:       Patellar reflexes are 2+ on the right side and 2+ on the left side. Achilles reflexes are 2+ on the right side and 2+ on the left side. Comments: Strength testing is 5 out of 5 in both upper and lower extremities except for the left upper arm is 4 out of 5    Patient does use a cane for ambulation   Psychiatric:         Attention and Perception: Attention and perception normal.         Mood and Affect: Mood and affect normal.         Speech: Speech normal.         Behavior: Behavior normal. Behavior is cooperative. Cognition and Memory: Cognition normal. Memory is impaired. Judgment: Judgment normal.       Assessment:      Pat Soliman was seen today for 3 month follow-up and hypertension. Diagnoses and all orders for this visit:    Idiopathic peripheral neuropathy  -     TSH;  Future  -     FREE T4; Future  -     Vitamin B12 & Folate; Future    Late onset Alzheimer's disease without behavioral disturbance (HCC)    Cervical myelopathy (HCC)    Chronic low back pain without sciatica, unspecified back pain laterality    Current moderate episode of major depressive disorder without prior episode ([facility] [place] [address]    OARRS report checked        Plan:      Proceed with additional laboratory assessment regards to the peripheral neuropathy but her upper extremity symptoms and some of the lower extremity symptoms are probably related to the cervical stenosis and myelopathy. She does not want a surgical intervention. For the chronic back pain maintain current treatment plan as she is already on max doses of Cymbalta and gabapentin medication and pain medication    Patient should maintain the BuSpar and Cymbalta for her anxiety    RTC 3 months        Please note that this chart was generated using Dragon dictation software. Although every effort was made to ensure the accuracy of this automated transcription, some errors in transcription may have occurred.

## 2023-01-31 DIAGNOSIS — G89.29 CHRONIC LOW BACK PAIN WITHOUT SCIATICA, UNSPECIFIED BACK PAIN LATERALITY: ICD-10-CM

## 2023-01-31 DIAGNOSIS — M54.50 CHRONIC LOW BACK PAIN WITHOUT SCIATICA, UNSPECIFIED BACK PAIN LATERALITY: ICD-10-CM

## 2023-01-31 RX ORDER — ACETAMINOPHEN AND CODEINE PHOSPHATE 300; 30 MG/1; MG/1
TABLET ORAL
Qty: 90 TABLET | Refills: 0 | Status: SHIPPED | OUTPATIENT
Start: 2023-01-31 | End: 2023-03-02

## 2023-01-31 NOTE — TELEPHONE ENCOUNTER
Medication:   Requested Prescriptions     Pending Prescriptions Disp Refills    acetaminophen-codeine (TYLENOL #3) 300-30 MG per tablet [Pharmacy Med Name: ACETAMINOPHEN-CODEINE 300-30 MG TAB] 90 tablet      Sig: TAKE ONE TABLET BY MOUTH EVERY 8 HOURS AS NEEDED FOR PAIN. REDUCE DOSE TAKEN AS PAIN BECOMES MANAGEABLE      Last Filled:      Patient Phone Number: [phone] (home)     Last appt: 1/10/2023   Next appt: 4/11/2023    Last OARRS:   RX Monitoring 8/19/2021   Attestation -   Periodic Controlled Substance Monitoring Possible medication side effects, risk of tolerance/dependence & alternative treatments discussed.;No signs of potential drug abuse or diversion identified.   Chronic Pain > 50 MEDD -     PDMP Monitoring:    Last PDMP [place] as Reviewed ([place]):  Review User Review Instant Review Result   ESTEFANI CASTILLO 1/10/2023  2:26 PM Reviewed PDMP [1]     Preferred Pharmacy:   [facility] [id number] - [place] - [address] [phone]  [address] [place] [place] [address]  Phone: [phone] Fax: [phone]

## 2023-02-09 NOTE — TELEPHONE ENCOUNTER
Pt called and requested that provider increase her Gabapentin. She states she is still shaking all over.   Please advise

## 2023-02-10 RX ORDER — PRIMIDONE 50 MG/1
50 TABLET ORAL 2 TIMES DAILY
Qty: 60 TABLET | Refills: 1 | Status: SHIPPED | OUTPATIENT
Start: 2023-02-10

## 2023-02-10 NOTE — TELEPHONE ENCOUNTER
Patient is not been evaluated for tremor and the gabapentin medication is for nerve pain in her back and legs.   If she needs some medication for tremor we certainly could begin her on primidone 50 mg twice daily

## 2023-02-10 NOTE — TELEPHONE ENCOUNTER
Talked to patient. She would like to try the new medication to see if it would help her. Please send to pharmacy.

## 2023-02-13 RX ORDER — AMLODIPINE BESYLATE 10 MG/1
TABLET ORAL
Qty: 90 TABLET | Refills: 0 | Status: SHIPPED | OUTPATIENT
Start: 2023-02-13

## 2023-02-13 NOTE — TELEPHONE ENCOUNTER
Medication:   Requested Prescriptions     Pending Prescriptions Disp Refills    amLODIPine (NORVASC) 10 MG tablet [Pharmacy Med Name: amLODIPine BESYLATE 10 MG TAB] 90 tablet 0     Sig: TAKE ONE TABLET BY MOUTH DAILY      Last Filled:      Patient Phone Number: [phone] (home)     Last appt: 1/10/2023   Next appt: 4/11/2023    Last OARRS:   RX Monitoring 8/19/2021   Attestation -   Periodic Controlled Substance Monitoring Possible medication side effects, risk of tolerance/dependence & alternative treatments discussed. ;No signs of potential drug abuse or diversion identified.    Chronic Pain > 50 MEDD -     PDMP Monitoring:    Last PDMP Brendan Hyman as Reviewed [facility]):  Review User Review Instant Review Result   Paula Jones 1/10/2023  2:26 PM Reviewed PDMP [1]     Preferred Pharmacy:   [facility] [id number] Rocco Oakes, [address] [facility] [phone] Logan Pearce [phone]  [facility] [address]  [address] [place] [address]  Phone: [phone] Fax: [phone]

## 2023-03-01 RX ORDER — GABAPENTIN 600 MG/1
600 TABLET ORAL 3 TIMES DAILY
Qty: 90 TABLET | Refills: 1 | Status: SHIPPED | OUTPATIENT
Start: 2023-03-01 | End: 2023-04-30

## 2023-03-01 RX ORDER — GABAPENTIN 600 MG/1
TABLET ORAL
Qty: 90 TABLET | Refills: 1 | Status: SHIPPED | OUTPATIENT
Start: 2023-03-01 | End: 2023-03-01 | Stop reason: SDUPTHER

## 2023-03-01 NOTE — TELEPHONE ENCOUNTER
Medication:   Requested Prescriptions     Pending Prescriptions Disp Refills    gabapentin (NEURONTIN) 600 MG tablet 90 tablet 1      Last Filled:      Patient Phone Number: [phone] (home)     Last appt: 1/10/2023   Next appt: 4/11/2023    Last OARRS:   RX Monitoring 8/19/2021   Attestation -   Periodic Controlled Substance Monitoring Possible medication side effects, risk of tolerance/dependence & alternative treatments discussed. ;No signs of potential drug abuse or diversion identified.    Chronic Pain > 50 MEDD -     PDMP Monitoring:    Last PDMP Aliya Malhotra as Reviewed [facility]):  Review User Review Instant Review Result   Jennifer Pantoja 1/10/2023  2:26 PM Reviewed PDMP [1]     Preferred Pharmacy:   [facility] [id number] Claudene Fry, [address] [phone] Karan Salinas [phone]  [facility] [address]  [address] [place] [address]  Phone: [phone] Fax: [phone]

## 2023-03-02 DIAGNOSIS — M54.50 CHRONIC LOW BACK PAIN WITHOUT SCIATICA, UNSPECIFIED BACK PAIN LATERALITY: ICD-10-CM

## 2023-03-02 DIAGNOSIS — G89.29 CHRONIC LOW BACK PAIN WITHOUT SCIATICA, UNSPECIFIED BACK PAIN LATERALITY: ICD-10-CM

## 2023-03-02 NOTE — TELEPHONE ENCOUNTER
Medication:   Requested Prescriptions     Pending Prescriptions Disp Refills    acetaminophen-codeine (TYLENOL #3) 300-30 MG per tablet [Pharmacy Med Name: ACETAMINOPHEN-CODEINE 300-30 MG TAB] 90 tablet      Sig: TAKE ONE TABLET BY MOUTH EVERY 8 HOURS AS NEEDED FOR PAIN. REDUCE DOSE TAKEN AS PAIN BECOMES MANAGEABLE      Last Filled:  1/31/2023    Patient Phone Number: [phone] (home)     Last appt: 1/10/2023   Next appt: 4/11/2023    Last OARRS:   RX Monitoring 8/19/2021   Attestation -   Periodic Controlled Substance Monitoring Possible medication side effects, risk of tolerance/dependence & alternative treatments discussed. ;No signs of potential drug abuse or diversion identified.    Chronic Pain > 50 MEDD -     PDMP Monitoring:    Last PDMP [address] as Reviewed [facility]):  Review User Review Instant Review Result   Lupis Matute 1/10/2023  2:26 PM Reviewed PDMP [1]     Preferred Pharmacy:   Jai Perez [id number] Alanis Rahman, [facility] [phone] [place] [phone]  [facility] [address]  [address] [place] [address]  Phone: [phone] Fax: [phone]

## 2023-03-03 RX ORDER — AMLODIPINE BESYLATE 10 MG/1
TABLET ORAL
Qty: 90 TABLET | Refills: 0 | Status: SHIPPED | OUTPATIENT
Start: 2023-03-03

## 2023-03-03 RX ORDER — ACETAMINOPHEN AND CODEINE PHOSPHATE 300; 30 MG/1; MG/1
TABLET ORAL
Qty: 90 TABLET | Refills: 0 | Status: SHIPPED | OUTPATIENT
Start: 2023-03-03 | End: 2023-04-02

## 2023-03-03 NOTE — TELEPHONE ENCOUNTER
Medication:   Requested Prescriptions     Pending Prescriptions Disp Refills    amLODIPine (NORVASC) 10 MG tablet 90 tablet 0      Last Filled:    Patient Phone Number: [phone] (home)     Last appt: 1/10/2023   Next appt: 4/11/2023    Last OARRS:   RX Monitoring 8/19/2021   Attestation -   Periodic Controlled Substance Monitoring Possible medication side effects, risk of tolerance/dependence & alternative treatments discussed. ;No signs of potential drug abuse or diversion identified.    Chronic Pain > 50 MEDD -     PDMP Monitoring:    Last PDMP Thersa Bras as Reviewed [facility]):  Review User Review Instant Review Result   Shilohlluvia Kvng 1/10/2023  2:26 PM Reviewed PDMP [1]     Preferred Pharmacy:   [facility] [id number] Tato Jacob, [facility] [phone] [facility] [phone]  [facility] [address]  [address] [place] [address]  Phone: [phone] Fax: [phone]

## 2023-03-14 RX ORDER — ATORVASTATIN CALCIUM 40 MG/1
TABLET, FILM COATED ORAL
Qty: 90 TABLET | Refills: 0 | Status: SHIPPED | OUTPATIENT
Start: 2023-03-14

## 2023-03-14 NOTE — TELEPHONE ENCOUNTER
Medication:   Requested Prescriptions     Pending Prescriptions Disp Refills    atorvastatin (LIPITOR) 40 MG tablet [Pharmacy Med Name: ATORVASTATIN 40 MG TABLET] 90 tablet 1     Sig: TAKE ONE TABLET BY MOUTH DAILY      Last Filled:      Patient Phone Number: [phone] (home)     Last appt: 1/10/2023   Next appt: 4/11/2023    Last OARRS:   RX Monitoring 8/19/2021   Attestation -   Periodic Controlled Substance Monitoring Possible medication side effects, risk of tolerance/dependence & alternative treatments discussed. ;No signs of potential drug abuse or diversion identified.    Chronic Pain > 50 MEDD -     PDMP Monitoring:    Last PDMP Pancho Eisenberg as Reviewed [facility]):  Review User Review Instant Review Result   Ramiro Ho 1/10/2023  2:26 PM Reviewed PDMP [1]     Preferred Pharmacy:   [facility] [id number] [address] [phone] [facility] [phone]  [facility] [address]  [address] [place] [address]  Phone: [phone] Fax: [phone]

## 2023-03-26 DIAGNOSIS — G30.1 LATE ONSET ALZHEIMER'S DISEASE WITHOUT BEHAVIORAL DISTURBANCE (HCC): ICD-10-CM

## 2023-03-26 DIAGNOSIS — F02.80 LATE ONSET ALZHEIMER'S DISEASE WITHOUT BEHAVIORAL DISTURBANCE (HCC): ICD-10-CM

## 2023-03-27 RX ORDER — DONEPEZIL HYDROCHLORIDE 10 MG/1
TABLET, FILM COATED ORAL
Qty: 60 TABLET | Refills: 2 | Status: SHIPPED | OUTPATIENT
Start: 2023-03-27

## 2023-03-27 NOTE — TELEPHONE ENCOUNTER
Medication:   Requested Prescriptions     Pending Prescriptions Disp Refills    donepezil (ARICEPT) 10 MG tablet [Pharmacy Med Name: DONEPEZIL HCL 10 MG TABLET] 60 tablet 2     Sig: TAKE TWO TABLETS BY MOUTH EVERY NIGHT AT BEDTIME      Last Filled:      Patient Phone Number: [phone] (home)     Last appt: 1/10/2023   Next appt: 4/11/2023    Last OARRS:   RX Monitoring 8/19/2021   Attestation -   Periodic Controlled Substance Monitoring Possible medication side effects, risk of tolerance/dependence & alternative treatments discussed. ;No signs of potential drug abuse or diversion identified.    Chronic Pain > 50 MEDD -     PDMP Monitoring:    Last PDMP Aylin Singletary as Reviewed [facility]):  Review User Review Instant Review Result   Gonsalo Hood 1/10/2023  2:26 PM Reviewed PDMP [1]     Preferred Pharmacy:   [facility] [id number] Bryant Cristobal, [facility] [place] [address] [phone] Rody Tillman [phone]  Formerly named [facility] [address]  [facility]  Phone: [phone] Fax: [phone]

## 2023-04-03 DIAGNOSIS — M48.061 DEGENERATIVE LUMBAR SPINAL STENOSIS: Primary | ICD-10-CM

## 2023-04-03 RX ORDER — ACETAMINOPHEN AND CODEINE PHOSPHATE 300; 30 MG/1; MG/1
TABLET ORAL
Qty: 90 TABLET | Refills: 0 | Status: SHIPPED | OUTPATIENT
Start: 2023-04-03 | End: 2023-05-03

## 2023-04-03 NOTE — TELEPHONE ENCOUNTER
Medication:   Requested Prescriptions     Pending Prescriptions Disp Refills    acetaminophen-codeine (TYLENOL #3) 300-30 MG per tablet [Pharmacy Med Name: ACETAMINOPHEN-CODEINE 300-30 MG TAB] 90 tablet 0     Sig: TAKE ONE TABLET BY MOUTH EVERY 8 HOURS AS NEEDED FOR PAIN. REDUCE DOSE AS PAIN BECOMES MANAGEABLE      Last Filled:  3/3/23      Patient Phone Number: [phone] (home)     Last appt: 1/10/2023   Next appt: 4/11/2023    Last OARRS:   RX Monitoring 8/19/2021   Attestation -   Periodic Controlled Substance Monitoring Possible medication side effects, risk of tolerance/dependence & alternative treatments discussed. ;No signs of potential drug abuse or diversion identified.    Chronic Pain > 50 MEDD -     PDMP Monitoring:    Last PDMP Irena Duncan as Reviewed [facility]):  Review User Review Instant Review Result   Katia Ortiz 1/10/2023  2:26 PM Reviewed PDMP [1]     Preferred Pharmacy:   [facility] [id number] Ivan Ross, [address] [phone] Mika Sanches [phone]  [address] [place] [address] Road  [address] [place] [address]  Phone: [phone] Fax: [phone]

## 2023-04-17 RX ORDER — PRIMIDONE 50 MG/1
TABLET ORAL
Qty: 60 TABLET | Refills: 2 | Status: SHIPPED | OUTPATIENT
Start: 2023-04-17

## 2023-04-17 NOTE — TELEPHONE ENCOUNTER
Medication:   Requested Prescriptions     Pending Prescriptions Disp Refills    primidone (MYSOLINE) 50 MG tablet [Pharmacy Med Name: PRIMIDONE 50 MG TABLET] 60 tablet 1     Sig: TAKE ONE TABLET BY MOUTH TWICE A DAY IN THE MORNING AND AT BEDTIME      Last Filled:      Patient Phone Number: [phone] (home)     Last appt: 4/11/2023   Next appt: 7/11/2023    Last OARRS:   RX Monitoring 8/19/2021   Attestation -   Periodic Controlled Substance Monitoring Possible medication side effects, risk of tolerance/dependence & alternative treatments discussed. ;No signs of potential drug abuse or diversion identified.    Chronic Pain > 50 MEDD -     PDMP Monitoring:    Last PDMP Edward Sauceda as Reviewed [facility]):  Review User Review Instant Review Result   Geri Mabry 1/10/2023  2:26 PM Reviewed PDMP [1]     Preferred Pharmacy:   [facility] [id number] Simone Sil, [address] [facility] [phone] [facility] [phone]  [facility] [address]  [address] [place] [address]  Phone: [phone] Fax: [phone]

## 2023-05-05 DIAGNOSIS — M48.02 CERVICAL SPINAL STENOSIS: Primary | ICD-10-CM

## 2023-05-05 RX ORDER — ACETAMINOPHEN AND CODEINE PHOSPHATE 300; 30 MG/1; MG/1
TABLET ORAL
Qty: 90 TABLET | Refills: 0 | Status: SHIPPED | OUTPATIENT
Start: 2023-05-05 | End: 2023-06-04

## 2023-05-05 NOTE — TELEPHONE ENCOUNTER
Medication:   Requested Prescriptions     Pending Prescriptions Disp Refills    acetaminophen-codeine (TYLENOL #3) 300-30 MG per tablet [Pharmacy Med Name: ACETAMINOPHEN-CODEINE 300-30 MG TAB] 90 tablet      Sig: TAKE ONE TABLET BY MOUTH EVERY 8 HOURS AS NEEDED FOR PAIN . REDUCE DOSES TAKEN AS PAIN BECOMES MANAGEABLE      Last Filled:  4/3/2023    Patient Phone Number: [phone] (home)     Last appt: 4/11/2023   Next appt: 7/11/2023    Last OARRS:   RX Monitoring 8/19/2021   Attestation -   Periodic Controlled Substance Monitoring Possible medication side effects, risk of tolerance/dependence & alternative treatments discussed. ;No signs of potential drug abuse or diversion identified.    Chronic Pain > 50 MEDD -     PDMP Monitoring:    Last PDMP Andrew Villarreal as Reviewed [facility]):  Review User Review Instant Review Result   Florencio Goltz 1/10/2023  2:26 PM Reviewed PDMP [1]     Preferred Pharmacy:   [facility] [address] [id number] [address] [phone] [place] [phone]  [address] [place] [address]  Phone: [phone] Fax: [phone]

## 2023-05-15 RX ORDER — AMLODIPINE BESYLATE 10 MG/1
TABLET ORAL
Qty: 90 TABLET | Refills: 0 | Status: SHIPPED | OUTPATIENT
Start: 2023-05-15

## 2023-05-15 NOTE — TELEPHONE ENCOUNTER
Medication:   Requested Prescriptions     Pending Prescriptions Disp Refills    amLODIPine (NORVASC) 10 MG tablet [Pharmacy Med Name: amLODIPine BESYLATE 10 MG TAB] 90 tablet 0     Sig: TAKE ONE TABLET BY MOUTH DAILY      Last Filled:      Patient Phone Number: [phone] (home)     Last appt: 4/11/2023   Next appt: 7/11/2023    Last OARRS:   RX Monitoring 8/19/2021   Attestation -   Periodic Controlled Substance Monitoring Possible medication side effects, risk of tolerance/dependence & alternative treatments discussed. ;No signs of potential drug abuse or diversion identified.    Chronic Pain > 50 MEDD -     PDMP Monitoring:    Last PDMP Jean Paul Joseph as Reviewed [facility]):  Review User Review Instant Review Result   Trisha Laboy 5/5/2023  3:36 PM Reviewed PDMP [1]     Preferred Pharmacy:   [facility] [id number] Selena Erwin [address] [facility] [phone]  [address]  [address] [place] [address]  Phone: [phone] Fax: [phone]

## 2023-06-22 ENCOUNTER — TELEPHONE (OUTPATIENT)
Dept: FAMILY MEDICINE CLINIC | Age: 78
End: 2023-06-22

## 2023-06-22 NOTE — TELEPHONE ENCOUNTER
Patient is requesting a call to discuss the results of her XR CERVICAL SPINE (2-3 VIEWS). She can be reached at [phone]. Please advise.

## 2023-06-27 RX ORDER — GABAPENTIN 600 MG/1
TABLET ORAL
Qty: 90 TABLET | Refills: 2 | Status: SHIPPED | OUTPATIENT
Start: 2023-06-27 | End: 2023-09-25

## 2023-07-05 ENCOUNTER — OFFICE VISIT (OUTPATIENT)
Dept: FAMILY MEDICINE CLINIC | Age: 78
End: 2023-07-05

## 2023-07-05 VITALS
TEMPERATURE: 97.3 F | SYSTOLIC BLOOD PRESSURE: 112 MMHG | DIASTOLIC BLOOD PRESSURE: 70 MMHG | HEART RATE: 67 BPM | OXYGEN SATURATION: 92 %

## 2023-07-05 DIAGNOSIS — M48.02 CERVICAL SPINAL STENOSIS: Primary | ICD-10-CM

## 2023-07-05 RX ORDER — GABAPENTIN 800 MG/1
800 TABLET ORAL 3 TIMES DAILY
Qty: 90 TABLET | Refills: 2 | Status: SHIPPED | OUTPATIENT
Start: 2023-07-05 | End: 2023-07-05

## 2023-07-05 RX ORDER — GABAPENTIN 600 MG/1
1200 TABLET ORAL 3 TIMES DAILY
Qty: 180 TABLET | Refills: 2 | Status: SHIPPED | OUTPATIENT
Start: 2023-07-05 | End: 2023-10-03

## 2023-07-05 NOTE — TELEPHONE ENCOUNTER
Patient advised. Please send new script to pharmacy. Cancelled script for Gabapentin 800 mg at the pharmacy.

## 2023-07-05 NOTE — TELEPHONE ENCOUNTER
Patient called about new prescription given today being the gabapentin, she stated she was taking 3600 mg a day and now she is prescribed to be taking 2400 mg, she is confused because Dr. Sebas Jackson told her he would be increasing the dose, not decreasing. She believes she is supposed to be taking 4800 mg.     Please Advise

## 2023-07-05 NOTE — TELEPHONE ENCOUNTER
The chart had gabapentin was taking 600 mg 3 times a day-although prior it was increased to 2 tablets 3 times a day. 3600 mg is the max dose per day.   We can send a prescription to the pharmacy to that effect

## 2023-07-05 NOTE — PROGRESS NOTES
for this visit:    Cervical spinal stenosis    Other orders  -     gabapentin (NEURONTIN) 800 MG tablet; Take 1 tablet by mouth 3 times daily for 90 days. Plan:      Discussed the MRI scan reports and the x-ray reports. She would probably benefit from surgical intervention but she does not want to pursue this. We will go ahead and increase gabapentin and her milligrams 3 times daily but not sure if this could be of much benefit for her. Keep RTC appointment        Please note that this chart was generated using Dragon dictation software. Although every effort was made to ensure the accuracy of this automated transcription, some errors in transcription may have occurred.

## 2023-07-07 SDOH — HEALTH STABILITY: PHYSICAL HEALTH: ON AVERAGE, HOW MANY MINUTES DO YOU ENGAGE IN EXERCISE AT THIS LEVEL?: 10 MIN

## 2023-07-07 SDOH — HEALTH STABILITY: PHYSICAL HEALTH: ON AVERAGE, HOW MANY DAYS PER WEEK DO YOU ENGAGE IN MODERATE TO STRENUOUS EXERCISE (LIKE A BRISK WALK)?: 0 DAYS

## 2023-07-07 ASSESSMENT — LIFESTYLE VARIABLES
HOW OFTEN DO YOU HAVE A DRINK CONTAINING ALCOHOL: NEVER
HOW OFTEN DO YOU HAVE SIX OR MORE DRINKS ON ONE OCCASION: 1
HOW MANY STANDARD DRINKS CONTAINING ALCOHOL DO YOU HAVE ON A TYPICAL DAY: PATIENT DOES NOT DRINK
HOW MANY STANDARD DRINKS CONTAINING ALCOHOL DO YOU HAVE ON A TYPICAL DAY: 0
HOW OFTEN DO YOU HAVE A DRINK CONTAINING ALCOHOL: 1

## 2023-07-07 ASSESSMENT — PATIENT HEALTH QUESTIONNAIRE - PHQ9
SUM OF ALL RESPONSES TO PHQ9 QUESTIONS 1 & 2: 0
SUM OF ALL RESPONSES TO PHQ QUESTIONS 1-9: 0
1. LITTLE INTEREST OR PLEASURE IN DOING THINGS: 0
SUM OF ALL RESPONSES TO PHQ QUESTIONS 1-9: 0
2. FEELING DOWN, DEPRESSED OR HOPELESS: 0

## 2023-07-10 ENCOUNTER — OFFICE VISIT (OUTPATIENT)
Dept: FAMILY MEDICINE CLINIC | Age: 78
End: 2023-07-10

## 2023-07-10 VITALS
OXYGEN SATURATION: 97 % | TEMPERATURE: 97.4 F | HEART RATE: 56 BPM | BODY MASS INDEX: 24.07 KG/M2 | SYSTOLIC BLOOD PRESSURE: 114 MMHG | HEIGHT: 64 IN | DIASTOLIC BLOOD PRESSURE: 72 MMHG | WEIGHT: 141 LBS

## 2023-07-10 DIAGNOSIS — Z00.00 MEDICARE ANNUAL WELLNESS VISIT, SUBSEQUENT: Primary | ICD-10-CM

## 2023-07-10 DIAGNOSIS — M48.02 CERVICAL SPINAL STENOSIS: ICD-10-CM

## 2023-07-10 DIAGNOSIS — G30.1 LATE ONSET ALZHEIMER'S DISEASE WITHOUT BEHAVIORAL DISTURBANCE (HCC): ICD-10-CM

## 2023-07-10 DIAGNOSIS — L24.9 IRRITANT CONTACT DERMATITIS, UNSPECIFIED TRIGGER: ICD-10-CM

## 2023-07-10 DIAGNOSIS — E21.3 HYPERPARATHYROIDISM (HCC): ICD-10-CM

## 2023-07-10 DIAGNOSIS — F02.80 LATE ONSET ALZHEIMER'S DISEASE WITHOUT BEHAVIORAL DISTURBANCE (HCC): ICD-10-CM

## 2023-07-10 RX ORDER — VIBEGRON 75 MG/1
75 TABLET, FILM COATED ORAL DAILY
COMMUNITY

## 2023-07-11 DIAGNOSIS — M48.061 DEGENERATIVE LUMBAR SPINAL STENOSIS: ICD-10-CM

## 2023-07-11 RX ORDER — ATORVASTATIN CALCIUM 40 MG/1
TABLET, FILM COATED ORAL
Qty: 90 TABLET | Refills: 1 | Status: SHIPPED | OUTPATIENT
Start: 2023-07-11

## 2023-07-11 RX ORDER — ACETAMINOPHEN AND CODEINE PHOSPHATE 300; 30 MG/1; MG/1
TABLET ORAL
Qty: 90 TABLET | Refills: 0 | Status: SHIPPED | OUTPATIENT
Start: 2023-07-11 | End: 2023-08-10

## 2023-07-11 NOTE — TELEPHONE ENCOUNTER
Medication:   Requested Prescriptions     Pending Prescriptions Disp Refills    acetaminophen-codeine (TYLENOL #3) 300-30 MG per tablet [Pharmacy Med Name: ACETAMINOPHEN-CODEINE 300-30 MG TAB] 90 tablet 0     Sig: TAKE ONE TABLET BY MOUTH EVERY 8 HOURS AS NEEDED FOR PAIN . REDUCE DOSES TAKEN AS PAIN BECOMES MANAGEABLE    atorvastatin (LIPITOR) 40 MG tablet [Pharmacy Med Name: ATORVASTATIN 40 MG TABLET] 90 tablet 1     Sig: TAKE 1 TABLET BY MOUTH DAILY      Last Filled:      Patient Phone Number: [phone] (home)     Last appt: 7/10/2023   Next appt: 10/10/2023    Last OARRS:   RX Monitoring 8/19/2021   Attestation -   Periodic Controlled Substance Monitoring Possible medication side effects, risk of tolerance/dependence & alternative treatments discussed. ;No signs of potential drug abuse or diversion identified.    Chronic Pain > 50 MEDD -     PDMP Monitoring:    Last PDMP Lin Valero as Reviewed [facility]):  Review User Review Instant Review Result   Carrie Manuel 5/5/2023  3:36 PM Reviewed PDMP [1]     Preferred Pharmacy:   [facility] [id number] Gunnar Virgen [facility] [phone]  [address]  [address]  Phone: [phone] Fax: [phone]

## 2023-07-28 DIAGNOSIS — F02.80 LATE ONSET ALZHEIMER'S DISEASE WITHOUT BEHAVIORAL DISTURBANCE (HCC): ICD-10-CM

## 2023-07-28 DIAGNOSIS — G30.1 LATE ONSET ALZHEIMER'S DISEASE WITHOUT BEHAVIORAL DISTURBANCE (HCC): ICD-10-CM

## 2023-07-28 RX ORDER — DONEPEZIL HYDROCHLORIDE 10 MG/1
TABLET, FILM COATED ORAL
Qty: 60 TABLET | Refills: 2 | OUTPATIENT
Start: 2023-07-28

## 2023-08-09 DIAGNOSIS — M48.061 DEGENERATIVE LUMBAR SPINAL STENOSIS: ICD-10-CM

## 2023-08-09 RX ORDER — ACETAMINOPHEN AND CODEINE PHOSPHATE 300; 30 MG/1; MG/1
TABLET ORAL
Qty: 90 TABLET | Refills: 2 | Status: SHIPPED | OUTPATIENT
Start: 2023-08-09 | End: 2023-11-07

## 2023-08-09 NOTE — TELEPHONE ENCOUNTER
Medication:   Requested Prescriptions     Pending Prescriptions Disp Refills    acetaminophen-codeine (TYLENOL #3) 300-30 MG per tablet [Pharmacy Med Name: ACETAMINOPHEN-CODEINE 300-30 MG TAB] 90 tablet 2     Sig: TAKE ONE TABLET BY MOUTH EVERY 8 HOURS AS NEEDED FOR PAIN. REDUCE DOSES TAKEN AS PAIN BECOMES MANAGEABLE. Last Filled:  7/11/23    Patient Phone Number: [phone] (home)     Last appt: 7/10/2023   Next appt: 10/10/2023    Last OARRS:   RX Monitoring 8/19/2021   Attestation -   Periodic Controlled Substance Monitoring Possible medication side effects, risk of tolerance/dependence & alternative treatments discussed. ;No signs of potential drug abuse or diversion identified.    Chronic Pain > 50 MEDD -     PDMP Monitoring:    Last PDMP Abril Shanae as Reviewed [facility]):  Review User Review Instant Review Result   Sami Gardner 5/5/2023  3:36 PM Reviewed PDMP [1]     Preferred Pharmacy:   [facility] [id number] Gunnar Bains [phone]  [address] [place] [address] Road  [address]  Phone: [phone] Fax: [phone]

## 2023-08-11 DIAGNOSIS — F02.80 LATE ONSET ALZHEIMER'S DISEASE WITHOUT BEHAVIORAL DISTURBANCE (HCC): ICD-10-CM

## 2023-08-11 DIAGNOSIS — G30.1 LATE ONSET ALZHEIMER'S DISEASE WITHOUT BEHAVIORAL DISTURBANCE (HCC): ICD-10-CM

## 2023-08-11 RX ORDER — DONEPEZIL HYDROCHLORIDE 10 MG/1
TABLET, FILM COATED ORAL
Qty: 60 TABLET | Refills: 2 | OUTPATIENT
Start: 2023-08-11

## 2023-09-19 RX ORDER — PRIMIDONE 50 MG/1
TABLET ORAL
Qty: 60 TABLET | Refills: 1 | Status: SHIPPED | OUTPATIENT
Start: 2023-09-19

## 2023-09-22 ENCOUNTER — HOSPITAL ENCOUNTER (OUTPATIENT)
Dept: PHYSICAL THERAPY | Age: 78
Setting detail: THERAPIES SERIES
Discharge: HOME | End: 2023-09-22
Payer: COMMERCIAL

## 2023-09-22 PROCEDURE — 97530 THERAPEUTIC ACTIVITIES: CPT

## 2023-09-22 PROCEDURE — 97162 PT EVAL MOD COMPLEX 30 MIN: CPT

## 2023-09-22 NOTE — FLOWSHEET NOTE
Modalities:     Pt. Education:  9/22/2023 patient educated on diagnosis, prognosis and expectations for rehab, all patient questions were answered    HEP instruction:  Access Code: [email or web address]  URL: [email or web address]  Date: 09/22/2023  Prepared by: Rafat Hutching     Exercises  - Seated Scapular Retraction  - 2 x daily - 7 x weekly - 1 sets - 10 reps  - Supine Shoulder Flexion Extension AAROM with Dowel  - 2 x daily - 7 x weekly - 1 sets - 10 reps  - Supine Shoulder Press with Dowel  - 2 x daily - 7 x weekly - 1 sets - 10 reps  - Hooklying Gluteal Sets  - 2 x daily - 7 x weekly - 1 sets - 10 reps  - Bridge  - 2 x daily - 7 x weekly - 1 sets - 10 reps  - Sit to Stand with Armchair  - 2 x daily - 7 x weekly - 1 sets - 10 reps  - Walking  - 6 x daily - 7 x weekly - 1 sets - 3 mins hold      Therapeutic Exercise and NMR EXR  [] (68993) Provided verbal/tactile cueing for activities related to strengthening, flexibility, endurance, ROM for improvements in  [] LE / Core stability: LE, hip, and core control with self care, mobility, lifting, ambulation. [] UE / Shoulder complex: cervical, postural, scapular, scapulothoracic and UE control with self care, reaching, carrying, lifting, house/yardwork, driving, computer work.  [] (82417) Provided verbal/tactile cueing for activities related to improving balance, coordination, kinesthetic sense, posture, motor skill, proprioception to assist with   [] LE / Core stability: LE, hip, and core control in self care, mobility, lifting, ambulation and eccentric single leg control.    [] UE / Shoulder complex: cervical, scapular, scapulothoracic and UE control with self care, reaching, carrying, lifting, house/yardwork, driving, computer work.   [] (85433) Therapist is in constant attendance of 2 or more patients providing skilled therapy interventions, but not providing any significant amount of measurable one-on-one time to either patient, for

## 2023-09-22 NOTE — PLAN OF CARE
- 3 mins hold      GOALS:  Patient stated goal: improve walking, reduce pain  [] Progressing: [] Met: [] Not Met: [] Adjusted    Therapist goals for Patient:   Short Term Goals: To be achieved in: 2 weeks  1. Independent in HEP and progression per patient tolerance, in order to prevent re-injury. [] Progressing: [] Met: [] Not Met: [] Adjusted  2. Patient will have a decrease in pain to facilitate improvement in movement, function, and ADLs as indicated by Functional Deficits. [] Progressing: [] Met: [] Not Met: [] Adjusted    Long Term Goals: To be achieved in: 6 weeks  1. Patient to improve TUG to <12 sec to demonstrate improved fall risk to assist with reaching prior level of function. [] Progressing: [] Met: [] Not Met: [] Adjusted  2. Patient will demonstrate an increase in reps of 30\" STS to 13, completing full hip and trunk ext, in order to improve functional strength in preparation of return to community activity. [] Progressing: [] Met: [] Not Met: [] Adjusted  3. Patient will improve Tinetti score to 20/28 in order to demo improvements in transfers, balance and gait pattern for safe return to community activity. [] Progressing: [] Met: [] Not Met: [] Adjusted  4. Patient to demonstrate B shoulder strength to 4+/5 throughout in order to perform all lifting and reaching pain free, without LOB, while completing meal prep and cleanup.   [] Progressing: [] Met: [] Not Met: [] Adjusted     Electronically signed by:  Maurice Saleh PT

## 2023-09-26 ENCOUNTER — HOSPITAL ENCOUNTER (OUTPATIENT)
Dept: PHYSICAL THERAPY | Age: 78
Setting detail: THERAPIES SERIES
Discharge: HOME | End: 2023-09-26
Payer: COMMERCIAL

## 2023-09-26 RX ORDER — GABAPENTIN 600 MG/1
600 TABLET ORAL 3 TIMES DAILY
Qty: 90 TABLET | Refills: 0 | Status: SHIPPED | OUTPATIENT
Start: 2023-09-26 | End: 2023-10-26

## 2023-09-26 NOTE — FLOWSHEET NOTE
[address] [facility], [address] [facility], [address] [place] [address] office: [phone] fax: [phone]    Physical Therapy  Cancellation/No-show Note  Patient Name:  Odilia Hoyt  :  1945   Date:  2023  Cancelled visits to date: 1  No-shows to date: 0    For today's appointment patient:  [x]  Cancelled   []  Rescheduled appointment  []  No-show     Reason given by patient:  [x]  Patient ill  []  Conflicting appointment  []  No transportation    []  Conflict with work  []  No reason given  []  Other:     Comments:      Phone call information:   []  Phone call made today to patient at _ time at number provided:      []  Patient answered, conversation as follows:    []  Patient did not answer, message left as follows:  []  Phone call not made today  [x]  Phone call not needed - pt contacted us to cancel and provided reason for cancellation.      Electronically signed by:  Nancy Wang, PT, DPT

## 2023-10-02 DIAGNOSIS — F02.80 LATE ONSET ALZHEIMER'S DISEASE WITHOUT BEHAVIORAL DISTURBANCE (HCC): ICD-10-CM

## 2023-10-02 DIAGNOSIS — G30.1 LATE ONSET ALZHEIMER'S DISEASE WITHOUT BEHAVIORAL DISTURBANCE (HCC): ICD-10-CM

## 2023-10-03 RX ORDER — DONEPEZIL HYDROCHLORIDE 10 MG/1
TABLET, FILM COATED ORAL
Qty: 60 TABLET | Refills: 3 | Status: SHIPPED | OUTPATIENT
Start: 2023-10-03

## 2023-10-09 ENCOUNTER — HOSPITAL ENCOUNTER (OUTPATIENT)
Dept: PHYSICAL THERAPY | Age: 78
Setting detail: THERAPIES SERIES
Discharge: HOME | End: 2023-10-09
Payer: COMMERCIAL

## 2023-10-09 PROCEDURE — 97530 THERAPEUTIC ACTIVITIES: CPT

## 2023-10-09 PROCEDURE — 97110 THERAPEUTIC EXERCISES: CPT

## 2023-10-09 NOTE — FLOWSHEET NOTE
progressing as expected and requires additional follow up with physician  [] Other    Persisting Functional Limitations/Impairments:  []Sleeping []Sitting               [x]Standing [x]Transfers        [x]Walking []Kneeling               [x]Stairs []Squatting / bending   []ADLs [x]Reaching  [x]Lifting  [x]Housework  []Driving []Job related tasks  []Sports/Recreation []Other:        ASSESSMENT:   Pt performs activities too quickly but tolerated all well. Had difficulty maintaining good scapular and cervical posture while performing resisted band activities. Continue to progress BLE strength and stamina. Treatment/Activity Tolerance:  [x] Patient able to complete tx [] Patient limited by fatigue  [] Patient limited by pain  [] Patient limited by other medical complications  [] Other:     Prognosis: [x] Good [] Fair  [] Poor    Patient Requires Follow-up: [x] Yes  [] No    Plan for next treatment session:   progress BLE strength, stamina, balance & gait    PLAN: See christiano. PT 2x / week for 6 weeks. [x] Continue per plan of care [] Alter current plan (see comments)  [] Plan of care initiated [] Hold pending MD visit [] Discharge    Electronically signed by: Celeste Figueroa, PT, DPT    Note: If patient does not return for scheduled/ recommended follow up visits, his note will serve as a discharge from care along with most recent update on progress.

## 2023-10-10 ENCOUNTER — OFFICE VISIT (OUTPATIENT)
Dept: FAMILY MEDICINE CLINIC | Age: 78
End: 2023-10-10

## 2023-10-10 VITALS
DIASTOLIC BLOOD PRESSURE: 78 MMHG | OXYGEN SATURATION: 97 % | SYSTOLIC BLOOD PRESSURE: 122 MMHG | HEART RATE: 56 BPM | WEIGHT: 139.56 LBS | TEMPERATURE: 97.2 F | RESPIRATION RATE: 12 BRPM | BODY MASS INDEX: 24.33 KG/M2

## 2023-10-10 DIAGNOSIS — G30.1 LATE ONSET ALZHEIMER'S DISEASE WITHOUT BEHAVIORAL DISTURBANCE (HCC): ICD-10-CM

## 2023-10-10 DIAGNOSIS — M48.061 DEGENERATIVE LUMBAR SPINAL STENOSIS: ICD-10-CM

## 2023-10-10 DIAGNOSIS — G89.4 CHRONIC PAIN SYNDROME: ICD-10-CM

## 2023-10-10 DIAGNOSIS — F02.80 LATE ONSET ALZHEIMER'S DISEASE WITHOUT BEHAVIORAL DISTURBANCE (HCC): ICD-10-CM

## 2023-10-10 DIAGNOSIS — E11.40 TYPE 2 DIABETES MELLITUS WITH DIABETIC NEUROPATHY, WITHOUT LONG-TERM CURRENT USE OF INSULIN (HCC): Primary | ICD-10-CM

## 2023-10-10 RX ORDER — MIRTAZAPINE 15 MG/1
TABLET, FILM COATED ORAL
Qty: 90 TABLET | Refills: 1 | Status: SHIPPED | OUTPATIENT
Start: 2023-10-10

## 2023-10-10 RX ORDER — DULOXETIN HYDROCHLORIDE 60 MG/1
CAPSULE, DELAYED RELEASE ORAL
Qty: 90 CAPSULE | Refills: 1 | Status: SHIPPED | OUTPATIENT
Start: 2023-10-10

## 2023-10-10 RX ORDER — OMEPRAZOLE 40 MG/1
CAPSULE, DELAYED RELEASE ORAL
Qty: 90 CAPSULE | Refills: 1 | Status: SHIPPED | OUTPATIENT
Start: 2023-10-10

## 2023-10-10 RX ORDER — ACETAMINOPHEN AND CODEINE PHOSPHATE 300; 30 MG/1; MG/1
1 TABLET ORAL EVERY 8 HOURS PRN
Qty: 90 TABLET | Refills: 2 | Status: SHIPPED | OUTPATIENT
Start: 2023-10-10 | End: 2024-01-08

## 2023-10-10 RX ORDER — AMLODIPINE BESYLATE 10 MG/1
10 TABLET ORAL DAILY
Qty: 90 TABLET | Refills: 1 | Status: SHIPPED | OUTPATIENT
Start: 2023-10-10

## 2023-10-10 RX ORDER — GABAPENTIN 600 MG/1
1200 TABLET ORAL 3 TIMES DAILY
Qty: 180 TABLET | Refills: 3 | Status: SHIPPED | OUTPATIENT
Start: 2023-10-10 | End: 2024-02-08

## 2023-10-10 RX ORDER — BUSPIRONE HYDROCHLORIDE 15 MG/1
TABLET ORAL
Qty: 90 TABLET | Refills: 1 | Status: SHIPPED | OUTPATIENT
Start: 2023-10-10

## 2023-10-10 NOTE — PROGRESS NOTES
Exam  Vitals and nursing note reviewed. Constitutional:       General: She is not in acute distress. Appearance: Normal appearance. She is well-developed and well-groomed. Neck:      Vascular: No carotid bruit. Cardiovascular:      Rate and Rhythm: Normal rate and regular rhythm. Pulses:           Dorsalis pedis pulses are 2+ on the right side and 2+ on the left side. Posterior tibial pulses are 2+ on the right side and 2+ on the left side. Heart sounds: Normal heart sounds. No murmur heard. No friction rub. No gallop. Pulmonary:      Effort: Pulmonary effort is normal.      Breath sounds: Normal breath sounds. Musculoskeletal:      Lumbar back: Tenderness (Sacroiliac joint bilaterally and sacrum) present. No swelling, edema, deformity or spasms. Decreased range of motion. Right upper leg: Normal. No swelling, deformity or tenderness. Left upper leg: Normal. No swelling, deformity or tenderness. Right lower leg: No edema. Left lower leg: No edema. Skin:     General: Skin is warm and dry. Neurological:      Mental Status: She is alert and oriented to person, place, and time. Sensory: Sensation is intact. Motor: Weakness present. Coordination: Abnormal coordination: .oar. Gait: Gait abnormal.      Deep Tendon Reflexes:      Reflex Scores:       Patellar reflexes are 2+ on the right side and 2+ on the left side. Achilles reflexes are 2+ on the right side and 2+ on the left side. Comments: Strength testing is 5 out of 5 in both upper and lower extremities except for the left upper arm is 4 out of 5    Patient does use a cane for ambulation   Psychiatric:         Attention and Perception: Attention and perception normal.         Mood and Affect: Mood and affect normal.         Speech: Speech normal.         Behavior: Behavior normal. Behavior is cooperative. Cognition and Memory: Cognition normal. Memory is impaired.

## 2023-10-11 ENCOUNTER — HOSPITAL ENCOUNTER (OUTPATIENT)
Dept: PHYSICAL THERAPY | Age: 78
Setting detail: THERAPIES SERIES
Discharge: HOME | End: 2023-10-11
Payer: COMMERCIAL

## 2023-10-11 PROCEDURE — 97110 THERAPEUTIC EXERCISES: CPT

## 2023-10-11 PROCEDURE — 97530 THERAPEUTIC ACTIVITIES: CPT

## 2023-10-11 NOTE — FLOWSHEET NOTE
[facility]  Phone: [phone]   Fax: [phone]    Physical Therapy Daily Treatment Note    Date:  10/11/2023     Patient Name:  Catarina Ramirez    :  1945  MRN: [id number]  Medical Diagnosis:  M54.2 (ICD-10-CM) - Cervicalgia   R20.0 (ICD-10-CM) - Anesthesia of skin     Treatment Diagnosis: Decreased BUE and BLE strength, Impaired transfers, balance and gait    Insurance/Certification information:  PT Insurance Information: [id number] [address]  $40 copay  Physician Information:  SHENA Benson - CNP    Plan of care signed (Y/N): []  Yes [x]  No     Date of Patient follow up with Physician:      Progress Report: []  Yes  [x]  No     Date Range for reporting period:  Beginnin2023  Ending:       Progress report due (10 Rx/or 30 days whichever is less): visit #10 or  (date)     Recertification due (POC duration/ or 90 days whichever is less): visit #12 or 23 (date)     Visit # Insurance Allowable Auth required? Date Range   3/12 mn []  Yes  [x]  No pcy       Latex Allergy:  [x]NO      []YES  Preferred Language for Healthcare:   [x]English       []Other:        Functional Scale/Test Evaluation 2023 30 day  60 day  Discharge    Tinetti Assessment        TUG 22\"       30\" STS 10            Pain level:  0/10  Location:  across lower back    SUBJECTIVE:    Felt good following last session, no pain. Feeling good again today too.         OBJECTIVE: See eval      RESTRICTIONS/PRECAUTIONS: C2-C4 fusion, HTN    Interventions/Exercises:   Therapeutic Exercises (96979) Resistance / level Sets/sec Reps Notes   Seated stepper / NuStep 1.0 / L4 6 mins     IB / HR  2x30\" / 2x10     Resisted bands:  Mid row  GH Ext  High row  [facility]    1  1  1  1   20  20  20  20    HSS at step  Seated HS stretch  Seated piriformis stretch 8\" 2x30\" B  30\" B  2x30\" B  10/11: added to HEP    10/11: added to HEP   Neuromuscular

## 2023-10-11 NOTE — TELEPHONE ENCOUNTER
Medication:   Requested Prescriptions     Pending Prescriptions Disp Refills    DULoxetine (CYMBALTA) 60 MG extended release capsule [Pharmacy Med Name: DULOXETINE HCL DR 60 MG CAPSULE] 90 capsule 1     Sig: TAKE ONE CAPSULE BY MOUTH DAILY      Last Filled:      Patient Phone Number: [phone] (home)     Last appt: 4/19/2022   Next appt: 10/7/2022    Last OARRS:   RX Monitoring 8/19/2021   Attestation -   Periodic Controlled Substance Monitoring Possible medication side effects, risk of tolerance/dependence & alternative treatments discussed. ;No signs of potential drug abuse or diversion identified.    Chronic Pain > 50 MEDD -     PDMP Monitoring:    Last PDMP Flavio Laguerre as Reviewed [facility]):  Review User Review Instant Review Result   Stacia Pinto 4/19/2022 11:15 AM Reviewed PDMP [1]     Preferred Pharmacy:   [facility] [id number] Emy [address]ill, [address] [facility] [phone]  [facility] [address]  [address] [place] [address]  Phone: [phone] Fax: [phone] 77 Year old female patient with dislodged PEG tube. Patient recently discharged from  two days ago after being admitted for clogged PEG tube.  As per patient's  and care giver. No N/V/D/ or constipation, no abdominal pain, no hematochezia. Peg site Clean, dry, intact, rotates freely and without resistance.   Patient NPO  Tube feeds to be started  GI Consult: Dislodged PEG tube  New Peg placed: 10/11

## 2023-10-16 ENCOUNTER — HOSPITAL ENCOUNTER (OUTPATIENT)
Dept: PHYSICAL THERAPY | Age: 78
Setting detail: THERAPIES SERIES
Discharge: HOME | End: 2023-10-16
Payer: COMMERCIAL

## 2023-10-16 PROCEDURE — 97110 THERAPEUTIC EXERCISES: CPT

## 2023-10-16 PROCEDURE — 97530 THERAPEUTIC ACTIVITIES: CPT

## 2023-10-16 NOTE — FLOWSHEET NOTE
[facility]  Phone: [phone]   Fax: [phone]    Physical Therapy Daily Treatment Note    Date:  10/16/2023     Patient Name:  Lupe Love    :  1945  MRN: [id number]  Medical Diagnosis:  M54.2 (ICD-10-CM) - Cervicalgia   R20.0 (ICD-10-CM) - Anesthesia of skin     Treatment Diagnosis: Decreased BUE and BLE strength, Impaired transfers, balance and gait    Insurance/Certification information:  PT Insurance Information: [id number] [address]  $40 copay  Physician Information:  SHENA Ovalles - CNP    Plan of care signed (Y/N): []  Yes [x]  No     Date of Patient follow up with Physician:      Progress Report: []  Yes  [x]  No     Date Range for reporting period:  Beginnin2023  Ending:       Progress report due (10 Rx/or 30 days whichever is less): visit #10 or 32 (date)     Recertification due (POC duration/ or 90 days whichever is less): visit #12 or 23 (date)     Visit # Insurance Allowable Auth required? Date Range    mn []  Yes  [x]  No pcy       Latex Allergy:  [x]NO      []YES  Preferred Language for Healthcare:   [x]English       []Other:        Functional Scale/Test Evaluation 2023 30 day  60 day  Discharge    Tinetti Assessment        TUG 22\"       30\" STS 10            Pain level:  0/10  Location:  across lower back    SUBJECTIVE:     Doing okay today, not having any pain. Felt good after last session too, no pain.         OBJECTIVE: See eval      RESTRICTIONS/PRECAUTIONS: C2-C4 fusion, HTN    Interventions/Exercises:   Therapeutic Exercises (40636) Resistance / level Sets/sec Reps Notes   Seated stepper / NuStep 1.0 / L4 6 mins     IB / HR  2x30\" / 2x20     Resisted bands:  Mid row  GH Ext  High row  [facility]    1  1    20  20     HSS at step  Seated HS stretch  Seated piriformis stretch 8\"  10: added to HEP    10/11: added to HEP   Wall walks with ball  B scap retract at

## 2023-10-18 ENCOUNTER — APPOINTMENT (OUTPATIENT)
Dept: PHYSICAL THERAPY | Age: 78
End: 2023-10-18
Payer: COMMERCIAL

## 2023-10-23 ENCOUNTER — APPOINTMENT (OUTPATIENT)
Dept: PHYSICAL THERAPY | Age: 78
End: 2023-10-23
Payer: COMMERCIAL

## 2023-10-24 RX ORDER — GABAPENTIN 600 MG/1
600 TABLET ORAL 3 TIMES DAILY
Qty: 90 TABLET | OUTPATIENT
Start: 2023-10-24

## 2023-10-25 DIAGNOSIS — Z12.31 ENCOUNTER FOR SCREENING MAMMOGRAM FOR MALIGNANT NEOPLASM OF BREAST: Primary | ICD-10-CM

## 2023-11-08 ENCOUNTER — TELEPHONE (OUTPATIENT)
Dept: FAMILY MEDICINE CLINIC | Age: 78
End: 2023-11-08

## 2023-11-08 NOTE — TELEPHONE ENCOUNTER
Called Pt, states she wanted her screening done. Advised that screenings are done until 74 and that insurance may not cover. Advised Pt that she may want to contact insurance to see if it will be an issue. Pt states that she does not have any pain, swelling, tenderness, change in skin or unusual lumps. Pt states that she does not want one since she is not having any changes and insurance may not cover. Advised Pt that this nurse will order the mammo. Pt voiced that she does not want one at this time just that she thought she was due for her screening. Pt had no further questions or concerns at this time.

## 2023-11-16 RX ORDER — AMLODIPINE BESYLATE 10 MG/1
10 TABLET ORAL DAILY
Qty: 90 TABLET | Refills: 1 | Status: SHIPPED | OUTPATIENT
Start: 2023-11-16

## 2023-11-16 RX ORDER — PRIMIDONE 50 MG/1
TABLET ORAL
Qty: 60 TABLET | Refills: 1 | Status: SHIPPED | OUTPATIENT
Start: 2023-11-16

## 2023-11-16 NOTE — TELEPHONE ENCOUNTER
Medication:   Requested Prescriptions     Pending Prescriptions Disp Refills    amLODIPine (NORVASC) 10 MG tablet [Pharmacy Med Name: amLODIPine BESYLATE 10 MG TAB] 90 tablet 1     Sig: TAKE ONE TABLET BY MOUTH DAILY    primidone (MYSOLINE) 50 MG tablet [Pharmacy Med Name: PRIMIDONE 50 MG TABLET] 60 tablet 1     Sig: TAKE ONE TABLET BY MOUTH EVERY MORNING AND TAKE ONE TABLET BY MOUTH EVERY NIGHT AT BEDTIME     Last Filled:  # 60 with 1 refill on 9/19/23 on the primidone, norvasc just filled 10/10/23 with refills     Last appt: 10/10/2023   Next appt: 11/16/2023    Last OARRS:       8/19/2021     8:15 AM   RX Monitoring   Periodic Controlled Substance Monitoring Possible medication side effects, risk of tolerance/dependence & alternative treatments discussed. ;No signs of potential drug abuse or diversion identified.

## 2024-01-02 NOTE — TELEPHONE ENCOUNTER
MARYELLEN. Pt is to call Dr. Damaris Rodas office at [phone] Patient off unit at this time to go to nuclear medicine.

## 2024-01-12 RX ORDER — PRIMIDONE 50 MG/1
TABLET ORAL
Qty: 60 TABLET | Refills: 1 | Status: SHIPPED | OUTPATIENT
Start: 2024-01-12

## 2024-01-12 NOTE — TELEPHONE ENCOUNTER
Medication:   Requested Prescriptions     Pending Prescriptions Disp Refills    primidone (MYSOLINE) 50 MG tablet [Pharmacy Med Name: PRIMIDONE 50 MG TABLET] 60 tablet 1     Sig: TAKE ONE TABLET BY MOUTH EVERY MORNING AND TAKE ONE TABLET BY MOUTH EVERY NIGHT AT BEDTIME      Provider out of office.     Patient Phone Number: [phone] (home)     Last appt: 10/10/2023   Next appt: 1/29/2024    Last OARRS:       8/19/2021     8:15 AM   RX Monitoring   Periodic Controlled Substance Monitoring Possible medication side effects, risk of tolerance/dependence & alternative treatments discussed.;No signs of potential drug abuse or diversion identified.     PDMP Monitoring:    Last PDMP [place] as Reviewed ([place]):  Review User Review Instant Review Result   MAE SINGH 5/5/2023  3:36 PM Reviewed PDMP [1]     Preferred Pharmacy:   [facility] [id number] - [place], [place] - [address] [phone] - F [phone]  [address]  [place] [address]  Phone: [phone] Fax: [phone]

## 2024-01-22 RX ORDER — BUSPIRONE HYDROCHLORIDE 15 MG/1
15 TABLET ORAL 3 TIMES DAILY PRN
Qty: 90 TABLET | Refills: 0 | Status: SHIPPED | OUTPATIENT
Start: 2024-01-22

## 2024-01-22 RX ORDER — BUSPIRONE HYDROCHLORIDE 15 MG/1
TABLET ORAL
Qty: 90 TABLET | Refills: 0 | OUTPATIENT
Start: 2024-01-22

## 2024-01-29 ENCOUNTER — OFFICE VISIT (OUTPATIENT)
Dept: FAMILY MEDICINE CLINIC | Age: 79
End: 2024-01-29
Payer: MEDICARE

## 2024-01-29 VITALS
DIASTOLIC BLOOD PRESSURE: 70 MMHG | RESPIRATION RATE: 20 BRPM | BODY MASS INDEX: 24.59 KG/M2 | WEIGHT: 141 LBS | TEMPERATURE: 97.2 F | OXYGEN SATURATION: 98 % | SYSTOLIC BLOOD PRESSURE: 112 MMHG | HEART RATE: 71 BPM

## 2024-01-29 DIAGNOSIS — E21.3 HYPERPARATHYROIDISM (HCC): ICD-10-CM

## 2024-01-29 DIAGNOSIS — G62.9 PERIPHERAL POLYNEUROPATHY: ICD-10-CM

## 2024-01-29 DIAGNOSIS — F32.1 CURRENT MODERATE EPISODE OF MAJOR DEPRESSIVE DISORDER WITHOUT PRIOR EPISODE (HCC): ICD-10-CM

## 2024-01-29 DIAGNOSIS — G95.9 CERVICAL MYELOPATHY (HCC): ICD-10-CM

## 2024-01-29 DIAGNOSIS — F02.80 LATE ONSET ALZHEIMER'S DISEASE WITHOUT BEHAVIORAL DISTURBANCE (HCC): ICD-10-CM

## 2024-01-29 DIAGNOSIS — F19.20 DRUG DEPENDENCE (HCC): ICD-10-CM

## 2024-01-29 DIAGNOSIS — I10 ESSENTIAL HYPERTENSION: ICD-10-CM

## 2024-01-29 DIAGNOSIS — G30.1 LATE ONSET ALZHEIMER'S DISEASE WITHOUT BEHAVIORAL DISTURBANCE (HCC): ICD-10-CM

## 2024-01-29 DIAGNOSIS — E11.40 TYPE 2 DIABETES MELLITUS WITH DIABETIC NEUROPATHY, WITHOUT LONG-TERM CURRENT USE OF INSULIN (HCC): Primary | ICD-10-CM

## 2024-01-29 PROCEDURE — 3074F SYST BP LT 130 MM HG: CPT | Performed by: FAMILY MEDICINE

## 2024-01-29 PROCEDURE — G8399 PT W/DXA RESULTS DOCUMENT: HCPCS | Performed by: FAMILY MEDICINE

## 2024-01-29 PROCEDURE — G8484 FLU IMMUNIZE NO ADMIN: HCPCS | Performed by: FAMILY MEDICINE

## 2024-01-29 PROCEDURE — G8427 DOCREV CUR MEDS BY ELIG CLIN: HCPCS | Performed by: FAMILY MEDICINE

## 2024-01-29 PROCEDURE — G8420 CALC BMI NORM PARAMETERS: HCPCS | Performed by: FAMILY MEDICINE

## 2024-01-29 PROCEDURE — 1090F PRES/ABSN URINE INCON ASSESS: CPT | Performed by: FAMILY MEDICINE

## 2024-01-29 PROCEDURE — 1036F TOBACCO NON-USER: CPT | Performed by: FAMILY MEDICINE

## 2024-01-29 PROCEDURE — 1123F ACP DISCUSS/DSCN MKR DOCD: CPT | Performed by: FAMILY MEDICINE

## 2024-01-29 PROCEDURE — 3078F DIAST BP <80 MM HG: CPT | Performed by: FAMILY MEDICINE

## 2024-01-29 PROCEDURE — 99214 OFFICE O/P EST MOD 30 MIN: CPT | Performed by: FAMILY MEDICINE

## 2024-01-29 RX ORDER — ATORVASTATIN CALCIUM 40 MG/1
TABLET, FILM COATED ORAL
Qty: 90 TABLET | Refills: 1 | Status: SHIPPED | OUTPATIENT
Start: 2024-01-29

## 2024-01-29 RX ORDER — DONEPEZIL HYDROCHLORIDE 10 MG/1
TABLET, FILM COATED ORAL
Qty: 180 TABLET | Refills: 1 | Status: SHIPPED | OUTPATIENT
Start: 2024-01-29

## 2024-01-29 ASSESSMENT — PATIENT HEALTH QUESTIONNAIRE - PHQ9
10. IF YOU CHECKED OFF ANY PROBLEMS, HOW DIFFICULT HAVE THESE PROBLEMS MADE IT FOR YOU TO DO YOUR WORK, TAKE CARE OF THINGS AT HOME, OR GET ALONG WITH OTHER PEOPLE: 0
3. TROUBLE FALLING OR STAYING ASLEEP: 0
SUM OF ALL RESPONSES TO PHQ QUESTIONS 1-9: 0
8. MOVING OR SPEAKING SO SLOWLY THAT OTHER PEOPLE COULD HAVE NOTICED. OR THE OPPOSITE, BEING SO FIGETY OR RESTLESS THAT YOU HAVE BEEN MOVING AROUND A LOT MORE THAN USUAL: 0
5. POOR APPETITE OR OVEREATING: 0
SUM OF ALL RESPONSES TO PHQ QUESTIONS 1-9: 0
1. LITTLE INTEREST OR PLEASURE IN DOING THINGS: 0
7. TROUBLE CONCENTRATING ON THINGS, SUCH AS READING THE NEWSPAPER OR WATCHING TELEVISION: 0
2. FEELING DOWN, DEPRESSED OR HOPELESS: 0
SUM OF ALL RESPONSES TO PHQ QUESTIONS 1-9: 0
SUM OF ALL RESPONSES TO PHQ9 QUESTIONS 1 & 2: 0
6. FEELING BAD ABOUT YOURSELF - OR THAT YOU ARE A FAILURE OR HAVE LET YOURSELF OR YOUR FAMILY DOWN: 0
9. THOUGHTS THAT YOU WOULD BE BETTER OFF DEAD, OR OF HURTING YOURSELF: 0
4. FEELING TIRED OR HAVING LITTLE ENERGY: 0
SUM OF ALL RESPONSES TO PHQ QUESTIONS 1-9: 0

## 2024-01-29 NOTE — PROGRESS NOTES
Subjective:      Patient ID: Selin King is a 78 y.o. female.  CC: Patient presents for re-evaluation of chronic health problems including diabetes mellitus, left arm weakness and left ankle discomfort, depression and dementia..    HPI Patient is here for Hypertension follow up and medication refills and accompaniment of .  Patient did not have her laboratory assessment performed from last visit.  She is complaining of left arm heaviness and this generalized feeling of not feeling well after eating supper.  She typically often will go in and lay down and just stays in bed and falls asleep.  Originally she describes a tremor but  states she has never seen her with a tremor issue.  She has been on primidone medication for the last year.  She also has complaints of dry mouth which is probably related to her bladder medication and/or the arthritis.  She has been compliant with her medication but unfortunately still takes pain pills 3 times a day.   has been overseeing her medications for some time.    Review of Systems  Patient Active Problem List   Diagnosis    Essential hypertension    Generalized osteoarthrosis, involving multiple sites    Type 2 diabetes mellitus without complication (HCC)    Degenerative lumbar spinal stenosis    Congenital clotting factor deficiency (HCC)    Hypercholesterolemia    GERD (gastroesophageal reflux disease)    Late onset Alzheimer's disease without behavioral disturbance (HCC)    Drug dependence (HCC)    Chronic low back pain without sciatica    Spondylolisthesis, lumbar region    Chronic pain syndrome    Hyperparathyroidism (HCC)    Pain disorder with psychological factors    Current moderate episode of major depressive disorder without prior episode (HCC)    TIA (transient ischemic attack)    Cubital tunnel syndrome on left    Left carpal tunnel syndrome    Right carpal tunnel syndrome    Cervical spinal stenosis    Type 2 diabetes mellitus with diabetic

## 2024-01-31 ENCOUNTER — HOSPITAL ENCOUNTER (OUTPATIENT)
Age: 79
Discharge: HOME | End: 2024-01-31
Payer: COMMERCIAL

## 2024-01-31 DIAGNOSIS — I10 ESSENTIAL HYPERTENSION: ICD-10-CM

## 2024-01-31 DIAGNOSIS — E11.40 TYPE 2 DIABETES MELLITUS WITH DIABETIC NEUROPATHY, WITHOUT LONG-TERM CURRENT USE OF INSULIN (HCC): ICD-10-CM

## 2024-01-31 DIAGNOSIS — E21.3 HYPERPARATHYROIDISM (HCC): ICD-10-CM

## 2024-01-31 DIAGNOSIS — G62.9 PERIPHERAL POLYNEUROPATHY: ICD-10-CM

## 2024-01-31 LAB
ALBUMIN SERPL-MCNC: 4.2 G/DL (ref 3.4–5)
ALBUMIN/GLOB SERPL: 1.6 {RATIO} (ref 1.1–2.2)
ALP SERPL-CCNC: 80 U/L (ref 40–129)
ALT SERPL-CCNC: 15 U/L (ref 10–40)
ANION GAP SERPL CALCULATED.3IONS-SCNC: 9 MMOL/L (ref 3–16)
AST SERPL-CCNC: 19 U/L (ref 15–37)
BILIRUB SERPL-MCNC: <0.2 MG/DL (ref 0–1)
BUN SERPL-MCNC: 10 MG/DL (ref 7–20)
CALCIUM SERPL-MCNC: 10 MG/DL (ref 8.3–10.6)
CHLORIDE SERPL-SCNC: 103 MMOL/L (ref 99–110)
CHOLEST SERPL-MCNC: 133 MG/DL (ref 0–199)
CO2 SERPL-SCNC: 25 MMOL/L (ref 21–32)
CREAT SERPL-MCNC: 0.8 MG/DL (ref 0.6–1.2)
DEPRECATED RDW RBC AUTO: 19.1 % (ref 12.4–15.4)
FOLATE SERPL-MCNC: >20 NG/ML (ref 4.78–24.2)
GFR SERPLBLD CREATININE-BSD FMLA CKD-EPI: >60 ML/MIN/{1.73_M2}
GLUCOSE SERPL-MCNC: 97 MG/DL (ref 70–99)
HCT VFR BLD AUTO: 23.5 % (ref 36–48)
HDLC SERPL-MCNC: 78 MG/DL (ref 40–60)
HGB BLD-MCNC: 7.2 G/DL (ref 12–16)
LDLC SERPL CALC-MCNC: 34 MG/DL
MCH RBC QN AUTO: 20.8 PG (ref 26–34)
MCHC RBC AUTO-ENTMCNC: 30.7 G/DL (ref 31–36)
MCV RBC AUTO: 67.8 FL (ref 80–100)
PATH INTERP BLD-IMP: YES
PLATELET # BLD AUTO: 372 K/UL (ref 135–450)
PMV BLD AUTO: 7.3 FL (ref 5–10.5)
POTASSIUM SERPL-SCNC: 4.9 MMOL/L (ref 3.5–5.1)
PROT SERPL-MCNC: 6.8 G/DL (ref 6.4–8.2)
PTH-INTACT SERPL-MCNC: 81.1 PG/ML (ref 14–72)
RBC # BLD AUTO: 3.47 M/UL (ref 4–5.2)
SODIUM SERPL-SCNC: 137 MMOL/L (ref 136–145)
TRIGL SERPL-MCNC: 103 MG/DL (ref 0–150)
TSH SERPL DL<=0.005 MIU/L-ACNC: 1.61 UIU/ML (ref 0.27–4.2)
VIT B12 SERPL-MCNC: 859 PG/ML (ref 211–911)
VLDLC SERPL CALC-MCNC: 21 MG/DL
WBC # BLD AUTO: 6.9 K/UL (ref 4–11)

## 2024-01-31 PROCEDURE — 82607 VITAMIN B-12: CPT

## 2024-01-31 PROCEDURE — 82746 ASSAY OF FOLIC ACID SERUM: CPT

## 2024-01-31 PROCEDURE — 84443 ASSAY THYROID STIM HORMONE: CPT

## 2024-01-31 PROCEDURE — 83036 HEMOGLOBIN GLYCOSYLATED A1C: CPT

## 2024-01-31 PROCEDURE — 80053 COMPREHEN METABOLIC PANEL: CPT

## 2024-01-31 PROCEDURE — 36415 COLL VENOUS BLD VENIPUNCTURE: CPT

## 2024-01-31 PROCEDURE — 85027 COMPLETE CBC AUTOMATED: CPT

## 2024-01-31 PROCEDURE — 83970 ASSAY OF PARATHORMONE: CPT

## 2024-01-31 PROCEDURE — 80061 LIPID PANEL: CPT

## 2024-02-01 DIAGNOSIS — D64.9 ANEMIA, UNSPECIFIED TYPE: Primary | ICD-10-CM

## 2024-02-01 LAB
EST. AVERAGE GLUCOSE BLD GHB EST-MCNC: 116.9 MG/DL
HBA1C MFR BLD: 5.7 %
PATH INTERP BLD-IMP: NORMAL

## 2024-02-02 ENCOUNTER — HOSPITAL ENCOUNTER (OUTPATIENT)
Age: 79
Discharge: HOME | End: 2024-02-02
Payer: MEDICARE

## 2024-02-02 DIAGNOSIS — D64.9 ANEMIA, UNSPECIFIED TYPE: ICD-10-CM

## 2024-02-02 LAB
IRON SATN MFR SERPL: 4 % (ref 15–50)
IRON SERPL-MCNC: 19 UG/DL (ref 37–145)
TIBC SERPL-MCNC: 482 UG/DL (ref 260–445)

## 2024-02-02 PROCEDURE — 83550 IRON BINDING TEST: CPT

## 2024-02-02 PROCEDURE — 83540 ASSAY OF IRON: CPT

## 2024-02-02 PROCEDURE — 36415 COLL VENOUS BLD VENIPUNCTURE: CPT

## 2024-02-05 DIAGNOSIS — D50.9 IRON DEFICIENCY ANEMIA, UNSPECIFIED IRON DEFICIENCY ANEMIA TYPE: Primary | ICD-10-CM

## 2024-02-05 RX ORDER — BUSPIRONE HYDROCHLORIDE 15 MG/1
15 TABLET ORAL 3 TIMES DAILY PRN
Qty: 90 TABLET | Refills: 0 | OUTPATIENT
Start: 2024-02-05

## 2024-02-05 RX ORDER — FERROUS SULFATE 325(65) MG
325 TABLET ORAL 2 TIMES DAILY
Qty: 180 TABLET | Refills: 1 | Status: SHIPPED | OUTPATIENT
Start: 2024-02-05

## 2024-02-14 DIAGNOSIS — M48.061 DEGENERATIVE LUMBAR SPINAL STENOSIS: ICD-10-CM

## 2024-02-14 RX ORDER — ACETAMINOPHEN AND CODEINE PHOSPHATE 300; 30 MG/1; MG/1
TABLET ORAL
Qty: 90 TABLET | OUTPATIENT
Start: 2024-02-14

## 2024-02-15 RX ORDER — ACETAMINOPHEN AND CODEINE PHOSPHATE 300; 30 MG/1; MG/1
1 TABLET ORAL EVERY 8 HOURS PRN
Qty: 90 TABLET | Refills: 0 | Status: SHIPPED | OUTPATIENT
Start: 2024-02-15 | End: 2024-03-16

## 2024-02-15 NOTE — TELEPHONE ENCOUNTER
Medication:   Requested Prescriptions     Pending Prescriptions Disp Refills    acetaminophen-codeine (TYLENOL #3) 300-30 MG per tablet [Pharmacy Med Name: ACETAMINOPHEN-CODEINE 300-30 MG TAB] 90 tablet      Sig: TAKE ONE TABLET BY MOUTH EVERY 8 HOURS AS NEEDED FOR PAIN. REDUCE DOSES TAKEN AS PAIN BECOME MANAGEABLE      Last Filled:  10/10/23    Patient Phone Number: [phone] (home)     Last appt: 1/29/2024   Next appt: 2/14/2024    Last OARRS:       8/19/2021     8:15 AM   RX Monitoring   Periodic Controlled Substance Monitoring Possible medication side effects, risk of tolerance/dependence & alternative treatments discussed.;No signs of potential drug abuse or diversion identified.     PDMP Monitoring:    Last PDMP [place] as Reviewed ([place]):  Review User Review Instant Review Result   ESTEFANI CASTILLO 1/29/2024 11:14 AM Reviewed PDMP [1]     Preferred Pharmacy:   [facility] [id number] - [place] - [address] [phone] - F [phone]  [address] [place] [address]  Phone: [phone] Fax: [phone]

## 2024-02-16 RX ORDER — BUSPIRONE HYDROCHLORIDE 15 MG/1
15 TABLET ORAL 3 TIMES DAILY PRN
Qty: 90 TABLET | Refills: 0 | Status: SHIPPED | OUTPATIENT
Start: 2024-02-16

## 2024-02-16 NOTE — TELEPHONE ENCOUNTER
Medication:   Requested Prescriptions     Pending Prescriptions Disp Refills    busPIRone (BUSPAR) 15 MG tablet [Pharmacy Med Name: BUSPIRONE HCL 15 MG TABLET] 90 tablet 0     Sig: TAKE ONE TABLET BY MOUTH THREE TIMES A DAY AS NEEDED      Provider out of office.     Patient Phone Number: [phone] (home)     Last appt: 1/29/2024   Next appt: 4/29/2024    Last OARRS:       2/15/2024     3:13 PM   RX Monitoring   Periodic Controlled Substance Monitoring Possible medication side effects, risk of tolerance/dependence & alternative treatments discussed.     PDMP Monitoring:    Last PDMP [place] Reviewed ([place]):  Review User Review Instant Review Result   ESTEFANI CASTILLO 1/29/2024 11:14 AM Reviewed PDMP [1]     Preferred Pharmacy:   [facility] [id number] - [place] - [address] [phone] - F [phone]  [address] [place] [address]  Phone: [phone] Fax: [phone]

## 2024-03-16 DIAGNOSIS — M48.061 DEGENERATIVE LUMBAR SPINAL STENOSIS: ICD-10-CM

## 2024-03-18 RX ORDER — ACETAMINOPHEN AND CODEINE PHOSPHATE 300; 30 MG/1; MG/1
1 TABLET ORAL EVERY 8 HOURS PRN
Qty: 90 TABLET | Refills: 0 | Status: SHIPPED | OUTPATIENT
Start: 2024-03-18 | End: 2024-04-17

## 2024-03-18 NOTE — TELEPHONE ENCOUNTER
Medication:   Requested Prescriptions     Pending Prescriptions Disp Refills    acetaminophen-codeine (TYLENOL #3) 300-30 MG per tablet [Pharmacy Med Name: ACETAMINOPHEN-CODEINE 300-30 MG TAB] 90 tablet 0     Sig: Take 1 tablet by mouth every 8 hours as needed for Pain for up to 30 days. Max Daily Amount: 3 tablets      Last Filled:  2/15/24    Patient Phone Number: [phone] (home)     Last appt: 1/29/2024   Next appt: 4/29/2024    Last OARRS:       2/15/2024     3:13 PM   RX Monitoring   Periodic Controlled Substance Monitoring Possible medication side effects, risk of tolerance/dependence & alternative treatments discussed.     PDMP Monitoring:    Last PDMP [place]ed ([place]):  Review User Review Instant Review Result   ESTEFANI CASTILLO 1/29/2024 11:14 AM Reviewed PDMP [1]     Preferred Pharmacy:   [facility] [id number] - [place] - [address] [phone] - F [phone]  [address] [place] [address]  Phone: [phone] Fax: [phone]

## 2024-03-25 RX ORDER — BUSPIRONE HYDROCHLORIDE 15 MG/1
15 TABLET ORAL 3 TIMES DAILY PRN
Qty: 90 TABLET | Refills: 0 | Status: SHIPPED | OUTPATIENT
Start: 2024-03-25

## 2024-04-04 RX ORDER — DULOXETIN HYDROCHLORIDE 60 MG/1
CAPSULE, DELAYED RELEASE ORAL
Qty: 90 CAPSULE | Refills: 0 | Status: SHIPPED | OUTPATIENT
Start: 2024-04-04

## 2024-04-15 RX ORDER — GABAPENTIN 600 MG/1
1200 TABLET ORAL 3 TIMES DAILY
Qty: 180 TABLET | Refills: 0 | Status: SHIPPED | OUTPATIENT
Start: 2024-04-15 | End: 2024-05-15

## 2024-04-15 RX ORDER — GABAPENTIN 600 MG/1
TABLET ORAL
Qty: 120 TABLET | OUTPATIENT
Start: 2024-04-15

## 2024-04-15 NOTE — TELEPHONE ENCOUNTER
gabapentin (NEURONTIN) 600 MG tablet     [facility] [id number] - [place], [place] - [address] - P [phone] - F [phone]  [address], [facility] [address]  Phone: [phone]  Fax: [phone]     Pt has been out of medication for over 2 days.     Please advise...

## 2024-04-17 DIAGNOSIS — M48.061 DEGENERATIVE LUMBAR SPINAL STENOSIS: ICD-10-CM

## 2024-04-17 RX ORDER — ACETAMINOPHEN AND CODEINE PHOSPHATE 300; 30 MG/1; MG/1
1 TABLET ORAL EVERY 8 HOURS PRN
Qty: 90 TABLET | Refills: 1 | Status: SHIPPED | OUTPATIENT
Start: 2024-04-17 | End: 2024-06-16

## 2024-04-17 NOTE — TELEPHONE ENCOUNTER
Medication:   Requested Prescriptions     Pending Prescriptions Disp Refills    acetaminophen-codeine (TYLENOL #3) 300-30 MG per tablet [Pharmacy Med Name: ACETAMINOPHEN-CODEINE 300-30 MG TAB] 90 tablet      Sig: Take 1 tablet by mouth every 8 hours as needed for Pain for up to 30 days. Max Daily Amount: 3      Last Filled:  03/18/24    Patient Phone Number: [phone] (home)     Last appt: 1/29/2024   Next appt: 4/29/2024    Last OARRS:       2/15/2024     3:13 PM   RX Monitoring   Periodic Controlled Substance Monitoring Possible medication side effects, risk of tolerance/dependence & alternative treatments discussed.     PDMP Monitoring:    Last PDMP [place] Reviewed ([place]):  Review User Review Instant Review Result   ESTEFANI CASTILLO 1/29/2024 11:14 AM Reviewed PDMP [1]     Preferred Pharmacy:   [facility] [id number] - [place], [place] - [address] [phone] - F [phone]  [address] [place] [address]  Phone: [phone] Fax: [phone]

## 2024-04-25 ENCOUNTER — HOSPITAL ENCOUNTER (OUTPATIENT)
Age: 79
Discharge: HOME | End: 2024-04-25
Payer: MEDICARE

## 2024-04-25 DIAGNOSIS — D50.9 IRON DEFICIENCY ANEMIA, UNSPECIFIED IRON DEFICIENCY ANEMIA TYPE: ICD-10-CM

## 2024-04-25 LAB
BASOPHILS # BLD: 0 K/UL (ref 0–0.2)
BASOPHILS NFR BLD: 0.6 %
DEPRECATED RDW RBC AUTO: 23.5 % (ref 12.4–15.4)
EOSINOPHIL # BLD: 0.2 K/UL (ref 0–0.6)
EOSINOPHIL NFR BLD: 2.7 %
HCT VFR BLD AUTO: 41.6 % (ref 36–48)
HGB BLD-MCNC: 13.8 G/DL (ref 12–16)
IRON SATN MFR SERPL: 57 % (ref 15–50)
IRON SERPL-MCNC: 209 UG/DL (ref 37–145)
LYMPHOCYTES # BLD: 2 K/UL (ref 1–5.1)
LYMPHOCYTES NFR BLD: 34.6 %
MCH RBC QN AUTO: 30.4 PG (ref 26–34)
MCHC RBC AUTO-ENTMCNC: 33.2 G/DL (ref 31–36)
MCV RBC AUTO: 91.6 FL (ref 80–100)
MONOCYTES # BLD: 0.5 K/UL (ref 0–1.3)
MONOCYTES NFR BLD: 8.6 %
NEUTROPHILS # BLD: 3.1 K/UL (ref 1.7–7.7)
NEUTROPHILS NFR BLD: 53.5 %
PLATELET # BLD AUTO: 284 K/UL (ref 135–450)
PLATELET BLD QL SMEAR: ADEQUATE
PMV BLD AUTO: 7.6 FL (ref 5–10.5)
RBC # BLD AUTO: 4.54 M/UL (ref 4–5.2)
TIBC SERPL-MCNC: 369 UG/DL (ref 260–445)
WBC # BLD AUTO: 5.7 K/UL (ref 4–11)

## 2024-04-25 PROCEDURE — 36415 COLL VENOUS BLD VENIPUNCTURE: CPT

## 2024-04-25 PROCEDURE — 85025 COMPLETE CBC W/AUTO DIFF WBC: CPT

## 2024-04-25 PROCEDURE — 83550 IRON BINDING TEST: CPT

## 2024-04-25 PROCEDURE — 83540 ASSAY OF IRON: CPT

## 2024-04-25 RX ORDER — BUSPIRONE HYDROCHLORIDE 15 MG/1
TABLET ORAL
Qty: 90 TABLET | Refills: 0 | Status: SHIPPED | OUTPATIENT
Start: 2024-04-25

## 2024-04-29 ENCOUNTER — OFFICE VISIT (OUTPATIENT)
Dept: FAMILY MEDICINE CLINIC | Age: 79
End: 2024-04-29
Payer: MEDICARE

## 2024-04-29 VITALS
RESPIRATION RATE: 12 BRPM | WEIGHT: 137.13 LBS | TEMPERATURE: 98.2 F | OXYGEN SATURATION: 97 % | DIASTOLIC BLOOD PRESSURE: 72 MMHG | BODY MASS INDEX: 23.91 KG/M2 | SYSTOLIC BLOOD PRESSURE: 110 MMHG | HEART RATE: 64 BPM

## 2024-04-29 DIAGNOSIS — D50.0 IRON DEFICIENCY ANEMIA DUE TO CHRONIC BLOOD LOSS: Primary | ICD-10-CM

## 2024-04-29 DIAGNOSIS — Z86.73 HISTORY OF TIA (TRANSIENT ISCHEMIC ATTACK): ICD-10-CM

## 2024-04-29 DIAGNOSIS — F02.80 LATE ONSET ALZHEIMER'S DISEASE WITHOUT BEHAVIORAL DISTURBANCE (HCC): ICD-10-CM

## 2024-04-29 DIAGNOSIS — D68.2 CONGENITAL CLOTTING FACTOR DEFICIENCY (HCC): ICD-10-CM

## 2024-04-29 DIAGNOSIS — G30.1 LATE ONSET ALZHEIMER'S DISEASE WITHOUT BEHAVIORAL DISTURBANCE (HCC): ICD-10-CM

## 2024-04-29 DIAGNOSIS — G89.4 CHRONIC PAIN SYNDROME: ICD-10-CM

## 2024-04-29 DIAGNOSIS — F11.20 OPIOID DEPENDENCE WITH CURRENT USE (HCC): ICD-10-CM

## 2024-04-29 DIAGNOSIS — F32.1 CURRENT MODERATE EPISODE OF MAJOR DEPRESSIVE DISORDER WITHOUT PRIOR EPISODE (HCC): ICD-10-CM

## 2024-04-29 PROCEDURE — G8420 CALC BMI NORM PARAMETERS: HCPCS | Performed by: FAMILY MEDICINE

## 2024-04-29 PROCEDURE — G8399 PT W/DXA RESULTS DOCUMENT: HCPCS | Performed by: FAMILY MEDICINE

## 2024-04-29 PROCEDURE — 1090F PRES/ABSN URINE INCON ASSESS: CPT | Performed by: FAMILY MEDICINE

## 2024-04-29 PROCEDURE — 3074F SYST BP LT 130 MM HG: CPT | Performed by: FAMILY MEDICINE

## 2024-04-29 PROCEDURE — 1036F TOBACCO NON-USER: CPT | Performed by: FAMILY MEDICINE

## 2024-04-29 PROCEDURE — G8427 DOCREV CUR MEDS BY ELIG CLIN: HCPCS | Performed by: FAMILY MEDICINE

## 2024-04-29 PROCEDURE — 1123F ACP DISCUSS/DSCN MKR DOCD: CPT | Performed by: FAMILY MEDICINE

## 2024-04-29 PROCEDURE — 99214 OFFICE O/P EST MOD 30 MIN: CPT | Performed by: FAMILY MEDICINE

## 2024-04-29 PROCEDURE — 3078F DIAST BP <80 MM HG: CPT | Performed by: FAMILY MEDICINE

## 2024-04-29 RX ORDER — MIRTAZAPINE 15 MG/1
TABLET, FILM COATED ORAL
Qty: 90 TABLET | Refills: 1 | Status: SHIPPED | OUTPATIENT
Start: 2024-04-29

## 2024-04-29 RX ORDER — FERROUS SULFATE 325(65) MG
325 TABLET ORAL DAILY
Qty: 180 TABLET | Refills: 1 | Status: SHIPPED | OUTPATIENT
Start: 2024-04-29

## 2024-04-29 RX ORDER — AMLODIPINE BESYLATE 10 MG/1
10 TABLET ORAL DAILY
Qty: 90 TABLET | Refills: 1 | Status: SHIPPED | OUTPATIENT
Start: 2024-04-29

## 2024-04-29 RX ORDER — OMEPRAZOLE 40 MG/1
CAPSULE, DELAYED RELEASE ORAL
Qty: 90 CAPSULE | Refills: 1 | Status: SHIPPED | OUTPATIENT
Start: 2024-04-29

## 2024-04-29 RX ORDER — SOLIFENACIN SUCCINATE 10 MG/1
TABLET, FILM COATED ORAL
COMMUNITY
Start: 2024-04-11

## 2024-04-29 RX ORDER — FERROUS SULFATE 325(65) MG
325 TABLET ORAL DAILY
Qty: 180 TABLET | Refills: 1 | Status: SHIPPED | OUTPATIENT
Start: 2024-04-29 | End: 2024-04-29 | Stop reason: SDUPTHER

## 2024-04-29 SDOH — ECONOMIC STABILITY: FOOD INSECURITY: WITHIN THE PAST 12 MONTHS, THE FOOD YOU BOUGHT JUST DIDN'T LAST AND YOU DIDN'T HAVE MONEY TO GET MORE.: NEVER TRUE

## 2024-04-29 SDOH — ECONOMIC STABILITY: FOOD INSECURITY: WITHIN THE PAST 12 MONTHS, YOU WORRIED THAT YOUR FOOD WOULD RUN OUT BEFORE YOU GOT MONEY TO BUY MORE.: NEVER TRUE

## 2024-04-29 SDOH — ECONOMIC STABILITY: HOUSING INSECURITY
IN THE LAST 12 MONTHS, WAS THERE A TIME WHEN YOU DID NOT HAVE A STEADY PLACE TO SLEEP OR SLEPT IN A SHELTER (INCLUDING NOW)?: NO

## 2024-04-29 SDOH — ECONOMIC STABILITY: INCOME INSECURITY: HOW HARD IS IT FOR YOU TO PAY FOR THE VERY BASICS LIKE FOOD, HOUSING, MEDICAL CARE, AND HEATING?: NOT HARD AT ALL

## 2024-04-29 NOTE — PROGRESS NOTES
Subjective   Patient ID: Selin King is a 78 y.o. female.  CC: Patient presents for re-evaluation of chronic health problems including iron deficiency anemia, chronic pain medication usage, depression and dementia..    HPI pt is here for a follow up, med refill, test results and accompaniment of .  Patient was found in January to have significant anemia.  She has had anemia problems in the past and workup has been negative.  She has been on iron therapy and states she feels much better at this time.  She was interested to see if a new medication for dementia.  She continues with pain medication 3 times a day that her  regulates.  She denies any bowel issues associate with the pain medication.  We did take her off primidone at last appointment time with no return of the tremor issues.  She is compliant with her medications with her 's help.    Review of Systems     Patient Active Problem List   Diagnosis    Essential hypertension    Generalized osteoarthrosis, involving multiple sites    Type 2 diabetes mellitus without complication (HCC)    Degenerative lumbar spinal stenosis    Congenital clotting factor deficiency (HCC)    Hypercholesterolemia    GERD (gastroesophageal reflux disease)    Late onset Alzheimer's disease without behavioral disturbance (HCC)    Drug dependence (HCC)    Chronic low back pain without sciatica    Spondylolisthesis, lumbar region    Chronic pain syndrome    Hyperparathyroidism (HCC)    Pain disorder with psychological factors    Current moderate episode of major depressive disorder without prior episode (HCC)    TIA (transient ischemic attack)    Cubital tunnel syndrome on left    Left carpal tunnel syndrome    Right carpal tunnel syndrome    Cervical spinal stenosis    Type 2 diabetes mellitus with diabetic neuropathy    Cervical myelopathy (HCC)       Outpatient Medications Marked as Taking for the 4/29/24 encounter (Office Visit) with Ariel Mcguire MD  Vital Signs Last 24 Hrs  T(C): 37.1 (29 May 2021 14:44), Max: 37.1 (29 May 2021 14:44)  T(F): 98.7 (29 May 2021 14:44), Max: 98.7 (29 May 2021 14:44)  HR: 80 (29 May 2021 17:19) (80 - 98)  BP: 143/66 (29 May 2021 17:19) (143/66 - 155/73)  BP(mean): --  RR: 14 (29 May 2021 17:19) (14 - 16)  SpO2: 98% (29 May 2021 17:19) (93% - 100%)

## 2024-04-30 PROBLEM — G56.01 RIGHT CARPAL TUNNEL SYNDROME: Status: RESOLVED | Noted: 2020-10-26 | Resolved: 2024-04-30

## 2024-04-30 PROBLEM — Z86.73 HISTORY OF TIA (TRANSIENT ISCHEMIC ATTACK): Status: ACTIVE | Noted: 2020-05-22

## 2024-05-09 ENCOUNTER — TELEPHONE (OUTPATIENT)
Dept: FAMILY MEDICINE CLINIC | Age: 79
End: 2024-05-09

## 2024-05-09 NOTE — TELEPHONE ENCOUNTER
Patient is having a problem with her eye \"turning in\" she called [facility] to schedule an appt, but they told her they do not treat that kind of thing.  She asking if you can refer her to a different doctor.

## 2024-05-13 RX ORDER — GABAPENTIN 600 MG/1
TABLET ORAL
Qty: 180 TABLET | Refills: 2 | Status: SHIPPED | OUTPATIENT
Start: 2024-05-13 | End: 2024-08-12

## 2024-05-24 NOTE — ANESTHESIA POSTPROCEDURE EVALUATION
Department of Anesthesiology  Postprocedure Note    Patient: Mallory Pop  MRN: [id number]  YOB: 1945  Date of evaluation: 11/12/2020  Time:  10:44 AM     Procedure Summary     Date:  11/12/20 Room / Location:  [address]    Anesthesia Start:  6685 Anesthesia Stop:  1036    Procedures:       LEFT CUBITAL RELEASE; LEFT CARPAL TUNNEL RELEASE (Left Hand)      LEFT ULNAR NERVE TRANSPOSITION (Left Arm Upper) Diagnosis:       Cubital tunnel syndrome on left      Neuropathy, ulnar at elbow, left      Carpal tunnel syndrome on left      (G56.20 LEFT CUBITAL TUNNEL SYNDROME; G56.22 LEFT ULNAR NEUROPATHY)      (G56.02 LEFT CARPAL TUNNEL SYNDROME)    Surgeon:  Lea Sandoval MD Responsible Provider:  Marybel Fernandez MD    Anesthesia Type:  general ASA Status:  3          Anesthesia Type: general    Christian Phase I: Christian Score: 8    Christian Phase II:      Last vitals: Reviewed and per EMR flowsheets.        Anesthesia Post Evaluation    Patient location during evaluation: PACU  Patient participation: complete - patient participated  Level of consciousness: awake and alert  Airway patency: patent  Nausea & Vomiting: no vomiting and no nausea  Complications: no  Cardiovascular status: hemodynamically stable  Respiratory status: acceptable  Hydration status: stable
Dr. Torres [phone]

## 2024-05-28 RX ORDER — BUSPIRONE HYDROCHLORIDE 15 MG/1
15 TABLET ORAL 3 TIMES DAILY PRN
Qty: 90 TABLET | Refills: 0 | Status: SHIPPED | OUTPATIENT
Start: 2024-05-28

## 2024-06-21 DIAGNOSIS — M48.061 DEGENERATIVE LUMBAR SPINAL STENOSIS: ICD-10-CM

## 2024-06-21 RX ORDER — ATORVASTATIN CALCIUM 40 MG/1
TABLET, FILM COATED ORAL
Qty: 90 TABLET | Refills: 0 | Status: SHIPPED | OUTPATIENT
Start: 2024-06-21

## 2024-06-21 RX ORDER — ACETAMINOPHEN AND CODEINE PHOSPHATE 300; 30 MG/1; MG/1
1 TABLET ORAL EVERY 8 HOURS PRN
Qty: 90 TABLET | Refills: 0 | Status: SHIPPED | OUTPATIENT
Start: 2024-06-21 | End: 2024-08-20

## 2024-06-21 NOTE — TELEPHONE ENCOUNTER
Medication:   Requested Prescriptions     Pending Prescriptions Disp Refills    acetaminophen-codeine (TYLENOL #3) 300-30 MG per tablet 90 tablet 0     Sig: Take 1 tablet by mouth every 8 hours as needed for Pain for up to 60 days. Max Daily Amount: 3 tablets      Last Filled:  04/17    Patient Phone Number: [phone] (home)     Last appt: 4/29/2024   Next appt: 6/25/2024    Last OARRS:       2/15/2024     3:13 PM   RX Monitoring   Periodic Controlled Substance Monitoring Possible medication side effects, risk of tolerance/dependence & alternative treatments discussed.     PDMP Monitoring:    Last PDMP [place] Reviewed ([place]):  Review User Review Instant Review Result   ESTEFANI CASTILLO 4/29/2024 11:13 AM Reviewed PDMP [1]     Preferred Pharmacy:   [facility] [id number] - [place] - [address] [phone] - F [phone]  [address] [place] [address]  Phone: [phone] Fax: [phone]

## 2024-06-26 DIAGNOSIS — M48.061 DEGENERATIVE LUMBAR SPINAL STENOSIS: ICD-10-CM

## 2024-06-26 RX ORDER — BUSPIRONE HYDROCHLORIDE 15 MG/1
15 TABLET ORAL 3 TIMES DAILY PRN
Qty: 90 TABLET | Refills: 0 | Status: SHIPPED | OUTPATIENT
Start: 2024-06-26

## 2024-06-26 RX ORDER — ACETAMINOPHEN AND CODEINE PHOSPHATE 300; 30 MG/1; MG/1
1 TABLET ORAL EVERY 8 HOURS PRN
Qty: 90 TABLET | Refills: 0 | Status: SHIPPED | OUTPATIENT
Start: 2024-06-26 | End: 2024-08-25

## 2024-06-26 NOTE — TELEPHONE ENCOUNTER
Medication:   Requested Prescriptions     Pending Prescriptions Disp Refills    acetaminophen-codeine (TYLENOL #3) 300-30 MG per tablet 90 tablet 0     Sig: Take 1 tablet by mouth every 8 hours as needed for Pain for up to 60 days. Max Daily Amount: 3 tablets      Last Filled: 6/21    Patient Phone Number: [phone] (home)     Last appt: 4/29/2024   Next appt: 8/26/2024    Last OARRS:       2/15/2024     3:13 PM   RX Monitoring   Periodic Controlled Substance Monitoring Possible medication side effects, risk of tolerance/dependence & alternative treatments discussed.     PDMP Monitoring:    Last PDMP [place] Reviewed ([place]):  Review User Review Instant Review Result   ESTEFANI CASTILLO 4/29/2024 11:13 AM Reviewed PDMP [1]     Preferred Pharmacy:   [facility] [id number] - [place] - [address] [phone] - F [phone]  [address] [place] [address]  Phone: [phone] Fax: [phone]

## 2024-06-26 NOTE — TELEPHONE ENCOUNTER
Insurance has approved for a 30 day supply now, fill last week was only for 7 due to insurance.   Please send new rx.

## 2024-07-10 RX ORDER — DULOXETIN HYDROCHLORIDE 60 MG/1
CAPSULE, DELAYED RELEASE ORAL
Qty: 90 CAPSULE | Refills: 0 | Status: SHIPPED | OUTPATIENT
Start: 2024-07-10

## 2024-07-23 DIAGNOSIS — F02.80 LATE ONSET ALZHEIMER'S DISEASE WITHOUT BEHAVIORAL DISTURBANCE (HCC): ICD-10-CM

## 2024-07-23 DIAGNOSIS — G30.1 LATE ONSET ALZHEIMER'S DISEASE WITHOUT BEHAVIORAL DISTURBANCE (HCC): ICD-10-CM

## 2024-07-23 RX ORDER — BUSPIRONE HYDROCHLORIDE 15 MG/1
15 TABLET ORAL 3 TIMES DAILY PRN
Qty: 90 TABLET | Refills: 0 | Status: SHIPPED | OUTPATIENT
Start: 2024-07-23

## 2024-07-23 RX ORDER — DONEPEZIL HYDROCHLORIDE 10 MG/1
TABLET, FILM COATED ORAL
Qty: 180 TABLET | Refills: 0 | Status: SHIPPED | OUTPATIENT
Start: 2024-07-23

## 2024-07-27 DIAGNOSIS — M48.061 DEGENERATIVE LUMBAR SPINAL STENOSIS: ICD-10-CM

## 2024-07-29 DIAGNOSIS — M48.061 DEGENERATIVE LUMBAR SPINAL STENOSIS: ICD-10-CM

## 2024-07-29 RX ORDER — ACETAMINOPHEN AND CODEINE PHOSPHATE 300; 30 MG/1; MG/1
1 TABLET ORAL EVERY 8 HOURS PRN
Qty: 90 TABLET | Refills: 0 | OUTPATIENT
Start: 2024-07-29 | End: 2024-09-27

## 2024-07-29 NOTE — TELEPHONE ENCOUNTER
Medication:   Requested Prescriptions     Pending Prescriptions Disp Refills    acetaminophen-codeine (TYLENOL #3) 300-30 MG per tablet [Pharmacy Med Name: ACETAMINOPHEN-CODEINE 300-30 MG TAB] 90 tablet      Sig: Take 1 tablet by mouth every 8 hours as needed.      Last Filled:  6/26/2024    Patient Phone Number: [phone] (home)     Last appt: 4/29/2024   Next appt: 8/26/2024    Dates say med should last until 8/25 but pt is requesting a refill. Please advise.    Last OARRS:       2/15/2024     3:13 PM   RX Monitoring   Periodic Controlled Substance Monitoring Possible medication side effects, risk of tolerance/dependence & alternative treatments discussed.     PDMP Monitoring:    Last PDMP [place] Reviewed ([place]):  Review User Review Instant Review Result   ESTEFANI CASTILLO 4/29/2024 11:13 AM Reviewed PDMP [1]     Preferred Pharmacy:   [facility] [id number] - [place] - [address] [phone] - F [phone]  [address] [place] [address]  Phone: [phone] Fax: [phone]

## 2024-07-30 RX ORDER — ACETAMINOPHEN AND CODEINE PHOSPHATE 300; 30 MG/1; MG/1
1 TABLET ORAL EVERY 8 HOURS PRN
Qty: 90 TABLET | Refills: 0 | Status: SHIPPED | OUTPATIENT
Start: 2024-07-30 | End: 2024-08-29

## 2024-08-05 DIAGNOSIS — M48.061 DEGENERATIVE LUMBAR SPINAL STENOSIS: ICD-10-CM

## 2024-08-05 RX ORDER — ACETAMINOPHEN AND CODEINE PHOSPHATE 300; 30 MG/1; MG/1
1 TABLET ORAL EVERY 8 HOURS PRN
Qty: 90 TABLET | Refills: 0 | Status: SHIPPED | OUTPATIENT
Start: 2024-08-05 | End: 2024-09-04

## 2024-08-05 NOTE — TELEPHONE ENCOUNTER
Insurance has approved for a 30 day supply now, fill last week was only for 7 due to insurance. Spouse would like to  paper script. Please call when      acetaminophen-codeine (TYLENOL #3) 300-30 MG per tablet     [facility] [id number] - [place], [place] - [address] - P [phone] - F [phone]  [address], [facility] [address]  Phone: [phone]  Fax: [phone]

## 2024-08-12 RX ORDER — GABAPENTIN 600 MG/1
TABLET ORAL
Qty: 180 TABLET | Refills: 0 | Status: SHIPPED | OUTPATIENT
Start: 2024-08-12 | End: 2024-11-10

## 2024-08-14 RX ORDER — GABAPENTIN 600 MG/1
TABLET ORAL
Qty: 180 TABLET | Refills: 0 | OUTPATIENT
Start: 2024-08-14 | End: 2024-11-12

## 2024-08-26 ENCOUNTER — OFFICE VISIT (OUTPATIENT)
Dept: FAMILY MEDICINE CLINIC | Age: 79
End: 2024-08-26

## 2024-08-26 ENCOUNTER — HOSPITAL ENCOUNTER (OUTPATIENT)
Age: 79
Discharge: HOME | End: 2024-08-26
Payer: MEDICARE

## 2024-08-26 VITALS
OXYGEN SATURATION: 96 % | SYSTOLIC BLOOD PRESSURE: 102 MMHG | TEMPERATURE: 97.8 F | DIASTOLIC BLOOD PRESSURE: 60 MMHG | RESPIRATION RATE: 12 BRPM | BODY MASS INDEX: 25 KG/M2 | HEART RATE: 87 BPM | WEIGHT: 143.38 LBS

## 2024-08-26 DIAGNOSIS — M48.061 DEGENERATIVE LUMBAR SPINAL STENOSIS: Primary | ICD-10-CM

## 2024-08-26 DIAGNOSIS — F02.80 LATE ONSET ALZHEIMER'S DISEASE WITHOUT BEHAVIORAL DISTURBANCE (HCC): ICD-10-CM

## 2024-08-26 DIAGNOSIS — D50.0 IRON DEFICIENCY ANEMIA DUE TO CHRONIC BLOOD LOSS: ICD-10-CM

## 2024-08-26 DIAGNOSIS — M54.50 CHRONIC LOW BACK PAIN WITHOUT SCIATICA, UNSPECIFIED BACK PAIN LATERALITY: ICD-10-CM

## 2024-08-26 DIAGNOSIS — D50.9 IRON DEFICIENCY ANEMIA, UNSPECIFIED IRON DEFICIENCY ANEMIA TYPE: ICD-10-CM

## 2024-08-26 DIAGNOSIS — G30.1 LATE ONSET ALZHEIMER'S DISEASE WITHOUT BEHAVIORAL DISTURBANCE (HCC): ICD-10-CM

## 2024-08-26 DIAGNOSIS — G89.29 CHRONIC LOW BACK PAIN WITHOUT SCIATICA, UNSPECIFIED BACK PAIN LATERALITY: ICD-10-CM

## 2024-08-26 DIAGNOSIS — F32.1 CURRENT MODERATE EPISODE OF MAJOR DEPRESSIVE DISORDER WITHOUT PRIOR EPISODE (HCC): ICD-10-CM

## 2024-08-26 LAB
DEPRECATED RDW RBC AUTO: 12.7 % (ref 12.4–15.4)
HCT VFR BLD AUTO: 39 % (ref 36–48)
HGB BLD-MCNC: 13.2 G/DL (ref 12–16)
IRON SATN MFR SERPL: 72 % (ref 15–50)
IRON SERPL-MCNC: 243 UG/DL (ref 37–145)
MCH RBC QN AUTO: 33.4 PG (ref 26–34)
MCHC RBC AUTO-ENTMCNC: 33.8 G/DL (ref 31–36)
MCV RBC AUTO: 98.7 FL (ref 80–100)
PLATELET # BLD AUTO: 286 K/UL (ref 135–450)
PMV BLD AUTO: 7.6 FL (ref 5–10.5)
RBC # BLD AUTO: 3.95 M/UL (ref 4–5.2)
TIBC SERPL-MCNC: 339 UG/DL (ref 260–445)
WBC # BLD AUTO: 7.4 K/UL (ref 4–11)

## 2024-08-26 PROCEDURE — 36415 COLL VENOUS BLD VENIPUNCTURE: CPT

## 2024-08-26 PROCEDURE — 83540 ASSAY OF IRON: CPT

## 2024-08-26 PROCEDURE — 85027 COMPLETE CBC AUTOMATED: CPT

## 2024-08-26 PROCEDURE — 83550 IRON BINDING TEST: CPT

## 2024-08-26 RX ORDER — DONEPEZIL HYDROCHLORIDE 10 MG/1
TABLET, FILM COATED ORAL
Qty: 180 TABLET | Refills: 1 | Status: SHIPPED | OUTPATIENT
Start: 2024-08-26

## 2024-08-26 RX ORDER — MIRTAZAPINE 15 MG/1
TABLET, FILM COATED ORAL
Qty: 90 TABLET | Refills: 1 | Status: SHIPPED | OUTPATIENT
Start: 2024-08-26

## 2024-08-26 RX ORDER — OMEPRAZOLE 40 MG/1
CAPSULE, DELAYED RELEASE ORAL
Qty: 90 CAPSULE | Refills: 1 | Status: SHIPPED | OUTPATIENT
Start: 2024-08-26

## 2024-08-26 RX ORDER — BUSPIRONE HYDROCHLORIDE 15 MG/1
15 TABLET ORAL 3 TIMES DAILY PRN
Qty: 90 TABLET | Refills: 1 | Status: SHIPPED | OUTPATIENT
Start: 2024-08-26

## 2024-08-26 RX ORDER — ACETAMINOPHEN AND CODEINE PHOSPHATE 300; 30 MG/1; MG/1
1 TABLET ORAL EVERY 8 HOURS PRN
Qty: 90 TABLET | Refills: 0 | Status: SHIPPED | OUTPATIENT
Start: 2024-08-26 | End: 2024-09-25

## 2024-08-26 RX ORDER — ATORVASTATIN CALCIUM 40 MG/1
TABLET, FILM COATED ORAL
Qty: 90 TABLET | Refills: 1 | Status: SHIPPED | OUTPATIENT
Start: 2024-08-26

## 2024-08-26 RX ORDER — BUSPIRONE HYDROCHLORIDE 15 MG/1
15 TABLET ORAL 3 TIMES DAILY PRN
Qty: 90 TABLET | Refills: 0 | OUTPATIENT
Start: 2024-08-26

## 2024-08-26 RX ORDER — DULOXETIN HYDROCHLORIDE 60 MG/1
CAPSULE, DELAYED RELEASE ORAL
Qty: 90 CAPSULE | Refills: 1 | Status: SHIPPED | OUTPATIENT
Start: 2024-08-26

## 2024-08-26 RX ORDER — AMLODIPINE BESYLATE 10 MG/1
10 TABLET ORAL DAILY
Qty: 90 TABLET | Refills: 1 | Status: SHIPPED | OUTPATIENT
Start: 2024-08-26

## 2024-08-26 NOTE — PROGRESS NOTES
Subjective   Patient ID: Selin King is a 78 y.o. female.  CC: Patient presents for re-evaluation of chronic health problems including chronic back pain, peripheral neuropathy, depression, history of iron deficiency anemia and dementia..    HPI pt is here for a follow up, med refill, test results and accompaniment of .  Patient overall feels she is doing well at this time other she continues with the chronic low back pain.  She says it for a number of years continues take pain pills 3 times a day.  Her  regulates all her medications.  She notes her memory is slowly worsening.  Her  assists her with day-to-day memory skills.  She does not feel that she has any depression symptoms at this time.  She does have a history of iron deficiency anemia thought to be secondary to AV malformations.Patient is very compliant with medications with no adverse reactions.    Review of Systems     Patient Active Problem List   Diagnosis    Essential hypertension    Generalized osteoarthrosis, involving multiple sites    Type 2 diabetes mellitus without complication (HCC)    Degenerative lumbar spinal stenosis    Congenital clotting factor deficiency (HCC)    Hypercholesterolemia    GERD (gastroesophageal reflux disease)    Late onset Alzheimer's disease without behavioral disturbance (HCC)    Drug dependence (HCC)    Chronic low back pain without sciatica    Spondylolisthesis, lumbar region    Chronic pain syndrome    Hyperparathyroidism (HCC)    Pain disorder with psychological factors    Current moderate episode of major depressive disorder without prior episode (HCC)    History of TIA (transient ischemic attack)    Cubital tunnel syndrome on left    Left carpal tunnel syndrome    Cervical spinal stenosis    Type 2 diabetes mellitus with diabetic neuropathy    Cervical myelopathy (HCC)    Opioid dependence with current use (HCC)       Outpatient Medications Marked as Taking for the 8/26/24 encounter       Vascular: No carotid bruit.   Cardiovascular:      Rate and Rhythm: Normal rate and regular rhythm.      Pulses:           Dorsalis pedis pulses are 2+ on the right side and 2+ on the left side.        Posterior tibial pulses are 2+ on the right side and 2+ on the left side.      Heart sounds: Normal heart sounds. No murmur heard.     No friction rub. No gallop.   Pulmonary:      Effort: Pulmonary effort is normal.      Breath sounds: Normal breath sounds.   Musculoskeletal:      Lumbar back: Tenderness (Sacroiliac joint bilaterally and sacrum) present. No swelling, edema, deformity or spasms. Decreased range of motion.      Right upper leg: Normal. No swelling, deformity or tenderness.      Left upper leg: Normal. No swelling, deformity or tenderness.      Right lower leg: No edema.      Left lower leg: No edema.   Skin:     General: Skin is warm and dry.   Neurological:      Mental Status: She is alert and oriented to person, place, and time.      Sensory: Sensation is intact.      Motor: Weakness present.      Coordination: Abnormal coordination: .oar.      Gait: Gait abnormal.      Deep Tendon Reflexes:      Reflex Scores:       Patellar reflexes are 2+ on the right side and 2+ on the left side.       Achilles reflexes are 2+ on the right side and 2+ on the left side.     Comments: Strength testing is 5 out of 5 in both upper and lower extremities except for the left upper arm is 4 out of 5    Patient does use a cane for ambulation   Psychiatric:         Attention and Perception: Attention and perception normal.         Mood and Affect: Mood and affect normal.         Speech: Speech normal.         Behavior: Behavior normal. Behavior is cooperative.         Cognition and Memory: Cognition normal. Memory is impaired.         Judgment: Judgment normal.            Assessment   Selin was seen today for follow-up, hyperlipidemia and hypertension.    Diagnoses and all orders for this visit:    Degenerative lumbar

## 2024-08-29 RX ORDER — BUSPIRONE HYDROCHLORIDE 15 MG/1
15 TABLET ORAL 3 TIMES DAILY PRN
Qty: 90 TABLET | Refills: 1 | OUTPATIENT
Start: 2024-08-29

## 2024-09-02 DIAGNOSIS — M48.061 DEGENERATIVE LUMBAR SPINAL STENOSIS: ICD-10-CM

## 2024-09-03 RX ORDER — ACETAMINOPHEN AND CODEINE PHOSPHATE 300; 30 MG/1; MG/1
1 TABLET ORAL EVERY 8 HOURS PRN
Qty: 90 TABLET | Refills: 0 | OUTPATIENT
Start: 2024-09-03 | End: 2024-10-03

## 2024-09-09 RX ORDER — GABAPENTIN 600 MG/1
TABLET ORAL
Qty: 180 TABLET | Refills: 0 | Status: SHIPPED | OUTPATIENT
Start: 2024-09-09 | End: 2024-12-08

## 2024-09-30 RX ORDER — BUSPIRONE HYDROCHLORIDE 15 MG/1
15 TABLET ORAL 3 TIMES DAILY PRN
Qty: 90 TABLET | Refills: 1 | Status: SHIPPED | OUTPATIENT
Start: 2024-09-30

## 2024-10-03 DIAGNOSIS — M48.061 DEGENERATIVE LUMBAR SPINAL STENOSIS: ICD-10-CM

## 2024-10-03 RX ORDER — ACETAMINOPHEN AND CODEINE PHOSPHATE 300; 30 MG/1; MG/1
1 TABLET ORAL EVERY 8 HOURS PRN
Qty: 90 TABLET | Refills: 0 | Status: SHIPPED | OUTPATIENT
Start: 2024-10-03 | End: 2024-11-02

## 2024-10-03 NOTE — TELEPHONE ENCOUNTER
Medication:   Requested Prescriptions     Pending Prescriptions Disp Refills    acetaminophen-codeine (TYLENOL #3) 300-30 MG per tablet [Pharmacy Med Name: ACETAMINOPHEN-COD #3 TABLET] 90 tablet 0     Sig: Take 1 tablet by mouth every 8 hours as needed for Pain for up to 30 days. Max Daily Amount: 3 tablets      Last Filled:  8/26/24    Patient Phone Number: [phone] (home)     Last appt: 8/26/2024   Next appt: 11/26/2024    Last OARRS:       2/15/2024     3:13 PM   RX Monitoring   Periodic Controlled Substance Monitoring Possible medication side effects, risk of tolerance/dependence & alternative treatments discussed.     PDMP Monitoring:    Last PDMP [place] Reviewed ([place]):  Review User Review Instant Review Result   ESTEFANI CASTILLO 4/29/2024 11:13 AM Reviewed PDMP [1]     Preferred Pharmacy:   [facility] [id number] - [place], [place] - [address] [phone] - F [phone]  [address] [place] [address]  Phone: [phone] Fax: [phone]

## 2024-10-09 RX ORDER — GABAPENTIN 600 MG/1
TABLET ORAL
Qty: 180 TABLET | Refills: 0 | Status: SHIPPED | OUTPATIENT
Start: 2024-10-09 | End: 2025-01-07

## 2024-10-24 RX ORDER — MIRTAZAPINE 15 MG/1
TABLET, FILM COATED ORAL
Qty: 90 TABLET | Refills: 0 | Status: SHIPPED | OUTPATIENT
Start: 2024-10-24

## 2024-10-28 RX ORDER — BUSPIRONE HYDROCHLORIDE 15 MG/1
TABLET ORAL
Qty: 270 TABLET | Refills: 0 | Status: SHIPPED | OUTPATIENT
Start: 2024-10-28

## 2024-10-30 ENCOUNTER — OFFICE VISIT (OUTPATIENT)
Dept: FAMILY MEDICINE CLINIC | Age: 79
End: 2024-10-30

## 2024-10-30 VITALS — HEART RATE: 77 BPM | BODY MASS INDEX: 25.81 KG/M2 | WEIGHT: 148 LBS | TEMPERATURE: 97.8 F | OXYGEN SATURATION: 97 %

## 2024-10-30 DIAGNOSIS — H69.91 DYSFUNCTION OF RIGHT EUSTACHIAN TUBE: Primary | ICD-10-CM

## 2024-10-30 RX ORDER — FLUTICASONE PROPIONATE 50 MCG
2 SPRAY, SUSPENSION (ML) NASAL DAILY
Qty: 16 G | Refills: 0 | Status: SHIPPED | OUTPATIENT
Start: 2024-10-30

## 2024-10-30 NOTE — PROGRESS NOTES
tobacco: Never   Substance Use Topics    Alcohol use: No     Alcohol/week: 0.0 standard drinks of alcohol        Review of Systems    Objective:    Pulse 77   Temp 97.8 °F (36.6 °C) (Infrared)   Wt 67.1 kg (148 lb)   SpO2 97%   BMI 25.81 kg/m²   Weight - Scale: 67.1 kg (148 lb)     BP Readings from Last 3 Encounters:   08/26/24 102/60   04/29/24 110/72   01/29/24 112/70     Wt Readings from Last 3 Encounters:   10/30/24 67.1 kg (148 lb)   08/26/24 65 kg (143 lb 6 oz)   04/29/24 62.2 kg (137 lb 2 oz)     BMI Readings from Last 3 Encounters:   10/30/24 25.81 kg/m²   08/26/24 25.00 kg/m²   04/29/24 23.91 kg/m²       Physical Exam  Vitals reviewed.   Constitutional:       Appearance: Normal appearance. She is well-developed.   HENT:      Right Ear: Ear canal normal. Tympanic membrane is retracted.      Left Ear: Tympanic membrane and ear canal normal.      Nose: Rhinorrhea present.   Cardiovascular:      Rate and Rhythm: Normal rate and regular rhythm.      Pulses: Normal pulses.      Heart sounds: Normal heart sounds. No murmur heard.  Pulmonary:      Effort: Pulmonary effort is normal.      Breath sounds: Normal breath sounds.   Skin:     General: Skin is warm and dry.   Neurological:      Mental Status: She is alert and oriented to person, place, and time.   Psychiatric:         Mood and Affect: Mood normal.         Assessment/Plan    1. Dysfunction of right eustachian tube  Information provided  Suggested OTC antihistamine  - fluticasone (FLONASE) 50 MCG/ACT nasal spray; 2 sprays by Each Nostril route daily  Dispense: 16 g; Refill: 0  - Kyle Nicolas DO, [facility]      No follow-ups on file.    This dictation was generated by voice recognition computer software.  Although all attempts are made to edit the dictation for accuracy, there may be errors in the transcription that are not intended.

## 2024-11-02 DIAGNOSIS — M48.061 DEGENERATIVE LUMBAR SPINAL STENOSIS: ICD-10-CM

## 2024-11-04 RX ORDER — ACETAMINOPHEN AND CODEINE PHOSPHATE 300; 30 MG/1; MG/1
1 TABLET ORAL EVERY 8 HOURS PRN
Qty: 90 TABLET | Refills: 0 | Status: SHIPPED | OUTPATIENT
Start: 2024-11-04 | End: 2024-12-04

## 2024-11-04 NOTE — TELEPHONE ENCOUNTER
Medication:   Requested Prescriptions     Pending Prescriptions Disp Refills    acetaminophen-codeine (TYLENOL #3) 300-30 MG per tablet [Pharmacy Med Name: ACETAMINOPHEN-COD #3 TABLET] 90 tablet       Last Filled:  10/3/24    Patient Phone Number: [phone] (home)     Last appt: 10/30/2024   Next appt: 11/26/2024    Last OARRS:       2/15/2024     3:13 PM   RX Monitoring   Periodic Controlled Substance Monitoring Possible medication side effects, risk of tolerance/dependence & alternative treatments discussed.     PDMP Monitoring:    Last PDMP [place] Reviewed ([place]):  Review User Review Instant Review Result   ESTEFANI CASTILLO 4/29/2024 11:13 AM Reviewed PDMP [1]     Preferred Pharmacy:   [facility] [id number] - [place] - [address] [phone] - F [phone]  [address] [place] [address]  Phone: [phone] Fax: [phone]

## 2024-11-04 NOTE — TELEPHONE ENCOUNTER
Waiting for refill/ would also like medication to added to medication list/chart      Has 1 pill left    acetaminophen-codeine (TYLENOL #3) 300-30 MG per tablet

## 2024-11-11 RX ORDER — GABAPENTIN 600 MG/1
TABLET ORAL
Qty: 180 TABLET | Refills: 0 | Status: SHIPPED | OUTPATIENT
Start: 2024-11-11 | End: 2025-02-09

## 2024-11-27 RX ORDER — BUSPIRONE HYDROCHLORIDE 15 MG/1
15 TABLET ORAL 3 TIMES DAILY
Qty: 270 TABLET | Refills: 0 | Status: SHIPPED | OUTPATIENT
Start: 2024-11-27

## 2024-12-02 DIAGNOSIS — M48.061 DEGENERATIVE LUMBAR SPINAL STENOSIS: ICD-10-CM

## 2024-12-02 RX ORDER — ACETAMINOPHEN AND CODEINE PHOSPHATE 300; 30 MG/1; MG/1
1 TABLET ORAL EVERY 8 HOURS PRN
Qty: 52 TABLET | Refills: 0 | Status: SHIPPED | OUTPATIENT
Start: 2024-12-02 | End: 2024-12-16

## 2024-12-02 NOTE — TELEPHONE ENCOUNTER
Medication:   Requested Prescriptions     Pending Prescriptions Disp Refills    acetaminophen-codeine (TYLENOL #3) 300-30 MG per tablet [Pharmacy Med Name: ACETAMINOPHEN-COD #3 TABLET] 90 tablet      Sig: TAKE ONE TABLET BY MOUTH EVERY 8 HOURS AS NEEDED FOR PAIN MAX DAILY AMOUNT 3 TABLETS PER DAY      Last Filled:  10/3/24    Patient Phone Number: [phone] (home)     Last appt: 10/30/2024   Next appt: Visit date not found    Last OARRS:       2/15/2024     3:13 PM   RX Monitoring   Periodic Controlled Substance Monitoring Possible medication side effects, risk of tolerance/dependence & alternative treatments discussed.     PDMP Monitoring:    Last PDMP [place] Reviewed ([place]):  Review User Review Instant Review Result   ESTEFANI CASTILLO 4/29/2024 11:13 AM Reviewed PDMP [1]     Preferred Pharmacy:   [facility] [id number] - [place] - [address] [phone] - F [phone]  [address] [place] [address]  Phone: [phone] Fax: [phone]

## 2024-12-09 ENCOUNTER — OFFICE VISIT (OUTPATIENT)
Dept: FAMILY MEDICINE CLINIC | Age: 79
End: 2024-12-09

## 2024-12-09 VITALS
SYSTOLIC BLOOD PRESSURE: 106 MMHG | DIASTOLIC BLOOD PRESSURE: 60 MMHG | RESPIRATION RATE: 12 BRPM | BODY MASS INDEX: 24.96 KG/M2 | WEIGHT: 143.13 LBS | TEMPERATURE: 97.7 F | HEART RATE: 67 BPM | OXYGEN SATURATION: 95 %

## 2024-12-09 DIAGNOSIS — E78.00 HYPERCHOLESTEROLEMIA: Primary | ICD-10-CM

## 2024-12-09 DIAGNOSIS — G30.1 LATE ONSET ALZHEIMER'S DISEASE WITHOUT BEHAVIORAL DISTURBANCE (HCC): ICD-10-CM

## 2024-12-09 DIAGNOSIS — G89.4 CHRONIC PAIN SYNDROME: ICD-10-CM

## 2024-12-09 DIAGNOSIS — F02.80 LATE ONSET ALZHEIMER'S DISEASE WITHOUT BEHAVIORAL DISTURBANCE (HCC): ICD-10-CM

## 2024-12-09 DIAGNOSIS — F45.42 PAIN DISORDER WITH PSYCHOLOGICAL FACTORS: ICD-10-CM

## 2024-12-09 DIAGNOSIS — D50.9 IRON DEFICIENCY ANEMIA, UNSPECIFIED IRON DEFICIENCY ANEMIA TYPE: ICD-10-CM

## 2024-12-09 DIAGNOSIS — E11.9 TYPE 2 DIABETES MELLITUS WITHOUT COMPLICATION, WITHOUT LONG-TERM CURRENT USE OF INSULIN (HCC): ICD-10-CM

## 2024-12-09 RX ORDER — GABAPENTIN 600 MG/1
TABLET ORAL
Qty: 180 TABLET | Refills: 5 | Status: SHIPPED | OUTPATIENT
Start: 2024-12-09 | End: 2025-06-07

## 2024-12-09 RX ORDER — FERROUS SULFATE 325(65) MG
325 TABLET ORAL DAILY
Qty: 180 TABLET | Refills: 1 | Status: CANCELLED | OUTPATIENT
Start: 2024-12-09

## 2024-12-09 RX ORDER — MIRTAZAPINE 15 MG/1
TABLET, FILM COATED ORAL
Qty: 90 TABLET | Refills: 1 | Status: SHIPPED | OUTPATIENT
Start: 2024-12-09

## 2024-12-09 NOTE — TELEPHONE ENCOUNTER
Medication:   Requested Prescriptions     Pending Prescriptions Disp Refills    gabapentin (NEURONTIN) 600 MG tablet [Pharmacy Med Name: GABAPENTIN 600 MG TABLET] 180 tablet 0     Sig: TAKE TWO TABLETS BY MOUTH THREE TIMES A DAY          Patient Phone Number: [phone] (home)     Last appt: 10/30/2024   Next appt: 12/9/2024    Last OARRS:       2/15/2024     3:13 PM   RX Monitoring   Periodic Controlled Substance Monitoring Possible medication side effects, risk of tolerance/dependence & alternative treatments discussed.     PDMP Monitoring:    Last PDMP [place] Reviewed ([place]):  Review User Review Instant Review Result   ESTEFANI CASTILLO 4/29/2024 11:13 AM Reviewed PDMP [1]     Preferred Pharmacy:   [facility] [id number] - [place] - [address] [phone] - F [phone]  [address] [place] [address]  Phone: [phone] Fax: [phone]

## 2024-12-09 NOTE — PROGRESS NOTES
Subjective   Patient ID: Selin King is a 79 y.o. female.  CC: Patient presents for re-evaluation of chronic health problems including chronic pain, dementia, iron deficiency anemia and hypercholesterol..    HPI pt is here for a follow up, med red refill, and accompaniment of .   continues to maintain her medications because of her memory issues.  She continues to have chronic back pain and maintains gabapentin and Cymbalta medication.  She continues take pain pills up to 3 times a day.  She denies any adverse reactions to the pain medication.  She has not had laboratory profiling performed in regards to diabetes mellitus or iron deficiency anemia.  Her last laboratory profile demonstrated she had too much iron and the iron was stopped.  Memory continues to deteriorate but she is able to live with her  with no issues.    Review of Systems     Patient Active Problem List   Diagnosis    Essential hypertension    Generalized osteoarthrosis, involving multiple sites    Type 2 diabetes mellitus without complication (HCC)    Degenerative lumbar spinal stenosis    Congenital clotting factor deficiency (HCC)    Hypercholesterolemia    GERD (gastroesophageal reflux disease)    Late onset Alzheimer's disease without behavioral disturbance (HCC)    Chronic low back pain without sciatica    Spondylolisthesis, lumbar region    Chronic pain syndrome    Hyperparathyroidism (HCC)    Pain disorder with psychological factors    Current moderate episode of major depressive disorder without prior episode (HCC)    History of TIA (transient ischemic attack)    Cubital tunnel syndrome on left    Left carpal tunnel syndrome    Cervical spinal stenosis    Type 2 diabetes mellitus with diabetic neuropathy    Cervical myelopathy (HCC)    Opioid dependence with current use (HCC)       Outpatient Medications Marked as Taking for the 12/9/24 encounter (Office Visit) with Ariel Mcguire MD   Medication Sig

## 2024-12-10 PROBLEM — G56.22 CUBITAL TUNNEL SYNDROME ON LEFT: Status: RESOLVED | Noted: 2020-10-26 | Resolved: 2024-12-10

## 2024-12-21 DIAGNOSIS — M48.061 DEGENERATIVE LUMBAR SPINAL STENOSIS: ICD-10-CM

## 2024-12-23 DIAGNOSIS — M48.061 DEGENERATIVE LUMBAR SPINAL STENOSIS: ICD-10-CM

## 2024-12-23 RX ORDER — ACETAMINOPHEN AND CODEINE PHOSPHATE 300; 30 MG/1; MG/1
1 TABLET ORAL EVERY 8 HOURS PRN
Qty: 90 TABLET | Refills: 0 | Status: SHIPPED | OUTPATIENT
Start: 2024-12-23 | End: 2025-01-22

## 2024-12-23 RX ORDER — ACETAMINOPHEN AND CODEINE PHOSPHATE 300; 30 MG/1; MG/1
1 TABLET ORAL EVERY 8 HOURS PRN
Qty: 52 TABLET | Refills: 0 | Status: SHIPPED | OUTPATIENT
Start: 2024-12-23 | End: 2024-12-23 | Stop reason: SDUPTHER

## 2024-12-23 NOTE — TELEPHONE ENCOUNTER
Medication:   Requested Prescriptions     Pending Prescriptions Disp Refills    acetaminophen-codeine (TYLENOL #3) 300-30 MG per tablet [Pharmacy Med Name: ACETAMINOPHEN-COD #3 TABLET] 52 tablet      Sig: TAKE ONE TABLET BY MOUTH EVERY 8 HOURS AS NEEDED FOR PAIN. PATIENT NEEDS APPOINTMENT FOR ADDITIONAL REFILLS      Last Filled:  12/2/24    Patient Phone Number: [phone] (home)     Last appt: 12/9/2024   Next appt: 2/7/2025    Last OARRS:       2/15/2024     3:13 PM   RX Monitoring   Periodic Controlled Substance Monitoring Possible medication side effects, risk of tolerance/dependence & alternative treatments discussed.     PDMP Monitoring:    Last PDMP [place] as Reviewed ([place]):  Review User Review Instant Review Result   ESTEFANI CASTILLO 12/9/2024  4:55 PM Reviewed PDMP [1]     Preferred Pharmacy:   [facility] [id number] - [place], [place] - [address] [phone] - F [phone]  [address]  [place] [address]  Phone: [phone] Fax: [phone]

## 2024-12-23 NOTE — TELEPHONE ENCOUNTER
Requesting regular fill quantity (90)         acetaminophen-codeine (TYLENOL #3) 300-30 MG per tablet          [facility] [id number] - [place], [place] - [address] - P [phone] - F [phone]  [address], [facility] [address]  Phone: [phone]  Fax: [phone]       Please call for updates

## 2025-02-04 ENCOUNTER — HOSPITAL ENCOUNTER (OUTPATIENT)
Age: 80
Discharge: HOME | End: 2025-02-04
Payer: MEDICARE

## 2025-02-04 LAB
ALBUMIN SERPL-MCNC: 4.4 G/DL (ref 3.4–5)
ALBUMIN/GLOB SERPL: 1.5 {RATIO} (ref 1.1–2.2)
ALP SERPL-CCNC: 95 U/L (ref 40–129)
ALT SERPL-CCNC: 26 U/L (ref 10–40)
ANION GAP SERPL CALCULATED.3IONS-SCNC: 7 MMOL/L (ref 3–16)
AST SERPL-CCNC: 33 U/L (ref 15–37)
BILIRUB SERPL-MCNC: <0.2 MG/DL (ref 0–1)
BUN SERPL-MCNC: 11 MG/DL (ref 7–20)
CALCIUM SERPL-MCNC: 10.4 MG/DL (ref 8.3–10.6)
CHLORIDE SERPL-SCNC: 103 MMOL/L (ref 99–110)
CHOLEST SERPL-MCNC: 149 MG/DL (ref 0–199)
CO2 SERPL-SCNC: 28 MMOL/L (ref 21–32)
CREAT SERPL-MCNC: 0.8 MG/DL (ref 0.6–1.2)
DEPRECATED RDW RBC AUTO: 13.3 % (ref 12.4–15.4)
EST. AVERAGE GLUCOSE BLD GHB EST-MCNC: 105.4 MG/DL
GFR SERPLBLD CREATININE-BSD FMLA CKD-EPI: 74 ML/MIN/{1.73_M2}
GLUCOSE SERPL-MCNC: 86 MG/DL (ref 70–99)
HBA1C MFR BLD: 5.3 %
HCT VFR BLD AUTO: 38.9 % (ref 36–48)
HDLC SERPL-MCNC: 79 MG/DL (ref 40–60)
HGB BLD-MCNC: 12.9 G/DL (ref 12–16)
IRON SATN MFR SERPL: 39 % (ref 15–50)
IRON SERPL-MCNC: 158 UG/DL (ref 37–145)
LDLC SERPL CALC-MCNC: 47 MG/DL
MCH RBC QN AUTO: 32.1 PG (ref 26–34)
MCHC RBC AUTO-ENTMCNC: 33.1 G/DL (ref 31–36)
MCV RBC AUTO: 96.9 FL (ref 80–100)
PLATELET # BLD AUTO: 256 K/UL (ref 135–450)
PMV BLD AUTO: 8.6 FL (ref 5–10.5)
POTASSIUM SERPL-SCNC: 4.9 MMOL/L (ref 3.5–5.1)
PROT SERPL-MCNC: 7.3 G/DL (ref 6.4–8.2)
RBC # BLD AUTO: 4.01 M/UL (ref 4–5.2)
SODIUM SERPL-SCNC: 138 MMOL/L (ref 136–145)
TIBC SERPL-MCNC: 409 UG/DL (ref 260–445)
TRIGL SERPL-MCNC: 117 MG/DL (ref 0–150)
VLDLC SERPL CALC-MCNC: 23 MG/DL
WBC # BLD AUTO: 6.3 K/UL (ref 4–11)

## 2025-02-04 PROCEDURE — 80061 LIPID PANEL: CPT

## 2025-02-04 PROCEDURE — 83550 IRON BINDING TEST: CPT

## 2025-02-04 PROCEDURE — 36415 COLL VENOUS BLD VENIPUNCTURE: CPT

## 2025-02-04 PROCEDURE — 80053 COMPREHEN METABOLIC PANEL: CPT

## 2025-02-04 PROCEDURE — 83036 HEMOGLOBIN GLYCOSYLATED A1C: CPT

## 2025-02-04 PROCEDURE — 83540 ASSAY OF IRON: CPT

## 2025-02-04 PROCEDURE — 85027 COMPLETE CBC AUTOMATED: CPT

## 2025-02-06 PROBLEM — G56.02 LEFT CARPAL TUNNEL SYNDROME: Status: RESOLVED | Noted: 2020-10-26 | Resolved: 2025-02-06

## 2025-02-07 ENCOUNTER — OFFICE VISIT (OUTPATIENT)
Dept: FAMILY MEDICINE CLINIC | Age: 80
End: 2025-02-07

## 2025-02-07 VITALS
HEIGHT: 64 IN | WEIGHT: 147 LBS | HEART RATE: 102 BPM | BODY MASS INDEX: 25.1 KG/M2 | SYSTOLIC BLOOD PRESSURE: 104 MMHG | DIASTOLIC BLOOD PRESSURE: 62 MMHG | OXYGEN SATURATION: 98 % | TEMPERATURE: 98.1 F

## 2025-02-07 DIAGNOSIS — Z76.89 ENCOUNTER TO ESTABLISH CARE: Primary | ICD-10-CM

## 2025-02-07 DIAGNOSIS — F11.20 OPIOID DEPENDENCE WITH CURRENT USE (HCC): ICD-10-CM

## 2025-02-07 DIAGNOSIS — F32.1 CURRENT MODERATE EPISODE OF MAJOR DEPRESSIVE DISORDER WITHOUT PRIOR EPISODE (HCC): ICD-10-CM

## 2025-02-07 DIAGNOSIS — E21.3 HYPERPARATHYROIDISM (HCC): ICD-10-CM

## 2025-02-07 DIAGNOSIS — F41.9 ANXIETY: ICD-10-CM

## 2025-02-07 DIAGNOSIS — F02.80 LATE ONSET ALZHEIMER'S DISEASE WITHOUT BEHAVIORAL DISTURBANCE (HCC): ICD-10-CM

## 2025-02-07 DIAGNOSIS — E11.40 TYPE 2 DIABETES MELLITUS WITH DIABETIC NEUROPATHY, WITHOUT LONG-TERM CURRENT USE OF INSULIN (HCC): ICD-10-CM

## 2025-02-07 DIAGNOSIS — E78.00 HYPERCHOLESTEROLEMIA: ICD-10-CM

## 2025-02-07 DIAGNOSIS — G89.29 CHRONIC LOW BACK PAIN WITHOUT SCIATICA, UNSPECIFIED BACK PAIN LATERALITY: ICD-10-CM

## 2025-02-07 DIAGNOSIS — M15.9 GENERALIZED OSTEOARTHROSIS, INVOLVING MULTIPLE SITES: ICD-10-CM

## 2025-02-07 DIAGNOSIS — G30.1 LATE ONSET ALZHEIMER'S DISEASE WITHOUT BEHAVIORAL DISTURBANCE (HCC): ICD-10-CM

## 2025-02-07 DIAGNOSIS — I10 ESSENTIAL HYPERTENSION: ICD-10-CM

## 2025-02-07 DIAGNOSIS — M54.50 CHRONIC LOW BACK PAIN WITHOUT SCIATICA, UNSPECIFIED BACK PAIN LATERALITY: ICD-10-CM

## 2025-02-07 DIAGNOSIS — G89.4 CHRONIC PAIN SYNDROME: ICD-10-CM

## 2025-02-07 PROBLEM — M43.16 SPONDYLOLISTHESIS, LUMBAR REGION: Status: RESOLVED | Noted: 2019-05-01 | Resolved: 2025-02-07

## 2025-02-07 PROBLEM — F45.42 PAIN DISORDER WITH PSYCHOLOGICAL FACTORS: Status: RESOLVED | Noted: 2019-12-03 | Resolved: 2025-02-07

## 2025-02-07 PROBLEM — G95.9 CERVICAL MYELOPATHY (HCC): Status: RESOLVED | Noted: 2022-02-16 | Resolved: 2025-02-07

## 2025-02-07 RX ORDER — ACETAMINOPHEN AND CODEINE PHOSPHATE 300; 30 MG/1; MG/1
1 TABLET ORAL EVERY 8 HOURS PRN
COMMUNITY
Start: 2025-02-01

## 2025-02-07 RX ORDER — FLUTICASONE PROPIONATE 50 MCG
2 SPRAY, SUSPENSION (ML) NASAL DAILY
Qty: 16 G | Refills: 0 | Status: SHIPPED | OUTPATIENT
Start: 2025-02-07

## 2025-02-07 RX ORDER — ACETAMINOPHEN AND CODEINE PHOSPHATE 300; 30 MG/1; MG/1
1 TABLET ORAL EVERY 8 HOURS
Qty: 90 TABLET | Refills: 2 | Status: SHIPPED | OUTPATIENT
Start: 2025-02-07 | End: 2025-05-08

## 2025-02-07 SDOH — ECONOMIC STABILITY: FOOD INSECURITY: WITHIN THE PAST 12 MONTHS, THE FOOD YOU BOUGHT JUST DIDN'T LAST AND YOU DIDN'T HAVE MONEY TO GET MORE.: NEVER TRUE

## 2025-02-07 SDOH — ECONOMIC STABILITY: FOOD INSECURITY: WITHIN THE PAST 12 MONTHS, YOU WORRIED THAT YOUR FOOD WOULD RUN OUT BEFORE YOU GOT MONEY TO BUY MORE.: NEVER TRUE

## 2025-02-07 ASSESSMENT — PATIENT HEALTH QUESTIONNAIRE - PHQ9
SUM OF ALL RESPONSES TO PHQ9 QUESTIONS 1 & 2: 1
4. FEELING TIRED OR HAVING LITTLE ENERGY: SEVERAL DAYS
10. IF YOU CHECKED OFF ANY PROBLEMS, HOW DIFFICULT HAVE THESE PROBLEMS MADE IT FOR YOU TO DO YOUR WORK, TAKE CARE OF THINGS AT HOME, OR GET ALONG WITH OTHER PEOPLE: SOMEWHAT DIFFICULT
7. TROUBLE CONCENTRATING ON THINGS, SUCH AS READING THE NEWSPAPER OR WATCHING TELEVISION: MORE THAN HALF THE DAYS
6. FEELING BAD ABOUT YOURSELF - OR THAT YOU ARE A FAILURE OR HAVE LET YOURSELF OR YOUR FAMILY DOWN: NOT AT ALL
SUM OF ALL RESPONSES TO PHQ QUESTIONS 1-9: 6
5. POOR APPETITE OR OVEREATING: NOT AT ALL
SUM OF ALL RESPONSES TO PHQ QUESTIONS 1-9: 6
1. LITTLE INTEREST OR PLEASURE IN DOING THINGS: NOT AT ALL
SUM OF ALL RESPONSES TO PHQ QUESTIONS 1-9: 6
SUM OF ALL RESPONSES TO PHQ QUESTIONS 1-9: 6
2. FEELING DOWN, DEPRESSED OR HOPELESS: SEVERAL DAYS
3. TROUBLE FALLING OR STAYING ASLEEP: SEVERAL DAYS
9. THOUGHTS THAT YOU WOULD BE BETTER OFF DEAD, OR OF HURTING YOURSELF: NOT AT ALL
8. MOVING OR SPEAKING SO SLOWLY THAT OTHER PEOPLE COULD HAVE NOTICED. OR THE OPPOSITE, BEING SO FIGETY OR RESTLESS THAT YOU HAVE BEEN MOVING AROUND A LOT MORE THAN USUAL: SEVERAL DAYS

## 2025-02-07 NOTE — PATIENT INSTRUCTIONS
Try cutting amlodipine 10 mg in half and taking 5 mg because blood pressure is a bit low now.  - If blood pressure gets over 140/90 that is concerning.

## 2025-02-07 NOTE — PROGRESS NOTES
Office Note  2025  Patient Name: Selin King  MRN: [id number] : 1945    SUBJECTIVE:     CHIEF COMPLAINT:  Chief Complaint   Patient presents with    Establish Care    Hypertension     No new concerns, does not check bps at home       HISTORY OF PRESENT ILLNESS:  Former [facility] patient, establishing care. She presents today with the following concerns and/or to follow up on these chronic conditions:    History of Present Illness  The patient is an 79-year-old female who presents for medication management, chronic pain, hypertension, anxiety and depression, memory issues, and elevated iron levels.    She reports no current health concerns and is transitioning her care from Dr. Mcguire. She has been experiencing difficulties with her new insurance provider, [employer], regarding the prescription of acetaminophen with codeine, which she takes three times daily as needed. This is taken for chronic low back pain and osteoarthritis. She is on a regimen of gabapentin, two tablets three times daily, for neuropathy, but reports no significant relief.     She is on atorvastatin 40 mg and does not require any refills at this time.    She does not monitor her blood pressure at home and has been experiencing lightheadedness upon standing for several years. She has a blood pressure cuff at home that she can use. Her blood pressure for the past several visits has been on the low end of normal.    She has a history of anxiety and depression, which are well-managed with Cymbalta and Buspar. She takes one tablet of mirtazapine at night to aid sleep. She takes two tablets of donepezil at night for memory issues and tolerates it well. She has not been formally diagnosed with Alzheimer's disease to her knowledge. She also takes melatonin, which effectively induces sleep.    She had low iron levels and was advised to start taking iron pills. However, her iron levels became very high, so the dosage was reduced to one pill and then

## 2025-03-19 RX ORDER — ATORVASTATIN CALCIUM 40 MG/1
TABLET, FILM COATED ORAL
Qty: 90 TABLET | Refills: 1 | Status: SHIPPED | OUTPATIENT
Start: 2025-03-19

## 2025-03-19 NOTE — TELEPHONE ENCOUNTER
Medication:   Requested Prescriptions      No prescriptions requested or ordered in this encounter          Patient Phone Number: [phone] (home)     Last appt: 2/7/2025   Next appt: 6/6/2025    Last OARRS:       2/15/2024     3:13 PM   RX Monitoring   Periodic Controlled Substance Monitoring Possible medication side effects, risk of tolerance/dependence & alternative treatments discussed.     PDMP Monitoring:    Last PDMP [place] Reviewed ([place]):  Review User Review Instant Review Result   ESTEFANI CASTILLO 12/9/2024  4:55 PM Reviewed PDMP [1]     Preferred Pharmacy:   [facility] [id number] - [place], [place] - [address] [phone] - F [phone]  [address]  [place] [address]  Phone: [phone] Fax: [phone]

## 2025-03-27 ENCOUNTER — TELEPHONE (OUTPATIENT)
Dept: FAMILY MEDICINE CLINIC | Age: 80
End: 2025-03-27

## 2025-03-27 NOTE — TELEPHONE ENCOUNTER
Patient calling wanting to know if MM would advise patient and her  Familia to get a second dose of the covid vaccine.     Please advise

## 2025-04-07 RX ORDER — DULOXETIN HYDROCHLORIDE 60 MG/1
CAPSULE, DELAYED RELEASE ORAL
Qty: 90 CAPSULE | Refills: 1 | Status: SHIPPED | OUTPATIENT
Start: 2025-04-07

## 2025-04-07 RX ORDER — OMEPRAZOLE 40 MG/1
CAPSULE, DELAYED RELEASE ORAL
Qty: 90 CAPSULE | Refills: 1 | Status: SHIPPED | OUTPATIENT
Start: 2025-04-07

## 2025-04-07 NOTE — TELEPHONE ENCOUNTER
Medication:   Requested Prescriptions      No prescriptions requested or ordered in this encounter          Patient Phone Number: [phone] (home)     Last appt: 2/7/2025   Next appt: 6/6/2025    Last OARRS:       2/15/2024     3:13 PM   RX Monitoring   Periodic Controlled Substance Monitoring Possible medication side effects, risk of tolerance/dependence & alternative treatments discussed.     PDMP Monitoring:    Last PDMP [place] as Reviewed ([place]):  Review User Review Instant Review Result   ESTEFANI CASTILLO 12/9/2024  4:55 PM Reviewed PDMP [1]     Preferred Pharmacy:   [facility] [id number] - [place], [place] - [address] [phone] - F [phone]  [address]  [place] [address]  Phone: [phone] Fax: [phone]

## 2025-04-15 RX ORDER — BUSPIRONE HYDROCHLORIDE 15 MG/1
15 TABLET ORAL 3 TIMES DAILY
Qty: 270 TABLET | Refills: 0 | Status: SHIPPED | OUTPATIENT
Start: 2025-04-15

## 2025-04-22 DIAGNOSIS — F02.80 LATE ONSET ALZHEIMER'S DISEASE WITHOUT BEHAVIORAL DISTURBANCE (HCC): ICD-10-CM

## 2025-04-22 DIAGNOSIS — G30.1 LATE ONSET ALZHEIMER'S DISEASE WITHOUT BEHAVIORAL DISTURBANCE (HCC): ICD-10-CM

## 2025-04-22 RX ORDER — AMLODIPINE BESYLATE 10 MG/1
10 TABLET ORAL DAILY
Qty: 90 TABLET | Refills: 1 | Status: SHIPPED | OUTPATIENT
Start: 2025-04-22

## 2025-04-22 RX ORDER — DONEPEZIL HYDROCHLORIDE 10 MG/1
TABLET, FILM COATED ORAL
Qty: 180 TABLET | Refills: 1 | Status: SHIPPED | OUTPATIENT
Start: 2025-04-22

## 2025-04-22 NOTE — TELEPHONE ENCOUNTER
Medication:   Requested Prescriptions      No prescriptions requested or ordered in this encounter          Patient Phone Number: [phone] (home)     Last appt: 2/7/2025   Next appt: 6/6/2025    Last OARRS:       2/15/2024     3:13 PM   RX Monitoring   Periodic Controlled Substance Monitoring Possible medication side effects, risk of tolerance/dependence & alternative treatments discussed.     PDMP Monitoring:    Last PDMP [place] Reviewed ([place]):  Review User Review Instant Review Result   ESTEFANI CASTILLO 12/9/2024  4:55 PM Reviewed PDMP [1]     Preferred Pharmacy:   [facility] [id number] - [place], [place] - [address] [phone] - F [phone]  [address]  [place] [address]  Phone: [phone] Fax: [phone]

## 2025-05-12 ENCOUNTER — PROCEDURE VISIT (OUTPATIENT)
Dept: AUDIOLOGY | Age: 80
End: 2025-05-12

## 2025-05-12 DIAGNOSIS — H90.3 SENSORINEURAL HEARING LOSS (SNHL) OF BOTH EARS: Primary | ICD-10-CM

## 2025-05-12 DIAGNOSIS — G89.4 CHRONIC PAIN SYNDROME: Primary | ICD-10-CM

## 2025-05-12 DIAGNOSIS — H93.13 TINNITUS OF BOTH EARS: ICD-10-CM

## 2025-05-12 DIAGNOSIS — M54.50 CHRONIC LOW BACK PAIN WITHOUT SCIATICA, UNSPECIFIED BACK PAIN LATERALITY: ICD-10-CM

## 2025-05-12 DIAGNOSIS — G89.29 CHRONIC LOW BACK PAIN WITHOUT SCIATICA, UNSPECIFIED BACK PAIN LATERALITY: ICD-10-CM

## 2025-05-12 RX ORDER — ACETAMINOPHEN AND CODEINE PHOSPHATE 300; 30 MG/1; MG/1
1 TABLET ORAL EVERY 8 HOURS PRN
Qty: 90 TABLET | Refills: 0 | Status: SHIPPED | OUTPATIENT
Start: 2025-05-12 | End: 2025-08-10

## 2025-05-12 NOTE — TELEPHONE ENCOUNTER
Medication:   Requested Prescriptions      No prescriptions requested or ordered in this encounter      Last Filled:  2/7/25 Provider out of office.     Patient Phone Number: [phone] (home)     Last appt: 2/7/2025   Next appt: 6/6/2025    Last OARRS:       2/15/2024     3:13 PM   RX Monitoring   Periodic Controlled Substance Monitoring Possible medication side effects, risk of tolerance/dependence & alternative treatments discussed.     PDMP Monitoring:    Last PDMP [place]ed ([place]):  Review User Review Instant Review Result   ESTEFANI CASTILLO 12/9/2024  4:55 PM Reviewed PDMP [1]     Preferred Pharmacy:   [facility] [id number] - [place] - [address] [phone] - F [phone]  [address] [place] [address]  Phone: [phone] Fax: [phone]

## 2025-05-12 NOTE — PROGRESS NOTES
Selin King   1945, 79 y.o. female   [id number]       Referring Provider: SELF  Referral Type: N/A    Reason for Visit: Evaluation of suspected change in hearing, tinnitus, or balance.    ADULT AUDIOLOGIC EVALUATION      Selin King is a 79 y.o. female seen today, 5/12/2025, for an initial audiologic evaluation.      AUDIOLOGIC AND OTHER PERTINENT MEDICAL HISTORY:        Selin King noted concern for decreased hearing bilaterally, gradual, losing some clarity but overall feels she is doing well; tinnitus bilaterally, some intermittent ringing type of sound and an underlying buzzing sound that seems more noticeable in her right ear; she uses a cane to assist with ambulation, denied any spinning sensation.     She denied otalgia, aural fullness, otorrhea, history of noise exposure, history of ear surgery, and family history of hearing loss.    IMPRESSIONS:        Today's results revealed bilateral sensorineural hearing loss with notch shape RE>LE; both ears with flat tympanograms and excellent word recognition for conversational speech. Reviewed findings with patient, discussed tinnitus management strategies and considerations for amplification when ready.    ASSESSMENT AND FINDINGS:       Otoscopy revealed: Clear ear canals bilaterally    RIGHT EAR:  Hearing Sensitivity: Within normal limits to moderate sensorineural hearing loss with notch at 4000 Hz.  Speech Recognition Threshold: 20 dBHL  Word Recognition: Excellent (96%), based on NU-6 25-word list at 60 dBHL using recorded speech stimuli.    Tympanometry: Flat, no peak pressure or compliance, Type B tympanogram, with typical ear canal volume, consistent with middle ear fluid.       LEFT EAR:  Hearing Sensitivity: Within normal limits to moderate sensorineural hearing loss.  Speech Recognition Threshold: 25 dBHL  Word Recognition: Excellent (100%), based on NU-6 25-word list at 55 dBHL using recorded speech stimuli.    Tympanometry: Flat, no

## 2025-06-06 ENCOUNTER — TELEPHONE (OUTPATIENT)
Dept: FAMILY MEDICINE CLINIC | Age: 80
End: 2025-06-06

## 2025-06-06 ENCOUNTER — OFFICE VISIT (OUTPATIENT)
Dept: FAMILY MEDICINE CLINIC | Age: 80
End: 2025-06-06

## 2025-06-06 VITALS
TEMPERATURE: 97.6 F | WEIGHT: 136.4 LBS | BODY MASS INDEX: 23.78 KG/M2 | DIASTOLIC BLOOD PRESSURE: 67 MMHG | RESPIRATION RATE: 15 BRPM | OXYGEN SATURATION: 98 % | SYSTOLIC BLOOD PRESSURE: 113 MMHG | HEART RATE: 88 BPM

## 2025-06-06 DIAGNOSIS — K21.9 GASTROESOPHAGEAL REFLUX DISEASE WITHOUT ESOPHAGITIS: ICD-10-CM

## 2025-06-06 DIAGNOSIS — E78.00 HYPERCHOLESTEROLEMIA: ICD-10-CM

## 2025-06-06 DIAGNOSIS — G30.1 LATE ONSET ALZHEIMER'S DISEASE WITHOUT BEHAVIORAL DISTURBANCE (HCC): Primary | ICD-10-CM

## 2025-06-06 DIAGNOSIS — F41.9 ANXIETY: ICD-10-CM

## 2025-06-06 DIAGNOSIS — F02.80 LATE ONSET ALZHEIMER'S DISEASE WITHOUT BEHAVIORAL DISTURBANCE (HCC): Primary | ICD-10-CM

## 2025-06-06 DIAGNOSIS — R33.9 URINARY RETENTION: ICD-10-CM

## 2025-06-06 DIAGNOSIS — I10 ESSENTIAL HYPERTENSION: ICD-10-CM

## 2025-06-06 DIAGNOSIS — R63.4 WEIGHT LOSS: ICD-10-CM

## 2025-06-06 DIAGNOSIS — M54.41 CHRONIC BILATERAL LOW BACK PAIN WITH BILATERAL SCIATICA: ICD-10-CM

## 2025-06-06 DIAGNOSIS — G89.29 CHRONIC BILATERAL LOW BACK PAIN WITH BILATERAL SCIATICA: ICD-10-CM

## 2025-06-06 DIAGNOSIS — G89.4 CHRONIC PAIN SYNDROME: ICD-10-CM

## 2025-06-06 DIAGNOSIS — M54.42 CHRONIC BILATERAL LOW BACK PAIN WITH BILATERAL SCIATICA: ICD-10-CM

## 2025-06-06 RX ORDER — MIRTAZAPINE 15 MG/1
TABLET, FILM COATED ORAL
Qty: 90 TABLET | Refills: 1 | Status: SHIPPED | OUTPATIENT
Start: 2025-06-06

## 2025-06-06 RX ORDER — TAMSULOSIN HYDROCHLORIDE 0.4 MG/1
0.4 CAPSULE ORAL DAILY
Status: SHIPPED | COMMUNITY
Start: 2025-06-06

## 2025-06-06 RX ORDER — DONEPEZIL HYDROCHLORIDE 10 MG/1
TABLET, FILM COATED ORAL
Qty: 180 TABLET | Refills: 1 | Status: SHIPPED | OUTPATIENT
Start: 2025-06-06

## 2025-06-06 RX ORDER — OMEPRAZOLE 40 MG/1
CAPSULE, DELAYED RELEASE ORAL
Qty: 90 CAPSULE | Refills: 1 | Status: SHIPPED | OUTPATIENT
Start: 2025-06-06

## 2025-06-06 RX ORDER — TIZANIDINE 2 MG/1
2 TABLET ORAL 2 TIMES DAILY PRN
Qty: 30 TABLET | Refills: 0 | Status: SHIPPED | OUTPATIENT
Start: 2025-06-06

## 2025-06-06 RX ORDER — GABAPENTIN 300 MG/1
300 CAPSULE ORAL 3 TIMES DAILY
Qty: 270 CAPSULE | Refills: 1 | Status: SHIPPED | OUTPATIENT
Start: 2025-06-06 | End: 2025-12-03

## 2025-06-06 RX ORDER — GABAPENTIN 600 MG/1
600 TABLET ORAL 3 TIMES DAILY
Qty: 180 TABLET | Refills: 2 | Status: CANCELLED | OUTPATIENT
Start: 2025-06-06 | End: 2025-12-03

## 2025-06-06 RX ORDER — BUSPIRONE HYDROCHLORIDE 15 MG/1
15 TABLET ORAL 3 TIMES DAILY
Qty: 270 TABLET | Refills: 0 | Status: SHIPPED | OUTPATIENT
Start: 2025-06-06

## 2025-06-06 RX ORDER — AMLODIPINE BESYLATE 10 MG/1
10 TABLET ORAL DAILY
Qty: 90 TABLET | Refills: 1 | Status: CANCELLED | OUTPATIENT
Start: 2025-06-06

## 2025-06-06 RX ORDER — TAMSULOSIN HYDROCHLORIDE 0.4 MG/1
0.4 CAPSULE ORAL DAILY
COMMUNITY
End: 2025-06-06 | Stop reason: DRUGHIGH

## 2025-06-06 RX ORDER — ATORVASTATIN CALCIUM 40 MG/1
TABLET, FILM COATED ORAL
Qty: 90 TABLET | Refills: 1 | Status: SHIPPED | OUTPATIENT
Start: 2025-06-06

## 2025-06-06 RX ORDER — FLUTICASONE PROPIONATE 50 MCG
2 SPRAY, SUSPENSION (ML) NASAL DAILY
Qty: 16 G | Refills: 0 | Status: SHIPPED | OUTPATIENT
Start: 2025-06-06

## 2025-06-06 RX ORDER — DULOXETIN HYDROCHLORIDE 60 MG/1
CAPSULE, DELAYED RELEASE ORAL
Qty: 90 CAPSULE | Refills: 1 | Status: SHIPPED | OUTPATIENT
Start: 2025-06-06

## 2025-06-06 NOTE — PROGRESS NOTES
Office Note  2025  Patient Name: Selin King  MRN: [id number] : 1945    SUBJECTIVE:     CHIEF COMPLAINT:  Chief Complaint   Patient presents with    Follow-up       HISTORY OF PRESENT ILLNESS:  She presents today with the following concerns:    History of Present Illness  The patient presents for evaluation of blood pressure management, weight loss, and tailbone pain.    She reports experiencing a decline in short-term memory. She has been on a reduced dose of amlodipine, as previously advised, and her blood pressure readings have been stable. She is open to discontinuing the medication temporarily to assess its impact on her blood pressure. She typically visits the clinic every 3 months.    She has lost some weight due to a decreased appetite, often consuming only one meal per day. She has not yet tried protein drinks such as Boost or Ensure.    She reports increased pain in her tailbone, which she describes as a constant dull ache. The pain is bilateral and extends down to her heel. She continues to take Tylenol three times daily, but it does not provide relief. She also takes gabapentin, which is somewhat ineffective, but causes her to feel shaky if she tries to stop it. She finds relief from lidocaine patches applied to her tailbone.    She reports frequent urination and uses overnight pads consistently. A recent scan revealed bladder retention, leading to a change in her medication regimen. She will provide the name of the new medication at a later date. She consumes 6 to 7 Diet Pepsis daily, a habit she has maintained since childhood.       Results           Past Medical History:  Past Medical History:   Diagnosis Date    Acute gastritis without mention of hemorrhage     Acute sinusitis, unspecified     Acute upper respiratory infections of unspecified site     Allergic rhinitis, cause unspecified     Anemia, unspecified     Cervical myelopathy (HCC) 2022    Edema     Hyperlipidemia

## 2025-06-13 ENCOUNTER — OFFICE VISIT (OUTPATIENT)
Dept: ENT CLINIC | Age: 80
End: 2025-06-13
Payer: MEDICARE

## 2025-06-13 VITALS
WEIGHT: 135 LBS | TEMPERATURE: 98.5 F | SYSTOLIC BLOOD PRESSURE: 149 MMHG | DIASTOLIC BLOOD PRESSURE: 80 MMHG | BODY MASS INDEX: 23.05 KG/M2 | OXYGEN SATURATION: 97 % | HEART RATE: 65 BPM | HEIGHT: 64 IN

## 2025-06-13 DIAGNOSIS — J34.2 DNS (DEVIATED NASAL SEPTUM): ICD-10-CM

## 2025-06-13 DIAGNOSIS — R04.0 EPISTAXIS: Primary | ICD-10-CM

## 2025-06-13 DIAGNOSIS — J30.9 ALLERGIC RHINITIS, UNSPECIFIED SEASONALITY, UNSPECIFIED TRIGGER: ICD-10-CM

## 2025-06-13 DIAGNOSIS — J34.89 NASAL MUCOSA DRY: ICD-10-CM

## 2025-06-13 PROCEDURE — G8420 CALC BMI NORM PARAMETERS: HCPCS | Performed by: STUDENT IN AN ORGANIZED HEALTH CARE EDUCATION/TRAINING PROGRAM

## 2025-06-13 PROCEDURE — G8399 PT W/DXA RESULTS DOCUMENT: HCPCS | Performed by: STUDENT IN AN ORGANIZED HEALTH CARE EDUCATION/TRAINING PROGRAM

## 2025-06-13 PROCEDURE — 1123F ACP DISCUSS/DSCN MKR DOCD: CPT | Performed by: STUDENT IN AN ORGANIZED HEALTH CARE EDUCATION/TRAINING PROGRAM

## 2025-06-13 PROCEDURE — 1036F TOBACCO NON-USER: CPT | Performed by: STUDENT IN AN ORGANIZED HEALTH CARE EDUCATION/TRAINING PROGRAM

## 2025-06-13 PROCEDURE — G8427 DOCREV CUR MEDS BY ELIG CLIN: HCPCS | Performed by: STUDENT IN AN ORGANIZED HEALTH CARE EDUCATION/TRAINING PROGRAM

## 2025-06-13 PROCEDURE — 3079F DIAST BP 80-89 MM HG: CPT | Performed by: STUDENT IN AN ORGANIZED HEALTH CARE EDUCATION/TRAINING PROGRAM

## 2025-06-13 PROCEDURE — 1090F PRES/ABSN URINE INCON ASSESS: CPT | Performed by: STUDENT IN AN ORGANIZED HEALTH CARE EDUCATION/TRAINING PROGRAM

## 2025-06-13 PROCEDURE — 3077F SYST BP >= 140 MM HG: CPT | Performed by: STUDENT IN AN ORGANIZED HEALTH CARE EDUCATION/TRAINING PROGRAM

## 2025-06-13 PROCEDURE — 1159F MED LIST DOCD IN RCRD: CPT | Performed by: STUDENT IN AN ORGANIZED HEALTH CARE EDUCATION/TRAINING PROGRAM

## 2025-06-13 PROCEDURE — 99203 OFFICE O/P NEW LOW 30 MIN: CPT | Performed by: STUDENT IN AN ORGANIZED HEALTH CARE EDUCATION/TRAINING PROGRAM

## 2025-06-13 RX ORDER — LORATADINE 10 MG/1
10 TABLET ORAL DAILY
Qty: 30 TABLET | Refills: 2 | Status: SHIPPED | OUTPATIENT
Start: 2025-06-13

## 2025-06-13 RX ORDER — ECHINACEA PURPUREA EXTRACT 125 MG
2 TABLET ORAL 4 TIMES DAILY
Qty: 1 EACH | Refills: 3 | Status: SHIPPED | OUTPATIENT
Start: 2025-06-13

## 2025-06-13 RX ORDER — MUPIROCIN 2 %
OINTMENT (GRAM) TOPICAL
Qty: 1 EACH | Refills: 1 | Status: SHIPPED | OUTPATIENT
Start: 2025-06-13

## 2025-06-13 NOTE — TELEPHONE ENCOUNTER
Patient should be taking duloxetine which is Cymbalta every day Quality 110: Preventive Care And Screening: Influenza Immunization: Influenza Immunization previously received during influenza season Quality 226: Preventive Care And Screening: Tobacco Use: Screening And Cessation Intervention: Patient screened for tobacco use and is an ex/non-smoker Detail Level: Detailed Quality 431: Preventive Care And Screening: Unhealthy Alcohol Use - Screening: Patient not identified as an unhealthy alcohol user when screened for unhealthy alcohol use using a systematic screening method Quality 130: Documentation Of Current Medications In The Medical Record: Current Medications Documented

## 2025-06-13 NOTE — PATIENT INSTRUCTIONS
DR. CHNI'S NOSEBLEED INSTRUCTIONS:    - Obtain nasal saline spray over the counter. Spray nasal saline spray or gel multiple times a day (up to 5-6 times throughout the day) in each nostril to help with nasal mucosal moisturization for the next 3 weeks. You can get this over the counter. You cannot over use this!  - Place antibiotic ointment (I.e. polysporin, triple antiobiotic ointment, bactroban) to inside of both nostrils and along the nasal septum twice daily for next 3 weeks then as needed for moisturization.  - Consider humidification machine in the bedroom to help with nasal moisturization  - Avoid nasal trauma nose picking, harsh nasal blowing, rubbing nose after blowing! If you need to blow your nose blow very gently and do not harshly wipe nose afterwards.  - In case of another nosebleed: First spray AFRIN (can obtain over the counter) in affected nostril, then saturate a cottonball with Afrin and place it on the affected side and then placing unrelenting digital pressure for at least 15 minutes.  After this take the cottonball allowed and reinspected.  If still bleeding please repeat.  If Bleeding does not stop after 1 hour or you lose a large amount of blood go to emergency department.

## 2025-06-13 NOTE — PROGRESS NOTES
[facility]  DIVISION OF OTOLARYNGOLOGY- HEAD & NECK SURGERY  NEW PATIENT HISTORY AND PHYSICAL NOTE      Patient Name: Selin King  Medical Record Number:  [id number]  Primary Care Physician:  Lila De León DO    ChiefComplaint     Chief Complaint   Patient presents with    Sinus Problem    Epistaxis     Had several nosebleeds over the winter. Also has congestion and post nasal drip       History of Present Illness     Selin King is an 79 y.o. female presenting with nosebleeds over the wintertime.  She states that she had nosebleeds for approximately 4 months but these essentially stopped approximately 2 months ago.  She has allergy issues where she gets a lot of drainage out of the front and the back of her nose.  She was wiping her nose significantly in the past and had a sore in her left nostril but this seems to have cleared up as well.  She uses Flonase daily.  On baby aspirin daily, no other anticoagulants.  No history of sinonasal surgery.  She takes over-the-counter allergy medications for allergies.      Past Medical History     Past Medical History:   Diagnosis Date    Acute gastritis without mention of hemorrhage     Acute sinusitis, unspecified     Acute upper respiratory infections of unspecified site     Allergic rhinitis, cause unspecified     Anemia, unspecified     Cervical myelopathy (HCC) 02/16/2022    Edema     Hyperlipidemia     Hypertension     Loss of weight     Lumbago     Lumbosacral root lesions, not elsewhere classified     Need for prophylactic vaccination and inoculation against influenza     Neuropathy     Other bursitis disorders     Other seborrheic keratosis     Symptomatic menopausal or female climacteric states     Type II or unspecified type diabetes mellitus without mention of complication, not stated as uncontrolled     Pt is not aware of having diabetes, does know her A1C runs high but blood sugar is generally normal    Urinary retention 6/6/2025       Past

## 2025-06-16 DIAGNOSIS — M54.50 CHRONIC LOW BACK PAIN WITHOUT SCIATICA, UNSPECIFIED BACK PAIN LATERALITY: ICD-10-CM

## 2025-06-16 DIAGNOSIS — G89.4 CHRONIC PAIN SYNDROME: ICD-10-CM

## 2025-06-16 DIAGNOSIS — G89.29 CHRONIC LOW BACK PAIN WITHOUT SCIATICA, UNSPECIFIED BACK PAIN LATERALITY: ICD-10-CM

## 2025-06-16 RX ORDER — ACETAMINOPHEN AND CODEINE PHOSPHATE 300; 30 MG/1; MG/1
1 TABLET ORAL EVERY 8 HOURS PRN
Qty: 90 TABLET | Refills: 0 | Status: SHIPPED | OUTPATIENT
Start: 2025-06-16 | End: 2025-09-14

## 2025-06-16 NOTE — TELEPHONE ENCOUNTER
Medication:   Requested Prescriptions     Pending Prescriptions Disp Refills    acetaminophen-codeine (TYLENOL #3) 300-30 MG per tablet 90 tablet 0     Sig: Take 1 tablet by mouth every 8 hours as needed for Pain for up to 90 days. Max Daily Amount: 3 tablets      Last Filled:  5/12    Patient Phone Number: [phone] (home)     Last appt: 6/6/2025   Next appt: 9/8/2025    Last OARRS:       2/15/2024     3:13 PM   RX Monitoring   Periodic Controlled Substance Monitoring Possible medication side effects, risk of tolerance/dependence & alternative treatments discussed.     PDMP Monitoring:    Last PDMP [place] Reviewed ([place]):  Review User Review Instant Review Result   MAE SINGH 5/12/2025  3:29 PM Reviewed PDMP [1]     Pref[facility] Pharmacy:   [id number] [place] - [address] [phone] - F [phone]  [address] [place] [address]  Phone: [phone] Fax: [phone]

## 2025-06-30 DIAGNOSIS — G89.29 CHRONIC BILATERAL LOW BACK PAIN WITH BILATERAL SCIATICA: ICD-10-CM

## 2025-06-30 DIAGNOSIS — M54.41 CHRONIC BILATERAL LOW BACK PAIN WITH BILATERAL SCIATICA: ICD-10-CM

## 2025-06-30 DIAGNOSIS — M54.42 CHRONIC BILATERAL LOW BACK PAIN WITH BILATERAL SCIATICA: ICD-10-CM

## 2025-06-30 RX ORDER — TIZANIDINE 2 MG/1
TABLET ORAL
Qty: 30 TABLET | Refills: 0 | Status: SHIPPED | OUTPATIENT
Start: 2025-06-30

## 2025-06-30 NOTE — TELEPHONE ENCOUNTER
Medication:   Requested Prescriptions     Pending Prescriptions Disp Refills    tiZANidine (ZANAFLEX) 2 MG tablet [Pharmacy Med Name: TIZANIDINE HCL 2 MG TABLET] 30 tablet 0     Sig: TAKE 1 TABLET BY MOUTH 2 TIMES A DAY AS NEEDED FOR SCIATICA      Last Filled:  6/6/25    Patient Phone Number: [phone] (home)     Last appt: 6/6/2025   Next appt: 9/8/2025    Last OARRS:       2/15/2024     3:13 PM   RX Monitoring   Periodic Controlled Substance Monitoring Possible medication side effects, risk of tolerance/dependence & alternative treatments discussed.     PDMP Monitoring:    Last PDMP [place]ed ([place]):  Review User Review Instant Review Result   MAE SINGH 5/12/2025  3:29 PM Reviewed PDMP [1]     [facility] Pharmacy:   [id number] - [place] - [address] [phone] - F [phone]  [address] [place] [address]  Phone: [phone] Fax: [phone]

## 2025-07-14 DIAGNOSIS — G89.4 CHRONIC PAIN SYNDROME: ICD-10-CM

## 2025-07-14 DIAGNOSIS — M54.50 CHRONIC LOW BACK PAIN WITHOUT SCIATICA, UNSPECIFIED BACK PAIN LATERALITY: ICD-10-CM

## 2025-07-14 DIAGNOSIS — G89.29 CHRONIC LOW BACK PAIN WITHOUT SCIATICA, UNSPECIFIED BACK PAIN LATERALITY: ICD-10-CM

## 2025-07-14 NOTE — TELEPHONE ENCOUNTER
Patient called in requesting a refill on   acetaminophen-codeine (TYLENOL #3) 300-30 MG per tablet to be sent to  [facility] [id number] - [place] [address]

## 2025-07-15 RX ORDER — ACETAMINOPHEN AND CODEINE PHOSPHATE 300; 30 MG/1; MG/1
1 TABLET ORAL EVERY 8 HOURS PRN
Qty: 90 TABLET | Refills: 0 | Status: SHIPPED | OUTPATIENT
Start: 2025-07-15 | End: 2025-08-14

## 2025-07-15 NOTE — TELEPHONE ENCOUNTER
Medication:   Requested Prescriptions     Pending Prescriptions Disp Refills    acetaminophen-codeine (TYLENOL #3) 300-30 MG per tablet [Pharmacy Med Name: ACETAMINOPHEN-COD #3 TABLET] 90 tablet      Sig: TAKE 1 TABLET BY MOUTH EVERY 8 HOURS AS NEEDED FOR PAIN ** REDUCE DOSES TAKEN AS PAIN BECOMES MANAGEABLE**      Last Filled:  6/16/25    Patient Phone Number: [phone] (home)     Last appt: 6/6/2025   Next appt: 9/8/2025    Last OARRS:       2/15/2024     3:13 PM   RX Monitoring   Periodic Controlled Substance Monitoring Possible medication side effects, risk of tolerance/dependence & alternative treatments discussed.     PDMP Monitoring:    Last PDMP [place]ed ([place]):  Review User Review Instant Review Result   MAE SINGH 5/12/2025  3:29 PM Reviewed PDMP [1]     Preferred Pharmacy:   [facility] [id number] - [place], [place] - [address] [phone] - F [phone]  [address]  [place] [address]  Phone: [phone] Fax: [phone]

## 2025-07-28 DIAGNOSIS — G89.29 CHRONIC BILATERAL LOW BACK PAIN WITH BILATERAL SCIATICA: ICD-10-CM

## 2025-07-28 DIAGNOSIS — M54.42 CHRONIC BILATERAL LOW BACK PAIN WITH BILATERAL SCIATICA: ICD-10-CM

## 2025-07-28 DIAGNOSIS — M54.41 CHRONIC BILATERAL LOW BACK PAIN WITH BILATERAL SCIATICA: ICD-10-CM

## 2025-07-28 RX ORDER — TIZANIDINE 2 MG/1
TABLET ORAL
Qty: 30 TABLET | Refills: 0 | Status: SHIPPED | OUTPATIENT
Start: 2025-07-28

## 2025-07-28 NOTE — TELEPHONE ENCOUNTER
Medication:   Requested Prescriptions     Pending Prescriptions Disp Refills    tiZANidine (ZANAFLEX) 2 MG tablet [Pharmacy Med Name: TIZANIDINE HCL 2 MG TABLET] 30 tablet 0     Sig: TAKE 1 TABLET BY MOUTH 2 TIMES A DAY AS NEEDED FOR SCIATICA      Last Filled:  6/30/2025    Patient Phone Number: [phone] (home)     Last appt: 6/6/2025   Next appt: 9/8/2025    Last OARRS:       2/15/2024     3:13 PM   RX Monitoring   Periodic Controlled Substance Monitoring Possible medication side effects, risk of tolerance/dependence & alternative treatments discussed.     PDMP Monitoring:    Last PDMP [place]ed ([place]):  Review User Review Instant Review Result   MAE SINGH 5/12/2025  3:29 PM Reviewed PDMP [1]     Pref[facility] Pharmacy:   [id number] [place] - [address] [phone] - F [phone]  [address] [place] [address]  Phone: [phone] Fax: [phone]

## 2025-08-11 DIAGNOSIS — G89.29 CHRONIC BILATERAL LOW BACK PAIN WITH BILATERAL SCIATICA: ICD-10-CM

## 2025-08-11 DIAGNOSIS — M54.42 CHRONIC BILATERAL LOW BACK PAIN WITH BILATERAL SCIATICA: ICD-10-CM

## 2025-08-11 DIAGNOSIS — M54.41 CHRONIC BILATERAL LOW BACK PAIN WITH BILATERAL SCIATICA: ICD-10-CM

## 2025-08-11 RX ORDER — TIZANIDINE 2 MG/1
TABLET ORAL
Qty: 30 TABLET | Refills: 0 | Status: SHIPPED | OUTPATIENT
Start: 2025-08-11

## 2025-08-13 DIAGNOSIS — M54.50 CHRONIC LOW BACK PAIN WITHOUT SCIATICA, UNSPECIFIED BACK PAIN LATERALITY: ICD-10-CM

## 2025-08-13 DIAGNOSIS — G89.29 CHRONIC LOW BACK PAIN WITHOUT SCIATICA, UNSPECIFIED BACK PAIN LATERALITY: ICD-10-CM

## 2025-08-13 DIAGNOSIS — G89.4 CHRONIC PAIN SYNDROME: ICD-10-CM

## 2025-08-13 RX ORDER — ACETAMINOPHEN AND CODEINE PHOSPHATE 300; 30 MG/1; MG/1
1 TABLET ORAL EVERY 8 HOURS PRN
Qty: 90 TABLET | Refills: 0 | Status: SHIPPED | OUTPATIENT
Start: 2025-08-13 | End: 2025-09-12

## 2025-09-02 DIAGNOSIS — M54.42 CHRONIC BILATERAL LOW BACK PAIN WITH BILATERAL SCIATICA: ICD-10-CM

## 2025-09-02 DIAGNOSIS — M54.41 CHRONIC BILATERAL LOW BACK PAIN WITH BILATERAL SCIATICA: ICD-10-CM

## 2025-09-02 DIAGNOSIS — G89.29 CHRONIC BILATERAL LOW BACK PAIN WITH BILATERAL SCIATICA: ICD-10-CM

## 2025-09-02 RX ORDER — TIZANIDINE 2 MG/1
TABLET ORAL
Qty: 30 TABLET | Refills: 0 | Status: SHIPPED | OUTPATIENT
Start: 2025-09-02

## 2025-09-03 ENCOUNTER — TELEPHONE (OUTPATIENT)
Dept: FAMILY MEDICINE CLINIC | Age: 80
End: 2025-09-03

## 2025-09-03 DIAGNOSIS — E11.40 TYPE 2 DIABETES MELLITUS WITH DIABETIC NEUROPATHY, WITHOUT LONG-TERM CURRENT USE OF INSULIN (HCC): Primary | ICD-10-CM

## 2025-09-06 ENCOUNTER — HOSPITAL ENCOUNTER (OUTPATIENT)
Age: 80
Discharge: HOME | End: 2025-09-06
Payer: MEDICARE

## 2025-09-06 DIAGNOSIS — E11.40 TYPE 2 DIABETES MELLITUS WITH DIABETIC NEUROPATHY, WITHOUT LONG-TERM CURRENT USE OF INSULIN (HCC): ICD-10-CM

## 2025-09-06 LAB
ALBUMIN SERPL-MCNC: 4 G/DL (ref 3.4–5)
ALBUMIN/GLOB SERPL: 1.5 {RATIO} (ref 1.1–2.2)
ALP SERPL-CCNC: 74 U/L (ref 40–129)
ALT SERPL-CCNC: 32 U/L (ref 10–40)
ANION GAP SERPL CALCULATED.3IONS-SCNC: 9 MMOL/L (ref 3–16)
AST SERPL-CCNC: 35 U/L (ref 15–37)
BILIRUB SERPL-MCNC: <0.2 MG/DL (ref 0–1)
BUN SERPL-MCNC: 12 MG/DL (ref 7–20)
CALCIUM SERPL-MCNC: 10.5 MG/DL (ref 8.3–10.6)
CHLORIDE SERPL-SCNC: 106 MMOL/L (ref 99–110)
CO2 SERPL-SCNC: 25 MMOL/L (ref 21–32)
CREAT SERPL-MCNC: 0.9 MG/DL (ref 0.6–1.2)
GFR SERPLBLD CREATININE-BSD FMLA CKD-EPI: 64 ML/MIN/{1.73_M2}
GLUCOSE SERPL-MCNC: 94 MG/DL (ref 70–99)
POTASSIUM SERPL-SCNC: 4.9 MMOL/L (ref 3.5–5.1)
PROT SERPL-MCNC: 6.7 G/DL (ref 6.4–8.2)
SODIUM SERPL-SCNC: 140 MMOL/L (ref 136–145)

## 2025-09-06 PROCEDURE — 36415 COLL VENOUS BLD VENIPUNCTURE: CPT

## 2025-09-06 PROCEDURE — 80053 COMPREHEN METABOLIC PANEL: CPT

## 2025-09-06 PROCEDURE — 83036 HEMOGLOBIN GLYCOSYLATED A1C: CPT

## 2025-09-07 LAB
EST. AVERAGE GLUCOSE BLD GHB EST-MCNC: 111.2 MG/DL
HBA1C MFR BLD: 5.5 %

## (undated) DEVICE — DRAPE,LAP,CHOLE,W/TROUGHS,STERILE: Brand: MEDLINE

## (undated) DEVICE — TRAY PREP DRY W/ PREM GLV 2 APPL 6 SPNG 2 UNDPD 1 OVERWRAP

## (undated) DEVICE — BANDAGE COMPR W6INXL10YD ST M E WHITE/BEIGE

## (undated) DEVICE — GLOVE ORANGE PI 8 1/2   MSG9085

## (undated) DEVICE — SUTURE ETHLN SZ 4-0 L18IN NONABSORBABLE BLK L19MM PS-2 3/8 1667H

## (undated) DEVICE — SUTURE VCRL SZ 2-0 L18IN ABSRB UD CT-1 L36MM 1/2 CIR J839D

## (undated) DEVICE — MERCY FAIRFIELD TURNOVER KIT: Brand: MEDLINE INDUSTRIES, INC.

## (undated) DEVICE — PACK PROCEDURE SURG EXTREMITY MFFOP CUST

## (undated) DEVICE — CHLORAPREP 26ML ORANGE

## (undated) DEVICE — Device

## (undated) DEVICE — DRESSING,GAUZE,XEROFORM,CURAD,1"X8",ST: Brand: CURAD

## (undated) DEVICE — COVER LT HNDL BLU PLAS

## (undated) DEVICE — PADDING CAST W3INXL4YD COT BLEND MIC PLEAT UNDERCAST SPEC

## (undated) DEVICE — SYRINGE, LUER LOCK, 10ML: Brand: MEDLINE

## (undated) DEVICE — STAPLER SKIN H3.9MM WIRE DIA0.58MM CRWN 6.9MM 35 STPL ROT

## (undated) DEVICE — TOWEL,OR,DSP,ST,BLUE,STD,4/PK,20PK/CS: Brand: MEDLINE

## (undated) DEVICE — 3.0MM PRECISION NEURO (MATCH HEAD)

## (undated) DEVICE — DRAPE MICSCP W132XL406CM LENS DIA68MM W VARI LENS2 FOR LEICA

## (undated) DEVICE — NEEDLE SPNL L3.5IN PNK HUB S STL REG WALL FIT STYL W/ QNCKE

## (undated) DEVICE — SUTURE VCRL SZ 3-0 L18IN ABSRB UD L26MM SH 1/2 CIR J864D

## (undated) DEVICE — NEEDLE HYPO 22GA L1.5IN BLK POLYPR HUB S STL REG BVL STR

## (undated) DEVICE — DRAPE HND W114XL142IN BLU POLYPR W O PCH FEN CRD AND TB HLDR

## (undated) DEVICE — SURGICAL SUCTION CONNECTING TUBE WITH MALE CONNECTOR AND SUCTION CLAMP, 2 FT. LONG (.6 M), 5 MM I.D.: Brand: CONMED

## (undated) DEVICE — PROTECTOR EYE PT SELF ADH NS OPT GRD LF

## (undated) DEVICE — APPLICATOR PREP 26ML 0.7% IOD POVACRYLEX 74% ISO ALC ST

## (undated) DEVICE — SOLUTION IV IRRIG WATER 1000ML POUR BRL 2F7114

## (undated) DEVICE — SOLUTION IRRIG 1000ML 0.9% SOD CHL USP POUR PLAS BTL

## (undated) DEVICE — NEURO HBO 85949

## (undated) DEVICE — ELECTRODE PT RET AD L9FT HI MOIST COND ADH HYDRGEL CORDED

## (undated) DEVICE — GAUZE,SPONGE,4"X4",8PLY,STRL,LF,10/TRAY: Brand: MEDLINE

## (undated) DEVICE — TOTAL TRAY, DB, 100% SILI FOLEY, 16FR 10: Brand: MEDLINE

## (undated) DEVICE — SUTURE VCRL SZ 0 L18IN ABSRB UD L36MM CT-1 1/2 CIR J840D

## (undated) DEVICE — SURE SET SINGLE BASIN-LF: Brand: MEDLINE INDUSTRIES, INC.

## (undated) DEVICE — LOTION PREP REMV 5OZ IODO CLR TINC OF BENZ DURAPREP

## (undated) DEVICE — 3 ML SYRINGE LUER-LOCK TIP: Brand: MONOJECT

## (undated) DEVICE — ZIMMER® STERILE DISPOSABLE TOURNIQUET CUFF WITH PLC, DUAL PORT, SINGLE BLADDER, 18 IN. (46 CM)

## (undated) DEVICE — SHEET,DRAPE,53X77,STERILE: Brand: MEDLINE

## (undated) DEVICE — C-ARM: Brand: UNBRANDED

## (undated) DEVICE — SUTURE MCRYL SZ 4-0 L18IN ABSRB UD P-3 L13MM 3/8 CIR PRIM Y494G

## (undated) DEVICE — INTENDED FOR TISSUE SEPARATION, AND OTHER PROCEDURES THAT REQUIRE A SHARP SURGICAL BLADE TO PUNCTURE OR CUT.: Brand: BARD-PARKER ® STAINLESS STEEL BLADES

## (undated) DEVICE — HYPODERMIC SAFETY NEEDLE: Brand: MAGELLAN

## (undated) DEVICE — DRAPE THER FLUID WARMING 66X44 IN FLAT SLUSH DBL DISC ORS

## (undated) DEVICE — GOWNS CAPE 3 PLY WHT DISPOSABLE 30 X 42INCH

## (undated) DEVICE — STERILE LATEX POWDER-FREE SURGICAL GLOVESWITH NITRILE AND EMOLLIENT COATINGS: Brand: PROTEXIS

## (undated) DEVICE — BANDAGE COMPR W4INXL12FT E DISP ESMARCH EVEN

## (undated) DEVICE — DRAPE,U/ SHT,SPLIT,PLAS,STERIL: Brand: MEDLINE

## (undated) DEVICE — 3M™ IOBAN™ 2 ANTIMICROBIAL INCISE DRAPE 6650EZ: Brand: IOBAN™ 2